# Patient Record
Sex: FEMALE | Race: WHITE | NOT HISPANIC OR LATINO | Employment: OTHER | URBAN - NONMETROPOLITAN AREA
[De-identification: names, ages, dates, MRNs, and addresses within clinical notes are randomized per-mention and may not be internally consistent; named-entity substitution may affect disease eponyms.]

---

## 2023-03-10 LAB — SARS-COV-2 RESULT: NOT DETECTED

## 2023-03-24 DIAGNOSIS — I10 HYPERTENSION, UNSPECIFIED TYPE: ICD-10-CM

## 2023-03-24 DIAGNOSIS — E78.5 HYPERLIPIDEMIA, UNSPECIFIED HYPERLIPIDEMIA TYPE: ICD-10-CM

## 2023-03-24 DIAGNOSIS — E03.9 HYPOTHYROIDISM, UNSPECIFIED TYPE: ICD-10-CM

## 2023-03-24 RX ORDER — LOSARTAN POTASSIUM 100 MG/1
100 TABLET ORAL DAILY
Qty: 90 TABLET | Refills: 0 | Status: SHIPPED | OUTPATIENT
Start: 2023-03-24 | End: 2023-06-13

## 2023-03-24 RX ORDER — ROSUVASTATIN CALCIUM 40 MG/1
40 TABLET, COATED ORAL DAILY
Qty: 90 TABLET | Refills: 0 | Status: SHIPPED | OUTPATIENT
Start: 2023-03-24 | End: 2023-06-13

## 2023-03-24 RX ORDER — CHOLECALCIFEROL (VITAMIN D3) 25 MCG
25 TABLET ORAL DAILY
COMMUNITY

## 2023-03-24 RX ORDER — ROSUVASTATIN CALCIUM 40 MG/1
40 TABLET, COATED ORAL DAILY
COMMUNITY
Start: 2017-08-07 | End: 2023-03-24 | Stop reason: SDUPTHER

## 2023-03-24 RX ORDER — CALCIUM CARBONATE 600 MG
1 TABLET ORAL DAILY
COMMUNITY
End: 2023-10-12 | Stop reason: ALTCHOICE

## 2023-03-24 RX ORDER — ACETAMINOPHEN 500 MG
1 TABLET ORAL NIGHTLY
COMMUNITY

## 2023-03-24 RX ORDER — DIPHENOXYLATE HYDROCHLORIDE AND ATROPINE SULFATE 2.5; .025 MG/1; MG/1
TABLET ORAL
COMMUNITY
Start: 2022-12-27 | End: 2023-09-14 | Stop reason: ALTCHOICE

## 2023-03-24 RX ORDER — LEVOTHYROXINE SODIUM 88 UG/1
88 TABLET ORAL
COMMUNITY
End: 2023-03-24 | Stop reason: SDUPTHER

## 2023-03-24 RX ORDER — ASPIRIN 81 MG/1
1 TABLET ORAL DAILY
COMMUNITY
End: 2023-09-14 | Stop reason: ALTCHOICE

## 2023-03-24 RX ORDER — FENOFIBRATE 160 MG/1
160 TABLET ORAL DAILY
Qty: 90 TABLET | Refills: 0 | Status: SHIPPED | OUTPATIENT
Start: 2023-03-24 | End: 2023-06-13

## 2023-03-24 RX ORDER — FENOFIBRATE 160 MG/1
1 TABLET ORAL DAILY
COMMUNITY
End: 2023-03-24 | Stop reason: SDUPTHER

## 2023-03-24 RX ORDER — BLOOD SUGAR DIAGNOSTIC
STRIP MISCELLANEOUS 4 TIMES DAILY
COMMUNITY
Start: 2016-03-07 | End: 2023-10-06 | Stop reason: ALTCHOICE

## 2023-03-24 RX ORDER — DOCUSATE SODIUM 100 MG/1
CAPSULE, LIQUID FILLED ORAL
COMMUNITY
Start: 2022-12-28 | End: 2023-09-14 | Stop reason: ALTCHOICE

## 2023-03-24 RX ORDER — LOSARTAN POTASSIUM 100 MG/1
100 TABLET ORAL DAILY
COMMUNITY
End: 2023-03-24 | Stop reason: SDUPTHER

## 2023-03-24 RX ORDER — INSULIN LISPRO 100 [IU]/ML
10-20 INJECTION, SOLUTION INTRAVENOUS; SUBCUTANEOUS
COMMUNITY
Start: 2017-11-13 | End: 2023-11-09 | Stop reason: SDUPTHER

## 2023-03-24 RX ORDER — GABAPENTIN 100 MG/1
100 CAPSULE ORAL 3 TIMES DAILY
COMMUNITY
Start: 2023-02-08 | End: 2023-09-14 | Stop reason: ALTCHOICE

## 2023-03-24 RX ORDER — LISINOPRIL 10 MG/1
10 TABLET ORAL DAILY
COMMUNITY
Start: 2017-09-19 | End: 2024-01-17

## 2023-03-24 RX ORDER — INSULIN DEGLUDEC 100 U/ML
40 INJECTION, SOLUTION SUBCUTANEOUS NIGHTLY
COMMUNITY
End: 2023-09-14 | Stop reason: ALTCHOICE

## 2023-03-24 RX ORDER — LEVOTHYROXINE SODIUM 88 UG/1
88 TABLET ORAL
Qty: 90 TABLET | Refills: 0 | Status: SHIPPED | OUTPATIENT
Start: 2023-03-24 | End: 2023-06-13

## 2023-03-29 LAB
ALANINE AMINOTRANSFERASE (SGPT) (U/L) IN SER/PLAS: 9 U/L (ref 7–45)
ALBUMIN (G/DL) IN SER/PLAS: 3.9 G/DL (ref 3.4–5)
ALKALINE PHOSPHATASE (U/L) IN SER/PLAS: 48 U/L (ref 33–136)
ANION GAP IN SER/PLAS: 15 MMOL/L (ref 10–20)
ASPARTATE AMINOTRANSFERASE (SGOT) (U/L) IN SER/PLAS: 12 U/L (ref 9–39)
BASOPHILS (10*3/UL) IN BLOOD BY AUTOMATED COUNT: 0.1 X10E9/L (ref 0–0.1)
BASOPHILS/100 LEUKOCYTES IN BLOOD BY AUTOMATED COUNT: 1.2 % (ref 0–2)
BILIRUBIN TOTAL (MG/DL) IN SER/PLAS: 0.4 MG/DL (ref 0–1.2)
CALCIUM (MG/DL) IN SER/PLAS: 9.3 MG/DL (ref 8.6–10.3)
CARBON DIOXIDE, TOTAL (MMOL/L) IN SER/PLAS: 21 MMOL/L (ref 21–32)
CHLORIDE (MMOL/L) IN SER/PLAS: 105 MMOL/L (ref 98–107)
CREATININE (MG/DL) IN SER/PLAS: 0.93 MG/DL (ref 0.5–1.05)
EOSINOPHILS (10*3/UL) IN BLOOD BY AUTOMATED COUNT: 0.79 X10E9/L (ref 0–0.4)
EOSINOPHILS/100 LEUKOCYTES IN BLOOD BY AUTOMATED COUNT: 9.8 % (ref 0–6)
ERYTHROCYTE DISTRIBUTION WIDTH (RATIO) BY AUTOMATED COUNT: 17.2 % (ref 11.5–14.5)
ERYTHROCYTE MEAN CORPUSCULAR HEMOGLOBIN CONCENTRATION (G/DL) BY AUTOMATED: 30.7 G/DL (ref 32–36)
ERYTHROCYTE MEAN CORPUSCULAR VOLUME (FL) BY AUTOMATED COUNT: 92 FL (ref 80–100)
ERYTHROCYTES (10*6/UL) IN BLOOD BY AUTOMATED COUNT: 3.06 X10E12/L (ref 4–5.2)
GFR FEMALE: 65 ML/MIN/1.73M2
GLUCOSE (MG/DL) IN SER/PLAS: 180 MG/DL (ref 74–99)
HEMATOCRIT (%) IN BLOOD BY AUTOMATED COUNT: 28.3 % (ref 36–46)
HEMOGLOBIN (G/DL) IN BLOOD: 8.7 G/DL (ref 12–16)
IMMATURE GRANULOCYTES/100 LEUKOCYTES IN BLOOD BY AUTOMATED COUNT: 0.5 % (ref 0–0.9)
LEUKOCYTES (10*3/UL) IN BLOOD BY AUTOMATED COUNT: 8.1 X10E9/L (ref 4.4–11.3)
LYMPHOCYTES (10*3/UL) IN BLOOD BY AUTOMATED COUNT: 3.22 X10E9/L (ref 0.8–3)
LYMPHOCYTES/100 LEUKOCYTES IN BLOOD BY AUTOMATED COUNT: 40 % (ref 13–44)
MONOCYTES (10*3/UL) IN BLOOD BY AUTOMATED COUNT: 0.79 X10E9/L (ref 0.05–0.8)
MONOCYTES/100 LEUKOCYTES IN BLOOD BY AUTOMATED COUNT: 9.8 % (ref 2–10)
NEUTROPHILS (10*3/UL) IN BLOOD BY AUTOMATED COUNT: 3.12 X10E9/L (ref 1.6–5.5)
NEUTROPHILS/100 LEUKOCYTES IN BLOOD BY AUTOMATED COUNT: 38.7 % (ref 40–80)
PLATELETS (10*3/UL) IN BLOOD AUTOMATED COUNT: 562 X10E9/L (ref 150–450)
POTASSIUM (MMOL/L) IN SER/PLAS: 4.5 MMOL/L (ref 3.5–5.3)
PROTEIN TOTAL: 7 G/DL (ref 6.4–8.2)
SODIUM (MMOL/L) IN SER/PLAS: 136 MMOL/L (ref 136–145)
UREA NITROGEN (MG/DL) IN SER/PLAS: 25 MG/DL (ref 6–23)

## 2023-03-30 LAB — CANCER AG 125 (U/ML) IN SERUM: 89.6 U/ML (ref 0–30.2)

## 2023-04-05 ENCOUNTER — OFFICE VISIT (OUTPATIENT)
Dept: PRIMARY CARE | Facility: CLINIC | Age: 73
End: 2023-04-05
Payer: MEDICARE

## 2023-04-05 VITALS
OXYGEN SATURATION: 96 % | HEART RATE: 86 BPM | BODY MASS INDEX: 26.67 KG/M2 | WEIGHT: 155.4 LBS | SYSTOLIC BLOOD PRESSURE: 104 MMHG | DIASTOLIC BLOOD PRESSURE: 60 MMHG

## 2023-04-05 DIAGNOSIS — I10 HYPERTENSION, UNSPECIFIED TYPE: ICD-10-CM

## 2023-04-05 DIAGNOSIS — E11.69 TYPE 2 DIABETES MELLITUS WITH OTHER SPECIFIED COMPLICATION, WITH LONG-TERM CURRENT USE OF INSULIN (MULTI): ICD-10-CM

## 2023-04-05 DIAGNOSIS — C56.9 PRIMARY MALIGNANT NEOPLASM OF OVARY WITH WIDESPREAD METASTATIC DISEASE, UNSPECIFIED LATERALITY (MULTI): Primary | ICD-10-CM

## 2023-04-05 DIAGNOSIS — E03.9 HYPOTHYROIDISM, UNSPECIFIED TYPE: ICD-10-CM

## 2023-04-05 DIAGNOSIS — C80.0 PRIMARY MALIGNANT NEOPLASM OF OVARY WITH WIDESPREAD METASTATIC DISEASE, UNSPECIFIED LATERALITY (MULTI): Primary | ICD-10-CM

## 2023-04-05 DIAGNOSIS — I25.10 CAD, MULTIPLE VESSEL: ICD-10-CM

## 2023-04-05 DIAGNOSIS — Z79.4 TYPE 2 DIABETES MELLITUS WITH OTHER SPECIFIED COMPLICATION, WITH LONG-TERM CURRENT USE OF INSULIN (MULTI): ICD-10-CM

## 2023-04-05 PROBLEM — R93.1 ABNORMAL SCREENING CARDIAC CT: Status: ACTIVE | Noted: 2023-04-05

## 2023-04-05 PROBLEM — R39.9 URINARY SYMPTOM OR SIGN: Status: ACTIVE | Noted: 2023-04-05

## 2023-04-05 PROBLEM — R23.4 FISSURE IN SKIN OF FOOT: Status: ACTIVE | Noted: 2023-04-05

## 2023-04-05 PROBLEM — E11.9 TYPE 2 DIABETES MELLITUS (MULTI): Status: ACTIVE | Noted: 2023-04-05

## 2023-04-05 PROBLEM — M19.90 OSTEOARTHRITIS: Status: ACTIVE | Noted: 2023-04-05

## 2023-04-05 PROBLEM — E66.3 OVERWEIGHT WITH BODY MASS INDEX (BMI) OF 28 TO 28.9 IN ADULT: Status: ACTIVE | Noted: 2023-04-05

## 2023-04-05 PROBLEM — M25.539 PAIN, WRIST JOINT: Status: ACTIVE | Noted: 2023-04-05

## 2023-04-05 PROBLEM — B35.1 PAIN DUE TO ONYCHOMYCOSIS OF TOENAILS OF BOTH FEET: Status: ACTIVE | Noted: 2023-04-05

## 2023-04-05 PROBLEM — E55.9 VITAMIN D DEFICIENCY: Status: ACTIVE | Noted: 2023-04-05

## 2023-04-05 PROBLEM — M79.675 PAIN DUE TO ONYCHOMYCOSIS OF TOENAILS OF BOTH FEET: Status: ACTIVE | Noted: 2023-04-05

## 2023-04-05 PROBLEM — B35.3 TINEA PEDIS OF BOTH FEET: Status: ACTIVE | Noted: 2023-04-05

## 2023-04-05 PROBLEM — T75.3XXA MOTION SICKNESS: Status: ACTIVE | Noted: 2023-04-05

## 2023-04-05 PROBLEM — E78.5 HYPERLIPIDEMIA: Status: ACTIVE | Noted: 2023-04-05

## 2023-04-05 PROBLEM — M79.674 PAIN DUE TO ONYCHOMYCOSIS OF TOENAILS OF BOTH FEET: Status: ACTIVE | Noted: 2023-04-05

## 2023-04-05 PROCEDURE — 3008F BODY MASS INDEX DOCD: CPT | Performed by: INTERNAL MEDICINE

## 2023-04-05 PROCEDURE — 3078F DIAST BP <80 MM HG: CPT | Performed by: INTERNAL MEDICINE

## 2023-04-05 PROCEDURE — 3066F NEPHROPATHY DOC TX: CPT | Performed by: INTERNAL MEDICINE

## 2023-04-05 PROCEDURE — 1036F TOBACCO NON-USER: CPT | Performed by: INTERNAL MEDICINE

## 2023-04-05 PROCEDURE — 3074F SYST BP LT 130 MM HG: CPT | Performed by: INTERNAL MEDICINE

## 2023-04-05 PROCEDURE — 4010F ACE/ARB THERAPY RXD/TAKEN: CPT | Performed by: INTERNAL MEDICINE

## 2023-04-05 PROCEDURE — 99215 OFFICE O/P EST HI 40 MIN: CPT | Performed by: INTERNAL MEDICINE

## 2023-04-05 PROCEDURE — 1159F MED LIST DOCD IN RCRD: CPT | Performed by: INTERNAL MEDICINE

## 2023-04-05 PROCEDURE — 1160F RVW MEDS BY RX/DR IN RCRD: CPT | Performed by: INTERNAL MEDICINE

## 2023-04-05 RX ORDER — TRAMADOL HYDROCHLORIDE 50 MG/1
TABLET ORAL
COMMUNITY
Start: 2023-03-24 | End: 2023-10-06 | Stop reason: ALTCHOICE

## 2023-04-05 RX ORDER — PEN NEEDLE, DIABETIC 30 GX3/16"
NEEDLE, DISPOSABLE MISCELLANEOUS
COMMUNITY

## 2023-04-05 RX ORDER — SYRINGE,SAFETY WITH NEEDLE,1ML 25GX1"
SYRINGE (EA) MISCELLANEOUS DAILY
COMMUNITY
Start: 2017-10-16 | End: 2023-09-14 | Stop reason: ALTCHOICE

## 2023-04-05 RX ORDER — GLUCAGON INJECTION, SOLUTION 1 MG/.2ML
INJECTION, SOLUTION SUBCUTANEOUS
COMMUNITY

## 2023-04-05 RX ORDER — METFORMIN HYDROCHLORIDE 500 MG/1
500 TABLET, EXTENDED RELEASE ORAL 2 TIMES DAILY
COMMUNITY
End: 2024-04-09 | Stop reason: SDUPTHER

## 2023-04-05 RX ORDER — MECLIZINE HYDROCHLORIDE 25 MG/1
TABLET ORAL
COMMUNITY
Start: 2023-04-04 | End: 2023-09-14 | Stop reason: ALTCHOICE

## 2023-04-05 ASSESSMENT — ENCOUNTER SYMPTOMS
OCCASIONAL FEELINGS OF UNSTEADINESS: 0
LOSS OF SENSATION IN FEET: 0
DEPRESSION: 1

## 2023-04-05 NOTE — PROGRESS NOTES
Subjective   Patient ID: Catalina Mendoza is a 72 y.o. female who presents for Follow-up (3 mth medical management ).  HPI    Patient presented for post hospital follow-up.    Patient had surgery for stage III ovarian cancer on March 13, 2023.  Currently in chemo.  Oncology following  Ultrasound for RUQ pain tomorrow     DM  this morning on insulin no side effects.  Endocrine following.    Hypercholesterolemia on therapy no side effects    Hypothyroid on therapy no side effects    Hypertension stable on therapy no side effects    CAD Hypercholesterolemia on therapy no side effects    Diet and exercise reviewed      Review of Systems   All other systems reviewed and are negative.      Objective   Physical Exam  Vitals reviewed.   Constitutional:       Appearance: Normal appearance. She is obese.   HENT:      Head: Normocephalic and atraumatic.      Mouth/Throat:      Pharynx: No posterior oropharyngeal erythema.   Eyes:      General: No scleral icterus.     Conjunctiva/sclera: Conjunctivae normal.      Pupils: Pupils are equal, round, and reactive to light.   Cardiovascular:      Rate and Rhythm: Normal rate and regular rhythm.      Heart sounds: Normal heart sounds.   Pulmonary:      Effort: No respiratory distress.      Breath sounds: No wheezing.   Abdominal:      General: Abdomen is flat. Bowel sounds are normal. There is no distension.      Palpations: Abdomen is soft. There is no mass.      Tenderness: There is no abdominal tenderness. There is no rebound.      Comments: Incision clean and dry   Musculoskeletal:         General: Normal range of motion.      Cervical back: Normal range of motion and neck supple.   Skin:     General: Skin is warm and dry.   Neurological:      General: No focal deficit present.      Mental Status: She is alert and oriented to person, place, and time. Mental status is at baseline.   Psychiatric:         Mood and Affect: Mood normal.         Behavior: Behavior normal.          Thought Content: Thought content normal.         Judgment: Judgment normal.         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  Currently in chemo.  Oncology following  Ultrasound for RUQ pain tomorrow     DM  this morning on insulin no side effects.  Endocrine following.    Hypercholesterolemia on therapy no side effects    Hypothyroid on therapy no side effects    Hypertension stable on therapy no side effects    CAD Hypercholesterolemia on therapy no side effects    Diet and exercise reviewed    Follow up 3 months

## 2023-05-23 LAB
ESTIMATED AVERAGE GLUCOSE FOR HBA1C: 166 MG/DL
HEMOGLOBIN A1C/HEMOGLOBIN TOTAL IN BLOOD: 7.4 %
THYROTROPIN (MIU/L) IN SER/PLAS BY DETECTION LIMIT <= 0.05 MIU/L: 1.63 MIU/L (ref 0.44–3.98)

## 2023-06-02 PROBLEM — E11.311 DIABETIC MACULAR EDEMA (MULTI): Status: ACTIVE | Noted: 2019-05-10

## 2023-06-02 PROBLEM — E11.9 DIABETES MELLITUS (MULTI): Status: ACTIVE | Noted: 2023-06-02

## 2023-06-02 PROBLEM — E78.2 MIXED HYPERLIPIDEMIA: Status: ACTIVE | Noted: 2019-05-10

## 2023-06-02 PROBLEM — E55.9 VITAMIN D DEFICIENCY: Status: ACTIVE | Noted: 2019-05-10

## 2023-06-02 PROBLEM — E03.9 ACQUIRED HYPOTHYROIDISM: Status: ACTIVE | Noted: 2019-05-10

## 2023-06-02 PROBLEM — E10.21 DIABETIC NEPHROPATHY ASSOCIATED WITH TYPE 1 DIABETES MELLITUS (MULTI): Status: ACTIVE | Noted: 2020-07-10

## 2023-06-02 RX ORDER — DEXTROSE 4 G
TABLET,CHEWABLE ORAL
COMMUNITY
End: 2023-10-06 | Stop reason: ALTCHOICE

## 2023-06-06 LAB
GENETICS TEST NAME-DATA CONVERSION: NORMAL
LAB MOLECULAR CA TECHNICAL NOTES: NORMAL

## 2023-06-08 ENCOUNTER — OFFICE VISIT (OUTPATIENT)
Dept: PRIMARY CARE | Facility: CLINIC | Age: 73
End: 2023-06-08
Payer: MEDICARE

## 2023-06-08 VITALS
BODY MASS INDEX: 27.29 KG/M2 | SYSTOLIC BLOOD PRESSURE: 120 MMHG | HEART RATE: 70 BPM | WEIGHT: 159 LBS | DIASTOLIC BLOOD PRESSURE: 70 MMHG | OXYGEN SATURATION: 99 %

## 2023-06-08 DIAGNOSIS — I25.10 CAD, MULTIPLE VESSEL: ICD-10-CM

## 2023-06-08 DIAGNOSIS — C56.9 PRIMARY MALIGNANT NEOPLASM OF OVARY WITH WIDESPREAD METASTATIC DISEASE, UNSPECIFIED LATERALITY (MULTI): Primary | ICD-10-CM

## 2023-06-08 DIAGNOSIS — E78.2 MIXED HYPERLIPIDEMIA: ICD-10-CM

## 2023-06-08 DIAGNOSIS — I10 HYPERTENSION, UNSPECIFIED TYPE: ICD-10-CM

## 2023-06-08 DIAGNOSIS — E03.9 HYPOTHYROIDISM, UNSPECIFIED TYPE: ICD-10-CM

## 2023-06-08 DIAGNOSIS — Z79.4 TYPE 2 DIABETES MELLITUS WITH OTHER SPECIFIED COMPLICATION, WITH LONG-TERM CURRENT USE OF INSULIN (MULTI): ICD-10-CM

## 2023-06-08 DIAGNOSIS — C80.0 PRIMARY MALIGNANT NEOPLASM OF OVARY WITH WIDESPREAD METASTATIC DISEASE, UNSPECIFIED LATERALITY (MULTI): Primary | ICD-10-CM

## 2023-06-08 DIAGNOSIS — E11.69 TYPE 2 DIABETES MELLITUS WITH OTHER SPECIFIED COMPLICATION, WITH LONG-TERM CURRENT USE OF INSULIN (MULTI): ICD-10-CM

## 2023-06-08 PROCEDURE — 3078F DIAST BP <80 MM HG: CPT | Performed by: INTERNAL MEDICINE

## 2023-06-08 PROCEDURE — 3074F SYST BP LT 130 MM HG: CPT | Performed by: INTERNAL MEDICINE

## 2023-06-08 PROCEDURE — 3051F HG A1C>EQUAL 7.0%<8.0%: CPT | Performed by: INTERNAL MEDICINE

## 2023-06-08 PROCEDURE — 1036F TOBACCO NON-USER: CPT | Performed by: INTERNAL MEDICINE

## 2023-06-08 PROCEDURE — 3066F NEPHROPATHY DOC TX: CPT | Performed by: INTERNAL MEDICINE

## 2023-06-08 PROCEDURE — 1160F RVW MEDS BY RX/DR IN RCRD: CPT | Performed by: INTERNAL MEDICINE

## 2023-06-08 PROCEDURE — 3008F BODY MASS INDEX DOCD: CPT | Performed by: INTERNAL MEDICINE

## 2023-06-08 PROCEDURE — 4010F ACE/ARB THERAPY RXD/TAKEN: CPT | Performed by: INTERNAL MEDICINE

## 2023-06-08 PROCEDURE — 1159F MED LIST DOCD IN RCRD: CPT | Performed by: INTERNAL MEDICINE

## 2023-06-08 PROCEDURE — 99214 OFFICE O/P EST MOD 30 MIN: CPT | Performed by: INTERNAL MEDICINE

## 2023-06-08 ASSESSMENT — ENCOUNTER SYMPTOMS: HYPERTENSION: 1

## 2023-06-08 NOTE — PROGRESS NOTES
Subjective   Patient ID: Catalina Mendoza is a 72 y.o. female who presents for Hypertension, Hyperthyroidism, and Diabetes Mellitus.  Hypertension  Follow up    patient had surgery for stage III ovarian cancer on March 13, 2023.  Currently in chemo.  Oncology following     DM  this morning on insulin no side effects.  Endocrine following. up due to chemo    Hypercholesterolemia on therapy no side effects     Hypothyroid on therapy no side effects     Hypertension stable on therapy no side effects     CAD on therapy no side effects     Diet and exercise reviewed    Review of Systems   All other systems reviewed and are negative.      Objective   /70   Pulse 70   Wt 72.1 kg (159 lb)   SpO2 99%   BMI 27.29 kg/m²   Lab Results   Component Value Date    WBC 5.4 05/23/2023    HGB 9.7 (L) 05/23/2023    HCT 30.6 (L) 05/23/2023     05/23/2023    CHOL 161 07/27/2022    TRIG 453 (H) 07/27/2022    HDL 34.0 (A) 07/27/2022    ALT 17 05/23/2023    AST 20 05/23/2023     (L) 05/23/2023    K 4.2 05/23/2023     05/23/2023    CREATININE 0.81 05/23/2023    BUN 19 05/23/2023    CO2 24 05/23/2023    TSH 1.63 05/23/2023    HGBA1C 7.4 (A) 05/23/2023           Physical Exam  Vitals reviewed.   Constitutional:       Appearance: Normal appearance. She is normal weight.   HENT:      Head: Normocephalic and atraumatic.      Mouth/Throat:      Pharynx: No posterior oropharyngeal erythema.   Eyes:      General: No scleral icterus.     Conjunctiva/sclera: Conjunctivae normal.      Pupils: Pupils are equal, round, and reactive to light.   Cardiovascular:      Rate and Rhythm: Normal rate and regular rhythm.      Heart sounds: Normal heart sounds.   Pulmonary:      Effort: No respiratory distress.      Breath sounds: No wheezing.   Abdominal:      General: Abdomen is flat. Bowel sounds are normal. There is no distension.      Palpations: Abdomen is soft. There is no mass.      Tenderness: There is no abdominal  tenderness. There is no rebound.   Musculoskeletal:         General: Normal range of motion.      Cervical back: Normal range of motion and neck supple.   Skin:     General: Skin is warm and dry.   Neurological:      General: No focal deficit present.      Mental Status: She is alert and oriented to person, place, and time. Mental status is at baseline.   Psychiatric:         Mood and Affect: Mood normal.         Behavior: Behavior normal.         Thought Content: Thought content normal.         Judgment: Judgment normal.       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  Currently in chemo.  Oncology following     DM  this morning on insulin no side effects.  Endocrine following. up due to chemo    Hypercholesterolemia on therapy no side effects     Hypothyroid on therapy no side effects     Hypertension stable on therapy no side effects     CAD on therapy no side effects     Diet and exercise reviewed    Follow up 3 months

## 2023-06-13 DIAGNOSIS — E03.9 HYPOTHYROIDISM, UNSPECIFIED TYPE: ICD-10-CM

## 2023-06-13 DIAGNOSIS — I10 HYPERTENSION, UNSPECIFIED TYPE: ICD-10-CM

## 2023-06-13 DIAGNOSIS — E78.5 HYPERLIPIDEMIA, UNSPECIFIED HYPERLIPIDEMIA TYPE: ICD-10-CM

## 2023-06-13 RX ORDER — LEVOTHYROXINE SODIUM 88 UG/1
TABLET ORAL
Qty: 90 TABLET | Refills: 0 | Status: SHIPPED | OUTPATIENT
Start: 2023-06-13 | End: 2023-06-15 | Stop reason: SDUPTHER

## 2023-06-13 RX ORDER — LOSARTAN POTASSIUM 100 MG/1
TABLET ORAL
Qty: 90 TABLET | Refills: 0 | Status: SHIPPED | OUTPATIENT
Start: 2023-06-13 | End: 2023-09-12

## 2023-06-13 RX ORDER — ROSUVASTATIN CALCIUM 40 MG/1
TABLET, COATED ORAL
Qty: 90 TABLET | Refills: 0 | Status: SHIPPED | OUTPATIENT
Start: 2023-06-13 | End: 2023-10-25

## 2023-06-13 RX ORDER — FENOFIBRATE 160 MG/1
TABLET ORAL
Qty: 90 TABLET | Refills: 0 | Status: SHIPPED | OUTPATIENT
Start: 2023-06-13 | End: 2023-09-12

## 2023-06-15 ENCOUNTER — TELEPHONE (OUTPATIENT)
Dept: PRIMARY CARE | Facility: CLINIC | Age: 73
End: 2023-06-15
Payer: MEDICARE

## 2023-06-15 DIAGNOSIS — E03.9 HYPOTHYROIDISM, UNSPECIFIED TYPE: ICD-10-CM

## 2023-06-15 RX ORDER — LEVOTHYROXINE SODIUM 88 UG/1
88 TABLET ORAL
Qty: 90 TABLET | Refills: 0 | Status: SHIPPED | OUTPATIENT
Start: 2023-06-15 | End: 2023-06-19 | Stop reason: ALTCHOICE

## 2023-06-19 DIAGNOSIS — E03.9 HYPOTHYROIDISM, UNSPECIFIED TYPE: ICD-10-CM

## 2023-06-19 RX ORDER — LEVOTHYROXINE SODIUM 112 UG/1
CAPSULE ORAL
COMMUNITY
End: 2023-11-09 | Stop reason: ENTERED-IN-ERROR

## 2023-09-02 PROBLEM — E11.65 POORLY CONTROLLED DIABETES MELLITUS (MULTI): Status: ACTIVE | Noted: 2023-09-02

## 2023-09-02 RX ORDER — INSULIN GLARGINE 100 [IU]/ML
40 INJECTION, SOLUTION SUBCUTANEOUS NIGHTLY
COMMUNITY
Start: 2023-04-12 | End: 2023-12-08

## 2023-09-07 ENCOUNTER — APPOINTMENT (OUTPATIENT)
Dept: PRIMARY CARE | Facility: CLINIC | Age: 73
End: 2023-09-07
Payer: MEDICARE

## 2023-09-11 DIAGNOSIS — I10 HYPERTENSION, UNSPECIFIED TYPE: ICD-10-CM

## 2023-09-11 DIAGNOSIS — E78.5 HYPERLIPIDEMIA, UNSPECIFIED HYPERLIPIDEMIA TYPE: ICD-10-CM

## 2023-09-12 ENCOUNTER — APPOINTMENT (OUTPATIENT)
Dept: PRIMARY CARE | Facility: CLINIC | Age: 73
End: 2023-09-12
Payer: MEDICARE

## 2023-09-12 RX ORDER — FENOFIBRATE 160 MG/1
TABLET ORAL
Qty: 90 TABLET | Refills: 0 | Status: SHIPPED | OUTPATIENT
Start: 2023-09-12 | End: 2023-12-13

## 2023-09-12 RX ORDER — LOSARTAN POTASSIUM 100 MG/1
TABLET ORAL
Qty: 90 TABLET | Refills: 0 | Status: SHIPPED | OUTPATIENT
Start: 2023-09-12 | End: 2023-10-12 | Stop reason: ALTCHOICE

## 2023-09-14 ENCOUNTER — OFFICE VISIT (OUTPATIENT)
Dept: PRIMARY CARE | Facility: CLINIC | Age: 73
End: 2023-09-14
Payer: MEDICARE

## 2023-09-14 VITALS
WEIGHT: 153 LBS | DIASTOLIC BLOOD PRESSURE: 68 MMHG | HEART RATE: 86 BPM | SYSTOLIC BLOOD PRESSURE: 122 MMHG | OXYGEN SATURATION: 98 % | BODY MASS INDEX: 26.26 KG/M2

## 2023-09-14 DIAGNOSIS — R10.9 ABDOMINAL PAIN, UNSPECIFIED ABDOMINAL LOCATION: Primary | ICD-10-CM

## 2023-09-14 DIAGNOSIS — E78.00 HYPERCHOLESTEREMIA: ICD-10-CM

## 2023-09-14 DIAGNOSIS — E66.9 DIABETES MELLITUS TYPE 2 IN OBESE: ICD-10-CM

## 2023-09-14 DIAGNOSIS — I10 HYPERTENSION, UNSPECIFIED TYPE: ICD-10-CM

## 2023-09-14 DIAGNOSIS — C56.9 MALIGNANT NEOPLASM OF OVARY, UNSPECIFIED LATERALITY (MULTI): ICD-10-CM

## 2023-09-14 DIAGNOSIS — E03.9 HYPOTHYROIDISM, UNSPECIFIED TYPE: ICD-10-CM

## 2023-09-14 DIAGNOSIS — E11.69 DIABETES MELLITUS TYPE 2 IN OBESE: ICD-10-CM

## 2023-09-14 DIAGNOSIS — I25.10 CAD, MULTIPLE VESSEL: ICD-10-CM

## 2023-09-14 PROCEDURE — 3074F SYST BP LT 130 MM HG: CPT | Performed by: INTERNAL MEDICINE

## 2023-09-14 PROCEDURE — 1036F TOBACCO NON-USER: CPT | Performed by: INTERNAL MEDICINE

## 2023-09-14 PROCEDURE — 99214 OFFICE O/P EST MOD 30 MIN: CPT | Performed by: INTERNAL MEDICINE

## 2023-09-14 PROCEDURE — 1160F RVW MEDS BY RX/DR IN RCRD: CPT | Performed by: INTERNAL MEDICINE

## 2023-09-14 PROCEDURE — 3078F DIAST BP <80 MM HG: CPT | Performed by: INTERNAL MEDICINE

## 2023-09-14 PROCEDURE — 3008F BODY MASS INDEX DOCD: CPT | Performed by: INTERNAL MEDICINE

## 2023-09-14 PROCEDURE — 1159F MED LIST DOCD IN RCRD: CPT | Performed by: INTERNAL MEDICINE

## 2023-09-14 PROCEDURE — 3051F HG A1C>EQUAL 7.0%<8.0%: CPT | Performed by: INTERNAL MEDICINE

## 2023-09-14 PROCEDURE — 4010F ACE/ARB THERAPY RXD/TAKEN: CPT | Performed by: INTERNAL MEDICINE

## 2023-09-14 RX ORDER — DULOXETIN HYDROCHLORIDE 60 MG/1
60 CAPSULE, DELAYED RELEASE ORAL NIGHTLY
COMMUNITY
Start: 2023-08-15 | End: 2024-02-27 | Stop reason: SDUPTHER

## 2023-09-14 ASSESSMENT — ENCOUNTER SYMPTOMS: HYPERTENSION: 1

## 2023-09-14 NOTE — PROGRESS NOTES
Subjective   Patient ID: Catalina Mendoza is a 72 y.o. female who presents for Med Management, Diabetes Mellitus, Hyperlipidemia, Hypertension, and Primary malignant neoplasm of ovary with widespread metasta.  Hyperlipidemia    Hypertension    Sick appt    LUQ pain x 2-21  Now goes around upper abdomen  Before cancer  CT June rev'd responded well to chemo    patient had surgery for stage III ovarian cancer on March 13, 2023.  Currently in chemo.  Oncology following  Cardio involved possible chemo issues affecting heart     DM  this morning on insulin no side effects. Endocrine following.      Hypercholesterolemia on therapy no side effects     Hypothyroid on therapy no side effects     Hypertension stable on therapy no side effects     CAD on therapy no side effects     Diet and exercise reviewed    Review of Systems   All other systems reviewed and are negative.      Objective   /68   Pulse 86   Wt 69.4 kg (153 lb)   SpO2 98%   BMI 26.26 kg/m²   Lab Results   Component Value Date    WBC 4.5 06/09/2023    HGB 9.6 (L) 06/09/2023    HCT 29.8 (L) 06/09/2023     06/09/2023    CHOL 161 07/27/2022    TRIG 453 (H) 07/27/2022    HDL 34.0 (A) 07/27/2022    ALT 17 06/09/2023    AST 19 06/09/2023     (L) 06/09/2023    K 4.2 06/09/2023     06/09/2023    CREATININE 0.80 06/09/2023    BUN 13 06/09/2023    CO2 23 06/09/2023    TSH 1.63 05/23/2023    HGBA1C 7.4 (A) 05/23/2023           Physical Exam  Vitals reviewed.   Constitutional:       Appearance: Normal appearance. She is normal weight.   HENT:      Head: Normocephalic and atraumatic.      Mouth/Throat:      Pharynx: No posterior oropharyngeal erythema.   Eyes:      General: No scleral icterus.     Conjunctiva/sclera: Conjunctivae normal.      Pupils: Pupils are equal, round, and reactive to light.   Cardiovascular:      Rate and Rhythm: Normal rate and regular rhythm.      Heart sounds: Normal heart sounds.   Pulmonary:      Effort: No  respiratory distress.      Breath sounds: No wheezing.   Abdominal:      General: Abdomen is flat. Bowel sounds are normal. There is no distension.      Palpations: Abdomen is soft. There is no mass.      Tenderness: There is no abdominal tenderness. There is no rebound.   Musculoskeletal:         General: Normal range of motion.      Cervical back: Normal range of motion and neck supple.   Skin:     General: Skin is warm and dry.   Neurological:      General: No focal deficit present.      Mental Status: She is alert and oriented to person, place, and time. Mental status is at baseline.   Psychiatric:         Mood and Affect: Mood normal.         Behavior: Behavior normal.         Thought Content: Thought content normal.         Judgment: Judgment normal.         Problem List Items Addressed This Visit          Cardiac and Vasculature    CAD, multiple vessel       Endocrine/Metabolic    Hypothyroidism     Other Visit Diagnoses       Abdominal pain, unspecified abdominal location    -  Primary    Malignant neoplasm of ovary, unspecified laterality (CMS/HCC)        Diabetes mellitus type 2 in obese (CMS/HCC)        Hypertension, unspecified type        Hypercholesteremia        BMI 26.0-26.9,adult              Assessment/Plan   Sick visit    LUQ pain x 2-21  Now goes around upper abdomen  Before cancer  CT June rev'd responded well to chemo  Started cymbalta this week    patient had surgery for stage III ovarian cancer on March 13, 2023.  Currently in chemo.  Oncology following  Cardio involved possible chemo issues affecting heart     DM  this morning on insulin no side effects. Endocrine following.      Hypercholesterolemia on therapy no side effects     Hypothyroid on therapy no side effects     Hypertension stable on therapy no side effects     CAD on therapy no side effects     Diet and exercise reviewed    Follow up 1 month / medicare physical

## 2023-10-10 ENCOUNTER — OFFICE VISIT (OUTPATIENT)
Dept: HEMATOLOGY/ONCOLOGY | Facility: CLINIC | Age: 73
End: 2023-10-10
Payer: MEDICARE

## 2023-10-10 VITALS
TEMPERATURE: 97.7 F | BODY MASS INDEX: 26.99 KG/M2 | DIASTOLIC BLOOD PRESSURE: 83 MMHG | RESPIRATION RATE: 17 BRPM | HEART RATE: 88 BPM | WEIGHT: 152.34 LBS | OXYGEN SATURATION: 97 % | SYSTOLIC BLOOD PRESSURE: 133 MMHG | HEIGHT: 63 IN

## 2023-10-10 DIAGNOSIS — C56.9 PRIMARY MALIGNANT NEOPLASM OF OVARY WITH WIDESPREAD METASTATIC DISEASE, UNSPECIFIED LATERALITY (MULTI): Primary | ICD-10-CM

## 2023-10-10 DIAGNOSIS — C80.0 PRIMARY MALIGNANT NEOPLASM OF OVARY WITH WIDESPREAD METASTATIC DISEASE, UNSPECIFIED LATERALITY (MULTI): Primary | ICD-10-CM

## 2023-10-10 PROCEDURE — 99214 OFFICE O/P EST MOD 30 MIN: CPT | Performed by: INTERNAL MEDICINE

## 2023-10-10 PROCEDURE — 1125F AMNT PAIN NOTED PAIN PRSNT: CPT | Performed by: INTERNAL MEDICINE

## 2023-10-10 PROCEDURE — 4010F ACE/ARB THERAPY RXD/TAKEN: CPT | Performed by: INTERNAL MEDICINE

## 2023-10-10 PROCEDURE — 1159F MED LIST DOCD IN RCRD: CPT | Performed by: INTERNAL MEDICINE

## 2023-10-10 PROCEDURE — 3008F BODY MASS INDEX DOCD: CPT | Performed by: INTERNAL MEDICINE

## 2023-10-10 PROCEDURE — 1160F RVW MEDS BY RX/DR IN RCRD: CPT | Performed by: INTERNAL MEDICINE

## 2023-10-10 PROCEDURE — 1036F TOBACCO NON-USER: CPT | Performed by: INTERNAL MEDICINE

## 2023-10-10 PROCEDURE — 3075F SYST BP GE 130 - 139MM HG: CPT | Performed by: INTERNAL MEDICINE

## 2023-10-10 PROCEDURE — 3051F HG A1C>EQUAL 7.0%<8.0%: CPT | Performed by: INTERNAL MEDICINE

## 2023-10-10 PROCEDURE — 3079F DIAST BP 80-89 MM HG: CPT | Performed by: INTERNAL MEDICINE

## 2023-10-10 RX ORDER — HEPARIN 100 UNIT/ML
500 SYRINGE INTRAVENOUS AS NEEDED
Status: CANCELLED | OUTPATIENT
Start: 2023-10-10

## 2023-10-10 RX ORDER — HEPARIN SODIUM,PORCINE/PF 10 UNIT/ML
50 SYRINGE (ML) INTRAVENOUS AS NEEDED
Status: CANCELLED | OUTPATIENT
Start: 2023-10-10

## 2023-10-10 ASSESSMENT — PATIENT HEALTH QUESTIONNAIRE - PHQ9
SUM OF ALL RESPONSES TO PHQ9 QUESTIONS 1 AND 2: 0
1. LITTLE INTEREST OR PLEASURE IN DOING THINGS: NOT AT ALL
2. FEELING DOWN, DEPRESSED OR HOPELESS: NOT AT ALL

## 2023-10-10 ASSESSMENT — PAIN SCALES - GENERAL: PAINLEVEL: 3

## 2023-10-10 ASSESSMENT — ENCOUNTER SYMPTOMS
OCCASIONAL FEELINGS OF UNSTEADINESS: 0
LOSS OF SENSATION IN FEET: 0
DEPRESSION: 0

## 2023-10-10 ASSESSMENT — COLUMBIA-SUICIDE SEVERITY RATING SCALE - C-SSRS
6. HAVE YOU EVER DONE ANYTHING, STARTED TO DO ANYTHING, OR PREPARED TO DO ANYTHING TO END YOUR LIFE?: NO
1. IN THE PAST MONTH, HAVE YOU WISHED YOU WERE DEAD OR WISHED YOU COULD GO TO SLEEP AND NOT WAKE UP?: NO
2. HAVE YOU ACTUALLY HAD ANY THOUGHTS OF KILLING YOURSELF?: NO

## 2023-10-10 ASSESSMENT — LIFESTYLE VARIABLES: HOW OFTEN DO YOU HAVE A DRINK CONTAINING ALCOHOL: NEVER

## 2023-10-10 NOTE — PATIENT INSTRUCTIONS
Today with you met Dr. Ca.  You will have a STAT CT then MD follow up to discuss next treatment plan.  You were given lexicomp sheets for avastin to review.

## 2023-10-11 ENCOUNTER — HOSPITAL ENCOUNTER (OUTPATIENT)
Dept: RADIOLOGY | Facility: HOSPITAL | Age: 73
Discharge: HOME | End: 2023-10-11
Payer: MEDICARE

## 2023-10-11 ENCOUNTER — INFUSION (OUTPATIENT)
Dept: HEMATOLOGY/ONCOLOGY | Facility: HOSPITAL | Age: 73
End: 2023-10-11
Payer: MEDICARE

## 2023-10-11 VITALS
RESPIRATION RATE: 18 BRPM | TEMPERATURE: 97 F | DIASTOLIC BLOOD PRESSURE: 70 MMHG | HEART RATE: 94 BPM | BODY MASS INDEX: 27.03 KG/M2 | OXYGEN SATURATION: 98 % | SYSTOLIC BLOOD PRESSURE: 114 MMHG | WEIGHT: 152.34 LBS

## 2023-10-11 DIAGNOSIS — C80.0 PRIMARY MALIGNANT NEOPLASM OF OVARY WITH WIDESPREAD METASTATIC DISEASE, UNSPECIFIED LATERALITY (MULTI): ICD-10-CM

## 2023-10-11 DIAGNOSIS — C56.9 MALIGNANT NEOPLASM OF UNSPECIFIED OVARY (MULTI): ICD-10-CM

## 2023-10-11 DIAGNOSIS — C56.9 PRIMARY MALIGNANT NEOPLASM OF OVARY WITH WIDESPREAD METASTATIC DISEASE, UNSPECIFIED LATERALITY (MULTI): ICD-10-CM

## 2023-10-11 PROCEDURE — 71260 CT THORAX DX C+: CPT | Mod: FOREIGN READ | Performed by: RADIOLOGY

## 2023-10-11 PROCEDURE — G1004 CDSM NDSC: HCPCS

## 2023-10-11 PROCEDURE — 74177 CT ABD & PELVIS W/CONTRAST: CPT | Mod: FOREIGN READ | Performed by: RADIOLOGY

## 2023-10-11 PROCEDURE — 96523 IRRIG DRUG DELIVERY DEVICE: CPT

## 2023-10-11 PROCEDURE — 2550000001 HC RX 255 CONTRASTS: Performed by: INTERNAL MEDICINE

## 2023-10-11 PROCEDURE — 2500000004 HC RX 250 GENERAL PHARMACY W/ HCPCS (ALT 636 FOR OP/ED): Performed by: INTERNAL MEDICINE

## 2023-10-11 RX ORDER — HEPARIN 100 UNIT/ML
500 SYRINGE INTRAVENOUS AS NEEDED
Status: CANCELLED | OUTPATIENT
Start: 2023-10-11

## 2023-10-11 RX ORDER — HEPARIN 100 UNIT/ML
500 SYRINGE INTRAVENOUS AS NEEDED
Status: DISCONTINUED | OUTPATIENT
Start: 2023-10-11 | End: 2023-10-11 | Stop reason: HOSPADM

## 2023-10-11 RX ORDER — HEPARIN SODIUM,PORCINE/PF 10 UNIT/ML
50 SYRINGE (ML) INTRAVENOUS AS NEEDED
Status: CANCELLED | OUTPATIENT
Start: 2023-10-11

## 2023-10-11 RX ADMIN — IOHEXOL 75 ML: 350 INJECTION, SOLUTION INTRAVENOUS at 15:24

## 2023-10-11 RX ADMIN — Medication 500 UNITS: at 15:18

## 2023-10-11 ASSESSMENT — PAIN SCALES - GENERAL: PAINLEVEL: 0-NO PAIN

## 2023-10-12 ENCOUNTER — OFFICE VISIT (OUTPATIENT)
Dept: PRIMARY CARE | Facility: CLINIC | Age: 73
End: 2023-10-12
Payer: MEDICARE

## 2023-10-12 VITALS
OXYGEN SATURATION: 99 % | WEIGHT: 152.8 LBS | BODY MASS INDEX: 27.07 KG/M2 | SYSTOLIC BLOOD PRESSURE: 116 MMHG | HEART RATE: 88 BPM | DIASTOLIC BLOOD PRESSURE: 72 MMHG | HEIGHT: 63 IN

## 2023-10-12 DIAGNOSIS — Z79.4 TYPE 2 DIABETES MELLITUS WITHOUT COMPLICATION, WITH LONG-TERM CURRENT USE OF INSULIN (MULTI): ICD-10-CM

## 2023-10-12 DIAGNOSIS — I10 HYPERTENSION, UNSPECIFIED TYPE: ICD-10-CM

## 2023-10-12 DIAGNOSIS — C56.9 PRIMARY MALIGNANT NEOPLASM OF OVARY WITH WIDESPREAD METASTATIC DISEASE, UNSPECIFIED LATERALITY (MULTI): ICD-10-CM

## 2023-10-12 DIAGNOSIS — Z00.00 ROUTINE GENERAL MEDICAL EXAMINATION AT HEALTH CARE FACILITY: Primary | ICD-10-CM

## 2023-10-12 DIAGNOSIS — E55.9 VITAMIN D DEFICIENCY: ICD-10-CM

## 2023-10-12 DIAGNOSIS — C80.0 PRIMARY MALIGNANT NEOPLASM OF OVARY WITH WIDESPREAD METASTATIC DISEASE, UNSPECIFIED LATERALITY (MULTI): ICD-10-CM

## 2023-10-12 DIAGNOSIS — E78.00 HYPERCHOLESTEREMIA: ICD-10-CM

## 2023-10-12 DIAGNOSIS — E03.9 HYPOTHYROIDISM, UNSPECIFIED TYPE: ICD-10-CM

## 2023-10-12 DIAGNOSIS — I25.10 CAD, MULTIPLE VESSEL: ICD-10-CM

## 2023-10-12 DIAGNOSIS — Z00.00 ENCOUNTER FOR ANNUAL WELLNESS VISIT (AWV) IN MEDICARE PATIENT: ICD-10-CM

## 2023-10-12 DIAGNOSIS — E11.9 TYPE 2 DIABETES MELLITUS WITHOUT COMPLICATION, WITH LONG-TERM CURRENT USE OF INSULIN (MULTI): ICD-10-CM

## 2023-10-12 PROCEDURE — 3051F HG A1C>EQUAL 7.0%<8.0%: CPT | Performed by: INTERNAL MEDICINE

## 2023-10-12 PROCEDURE — 3074F SYST BP LT 130 MM HG: CPT | Performed by: INTERNAL MEDICINE

## 2023-10-12 PROCEDURE — 1036F TOBACCO NON-USER: CPT | Performed by: INTERNAL MEDICINE

## 2023-10-12 PROCEDURE — 1170F FXNL STATUS ASSESSED: CPT | Performed by: INTERNAL MEDICINE

## 2023-10-12 PROCEDURE — 3008F BODY MASS INDEX DOCD: CPT | Performed by: INTERNAL MEDICINE

## 2023-10-12 PROCEDURE — 3078F DIAST BP <80 MM HG: CPT | Performed by: INTERNAL MEDICINE

## 2023-10-12 PROCEDURE — 99397 PER PM REEVAL EST PAT 65+ YR: CPT | Performed by: INTERNAL MEDICINE

## 2023-10-12 PROCEDURE — 1126F AMNT PAIN NOTED NONE PRSNT: CPT | Performed by: INTERNAL MEDICINE

## 2023-10-12 PROCEDURE — 4010F ACE/ARB THERAPY RXD/TAKEN: CPT | Performed by: INTERNAL MEDICINE

## 2023-10-12 PROCEDURE — 1160F RVW MEDS BY RX/DR IN RCRD: CPT | Performed by: INTERNAL MEDICINE

## 2023-10-12 PROCEDURE — G0439 PPPS, SUBSEQ VISIT: HCPCS | Performed by: INTERNAL MEDICINE

## 2023-10-12 PROCEDURE — 99214 OFFICE O/P EST MOD 30 MIN: CPT | Performed by: INTERNAL MEDICINE

## 2023-10-12 PROCEDURE — 1159F MED LIST DOCD IN RCRD: CPT | Performed by: INTERNAL MEDICINE

## 2023-10-12 RX ORDER — ISOPROPYL ALCOHOL 70 ML/100ML
SWAB TOPICAL
COMMUNITY

## 2023-10-12 RX ORDER — TIZANIDINE 4 MG/1
1 TABLET ORAL 2 TIMES DAILY PRN
COMMUNITY
End: 2023-11-05 | Stop reason: WASHOUT

## 2023-10-12 ASSESSMENT — ACTIVITIES OF DAILY LIVING (ADL)
GROCERY_SHOPPING: INDEPENDENT
MANAGING_FINANCES: INDEPENDENT
DRESSING: INDEPENDENT
DOING_HOUSEWORK: INDEPENDENT
BATHING: INDEPENDENT
TAKING_MEDICATION: INDEPENDENT

## 2023-10-12 ASSESSMENT — PATIENT HEALTH QUESTIONNAIRE - PHQ9
2. FEELING DOWN, DEPRESSED OR HOPELESS: NOT AT ALL
1. LITTLE INTEREST OR PLEASURE IN DOING THINGS: NOT AT ALL
SUM OF ALL RESPONSES TO PHQ9 QUESTIONS 1 AND 2: 0

## 2023-10-12 ASSESSMENT — ENCOUNTER SYMPTOMS
OCCASIONAL FEELINGS OF UNSTEADINESS: 0
DEPRESSION: 0
LOSS OF SENSATION IN FEET: 0

## 2023-10-13 NOTE — PROGRESS NOTES
"Subjective   Reason for Visit: Catalina Mendoza is an 72 y.o. female here for a Medicare Wellness visit / yearly physical follow up    Past Medical, Surgical, and Family History reviewed and updated in chart.       Reviewed all medications by prescribing practitioner or clinical pharmacist (such as prescriptions, OTCs, herbal therapies and supplements) and documented in the medical record.       HPI    Medicare wellness    Yearly physical    Mammogram 2-23  Dexa n/a  Colonoscopy 2017  due 2027  CT chest lung cancer screening n/a  GYN n/a  immunizations rev'd RSV, COVID, Pneumo  (visual loss with flu yrs ago)  BMI 27    Follow up    LUQ pain x 2-21  Now goes around upper abdomen  Before cancer  CT June rev'd responded well to chemo  On cymbalta   Saw cardio work up neg  Consider pain mgmt on follow up     patient had surgery for stage III ovarian cancer on March 13, 2023.  Currently in chemo.  Oncology following  Now metastatic     DM -2  on insulin no side effects. Endocrine following.      Hypercholesterolemia on therapy no side effects     Hypothyroid on therapy no side effects     Hypertension stable on therapy no side effects     CAD stable on therapy no side effects     Diet and exercise reviewed    Patient Care Team:  Juju Kenney MD as PCP - General  Henry-Tesfaye Ca MD as PCP - [employer] PCP     Review of Systems   All other systems reviewed and are negative.      Objective   Vitals:  /72   Pulse 88   Ht 1.6 m (5' 3\")   Wt 69.3 kg (152 lb 12.8 oz)   LMP  (LMP Unknown)   SpO2 99%   BMI 27.07 kg/m²       Physical Exam  Vitals reviewed.   Constitutional:       Appearance: Normal appearance. She is normal weight.   HENT:      Head: Normocephalic and atraumatic.      Mouth/Throat:      Pharynx: No posterior oropharyngeal erythema.   Eyes:      General: No scleral icterus.     Conjunctiva/sclera: Conjunctivae normal.      Pupils: Pupils are equal, round, and reactive to light. "   Cardiovascular:      Rate and Rhythm: Normal rate and regular rhythm.      Heart sounds: Normal heart sounds.   Pulmonary:      Effort: No respiratory distress.      Breath sounds: No wheezing.   Abdominal:      General: Abdomen is flat. Bowel sounds are normal. There is no distension.      Palpations: Abdomen is soft. There is no mass.      Tenderness: There is no abdominal tenderness. There is no rebound.   Musculoskeletal:         General: Normal range of motion.      Cervical back: Normal range of motion and neck supple.   Skin:     General: Skin is warm and dry.   Neurological:      General: No focal deficit present.      Mental Status: She is alert and oriented to person, place, and time. Mental status is at baseline.   Psychiatric:         Mood and Affect: Mood normal.         Behavior: Behavior normal.         Thought Content: Thought content normal.         Judgment: Judgment normal.         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 (CMS/HCC)    Relevant Orders    CBC and Auto Differential     Other Visit Diagnoses       Routine general medical examination at health care facility    -  Primary    Encounter for annual wellness visit (AWV) in Medicare patient        Hypertension, unspecified type        Hypercholesteremia        Relevant Orders    Lipid Panel    TSH with reflex to Free T4 if abnormal    Comprehensive Metabolic Panel    BMI 27.0-27.9,adult            Medicare wellness    Yearly physical    Mammogram 2-23  Dexa n/a  Colonoscopy 2017 due 2027  CT chest lung cancer screening n/a  GYN  n/a  immunizations rev'd RSV, COVID, Pneumo  (visual loss with flu yrs ago)  BMI 27    Follow up    LUQ pain x 2-21  Now goes around upper abdomen  Before cancer  CT June rev'd responded well to chemo  On cymbalta   Saw cardio work up neg  Consider pain mgmt on follow up     patient had surgery for stage III ovarian cancer on March 13, 2023.  Currently in chemo.  Oncology following  Now metastatic     DM -2  on insulin no side effects. Endocrine following.      Hypercholesterolemia on therapy no side effects     Hypothyroid on therapy no side effects     Hypertension stable on therapy no side effects     CAD stable on therapy no side effects     Diet and exercise reviewed    Check labs before appt    Follow up 3 months

## 2023-10-19 ENCOUNTER — OFFICE VISIT (OUTPATIENT)
Dept: HEMATOLOGY/ONCOLOGY | Facility: CLINIC | Age: 73
End: 2023-10-19
Payer: MEDICARE

## 2023-10-19 VITALS
OXYGEN SATURATION: 98 % | DIASTOLIC BLOOD PRESSURE: 78 MMHG | BODY MASS INDEX: 26.93 KG/M2 | WEIGHT: 152.01 LBS | TEMPERATURE: 96.4 F | HEART RATE: 82 BPM | HEIGHT: 63 IN | SYSTOLIC BLOOD PRESSURE: 118 MMHG | RESPIRATION RATE: 18 BRPM

## 2023-10-19 DIAGNOSIS — C80.0 PRIMARY MALIGNANT NEOPLASM OF OVARY WITH WIDESPREAD METASTATIC DISEASE, UNSPECIFIED LATERALITY (MULTI): ICD-10-CM

## 2023-10-19 DIAGNOSIS — C56.9 PRIMARY MALIGNANT NEOPLASM OF OVARY WITH WIDESPREAD METASTATIC DISEASE, UNSPECIFIED LATERALITY (MULTI): ICD-10-CM

## 2023-10-19 DIAGNOSIS — C44.02 SQUAMOUS CELL CARCINOMA OF SKIN OF LIP: Primary | ICD-10-CM

## 2023-10-19 PROCEDURE — 1160F RVW MEDS BY RX/DR IN RCRD: CPT | Performed by: INTERNAL MEDICINE

## 2023-10-19 PROCEDURE — 3051F HG A1C>EQUAL 7.0%<8.0%: CPT | Performed by: INTERNAL MEDICINE

## 2023-10-19 PROCEDURE — 99215 OFFICE O/P EST HI 40 MIN: CPT | Performed by: INTERNAL MEDICINE

## 2023-10-19 PROCEDURE — 3074F SYST BP LT 130 MM HG: CPT | Performed by: INTERNAL MEDICINE

## 2023-10-19 PROCEDURE — 3008F BODY MASS INDEX DOCD: CPT | Performed by: INTERNAL MEDICINE

## 2023-10-19 PROCEDURE — 1036F TOBACCO NON-USER: CPT | Performed by: INTERNAL MEDICINE

## 2023-10-19 PROCEDURE — 1126F AMNT PAIN NOTED NONE PRSNT: CPT | Performed by: INTERNAL MEDICINE

## 2023-10-19 PROCEDURE — 4010F ACE/ARB THERAPY RXD/TAKEN: CPT | Performed by: INTERNAL MEDICINE

## 2023-10-19 PROCEDURE — 3078F DIAST BP <80 MM HG: CPT | Performed by: INTERNAL MEDICINE

## 2023-10-19 PROCEDURE — 1159F MED LIST DOCD IN RCRD: CPT | Performed by: INTERNAL MEDICINE

## 2023-10-19 RX ORDER — PROCHLORPERAZINE MALEATE 5 MG
10 TABLET ORAL EVERY 6 HOURS PRN
Status: CANCELLED | OUTPATIENT
Start: 2024-02-06

## 2023-10-19 RX ORDER — FAMOTIDINE 10 MG/ML
20 INJECTION INTRAVENOUS ONCE AS NEEDED
Status: CANCELLED | OUTPATIENT
Start: 2023-12-26

## 2023-10-19 RX ORDER — FAMOTIDINE 10 MG/ML
20 INJECTION INTRAVENOUS ONCE AS NEEDED
Status: CANCELLED | OUTPATIENT
Start: 2023-10-31

## 2023-10-19 RX ORDER — ONDANSETRON HYDROCHLORIDE 2 MG/ML
8 INJECTION, SOLUTION INTRAVENOUS ONCE
Status: CANCELLED | OUTPATIENT
Start: 2024-02-06

## 2023-10-19 RX ORDER — FAMOTIDINE 10 MG/ML
20 INJECTION INTRAVENOUS ONCE AS NEEDED
Status: CANCELLED | OUTPATIENT
Start: 2024-01-23

## 2023-10-19 RX ORDER — ONDANSETRON HYDROCHLORIDE 2 MG/ML
8 INJECTION, SOLUTION INTRAVENOUS ONCE
Status: CANCELLED | OUTPATIENT
Start: 2023-11-14

## 2023-10-19 RX ORDER — EPINEPHRINE 0.3 MG/.3ML
0.3 INJECTION SUBCUTANEOUS EVERY 5 MIN PRN
Status: CANCELLED | OUTPATIENT
Start: 2023-11-14

## 2023-10-19 RX ORDER — EPINEPHRINE 0.3 MG/.3ML
0.3 INJECTION SUBCUTANEOUS EVERY 5 MIN PRN
Status: CANCELLED | OUTPATIENT
Start: 2024-01-23

## 2023-10-19 RX ORDER — DIPHENHYDRAMINE HYDROCHLORIDE 50 MG/ML
50 INJECTION INTRAMUSCULAR; INTRAVENOUS AS NEEDED
Status: CANCELLED | OUTPATIENT
Start: 2024-01-09

## 2023-10-19 RX ORDER — EPINEPHRINE 0.3 MG/.3ML
0.3 INJECTION SUBCUTANEOUS EVERY 5 MIN PRN
Status: CANCELLED | OUTPATIENT
Start: 2023-12-05

## 2023-10-19 RX ORDER — ONDANSETRON HYDROCHLORIDE 2 MG/ML
8 INJECTION, SOLUTION INTRAVENOUS ONCE
Status: CANCELLED | OUTPATIENT
Start: 2023-12-05

## 2023-10-19 RX ORDER — FAMOTIDINE 10 MG/ML
20 INJECTION INTRAVENOUS ONCE AS NEEDED
Status: CANCELLED | OUTPATIENT
Start: 2023-11-28

## 2023-10-19 RX ORDER — ONDANSETRON HYDROCHLORIDE 2 MG/ML
8 INJECTION, SOLUTION INTRAVENOUS ONCE
Status: CANCELLED | OUTPATIENT
Start: 2024-01-23

## 2023-10-19 RX ORDER — DIPHENHYDRAMINE HYDROCHLORIDE 50 MG/ML
50 INJECTION INTRAMUSCULAR; INTRAVENOUS AS NEEDED
Status: CANCELLED | OUTPATIENT
Start: 2023-12-05

## 2023-10-19 RX ORDER — PROCHLORPERAZINE EDISYLATE 5 MG/ML
10 INJECTION INTRAMUSCULAR; INTRAVENOUS EVERY 6 HOURS PRN
Status: CANCELLED | OUTPATIENT
Start: 2023-12-05

## 2023-10-19 RX ORDER — EPINEPHRINE 0.3 MG/.3ML
0.3 INJECTION SUBCUTANEOUS EVERY 5 MIN PRN
Status: CANCELLED | OUTPATIENT
Start: 2024-01-02

## 2023-10-19 RX ORDER — ONDANSETRON HYDROCHLORIDE 2 MG/ML
8 INJECTION, SOLUTION INTRAVENOUS ONCE
Status: CANCELLED | OUTPATIENT
Start: 2024-01-30

## 2023-10-19 RX ORDER — FAMOTIDINE 10 MG/ML
20 INJECTION INTRAVENOUS ONCE AS NEEDED
Status: CANCELLED | OUTPATIENT
Start: 2023-11-07

## 2023-10-19 RX ORDER — PROCHLORPERAZINE MALEATE 5 MG
10 TABLET ORAL EVERY 6 HOURS PRN
Status: CANCELLED | OUTPATIENT
Start: 2023-11-14

## 2023-10-19 RX ORDER — ONDANSETRON HYDROCHLORIDE 2 MG/ML
8 INJECTION, SOLUTION INTRAVENOUS ONCE
Status: CANCELLED | OUTPATIENT
Start: 2023-12-26

## 2023-10-19 RX ORDER — PROCHLORPERAZINE EDISYLATE 5 MG/ML
10 INJECTION INTRAMUSCULAR; INTRAVENOUS EVERY 6 HOURS PRN
Status: CANCELLED | OUTPATIENT
Start: 2024-01-30

## 2023-10-19 RX ORDER — DIPHENHYDRAMINE HYDROCHLORIDE 50 MG/ML
50 INJECTION INTRAMUSCULAR; INTRAVENOUS AS NEEDED
Status: CANCELLED | OUTPATIENT
Start: 2023-12-12

## 2023-10-19 RX ORDER — ONDANSETRON HYDROCHLORIDE 2 MG/ML
8 INJECTION, SOLUTION INTRAVENOUS ONCE
Status: CANCELLED | OUTPATIENT
Start: 2023-11-28

## 2023-10-19 RX ORDER — ALBUTEROL SULFATE 0.83 MG/ML
3 SOLUTION RESPIRATORY (INHALATION) AS NEEDED
Status: CANCELLED | OUTPATIENT
Start: 2024-01-09

## 2023-10-19 RX ORDER — ONDANSETRON HYDROCHLORIDE 2 MG/ML
8 INJECTION, SOLUTION INTRAVENOUS ONCE
Status: CANCELLED | OUTPATIENT
Start: 2023-11-07

## 2023-10-19 RX ORDER — PROCHLORPERAZINE MALEATE 5 MG
10 TABLET ORAL EVERY 6 HOURS PRN
Status: CANCELLED | OUTPATIENT
Start: 2023-10-31

## 2023-10-19 RX ORDER — DIPHENHYDRAMINE HYDROCHLORIDE 50 MG/ML
50 INJECTION INTRAMUSCULAR; INTRAVENOUS AS NEEDED
Status: CANCELLED | OUTPATIENT
Start: 2024-01-30

## 2023-10-19 RX ORDER — PROCHLORPERAZINE EDISYLATE 5 MG/ML
10 INJECTION INTRAMUSCULAR; INTRAVENOUS EVERY 6 HOURS PRN
Status: CANCELLED | OUTPATIENT
Start: 2024-01-09

## 2023-10-19 RX ORDER — PROCHLORPERAZINE EDISYLATE 5 MG/ML
10 INJECTION INTRAMUSCULAR; INTRAVENOUS EVERY 6 HOURS PRN
Status: CANCELLED | OUTPATIENT
Start: 2023-10-31

## 2023-10-19 RX ORDER — DIPHENHYDRAMINE HYDROCHLORIDE 50 MG/ML
50 INJECTION INTRAMUSCULAR; INTRAVENOUS AS NEEDED
Status: CANCELLED | OUTPATIENT
Start: 2023-11-14

## 2023-10-19 RX ORDER — PROCHLORPERAZINE MALEATE 5 MG
10 TABLET ORAL EVERY 6 HOURS PRN
Status: CANCELLED | OUTPATIENT
Start: 2024-01-02

## 2023-10-19 RX ORDER — FAMOTIDINE 10 MG/ML
20 INJECTION INTRAVENOUS ONCE AS NEEDED
Status: CANCELLED | OUTPATIENT
Start: 2024-01-02

## 2023-10-19 RX ORDER — FAMOTIDINE 10 MG/ML
20 INJECTION INTRAVENOUS ONCE AS NEEDED
Status: CANCELLED | OUTPATIENT
Start: 2024-01-09

## 2023-10-19 RX ORDER — ONDANSETRON HYDROCHLORIDE 2 MG/ML
8 INJECTION, SOLUTION INTRAVENOUS ONCE
Status: CANCELLED | OUTPATIENT
Start: 2023-12-12

## 2023-10-19 RX ORDER — PROCHLORPERAZINE MALEATE 5 MG
10 TABLET ORAL EVERY 6 HOURS PRN
Status: CANCELLED | OUTPATIENT
Start: 2023-11-28

## 2023-10-19 RX ORDER — ONDANSETRON HYDROCHLORIDE 2 MG/ML
8 INJECTION, SOLUTION INTRAVENOUS ONCE
Status: CANCELLED | OUTPATIENT
Start: 2023-10-31

## 2023-10-19 RX ORDER — ALBUTEROL SULFATE 0.83 MG/ML
3 SOLUTION RESPIRATORY (INHALATION) AS NEEDED
Status: CANCELLED | OUTPATIENT
Start: 2023-12-12

## 2023-10-19 RX ORDER — PROCHLORPERAZINE MALEATE 5 MG
10 TABLET ORAL EVERY 6 HOURS PRN
Status: CANCELLED | OUTPATIENT
Start: 2023-12-26

## 2023-10-19 RX ORDER — EPINEPHRINE 0.3 MG/.3ML
0.3 INJECTION SUBCUTANEOUS EVERY 5 MIN PRN
Status: CANCELLED | OUTPATIENT
Start: 2024-01-30

## 2023-10-19 RX ORDER — DIPHENHYDRAMINE HYDROCHLORIDE 50 MG/ML
50 INJECTION INTRAMUSCULAR; INTRAVENOUS AS NEEDED
Status: CANCELLED | OUTPATIENT
Start: 2023-11-28

## 2023-10-19 RX ORDER — DIPHENHYDRAMINE HYDROCHLORIDE 50 MG/ML
50 INJECTION INTRAMUSCULAR; INTRAVENOUS AS NEEDED
Status: CANCELLED | OUTPATIENT
Start: 2024-01-23

## 2023-10-19 RX ORDER — DIPHENHYDRAMINE HYDROCHLORIDE 50 MG/ML
50 INJECTION INTRAMUSCULAR; INTRAVENOUS AS NEEDED
Status: CANCELLED | OUTPATIENT
Start: 2024-01-02

## 2023-10-19 RX ORDER — ALBUTEROL SULFATE 0.83 MG/ML
3 SOLUTION RESPIRATORY (INHALATION) AS NEEDED
Status: CANCELLED | OUTPATIENT
Start: 2024-02-06

## 2023-10-19 RX ORDER — ALBUTEROL SULFATE 0.83 MG/ML
3 SOLUTION RESPIRATORY (INHALATION) AS NEEDED
Status: CANCELLED | OUTPATIENT
Start: 2023-12-26

## 2023-10-19 RX ORDER — PROCHLORPERAZINE MALEATE 5 MG
10 TABLET ORAL EVERY 6 HOURS PRN
Status: CANCELLED | OUTPATIENT
Start: 2024-01-23

## 2023-10-19 RX ORDER — ONDANSETRON HYDROCHLORIDE 2 MG/ML
8 INJECTION, SOLUTION INTRAVENOUS ONCE
Status: CANCELLED | OUTPATIENT
Start: 2024-01-09

## 2023-10-19 RX ORDER — PROCHLORPERAZINE MALEATE 5 MG
10 TABLET ORAL EVERY 6 HOURS PRN
Status: CANCELLED | OUTPATIENT
Start: 2023-12-05

## 2023-10-19 RX ORDER — EPINEPHRINE 0.3 MG/.3ML
0.3 INJECTION SUBCUTANEOUS EVERY 5 MIN PRN
Status: CANCELLED | OUTPATIENT
Start: 2024-02-06

## 2023-10-19 RX ORDER — EPINEPHRINE 0.3 MG/.3ML
0.3 INJECTION SUBCUTANEOUS EVERY 5 MIN PRN
Status: CANCELLED | OUTPATIENT
Start: 2024-01-09

## 2023-10-19 RX ORDER — PROCHLORPERAZINE MALEATE 5 MG
10 TABLET ORAL EVERY 6 HOURS PRN
Status: CANCELLED | OUTPATIENT
Start: 2024-01-09

## 2023-10-19 RX ORDER — EPINEPHRINE 0.3 MG/.3ML
0.3 INJECTION SUBCUTANEOUS EVERY 5 MIN PRN
Status: CANCELLED | OUTPATIENT
Start: 2023-12-26

## 2023-10-19 RX ORDER — PROCHLORPERAZINE EDISYLATE 5 MG/ML
10 INJECTION INTRAMUSCULAR; INTRAVENOUS EVERY 6 HOURS PRN
Status: CANCELLED | OUTPATIENT
Start: 2023-11-28

## 2023-10-19 RX ORDER — PROCHLORPERAZINE MALEATE 5 MG
10 TABLET ORAL EVERY 6 HOURS PRN
Status: CANCELLED | OUTPATIENT
Start: 2023-11-07

## 2023-10-19 RX ORDER — FAMOTIDINE 10 MG/ML
20 INJECTION INTRAVENOUS ONCE AS NEEDED
Status: CANCELLED | OUTPATIENT
Start: 2024-02-06

## 2023-10-19 RX ORDER — DIPHENHYDRAMINE HYDROCHLORIDE 50 MG/ML
50 INJECTION INTRAMUSCULAR; INTRAVENOUS AS NEEDED
Status: CANCELLED | OUTPATIENT
Start: 2023-10-31

## 2023-10-19 RX ORDER — PROCHLORPERAZINE EDISYLATE 5 MG/ML
10 INJECTION INTRAMUSCULAR; INTRAVENOUS EVERY 6 HOURS PRN
Status: CANCELLED | OUTPATIENT
Start: 2023-11-07

## 2023-10-19 RX ORDER — EPINEPHRINE 0.3 MG/.3ML
0.3 INJECTION SUBCUTANEOUS EVERY 5 MIN PRN
Status: CANCELLED | OUTPATIENT
Start: 2023-12-12

## 2023-10-19 RX ORDER — ALBUTEROL SULFATE 0.83 MG/ML
3 SOLUTION RESPIRATORY (INHALATION) AS NEEDED
Status: CANCELLED | OUTPATIENT
Start: 2024-01-30

## 2023-10-19 RX ORDER — PROCHLORPERAZINE EDISYLATE 5 MG/ML
10 INJECTION INTRAMUSCULAR; INTRAVENOUS EVERY 6 HOURS PRN
Status: CANCELLED | OUTPATIENT
Start: 2023-11-14

## 2023-10-19 RX ORDER — FAMOTIDINE 10 MG/ML
20 INJECTION INTRAVENOUS ONCE AS NEEDED
Status: CANCELLED | OUTPATIENT
Start: 2023-12-05

## 2023-10-19 RX ORDER — ALBUTEROL SULFATE 0.83 MG/ML
3 SOLUTION RESPIRATORY (INHALATION) AS NEEDED
Status: CANCELLED | OUTPATIENT
Start: 2024-01-23

## 2023-10-19 RX ORDER — EPINEPHRINE 0.3 MG/.3ML
0.3 INJECTION SUBCUTANEOUS EVERY 5 MIN PRN
Status: CANCELLED | OUTPATIENT
Start: 2023-11-07

## 2023-10-19 RX ORDER — FAMOTIDINE 10 MG/ML
20 INJECTION INTRAVENOUS ONCE AS NEEDED
Status: CANCELLED | OUTPATIENT
Start: 2023-12-12

## 2023-10-19 RX ORDER — PROCHLORPERAZINE EDISYLATE 5 MG/ML
10 INJECTION INTRAMUSCULAR; INTRAVENOUS EVERY 6 HOURS PRN
Status: CANCELLED | OUTPATIENT
Start: 2024-01-23

## 2023-10-19 RX ORDER — DIPHENHYDRAMINE HYDROCHLORIDE 50 MG/ML
50 INJECTION INTRAMUSCULAR; INTRAVENOUS AS NEEDED
Status: CANCELLED | OUTPATIENT
Start: 2023-12-26

## 2023-10-19 RX ORDER — FAMOTIDINE 10 MG/ML
20 INJECTION INTRAVENOUS ONCE AS NEEDED
Status: CANCELLED | OUTPATIENT
Start: 2024-01-30

## 2023-10-19 RX ORDER — DIPHENHYDRAMINE HYDROCHLORIDE 50 MG/ML
50 INJECTION INTRAMUSCULAR; INTRAVENOUS AS NEEDED
Status: CANCELLED | OUTPATIENT
Start: 2024-02-06

## 2023-10-19 RX ORDER — EPINEPHRINE 0.3 MG/.3ML
0.3 INJECTION SUBCUTANEOUS EVERY 5 MIN PRN
Status: CANCELLED | OUTPATIENT
Start: 2023-11-28

## 2023-10-19 RX ORDER — PROCHLORPERAZINE EDISYLATE 5 MG/ML
10 INJECTION INTRAMUSCULAR; INTRAVENOUS EVERY 6 HOURS PRN
Status: CANCELLED | OUTPATIENT
Start: 2024-01-02

## 2023-10-19 RX ORDER — PROCHLORPERAZINE EDISYLATE 5 MG/ML
10 INJECTION INTRAMUSCULAR; INTRAVENOUS EVERY 6 HOURS PRN
Status: CANCELLED | OUTPATIENT
Start: 2023-12-26

## 2023-10-19 RX ORDER — FAMOTIDINE 10 MG/ML
20 INJECTION INTRAVENOUS ONCE AS NEEDED
Status: CANCELLED | OUTPATIENT
Start: 2023-11-14

## 2023-10-19 RX ORDER — DIPHENHYDRAMINE HYDROCHLORIDE 50 MG/ML
50 INJECTION INTRAMUSCULAR; INTRAVENOUS AS NEEDED
Status: CANCELLED | OUTPATIENT
Start: 2023-11-07

## 2023-10-19 RX ORDER — ALBUTEROL SULFATE 0.83 MG/ML
3 SOLUTION RESPIRATORY (INHALATION) AS NEEDED
Status: CANCELLED | OUTPATIENT
Start: 2024-01-02

## 2023-10-19 RX ORDER — ALBUTEROL SULFATE 0.83 MG/ML
3 SOLUTION RESPIRATORY (INHALATION) AS NEEDED
Status: CANCELLED | OUTPATIENT
Start: 2023-11-14

## 2023-10-19 RX ORDER — ALBUTEROL SULFATE 0.83 MG/ML
3 SOLUTION RESPIRATORY (INHALATION) AS NEEDED
Status: CANCELLED | OUTPATIENT
Start: 2023-12-05

## 2023-10-19 RX ORDER — EPINEPHRINE 0.3 MG/.3ML
0.3 INJECTION SUBCUTANEOUS EVERY 5 MIN PRN
Status: CANCELLED | OUTPATIENT
Start: 2023-10-31

## 2023-10-19 RX ORDER — ALBUTEROL SULFATE 0.83 MG/ML
3 SOLUTION RESPIRATORY (INHALATION) AS NEEDED
Status: CANCELLED | OUTPATIENT
Start: 2023-10-31

## 2023-10-19 RX ORDER — ALBUTEROL SULFATE 0.83 MG/ML
3 SOLUTION RESPIRATORY (INHALATION) AS NEEDED
Status: CANCELLED | OUTPATIENT
Start: 2023-11-28

## 2023-10-19 RX ORDER — PROCHLORPERAZINE EDISYLATE 5 MG/ML
10 INJECTION INTRAMUSCULAR; INTRAVENOUS EVERY 6 HOURS PRN
Status: CANCELLED | OUTPATIENT
Start: 2023-12-12

## 2023-10-19 RX ORDER — PROCHLORPERAZINE EDISYLATE 5 MG/ML
10 INJECTION INTRAMUSCULAR; INTRAVENOUS EVERY 6 HOURS PRN
Status: CANCELLED | OUTPATIENT
Start: 2024-02-06

## 2023-10-19 RX ORDER — PROCHLORPERAZINE MALEATE 5 MG
10 TABLET ORAL EVERY 6 HOURS PRN
Status: CANCELLED | OUTPATIENT
Start: 2024-01-30

## 2023-10-19 RX ORDER — ONDANSETRON HYDROCHLORIDE 2 MG/ML
8 INJECTION, SOLUTION INTRAVENOUS ONCE
Status: CANCELLED | OUTPATIENT
Start: 2024-01-02

## 2023-10-19 RX ORDER — ALBUTEROL SULFATE 0.83 MG/ML
3 SOLUTION RESPIRATORY (INHALATION) AS NEEDED
Status: CANCELLED | OUTPATIENT
Start: 2023-11-07

## 2023-10-19 RX ORDER — PROCHLORPERAZINE MALEATE 5 MG
10 TABLET ORAL EVERY 6 HOURS PRN
Status: CANCELLED | OUTPATIENT
Start: 2023-12-12

## 2023-10-19 ASSESSMENT — PATIENT HEALTH QUESTIONNAIRE - PHQ9
SUM OF ALL RESPONSES TO PHQ9 QUESTIONS 1 AND 2: 0
2. FEELING DOWN, DEPRESSED OR HOPELESS: NOT AT ALL
1. LITTLE INTEREST OR PLEASURE IN DOING THINGS: NOT AT ALL

## 2023-10-19 ASSESSMENT — COLUMBIA-SUICIDE SEVERITY RATING SCALE - C-SSRS
1. IN THE PAST MONTH, HAVE YOU WISHED YOU WERE DEAD OR WISHED YOU COULD GO TO SLEEP AND NOT WAKE UP?: NO
2. HAVE YOU ACTUALLY HAD ANY THOUGHTS OF KILLING YOURSELF?: NO
6. HAVE YOU EVER DONE ANYTHING, STARTED TO DO ANYTHING, OR PREPARED TO DO ANYTHING TO END YOUR LIFE?: NO

## 2023-10-19 ASSESSMENT — ENCOUNTER SYMPTOMS
DEPRESSION: 0
OCCASIONAL FEELINGS OF UNSTEADINESS: 0
LOSS OF SENSATION IN FEET: 0

## 2023-10-19 ASSESSMENT — PAIN SCALES - GENERAL: PAINLEVEL: 0-NO PAIN

## 2023-10-19 NOTE — PATIENT INSTRUCTIONS
Today you met with Dr. Ca and reviewed your recent CT.  You consented to start Avastin and Topotecan.  You were given lexicomp information.  Your orers are in and scheduling will occur.  Call for any issues

## 2023-10-19 NOTE — PROGRESS NOTES
Patient ID: Catalina Mendoza is a 72 y.o. female.  Referring Physician: Aleyda Ca MD  [id number] [place] [id number]  Primary Care Provider: Jjuu Kenney MD      Subjective    HPI  Ovarian cancer with malignant ascites.  CT scan gave 12 19 2022.  compared with 12/23 2022.  Mediastinal and abdominal lymphadenopathy which demonstrated abnormal  FDG [id number].  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 Discussed with patient       woman [presented at 72 years old] who presents today with abdominal distention and CT scan showing omental nodularity and extensive abdominal adenopathy.  Her  is 2320 with  elevation in CA 19-9 as well.  INR guided paracentesis as well as cytology of malignant ascitic fluid is pending at this time.  Clinical diagnosis and impression is that of ovarian adenocarcinoma stage III\4.  Full staging CT scans ordered and pathology  reports pending.      04/04/2023: Below is notes copied from OB/GYN.  # [facility]   - We reviewed the typical pathophysiology and management of ovarian cancer, we discussed that ovarian cancer is usually managed with a combination of chemotherapy and surgery  - We reviewed her imaging    - Chest lymph nodes specifically were significantly improved prior to 3rd chemo   - Schedule visit for genetic counseling to r/o hereditary cancer syndromes and/or targeted therapy markers  - Continue monitoring tumor markers  - She is not a candidate for HIPEC  - We discussed the plan for adjuvant chemo following surgery. Adjuvant chemotherapy cycles after surgery can be managed by gyn onc or med onc   12/27/2022-2/7/2023: 3 Cycles Carbo Taxol: Had surgery and resumed Chemotherapy 4/13/2023-5/23/2023.  was  still in double digits of 85.  Patient not a candidate for olaparib.     06/13/2023: Most recent  is 18 well within normal.  We will discussed with patient with the option to observe and to  "intervene if we should see a  rise or morphologic evidence of disease recurrence.  Patient is platinum sensitive and has had  good response at this time.  Maintenance or other intervention will be dependent on either a rising  or morphologic evidence of disease persistence.  Bevacizumab will be considered plus minus of the therapeutic options.  Review of Systems - Oncology     Objective   BSA: 1.75 meters squared  /78 (BP Location: Left arm, Patient Position: Sitting, BP Cuff Size: Adult)   Pulse 82   Temp 35.8 °C (96.4 °F) (Temporal)   Resp 18   Ht (S) 1.597 m (5' 2.87\")   Wt 68.9 kg (152 lb 0.1 oz)   LMP  (LMP Unknown)   SpO2 98%   BMI 27.03 kg/m²     Family History   Problem Relation Name Age of Onset    Arthritis Mother      Diabetes Mother      Hyperlipidemia Mother      Other (Cardiac disorder) Father      Diabetes Father      Heart disease Father      Diabetes Sister      Hepatitis Sister          C, chronic    Other (Chronic liver failure without hepatic coma) Sister      Diabetes Brother      Heart disease Brother      Hyperlipidemia Brother      Diabetes Other Sibling     Other (Heart surgery) Other Sibling      Oncology History    No history exists.   FINDINGS:  Chest:  The lungs remain clear without significant nodule or mass. The heart  is normal size with minimal scattered coronary artery calcifications.  There is also calcification at the aortic valve. Thoracic aorta is  normal caliber. There is no significant mediastinal lymphadenopathy or  mass. There is no pericardial or pleural effusion.   Abdomen:  Liver demonstrates steatosis. There are no focal liver lesions. Spleen  is normal size with benign calcification noted. No significant  abnormalities involve the pancreas, adrenal glands, or biliary system.  A large dominant cyst in the lower right kidney is stable at 6.2 cm.  There is a small non-obstructing calculus the right kidney. There is  no hydronephrosis.  A few small " nodules from previously seen carcinomatosis are again  demonstrated. These appear slightly more prominent than the prior  study, including a 9 mm nodule anterior to the distal stomach (series  201, slice 122/213), previously 6 mm. In addition, small  retroperitoneal lymph nodes are also slightly more prominent,  including a left periaortic lymph node inferior to the renal hilum at  1.0 cm (series 201, slice 128/213), previously 0.9 cm. There is no  associated ascites.  Stomach is normally distended. There is no appreciable small bowel  thickening or obstruction. There is moderate stool in the colon.  Pelvis:  No bulky mass or lymphadenopathy is seen. Uterus and ovaries are  surgically absent. Bladder is nondistended. There are scattered  phleboliths. The deep pelvic veins are patent.  Skeletal:  No blastic or lytic lesions are seen. Minimal arthritis is most  pronounced in the mid thoracic spine, and at L3-4.  IMPRESSION:  1. Suggestion of subtle interval progression with minimal enlargement  of a few small nodules from carcinomatosis and minimal retroperitoneal  lymphadenopathy. Advise continued close surveillance.  2. No bulky disease in the chest, abdomen, or pelvis.  3. Remainder as above.  Signed by Cole Biswas MD 10/11/23    Catalinaglenn eMndoza  reports that she has never smoked. She has never been exposed to tobacco smoke. She has never used smokeless tobacco.  She  reports no history of alcohol use.  She  reports no history of drug use.    Physical Exam    Performance Status:  Symptomatic; fully ambulatory    Assessment/Plan    06/13/2023: Most recent  is 18 well within normal.  We will discussed with patient with the option to observe and to intervene if we should see a  rise or morphologic evidence of disease recurrence.  Patient is platinum sensitive and has had  good response at this time.  Maintenance or other intervention will be dependent on either a rising  or morphologic evidence of  disease persistence.  Bevacizumab will be considered plus minus of the therapeutic options.   every 2 monthly.  Return  to clinic every 4 monthly.        10/19/2023: Patient seen today CT scan chest abdomen and pelvis reviewed with showing subtle progression in the abdomen with carcinomatosis and minimal retroperitoneal lymphadenopathy.   has also markedly increased and patient will be treated with topotecan and bevacizumab and will follow with bevacizumab maintenance.                  Aleyda Ca MD

## 2023-10-24 DIAGNOSIS — E78.5 HYPERLIPIDEMIA, UNSPECIFIED HYPERLIPIDEMIA TYPE: ICD-10-CM

## 2023-10-25 RX ORDER — ROSUVASTATIN CALCIUM 40 MG/1
TABLET, COATED ORAL
Qty: 90 TABLET | Refills: 0 | Status: SHIPPED | OUTPATIENT
Start: 2023-10-25 | End: 2024-04-10 | Stop reason: SDUPTHER

## 2023-10-27 ENCOUNTER — LAB (OUTPATIENT)
Dept: LAB | Facility: LAB | Age: 73
End: 2023-10-27
Payer: MEDICARE

## 2023-10-27 DIAGNOSIS — C44.02 SQUAMOUS CELL CARCINOMA OF SKIN OF LIP: ICD-10-CM

## 2023-10-27 DIAGNOSIS — C56.9 PRIMARY MALIGNANT NEOPLASM OF OVARY WITH WIDESPREAD METASTATIC DISEASE, UNSPECIFIED LATERALITY (MULTI): ICD-10-CM

## 2023-10-27 DIAGNOSIS — C80.0 PRIMARY MALIGNANT NEOPLASM OF OVARY WITH WIDESPREAD METASTATIC DISEASE, UNSPECIFIED LATERALITY (MULTI): ICD-10-CM

## 2023-10-27 LAB
ALBUMIN SERPL BCP-MCNC: 4.1 G/DL (ref 3.4–5)
ALP SERPL-CCNC: 46 U/L (ref 33–136)
ALT SERPL W P-5'-P-CCNC: 17 U/L (ref 7–45)
ANION GAP SERPL CALC-SCNC: 12 MMOL/L (ref 10–20)
APPEARANCE UR: ABNORMAL
AST SERPL W P-5'-P-CCNC: 21 U/L (ref 9–39)
BACTERIA #/AREA URNS AUTO: ABNORMAL /HPF
BASOPHILS # BLD AUTO: 0.08 X10*3/UL (ref 0–0.1)
BASOPHILS NFR BLD AUTO: 1.3 %
BILIRUB SERPL-MCNC: 0.4 MG/DL (ref 0–1.2)
BILIRUB UR STRIP.AUTO-MCNC: NEGATIVE MG/DL
BUN SERPL-MCNC: 14 MG/DL (ref 6–23)
CALCIUM SERPL-MCNC: 9.6 MG/DL (ref 8.6–10.3)
CAOX CRY #/AREA UR COMP ASSIST: ABNORMAL /HPF
CHLORIDE SERPL-SCNC: 106 MMOL/L (ref 98–107)
CO2 SERPL-SCNC: 25 MMOL/L (ref 21–32)
COLOR UR: YELLOW
CREAT SERPL-MCNC: 0.91 MG/DL (ref 0.5–1.05)
EOSINOPHIL # BLD AUTO: 0.54 X10*3/UL (ref 0–0.4)
EOSINOPHIL NFR BLD AUTO: 8.6 %
ERYTHROCYTE [DISTWIDTH] IN BLOOD BY AUTOMATED COUNT: 12.8 % (ref 11.5–14.5)
GFR SERPL CREATININE-BSD FRML MDRD: 67 ML/MIN/1.73M*2
GLUCOSE SERPL-MCNC: 183 MG/DL (ref 74–99)
GLUCOSE UR STRIP.AUTO-MCNC: NEGATIVE MG/DL
HBV CORE AB SER QL: NONREACTIVE
HBV SURFACE AB SER-ACNC: <3.1 MIU/ML
HBV SURFACE AG SERPL QL IA: NONREACTIVE
HCT VFR BLD AUTO: 35.1 % (ref 36–46)
HGB BLD-MCNC: 10.8 G/DL (ref 12–16)
IMM GRANULOCYTES # BLD AUTO: 0.01 X10*3/UL (ref 0–0.5)
IMM GRANULOCYTES NFR BLD AUTO: 0.2 % (ref 0–0.9)
KETONES UR STRIP.AUTO-MCNC: NEGATIVE MG/DL
LEUKOCYTE ESTERASE UR QL STRIP.AUTO: ABNORMAL
LYMPHOCYTES # BLD AUTO: 3.15 X10*3/UL (ref 0.8–3)
LYMPHOCYTES NFR BLD AUTO: 50.2 %
MCH RBC QN AUTO: 28.9 PG (ref 26–34)
MCHC RBC AUTO-ENTMCNC: 30.8 G/DL (ref 32–36)
MCV RBC AUTO: 94 FL (ref 80–100)
MONOCYTES # BLD AUTO: 0.6 X10*3/UL (ref 0.05–0.8)
MONOCYTES NFR BLD AUTO: 9.6 %
NEUTROPHILS # BLD AUTO: 1.89 X10*3/UL (ref 1.6–5.5)
NEUTROPHILS NFR BLD AUTO: 30.1 %
NITRITE UR QL STRIP.AUTO: NEGATIVE
NRBC BLD-RTO: 0 /100 WBCS (ref 0–0)
PH UR STRIP.AUTO: 5 [PH]
PLATELET # BLD AUTO: 370 X10*3/UL (ref 150–450)
PMV BLD AUTO: 9.8 FL (ref 7.5–11.5)
POTASSIUM SERPL-SCNC: 4.8 MMOL/L (ref 3.5–5.3)
PROT SERPL-MCNC: 6.8 G/DL (ref 6.4–8.2)
PROT UR STRIP.AUTO-MCNC: NEGATIVE MG/DL
RBC # BLD AUTO: 3.74 X10*6/UL (ref 4–5.2)
RBC # UR STRIP.AUTO: NEGATIVE /UL
RBC #/AREA URNS AUTO: ABNORMAL /HPF
SODIUM SERPL-SCNC: 138 MMOL/L (ref 136–145)
SP GR UR STRIP.AUTO: 1.02
SQUAMOUS #/AREA URNS AUTO: ABNORMAL /HPF
TRANS CELLS #/AREA UR COMP ASSIST: ABNORMAL /HPF
UROBILINOGEN UR STRIP.AUTO-MCNC: <2 MG/DL
WBC # BLD AUTO: 6.3 X10*3/UL (ref 4.4–11.3)
WBC #/AREA URNS AUTO: >50 /HPF
WBC CLUMPS #/AREA URNS AUTO: ABNORMAL /HPF

## 2023-10-27 PROCEDURE — 86706 HEP B SURFACE ANTIBODY: CPT | Performed by: INTERNAL MEDICINE

## 2023-10-27 PROCEDURE — 85025 COMPLETE CBC W/AUTO DIFF WBC: CPT | Performed by: INTERNAL MEDICINE

## 2023-10-27 PROCEDURE — 80053 COMPREHEN METABOLIC PANEL: CPT | Performed by: INTERNAL MEDICINE

## 2023-10-27 PROCEDURE — 36415 COLL VENOUS BLD VENIPUNCTURE: CPT

## 2023-10-27 PROCEDURE — 87340 HEPATITIS B SURFACE AG IA: CPT | Performed by: INTERNAL MEDICINE

## 2023-10-27 PROCEDURE — 81001 URINALYSIS AUTO W/SCOPE: CPT | Performed by: INTERNAL MEDICINE

## 2023-10-27 PROCEDURE — 86704 HEP B CORE ANTIBODY TOTAL: CPT | Performed by: INTERNAL MEDICINE

## 2023-10-31 ENCOUNTER — INFUSION (OUTPATIENT)
Dept: HEMATOLOGY/ONCOLOGY | Facility: CLINIC | Age: 73
End: 2023-10-31
Payer: MEDICARE

## 2023-10-31 ENCOUNTER — OFFICE VISIT (OUTPATIENT)
Dept: HEMATOLOGY/ONCOLOGY | Facility: CLINIC | Age: 73
End: 2023-10-31
Payer: MEDICARE

## 2023-10-31 VITALS
BODY MASS INDEX: 27.17 KG/M2 | HEART RATE: 87 BPM | DIASTOLIC BLOOD PRESSURE: 81 MMHG | SYSTOLIC BLOOD PRESSURE: 124 MMHG | TEMPERATURE: 97.5 F | WEIGHT: 152.78 LBS | RESPIRATION RATE: 16 BRPM | OXYGEN SATURATION: 98 %

## 2023-10-31 DIAGNOSIS — C56.9 PRIMARY MALIGNANT NEOPLASM OF OVARY WITH WIDESPREAD METASTATIC DISEASE, UNSPECIFIED LATERALITY (MULTI): ICD-10-CM

## 2023-10-31 DIAGNOSIS — C80.0 PRIMARY MALIGNANT NEOPLASM OF OVARY WITH WIDESPREAD METASTATIC DISEASE, UNSPECIFIED LATERALITY (MULTI): ICD-10-CM

## 2023-10-31 DIAGNOSIS — C44.02 SQUAMOUS CELL CARCINOMA OF SKIN OF LIP: ICD-10-CM

## 2023-10-31 DIAGNOSIS — C56.9 PRIMARY MALIGNANT NEOPLASM OF OVARY WITH WIDESPREAD METASTATIC DISEASE, UNSPECIFIED LATERALITY (MULTI): Primary | ICD-10-CM

## 2023-10-31 DIAGNOSIS — C80.0 PRIMARY MALIGNANT NEOPLASM OF OVARY WITH WIDESPREAD METASTATIC DISEASE, UNSPECIFIED LATERALITY (MULTI): Primary | ICD-10-CM

## 2023-10-31 PROCEDURE — 2500000004 HC RX 250 GENERAL PHARMACY W/ HCPCS (ALT 636 FOR OP/ED): Performed by: INTERNAL MEDICINE

## 2023-10-31 PROCEDURE — 96417 CHEMO IV INFUS EACH ADDL SEQ: CPT

## 2023-10-31 PROCEDURE — 96375 TX/PRO/DX INJ NEW DRUG ADDON: CPT | Mod: INF

## 2023-10-31 PROCEDURE — 99215 OFFICE O/P EST HI 40 MIN: CPT | Performed by: INTERNAL MEDICINE

## 2023-10-31 PROCEDURE — 3051F HG A1C>EQUAL 7.0%<8.0%: CPT | Performed by: INTERNAL MEDICINE

## 2023-10-31 PROCEDURE — 4010F ACE/ARB THERAPY RXD/TAKEN: CPT | Performed by: INTERNAL MEDICINE

## 2023-10-31 PROCEDURE — 3074F SYST BP LT 130 MM HG: CPT | Performed by: INTERNAL MEDICINE

## 2023-10-31 PROCEDURE — 96413 CHEMO IV INFUSION 1 HR: CPT

## 2023-10-31 PROCEDURE — 1126F AMNT PAIN NOTED NONE PRSNT: CPT | Performed by: INTERNAL MEDICINE

## 2023-10-31 PROCEDURE — 1160F RVW MEDS BY RX/DR IN RCRD: CPT | Performed by: INTERNAL MEDICINE

## 2023-10-31 PROCEDURE — 99215 OFFICE O/P EST HI 40 MIN: CPT | Mod: 25 | Performed by: INTERNAL MEDICINE

## 2023-10-31 PROCEDURE — 3008F BODY MASS INDEX DOCD: CPT | Performed by: INTERNAL MEDICINE

## 2023-10-31 PROCEDURE — 1036F TOBACCO NON-USER: CPT | Performed by: INTERNAL MEDICINE

## 2023-10-31 PROCEDURE — 3079F DIAST BP 80-89 MM HG: CPT | Performed by: INTERNAL MEDICINE

## 2023-10-31 PROCEDURE — 1159F MED LIST DOCD IN RCRD: CPT | Performed by: INTERNAL MEDICINE

## 2023-10-31 PROCEDURE — 2500000004 HC RX 250 GENERAL PHARMACY W/ HCPCS (ALT 636 FOR OP/ED)

## 2023-10-31 RX ORDER — FAMOTIDINE 10 MG/ML
20 INJECTION INTRAVENOUS ONCE AS NEEDED
Status: DISCONTINUED | OUTPATIENT
Start: 2023-10-31 | End: 2023-10-31 | Stop reason: HOSPADM

## 2023-10-31 RX ORDER — DIPHENHYDRAMINE HYDROCHLORIDE 50 MG/ML
50 INJECTION INTRAMUSCULAR; INTRAVENOUS AS NEEDED
Status: DISCONTINUED | OUTPATIENT
Start: 2023-10-31 | End: 2023-10-31 | Stop reason: HOSPADM

## 2023-10-31 RX ORDER — HEPARIN 100 UNIT/ML
500 SYRINGE INTRAVENOUS AS NEEDED
Status: CANCELLED | OUTPATIENT
Start: 2023-10-31

## 2023-10-31 RX ORDER — ONDANSETRON HYDROCHLORIDE 2 MG/ML
8 INJECTION, SOLUTION INTRAVENOUS ONCE
Status: COMPLETED | OUTPATIENT
Start: 2023-10-31 | End: 2023-10-31

## 2023-10-31 RX ORDER — ALBUTEROL SULFATE 0.83 MG/ML
3 SOLUTION RESPIRATORY (INHALATION) AS NEEDED
Status: DISCONTINUED | OUTPATIENT
Start: 2023-10-31 | End: 2023-10-31 | Stop reason: HOSPADM

## 2023-10-31 RX ORDER — EPINEPHRINE 0.3 MG/.3ML
0.3 INJECTION SUBCUTANEOUS EVERY 5 MIN PRN
Status: DISCONTINUED | OUTPATIENT
Start: 2023-10-31 | End: 2023-10-31 | Stop reason: HOSPADM

## 2023-10-31 RX ORDER — PROCHLORPERAZINE MALEATE 10 MG
10 TABLET ORAL EVERY 6 HOURS PRN
Status: DISCONTINUED | OUTPATIENT
Start: 2023-10-31 | End: 2023-10-31 | Stop reason: HOSPADM

## 2023-10-31 RX ORDER — PROCHLORPERAZINE EDISYLATE 5 MG/ML
10 INJECTION INTRAMUSCULAR; INTRAVENOUS EVERY 6 HOURS PRN
Status: DISCONTINUED | OUTPATIENT
Start: 2023-10-31 | End: 2023-10-31 | Stop reason: HOSPADM

## 2023-10-31 RX ORDER — HEPARIN SODIUM,PORCINE/PF 10 UNIT/ML
50 SYRINGE (ML) INTRAVENOUS AS NEEDED
Status: CANCELLED | OUTPATIENT
Start: 2023-10-31

## 2023-10-31 RX ORDER — HEPARIN 100 UNIT/ML
500 SYRINGE INTRAVENOUS AS NEEDED
Status: DISCONTINUED | OUTPATIENT
Start: 2023-10-31 | End: 2023-10-31 | Stop reason: HOSPADM

## 2023-10-31 RX ADMIN — SODIUM CHLORIDE, PRESERVATIVE FREE 500 UNITS: 5 INJECTION INTRAVENOUS at 12:04

## 2023-10-31 RX ADMIN — BEVACIZUMAB-AWWB 700 MG: 100 INJECTION, SOLUTION INTRAVENOUS at 11:34

## 2023-10-31 RX ADMIN — ONDANSETRON 8 MG: 2 INJECTION, SOLUTION INTRAMUSCULAR; INTRAVENOUS at 10:16

## 2023-10-31 ASSESSMENT — ENCOUNTER SYMPTOMS
OCCASIONAL FEELINGS OF UNSTEADINESS: 0
DEPRESSION: 0
LOSS OF SENSATION IN FEET: 0

## 2023-10-31 ASSESSMENT — PAIN SCALES - GENERAL: PAINLEVEL: 0-NO PAIN

## 2023-10-31 NOTE — SIGNIFICANT EVENT
10/31/23 0914   Prechemo Checklist   Has the patient been in the hospital, ED, or urgent care since last date of service No   Chemo/Immuno Consent Signed Yes   Protocol/Indications Verified Yes   Confirmed to previous date/time of medication N/A   Compared to previous dose N/A   All medications are dated accurately Yes   Pregnancy Test Negative Not applicable   Parameters Met Yes   BSA/Weight-Height Verified Yes   Dose Calculations Verified Yes

## 2023-10-31 NOTE — PATIENT INSTRUCTIONS
Today you review the treatment plan with Dr. Ca.  A biopsy will be ordered of the mass on your left chest.  Remember to have the colace on hand if you should notice your bowels slowing down.  We will see you again with your day 1 of Cycle 2.  Call for any concerns.

## 2023-11-02 ENCOUNTER — APPOINTMENT (OUTPATIENT)
Dept: HEMATOLOGY/ONCOLOGY | Facility: CLINIC | Age: 73
End: 2023-11-02
Payer: MEDICARE

## 2023-11-03 ENCOUNTER — APPOINTMENT (OUTPATIENT)
Dept: HEMATOLOGY/ONCOLOGY | Facility: HOSPITAL | Age: 73
End: 2023-11-03
Payer: MEDICARE

## 2023-11-03 NOTE — PROGRESS NOTES
Patient ID: Catalina Mendoza is a 72 y.o. female.  Referring Physician: Aleyda Ca MD  [id number] [place] [id number]  Primary Care Provider: Juju Kenney MD      Subjective    HPI  Ovarian cancer with malignant ascites.  CT scan gave 12 19 2022.  compared with 12/23 2022.  Mediastinal and abdominal lymphadenopathy which demonstrated abnormal  FDG [id number].  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 Discussed with patient       woman [presented at 72 years old] who presents today with abdominal distention and CT scan showing omental nodularity and extensive abdominal adenopathy.  Her  is 2320 with  elevation in CA 19-9 as well.  INR guided paracentesis as well as cytology of malignant ascitic fluid is pending at this time.  Clinical diagnosis and impression is that of ovarian adenocarcinoma stage III\4.  Full staging CT scans ordered and pathology  reports pending.      04/04/2023: Below is notes copied from OB/GYN.  # HGSOC   - We reviewed the typical pathophysiology and management of ovarian cancer, we discussed that ovarian cancer is usually managed with a combination of chemotherapy and surgery  - We reviewed her imaging    - Chest lymph nodes specifically were significantly improved prior to 3rd chemo   - Schedule visit for genetic counseling to r/o hereditary cancer syndromes and/or targeted therapy markers  - Continue monitoring tumor markers  - She is not a candidate for HIPEC  - We discussed the plan for adjuvant chemo following surgery. Adjuvant chemotherapy cycles after surgery can be managed by gyn onc or med onc   12/27/2022-2/7/2023: 3 Cycles Carbo Taxol: Had surgery and resumed Chemotherapy 4/13/2023-5/23/2023.  was  still in double digits of 85.  Patient not a candidate for olaparib.     06/13/2023: Most recent  is 18 well within normal.  We will discussed with patient with the option to observe and to  intervene if we should see a  rise or morphologic evidence of disease recurrence.  Patient is platinum sensitive and has had  good response at this time.  Maintenance or other intervention will be dependent on either a rising  or morphologic evidence of disease persistence.  Bevacizumab will be considered plus minus of the therapeutic options    Now with recurrent disease:  Review of Systems - Oncology     Objective   BSA: 1.75 meters squared  /81 (BP Location: Right arm)   Pulse 87   Temp 36.4 °C (97.5 °F)   Resp 16   Wt 69.3 kg (152 lb 12.5 oz)   LMP  (LMP Unknown)   SpO2 98%   BMI 27.17 kg/m²     Family History   Problem Relation Name Age of Onset    Arthritis Mother      Diabetes Mother      Hyperlipidemia Mother      Other (Cardiac disorder) Father      Diabetes Father      Heart disease Father      Diabetes Sister      Hepatitis Sister          C, chronic    Other (Chronic liver failure without hepatic coma) Sister      Diabetes Brother      Heart disease Brother      Hyperlipidemia Brother      Diabetes Other Sibling     Other (Heart surgery) Other Sibling      Oncology History   Primary malignant neoplasm of ovary with widespread metastatic disease (CMS/HCC)   4/5/2023 Initial Diagnosis    Primary malignant neoplasm of ovary with widespread metastatic disease (CMS/HCC)     10/31/2023 -  Chemotherapy    Topotecan + Bevacizumab, 28 Day Cycles     Squamous cell carcinoma of skin of lip   4/14/2015 Initial Diagnosis    Squamous cell carcinoma of skin of lip     10/31/2023 -  Chemotherapy    Topotecan + Bevacizumab, 28 Day Cycles         Catalina LASSITER Wilton  reports that she has never smoked. She has never been exposed to tobacco smoke. She has never used smokeless tobacco.  She  reports no history of alcohol use.  She  reports no history of drug use.    Physical Exam    Performance Status:  Symptomatic; fully ambulatory    Assessment/Plan    Patient presents today high risk high-grade  ovarian cancer presenting for chemotherapy with topotecan plus bevacizumab.  Cycle 1 day 1.  Side effects adverse effects risk-benefit ratio has been discussed.  This patient is shared between [facility] and oncology.  She is cleared for chemotherapy at this point today.    Patient is deemed to have recurred less than 6 months and therefore platinum resistance is conferred.      Diagnoses and all orders for this visit:  Squamous cell carcinoma of skin of lip  -     Clinic Appointment Request  -     Referral to General Surgery; Future  Primary malignant neoplasm of ovary with widespread metastatic disease, unspecified laterality (CMS/HCC)  -     Clinic Appointment Request  -     Referral to General Surgery; Future             Henry-Tesfaye Ca MD

## 2023-11-06 ENCOUNTER — OFFICE VISIT (OUTPATIENT)
Dept: SURGERY | Facility: CLINIC | Age: 73
End: 2023-11-06
Payer: MEDICARE

## 2023-11-06 VITALS
SYSTOLIC BLOOD PRESSURE: 114 MMHG | DIASTOLIC BLOOD PRESSURE: 68 MMHG | OXYGEN SATURATION: 98 % | BODY MASS INDEX: 26.93 KG/M2 | HEART RATE: 86 BPM | WEIGHT: 152 LBS | TEMPERATURE: 97.5 F | HEIGHT: 63 IN

## 2023-11-06 DIAGNOSIS — C44.02 SQUAMOUS CELL CARCINOMA OF SKIN OF LIP: ICD-10-CM

## 2023-11-06 DIAGNOSIS — C56.9 PRIMARY MALIGNANT NEOPLASM OF OVARY WITH WIDESPREAD METASTATIC DISEASE, UNSPECIFIED LATERALITY (MULTI): ICD-10-CM

## 2023-11-06 DIAGNOSIS — C80.0 PRIMARY MALIGNANT NEOPLASM OF OVARY WITH WIDESPREAD METASTATIC DISEASE, UNSPECIFIED LATERALITY (MULTI): ICD-10-CM

## 2023-11-06 DIAGNOSIS — R07.81 RIB PAIN ON LEFT SIDE: Primary | ICD-10-CM

## 2023-11-06 PROCEDURE — 1159F MED LIST DOCD IN RCRD: CPT | Performed by: SURGERY

## 2023-11-06 PROCEDURE — 1036F TOBACCO NON-USER: CPT | Performed by: SURGERY

## 2023-11-06 PROCEDURE — 99203 OFFICE O/P NEW LOW 30 MIN: CPT | Performed by: SURGERY

## 2023-11-06 PROCEDURE — 4010F ACE/ARB THERAPY RXD/TAKEN: CPT | Performed by: SURGERY

## 2023-11-06 PROCEDURE — 1125F AMNT PAIN NOTED PAIN PRSNT: CPT | Performed by: SURGERY

## 2023-11-06 PROCEDURE — 3078F DIAST BP <80 MM HG: CPT | Performed by: SURGERY

## 2023-11-06 PROCEDURE — 3008F BODY MASS INDEX DOCD: CPT | Performed by: SURGERY

## 2023-11-06 PROCEDURE — 3074F SYST BP LT 130 MM HG: CPT | Performed by: SURGERY

## 2023-11-06 PROCEDURE — 3051F HG A1C>EQUAL 7.0%<8.0%: CPT | Performed by: SURGERY

## 2023-11-06 PROCEDURE — 1160F RVW MEDS BY RX/DR IN RCRD: CPT | Performed by: SURGERY

## 2023-11-06 ASSESSMENT — PAIN SCALES - GENERAL: PAINLEVEL: 3

## 2023-11-06 NOTE — PROGRESS NOTES
Patient ID: Catalina Mendoza is a 72 y.o. female.  Referring Physician: No referring provider defined for this encounter.  Primary Care Provider: Juju Kenney MD    Subjective    Interval History:  Overall doing Ok she still has kind of a band like feeling across her upper abdomen, she actually did see Dr. De La Cruz yesterday and I think she was referred to PT. Of note on her abdominal exam there is a slight indentation underneath the left rib cage where she is pointing to this band like structure. We did discuss potentially something musculoskeletal, we also reviewed her current treatment plan and plan going forward. She is eating and drinking well, moving her bowels OK, feel reasonably well in her current regimen which is Topotecan and Avastin.          PMH: type 1 diabetes, managed with endo; lip cancer x2, multiple skin cancers     Past Surgical History: laparoscopic hysterectomy, tubal ligation, laparoscopic appendectomy, several cyst removals     Family History: Cancer of unknown type in maternal aunt. Otherwise denies a history of gyn related cancers including ovarian, endometrial, breast,  pancreas, and GI cancers. Father and siblings have significant history of heart disease, father and 3 brothers have had major heart surgery.     Social History: Denies a history of smoking, alcohol use or recreational drug use. The patient is retired and currently lives with , son  Hussain and daughter in law Sanna; patient and  relocated here from [place] as they initially sought care there for concerning symptoms, and significant experienced delays in communication of results and lack of followup.      OBGYN History: The patient is a , experienced multiple miscarriages. She used OCP in distant past prior to hysterectomy for variable amounts  of time. She has not used HRT.       Screening:  -Pap smear: s/p hysterectomy  -Mammogram: 23, benign but right field not entirely visualized,  recommended repeat screen   -Colonoscopy: 2018    Objective    BSA: 1.75 meters squared  /77 (BP Location: Left arm, Patient Position: Sitting, BP Cuff Size: Adult)   Pulse 88   Temp 36.1 °C (97 °F) (Temporal)   Resp 17   Wt 69 kg (152 lb 1.9 oz)   LMP  (LMP Unknown)   SpO2 97%   BMI 26.95 kg/m²      Physical Exam  Constitutional:       General: She is not in acute distress.     Appearance: Normal appearance. She is not toxic-appearing.   HENT:      Head: Normocephalic.      Mouth/Throat:      Mouth: Mucous membranes are moist.      Pharynx: Oropharynx is clear.   Eyes:      Extraocular Movements: Extraocular movements intact.      Conjunctiva/sclera: Conjunctivae normal.      Pupils: Pupils are equal, round, and reactive to light.   Cardiovascular:      Rate and Rhythm: Normal rate and regular rhythm.      Heart sounds: Normal heart sounds. No murmur heard.     No friction rub. No gallop.   Pulmonary:      Effort: Pulmonary effort is normal.      Breath sounds: Normal breath sounds. No wheezing or rhonchi.   Abdominal:      General: Bowel sounds are normal. There is no distension.      Palpations: Abdomen is soft.      Tenderness: There is no abdominal tenderness.   Musculoskeletal:         General: Normal range of motion.      Cervical back: Normal range of motion.   Skin:     General: Skin is warm.   Neurological:      General: No focal deficit present.      Mental Status: She is alert and oriented to person, place, and time.   Psychiatric:         Mood and Affect: Mood normal.         Behavior: Behavior normal.         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, started on NACT with carbo/taxol  - 2/7/23: S/p cycle 3 with good interval response  - 3/13/23: Diagnostic laparoscopy, BSO, extensive MIKAELA, bilateral ureterolysis, infracolic omentectomy, abdominal wall and  mesenteric biopsies, complete gross resection to R0  - 5/23/23: Chemo completed     Primary malignant neoplasm of ovary with widespread metastatic disease (CMS/HCC)   4/5/2023 Initial Diagnosis    Primary malignant neoplasm of ovary with widespread metastatic disease (CMS/HCC)     10/31/2023 -  Chemotherapy    Topotecan + Bevacizumab, 28 Day Cycles     Squamous cell carcinoma of skin of lip   4/14/2015 Initial Diagnosis    Squamous cell carcinoma of skin of lip     10/31/2023 -  Chemotherapy    Topotecan + Bevacizumab, 28 Day Cycles       72 y.o. woman here today for post op visit following surgery for stage IV likely HGSOC, s/p 3 cylces of neoadjuvant carbo/taxol. S/P Diagnostic  laparoscopy, BSO, extensive MIKAELA, bilateral ureterolysis, infracolic omentectomy, abdominal wall and mesenteric biopsies, complete gross resection to R0 on 3/13/23.  Co-morbid conditions: T1DM, HGSOC, skin cancer  PS: 0     # HGSOC   - We reviewed the typical pathophysiology and management of ovarian cancer, we discussed that ovarian cancer is usually managed with a combination of chemotherapy and surgery  - We reviewed her imaging    - Continue monitoring tumor markers  - Genetics with two VUS, BRCA neg   - Recurrence being managed by Dr. Ca, currently on altaf/ishan  - Plan for a follow up visit with Dr. Dorys Crystal in 6 months we will call the patient with that.    # Chemotherapy induced neuropathy  - Taking vitamin B6  - Self discontinued Gabapentin  - Doing well on 60mg Duloxetine daily, will continue        Scribe Attestation  By signing my name below, I, Lisa Spencer   attest that this documentation has been prepared under the direction and in the presence of Dorys Crystal MD.    Provider Attestation - Scribe documentation    All medical record entries made by the Scribe were at my direction and personally dictated by me. I have reviewed the chart and agree that the record accurately reflects my personal  performance of the history, physical exam, discussion and plan.    Dorys Crystal MD

## 2023-11-07 ENCOUNTER — OFFICE VISIT (OUTPATIENT)
Dept: GYNECOLOGIC ONCOLOGY | Facility: CLINIC | Age: 73
End: 2023-11-07
Payer: MEDICARE

## 2023-11-07 ENCOUNTER — NUTRITION (OUTPATIENT)
Dept: HEMATOLOGY/ONCOLOGY | Facility: CLINIC | Age: 73
End: 2023-11-07
Payer: MEDICARE

## 2023-11-07 ENCOUNTER — INFUSION (OUTPATIENT)
Dept: HEMATOLOGY/ONCOLOGY | Facility: CLINIC | Age: 73
End: 2023-11-07
Payer: MEDICARE

## 2023-11-07 ENCOUNTER — APPOINTMENT (OUTPATIENT)
Dept: HEMATOLOGY/ONCOLOGY | Facility: CLINIC | Age: 73
End: 2023-11-07
Payer: MEDICARE

## 2023-11-07 ENCOUNTER — APPOINTMENT (OUTPATIENT)
Dept: GYNECOLOGIC ONCOLOGY | Facility: CLINIC | Age: 73
End: 2023-11-07
Payer: MEDICARE

## 2023-11-07 VITALS
BODY MASS INDEX: 26.95 KG/M2 | TEMPERATURE: 97 F | SYSTOLIC BLOOD PRESSURE: 122 MMHG | OXYGEN SATURATION: 97 % | DIASTOLIC BLOOD PRESSURE: 77 MMHG | WEIGHT: 152.12 LBS | HEART RATE: 88 BPM | RESPIRATION RATE: 17 BRPM

## 2023-11-07 VITALS — HEIGHT: 63 IN | WEIGHT: 152.12 LBS | BODY MASS INDEX: 26.95 KG/M2

## 2023-11-07 DIAGNOSIS — C56.9 PRIMARY MALIGNANT NEOPLASM OF OVARY WITH WIDESPREAD METASTATIC DISEASE, UNSPECIFIED LATERALITY (MULTI): Primary | ICD-10-CM

## 2023-11-07 DIAGNOSIS — C80.0 PRIMARY MALIGNANT NEOPLASM OF OVARY WITH WIDESPREAD METASTATIC DISEASE, UNSPECIFIED LATERALITY (MULTI): Primary | ICD-10-CM

## 2023-11-07 DIAGNOSIS — C56.9 PRIMARY MALIGNANT NEOPLASM OF OVARY WITH WIDESPREAD METASTATIC DISEASE, UNSPECIFIED LATERALITY (MULTI): ICD-10-CM

## 2023-11-07 DIAGNOSIS — C80.0 PRIMARY MALIGNANT NEOPLASM OF OVARY WITH WIDESPREAD METASTATIC DISEASE, UNSPECIFIED LATERALITY (MULTI): ICD-10-CM

## 2023-11-07 DIAGNOSIS — C44.02 SQUAMOUS CELL CARCINOMA OF SKIN OF LIP: ICD-10-CM

## 2023-11-07 LAB
BASOPHILS # BLD AUTO: 0.02 X10*3/UL (ref 0–0.1)
BASOPHILS NFR BLD AUTO: 0.5 %
EOSINOPHIL # BLD AUTO: 0.23 X10*3/UL (ref 0–0.4)
EOSINOPHIL NFR BLD AUTO: 5.3 %
ERYTHROCYTE [DISTWIDTH] IN BLOOD BY AUTOMATED COUNT: 12.2 % (ref 11.5–14.5)
HCT VFR BLD AUTO: 31.7 % (ref 36–46)
HGB BLD-MCNC: 10.2 G/DL (ref 12–16)
IMM GRANULOCYTES # BLD AUTO: 0 X10*3/UL (ref 0–0.5)
IMM GRANULOCYTES NFR BLD AUTO: 0 % (ref 0–0.9)
LYMPHOCYTES # BLD AUTO: 2.65 X10*3/UL (ref 0.8–3)
LYMPHOCYTES NFR BLD AUTO: 61.5 %
MCH RBC QN AUTO: 29.2 PG (ref 26–34)
MCHC RBC AUTO-ENTMCNC: 32.2 G/DL (ref 32–36)
MCV RBC AUTO: 91 FL (ref 80–100)
MONOCYTES # BLD AUTO: 0.44 X10*3/UL (ref 0.05–0.8)
MONOCYTES NFR BLD AUTO: 10.2 %
NEUTROPHILS # BLD AUTO: 0.97 X10*3/UL (ref 1.6–5.5)
NEUTROPHILS NFR BLD AUTO: 22.5 %
NRBC BLD-RTO: ABNORMAL /100{WBCS}
OVALOCYTES BLD QL SMEAR: NORMAL
PLATELET # BLD AUTO: 206 X10*3/UL (ref 150–450)
RBC # BLD AUTO: 3.49 X10*6/UL (ref 4–5.2)
RBC MORPH BLD: NORMAL
WBC # BLD AUTO: 4.3 X10*3/UL (ref 4.4–11.3)

## 2023-11-07 PROCEDURE — 1160F RVW MEDS BY RX/DR IN RCRD: CPT | Performed by: STUDENT IN AN ORGANIZED HEALTH CARE EDUCATION/TRAINING PROGRAM

## 2023-11-07 PROCEDURE — 85025 COMPLETE CBC W/AUTO DIFF WBC: CPT

## 2023-11-07 PROCEDURE — 96375 TX/PRO/DX INJ NEW DRUG ADDON: CPT | Mod: INF

## 2023-11-07 PROCEDURE — 99214 OFFICE O/P EST MOD 30 MIN: CPT | Performed by: STUDENT IN AN ORGANIZED HEALTH CARE EDUCATION/TRAINING PROGRAM

## 2023-11-07 PROCEDURE — 1125F AMNT PAIN NOTED PAIN PRSNT: CPT | Performed by: STUDENT IN AN ORGANIZED HEALTH CARE EDUCATION/TRAINING PROGRAM

## 2023-11-07 PROCEDURE — 3008F BODY MASS INDEX DOCD: CPT | Performed by: STUDENT IN AN ORGANIZED HEALTH CARE EDUCATION/TRAINING PROGRAM

## 2023-11-07 PROCEDURE — 2500000004 HC RX 250 GENERAL PHARMACY W/ HCPCS (ALT 636 FOR OP/ED): Performed by: INTERNAL MEDICINE

## 2023-11-07 PROCEDURE — 2500000004 HC RX 250 GENERAL PHARMACY W/ HCPCS (ALT 636 FOR OP/ED)

## 2023-11-07 PROCEDURE — 99214 OFFICE O/P EST MOD 30 MIN: CPT | Mod: 25 | Performed by: STUDENT IN AN ORGANIZED HEALTH CARE EDUCATION/TRAINING PROGRAM

## 2023-11-07 PROCEDURE — 4010F ACE/ARB THERAPY RXD/TAKEN: CPT | Performed by: STUDENT IN AN ORGANIZED HEALTH CARE EDUCATION/TRAINING PROGRAM

## 2023-11-07 PROCEDURE — 1036F TOBACCO NON-USER: CPT | Performed by: STUDENT IN AN ORGANIZED HEALTH CARE EDUCATION/TRAINING PROGRAM

## 2023-11-07 PROCEDURE — 3051F HG A1C>EQUAL 7.0%<8.0%: CPT | Performed by: STUDENT IN AN ORGANIZED HEALTH CARE EDUCATION/TRAINING PROGRAM

## 2023-11-07 PROCEDURE — 3078F DIAST BP <80 MM HG: CPT | Performed by: STUDENT IN AN ORGANIZED HEALTH CARE EDUCATION/TRAINING PROGRAM

## 2023-11-07 PROCEDURE — 96413 CHEMO IV INFUSION 1 HR: CPT

## 2023-11-07 PROCEDURE — 1159F MED LIST DOCD IN RCRD: CPT | Performed by: STUDENT IN AN ORGANIZED HEALTH CARE EDUCATION/TRAINING PROGRAM

## 2023-11-07 PROCEDURE — 3074F SYST BP LT 130 MM HG: CPT | Performed by: STUDENT IN AN ORGANIZED HEALTH CARE EDUCATION/TRAINING PROGRAM

## 2023-11-07 RX ORDER — ALBUTEROL SULFATE 0.83 MG/ML
3 SOLUTION RESPIRATORY (INHALATION) AS NEEDED
Status: DISCONTINUED | OUTPATIENT
Start: 2023-11-07 | End: 2023-11-07 | Stop reason: HOSPADM

## 2023-11-07 RX ORDER — EPINEPHRINE 0.3 MG/.3ML
0.3 INJECTION SUBCUTANEOUS EVERY 5 MIN PRN
Status: DISCONTINUED | OUTPATIENT
Start: 2023-11-07 | End: 2023-11-07 | Stop reason: HOSPADM

## 2023-11-07 RX ORDER — HEPARIN SODIUM,PORCINE/PF 10 UNIT/ML
50 SYRINGE (ML) INTRAVENOUS AS NEEDED
Status: CANCELLED | OUTPATIENT
Start: 2023-11-07

## 2023-11-07 RX ORDER — FAMOTIDINE 10 MG/ML
20 INJECTION INTRAVENOUS ONCE AS NEEDED
Status: DISCONTINUED | OUTPATIENT
Start: 2023-11-07 | End: 2023-11-07 | Stop reason: HOSPADM

## 2023-11-07 RX ORDER — PROCHLORPERAZINE MALEATE 10 MG
10 TABLET ORAL EVERY 6 HOURS PRN
Status: DISCONTINUED | OUTPATIENT
Start: 2023-11-07 | End: 2023-11-07 | Stop reason: HOSPADM

## 2023-11-07 RX ORDER — PROCHLORPERAZINE EDISYLATE 5 MG/ML
10 INJECTION INTRAMUSCULAR; INTRAVENOUS EVERY 6 HOURS PRN
Status: DISCONTINUED | OUTPATIENT
Start: 2023-11-07 | End: 2023-11-07 | Stop reason: HOSPADM

## 2023-11-07 RX ORDER — DIPHENHYDRAMINE HYDROCHLORIDE 50 MG/ML
50 INJECTION INTRAMUSCULAR; INTRAVENOUS AS NEEDED
Status: DISCONTINUED | OUTPATIENT
Start: 2023-11-07 | End: 2023-11-07 | Stop reason: HOSPADM

## 2023-11-07 RX ORDER — HEPARIN 100 UNIT/ML
500 SYRINGE INTRAVENOUS AS NEEDED
Status: DISCONTINUED | OUTPATIENT
Start: 2023-11-07 | End: 2023-11-07 | Stop reason: HOSPADM

## 2023-11-07 RX ORDER — ONDANSETRON HYDROCHLORIDE 2 MG/ML
8 INJECTION, SOLUTION INTRAVENOUS ONCE
Status: COMPLETED | OUTPATIENT
Start: 2023-11-07 | End: 2023-11-07

## 2023-11-07 RX ORDER — HEPARIN SODIUM,PORCINE/PF 10 UNIT/ML
50 SYRINGE (ML) INTRAVENOUS AS NEEDED
Status: DISCONTINUED | OUTPATIENT
Start: 2023-11-07 | End: 2023-11-07 | Stop reason: HOSPADM

## 2023-11-07 RX ORDER — HEPARIN 100 UNIT/ML
500 SYRINGE INTRAVENOUS AS NEEDED
Status: CANCELLED | OUTPATIENT
Start: 2023-11-07

## 2023-11-07 RX ADMIN — ONDANSETRON 8 MG: 2 INJECTION, SOLUTION INTRAMUSCULAR; INTRAVENOUS at 13:05

## 2023-11-07 RX ADMIN — HEPARIN 500 UNITS: 100 SYRINGE at 14:30

## 2023-11-07 ASSESSMENT — ENCOUNTER SYMPTOMS
DEPRESSION: 0
OCCASIONAL FEELINGS OF UNSTEADINESS: 0
LOSS OF SENSATION IN FEET: 0

## 2023-11-07 ASSESSMENT — PAIN SCALES - GENERAL: PAINLEVEL: 3

## 2023-11-07 NOTE — PROGRESS NOTES
"NUTRITION Assessment NOTE  Reason for Visit:  Catalina Mendoza is a 72 y.o. female who presents for hx ovarian cancer with malignant ascites; 10/2023 found to have subtle progression and rising , plan to restart treatment.     Visited with pt for assessment today during infusion. Pt noted to have weight loss.     Lab Results   Component Value Date/Time    GLUCOSE 183 (H) 10/27/2023 0820     10/27/2023 0820    K 4.8 10/27/2023 0820     10/27/2023 0820    CO2 25 10/27/2023 0820    ANIONGAP 12 10/27/2023 0820    BUN 14 10/27/2023 0820    CREATININE 0.91 10/27/2023 0820    EGFR 67 10/27/2023 0820    CALCIUM 9.6 10/27/2023 0820    ALBUMIN 4.1 10/27/2023 0820    ALKPHOS 46 10/27/2023 0820    PROT 6.8 10/27/2023 0820    AST 21 10/27/2023 0820    BILITOT 0.4 10/27/2023 0820    ALT 17 10/27/2023 0820     Lab Results   Component Value Date/Time    VITD25 55 07/27/2022 0831       Anthropometrics:  Anthropometrics  Height: 160 cm (5' 2.99\")  Weight: 69 kg (152 lb 1.9 oz)  BMI (Calculated): 26.95  IBW/kg (Dietitian Calculated): 52.3 kg  Percent of IBW: 132 %  Adjusted Body Weight (kg): 56 kg  Weight Change  Weight History / % Weight Change: 5.6% weight loss in the last 6 months; weight appears to be relatively stable over the 4-6 weeks.        Wt Readings from Last 10 Encounters:   11/07/23 69 kg (152 lb 1.9 oz)   11/07/23 69 kg (152 lb 1.9 oz)   11/06/23 68.9 kg (152 lb)   10/31/23 69.3 kg (152 lb 12.5 oz)   10/19/23 68.9 kg (152 lb 0.1 oz)   10/12/23 69.3 kg (152 lb 12.8 oz)   10/11/23 69.1 kg (152 lb 5.4 oz)   10/10/23 69.1 kg (152 lb 5.4 oz)   09/14/23 69.4 kg (153 lb)   06/08/23 72.1 kg (159 lb)        Food And Nutrient Intake:  Food and Nutrient History  Food and Nutrient History: pt reports weight gain from fluid retention in abdomen. Likes to walk and wasn't able to before- now working up to 8-9,000- Appetite good; type 1 DM - Dr. Ruvalcaba for endo - short and long action. sometimes will go low at night- " " dexcom 7; calibrated because was 40 but it was reading low readings now improved s/p calibration. Consumes fairlife milk at home- tries to incorporate lower sugar options due to BGs being elevated with medications/ treatment at times.  Energy Intake: Good > 75 %  Food Allergy: oranges, peas, melons, from being tested, not sure on ; but eats oranges now without issues.  GI Symptoms: none  Oral Problems: denies     Food Intake  Amount of Food: smaller breafkast (1/4 c coffee, 1/2 english muffin with PB) , fruit/ yogurt for lunch, live with son/ dtr in law who do cooking (every plate); Dinner is 630p usually bed by 930 doesn't eat before bed- morning 730am, and 830-9am.    Food Preparation  Cooking: Family  Other: prepares breakfast and lunch for self                                       Food Supplement Intake  Oral Nutrition Supplements:  (has tried but doesn't like)           Nutrition Focused Physical Exam:  Subcutaneous Fat Loss  Orbital Fat Pads: Well nourshed (slightly bulging fat pads)  Buccal Fat Pads: Well nourished (full, rounded cheeks)  Triceps: Defer  Ribs: Defer  Muscle Wasting  Temporalis: Well nourished (well-defined muscle)  Pectoralis (Clavicular Region): Defer  Deltoid/Trapezius: Defer  Interosseous: Well nourished (muscle bulges)  Trapezius/Infraspinatus/Supraspinatus (Scapular Region): Defer  Quadriceps: Defer  Gastrocnemius: Defer        Energy Needs  Calculated Energy Needs Using Equations  Height: 160 cm (5' 2.99\")  Weight Used for Equation Calculations: 56 kg (123 lb 7.3 oz) (adjusted weight)  [place] Equation (Overweight or Obese Patients): 1034  Estimated Energy Needs  Total Energy Estimated Needs (kCal): 1680 kCal  Total Estimated Energy Need per Day (kCal/kg): 30 kCal/kg  Estimated Fluid Needs  Total Fluid Estimated Needs (mL): 1680 mL  Total Fluid Estimated Needs (mL/kg): 30 mL/kg  Estimated Protein Needs  Total Protein Estimated Needs (g): 67.2 g  Total Protein Estimated Needs " (g/kg): 1.2 g/kg        Nutrition Diagnosis   Malnutrition Diagnosis  Patient has Malnutrition Diagnosis: No  Nutrition Diagnosis  Patient has Nutrition Diagnosis: Yes  Diagnosis Status (1): New  Nutrition Diagnosis 1: Unintended weight loss  Related to (1): pathophysiology of treatment/ fluid shifts  As Evidenced by (1): pt reporting fluid weight gain with previous treatment with loss (5.6% loss in 6 months) due to fluid shifts and now with weight at [facility].    Nutrition Interventions/Recommendations   Food and Nutrition Delivery  Meals & Snacks: Carbohydrate-modified diet, Protein-modified diet, Modify Composition of Meals/Snacks  Goals: Encouraged intake of complex carbohydrates in place of simple sugars and to consume with protein rich foods. Encouraged protein with meals and snacks as able. Encouraged consistency with meals/ snacks to help with BG as well- discouraged skipping meals/ snacks. Trial of snack before bed to help with BGs in the morning.  Medical Food Supplement: Other, Specify: (carnation instant breakfast)  Goals: discussed use of lower sugar option to help meet protein needs, incorporate as snack as able.  Coordination of Nutrition Care by a Nutrition Professional  Collaboration and referral of nutrition care: Collaboration by nutrition professional with other providers  Goals: Pt asked [facility] about foods to help with blood counts; discussed with pt, encouraged food safety and incorporating a balanced diet with fruits/ vegetables as able.        Patient Instructions   Today we talked about incorporating protein with meals and snacks to help with blood sugars. We discussed potential use of carnation instant breakfast (low sugar option). Also suggested consuming a snack before bed of at least 1 servings of carbohydrates (15g) and 1 ounce of protein to potentially help with blood sugars in the morning.   Examples are:  Apple/ banana with peanut butter or a small handful of nuts  Several whole grain  crackers with 1-2 ounces of cheese or some peanut butter  2 ranjith cracker squares with 8oz of milk  6oz container of low fat yogurt (can add in some cheerios or fruit if chooses)  1 slice of whole grain toast with peanut butter     We did also discuss incorporating fruits/ vegetables, could also try incorporating flaxseed meal, starting with 1 tsp mixed into yogurt and monitor for GI tolerance.     Handouts provided:  BG mgmt  Wrap up with summary/ suggestions    Nutrition Monitoring and Evaluation   Food/Nutrient Related History Monitoring  Monitoring and Evaluation Plan: Meal/snack pattern, Protein intake, Carbohydrate intake  Meal/Snack Pattern: Estimated meal and snack pattern  Criteria: Encouraged consistency with meals/ snacks at least 5 days a week  Estimated protein intake: Estimated protein intake  Criteria: Encouraged protein intake with carbohydrates to help maintain blood sugars.  Estimated carbohydrate intake: Estimated carbohydrate intake  Criteria: Recommend whole grain carbohydrates to help sustain BGs. Limit simple sugars and pair carbohydrates with protein source.  Additional Plan: Use of CIB as needed to help meet needs and prevent skipping meals.        Time Spent  Prep time on day of patient encounter: 10 minutes  Time spent directly with patient, family or caregiver: 20 minutes  Additional Time Spent on Patient Care Activities: 0 minutes  Documentation Time: 25 minutes  Other Time Spent: 0 minutes  Total: 55 minutes        Readiness to Change : Good  Level of Understanding : Good  Anticipated Compliant : Good

## 2023-11-07 NOTE — SIGNIFICANT EVENT
11/07/23 1234   Prechemo Checklist   Has the patient been in the hospital, ED, or urgent care since last date of service No   Chemo/Immuno Consent Signed Yes   Protocol/Indications Verified Yes   Confirmed to previous date/time of medication Yes   Compared to previous dose Yes   All medications are dated accurately Yes   Pregnancy Test Negative Not applicable   Parameters Met (!) No   Provider Notified Yes   Provider Name Dr. hutson   Is Patient Proceeding With Treatment? Yes   BSA/Weight-Height Verified Yes   Dose Calculations Verified Yes

## 2023-11-07 NOTE — PATIENT INSTRUCTIONS
Today we talked about incorporating protein with meals and snacks to help with blood sugars. We discussed potential use of carnation instant breakfast (low sugar option). Also suggested consuming a snack before bed of at least 1 servings of carbohydrates (15g) and 1 ounce of protein to potentially help with blood sugars in the morning.   Examples are:  Apple/ banana with peanut butter or a small handful of nuts  Several whole grain crackers with 1-2 ounces of cheese or some peanut butter  2 ranjith cracker squares with 8oz of milk  6oz container of low fat yogurt (can add in some cheerios or fruit if chooses)  1 slice of whole grain toast with peanut butter     We did also discuss incorporating fruits/ vegetables, could also try incorporating flaxseed meal, starting with 1 tsp mixed into yogurt and monitor for GI tolerance.     Handouts provided:  BG mgmt  Wrap up with summary/ suggestions

## 2023-11-09 ENCOUNTER — OFFICE VISIT (OUTPATIENT)
Dept: ENDOCRINOLOGY | Facility: CLINIC | Age: 73
End: 2023-11-09
Payer: MEDICARE

## 2023-11-09 VITALS
BODY MASS INDEX: 26.75 KG/M2 | HEART RATE: 76 BPM | SYSTOLIC BLOOD PRESSURE: 130 MMHG | RESPIRATION RATE: 16 BRPM | WEIGHT: 151 LBS | DIASTOLIC BLOOD PRESSURE: 70 MMHG | HEIGHT: 63 IN

## 2023-11-09 DIAGNOSIS — I10 BENIGN ESSENTIAL HYPERTENSION: ICD-10-CM

## 2023-11-09 DIAGNOSIS — E10.9 TYPE 1 DIABETES MELLITUS WITHOUT COMPLICATION (MULTI): Primary | ICD-10-CM

## 2023-11-09 DIAGNOSIS — E78.5 HYPERLIPIDEMIA, UNSPECIFIED HYPERLIPIDEMIA TYPE: ICD-10-CM

## 2023-11-09 PROCEDURE — 1036F TOBACCO NON-USER: CPT | Performed by: INTERNAL MEDICINE

## 2023-11-09 PROCEDURE — 1159F MED LIST DOCD IN RCRD: CPT | Performed by: INTERNAL MEDICINE

## 2023-11-09 PROCEDURE — 3008F BODY MASS INDEX DOCD: CPT | Performed by: INTERNAL MEDICINE

## 2023-11-09 PROCEDURE — 99214 OFFICE O/P EST MOD 30 MIN: CPT | Performed by: INTERNAL MEDICINE

## 2023-11-09 PROCEDURE — 3078F DIAST BP <80 MM HG: CPT | Performed by: INTERNAL MEDICINE

## 2023-11-09 PROCEDURE — 1125F AMNT PAIN NOTED PAIN PRSNT: CPT | Performed by: INTERNAL MEDICINE

## 2023-11-09 PROCEDURE — 3075F SYST BP GE 130 - 139MM HG: CPT | Performed by: INTERNAL MEDICINE

## 2023-11-09 PROCEDURE — 4010F ACE/ARB THERAPY RXD/TAKEN: CPT | Performed by: INTERNAL MEDICINE

## 2023-11-09 PROCEDURE — 1160F RVW MEDS BY RX/DR IN RCRD: CPT | Performed by: INTERNAL MEDICINE

## 2023-11-09 PROCEDURE — 3051F HG A1C>EQUAL 7.0%<8.0%: CPT | Performed by: INTERNAL MEDICINE

## 2023-11-09 RX ORDER — INSULIN LISPRO 100 [IU]/ML
INJECTION, SOLUTION INTRAVENOUS; SUBCUTANEOUS
Qty: 60 ML | Refills: 3 | Status: SHIPPED | OUTPATIENT
Start: 2023-11-09 | End: 2024-04-01 | Stop reason: SDUPTHER

## 2023-11-09 RX ORDER — LEVOTHYROXINE SODIUM 88 UG/1
88 TABLET ORAL
COMMUNITY
End: 2024-04-09 | Stop reason: SDUPTHER

## 2023-11-09 ASSESSMENT — ENCOUNTER SYMPTOMS
COUGH: 0
FEVER: 0
PALPITATIONS: 0
HEADACHES: 0
SHORTNESS OF BREATH: 0
CHILLS: 0
NAUSEA: 0
FATIGUE: 0
VOMITING: 0
DIARRHEA: 0

## 2023-11-09 NOTE — PATIENT INSTRUCTIONS
Adjust am carb ratio to 1/5 during treatment weeks  Message in a few weeks for further review  Follow up in 3 months

## 2023-11-09 NOTE — PROGRESS NOTES
"Subjective   Patient ID: Catalina Mendoza is a 72 y.o. female who presents for Diabetes (Type 1 ), Hypothyroidism, Hyperlipidemia, and Hypertension.  HPI  Since last visit now back on chemo for ovarian ca.  Just started and sugars spiking with rx, especially after breakfast.  No lows.  GMI per clarity 8.0.    Feeling pretty well so far and still walking for fitness despite treatment actually advised by onc    Checks sugars 4x a day  Injects insulin 4x a day  Currently utilizing cgm to check sugars      Review of Systems   Constitutional:  Negative for chills, fatigue and fever.   Respiratory:  Negative for cough and shortness of breath.    Cardiovascular:  Negative for chest pain and palpitations.   Gastrointestinal:  Negative for diarrhea, nausea and vomiting.   Neurological:  Negative for headaches.       Objective     11/9/2023 1:52 PM    /70   Pulse 76   Resp 16   Weight 68.5 kg (151 lb)   Height 1.6 m (5' 3\")         Physical Exam  Constitutional:       Appearance: Normal appearance. She is overweight.   HENT:      Head: Normocephalic and atraumatic.   Neck:      Thyroid: No thyroid mass, thyromegaly or thyroid tenderness.   Cardiovascular:      Rate and Rhythm: Normal rate and regular rhythm.      Heart sounds: No murmur heard.     No gallop.   Pulmonary:      Effort: Pulmonary effort is normal.      Breath sounds: Normal breath sounds.   Abdominal:      Palpations: Abdomen is soft.      Comments: benign   Neurological:      General: No focal deficit present.      Mental Status: She is alert and oriented to person, place, and time.      Deep Tendon Reflexes: Reflexes are normal and symmetric.   Psychiatric:         Behavior: Behavior is cooperative.         Assessment/Plan   Problem List Items Addressed This Visit             ICD-10-CM    Benign essential hypertension I10    Hyperlipidemia E78.5    Diabetes mellitus (CMS/[facility]) - Primary E11.9   Discussed course, sugars are decent.   Adjusted am carb " ratio today and advised using new ratio during treatment weeks only (3 wks on, 1 week off).    Advised messaging to review numbers  Follow up in 3 months  Bp controlled and on rosuva

## 2023-11-14 ENCOUNTER — NUTRITION (OUTPATIENT)
Dept: HEMATOLOGY/ONCOLOGY | Facility: CLINIC | Age: 73
End: 2023-11-14

## 2023-11-14 ENCOUNTER — INFUSION (OUTPATIENT)
Dept: HEMATOLOGY/ONCOLOGY | Facility: CLINIC | Age: 73
End: 2023-11-14
Payer: MEDICARE

## 2023-11-14 VITALS
OXYGEN SATURATION: 96 % | DIASTOLIC BLOOD PRESSURE: 73 MMHG | BODY MASS INDEX: 26.87 KG/M2 | HEART RATE: 90 BPM | SYSTOLIC BLOOD PRESSURE: 132 MMHG | RESPIRATION RATE: 17 BRPM | WEIGHT: 151.68 LBS | TEMPERATURE: 97.3 F

## 2023-11-14 VITALS — HEIGHT: 63 IN | BODY MASS INDEX: 26.88 KG/M2 | WEIGHT: 151.68 LBS

## 2023-11-14 DIAGNOSIS — C56.9 PRIMARY MALIGNANT NEOPLASM OF OVARY WITH WIDESPREAD METASTATIC DISEASE, UNSPECIFIED LATERALITY (MULTI): Primary | ICD-10-CM

## 2023-11-14 DIAGNOSIS — C80.0 PRIMARY MALIGNANT NEOPLASM OF OVARY WITH WIDESPREAD METASTATIC DISEASE, UNSPECIFIED LATERALITY (MULTI): Primary | ICD-10-CM

## 2023-11-14 DIAGNOSIS — C44.02 SQUAMOUS CELL CARCINOMA OF SKIN OF LIP: ICD-10-CM

## 2023-11-14 LAB
BASOPHILS # BLD AUTO: 0.01 X10*3/UL (ref 0–0.1)
BASOPHILS NFR BLD AUTO: 0.3 %
EOSINOPHIL # BLD AUTO: 0.14 X10*3/UL (ref 0–0.4)
EOSINOPHIL NFR BLD AUTO: 4.2 %
ERYTHROCYTE [DISTWIDTH] IN BLOOD BY AUTOMATED COUNT: 12.3 % (ref 11.5–14.5)
HCT VFR BLD AUTO: 28.8 % (ref 36–46)
HGB BLD-MCNC: 9.2 G/DL (ref 12–16)
IMM GRANULOCYTES # BLD AUTO: 0 X10*3/UL (ref 0–0.5)
IMM GRANULOCYTES NFR BLD AUTO: 0 % (ref 0–0.9)
LYMPHOCYTES # BLD AUTO: 2.3 X10*3/UL (ref 0.8–3)
LYMPHOCYTES NFR BLD AUTO: 68.2 %
MCH RBC QN AUTO: 28.8 PG (ref 26–34)
MCHC RBC AUTO-ENTMCNC: 31.9 G/DL (ref 32–36)
MCV RBC AUTO: 90 FL (ref 80–100)
MONOCYTES # BLD AUTO: 0.15 X10*3/UL (ref 0.05–0.8)
MONOCYTES NFR BLD AUTO: 4.5 %
NEUTROPHILS # BLD AUTO: 0.77 X10*3/UL (ref 1.6–5.5)
NEUTROPHILS NFR BLD AUTO: 22.8 %
NRBC BLD-RTO: ABNORMAL /100{WBCS}
OVALOCYTES BLD QL SMEAR: NORMAL
PLATELET # BLD AUTO: 119 X10*3/UL (ref 150–450)
RBC # BLD AUTO: 3.19 X10*6/UL (ref 4–5.2)
RBC MORPH BLD: NORMAL
WBC # BLD AUTO: 3.4 X10*3/UL (ref 4.4–11.3)

## 2023-11-14 PROCEDURE — 36591 DRAW BLOOD OFF VENOUS DEVICE: CPT

## 2023-11-14 PROCEDURE — 2500000004 HC RX 250 GENERAL PHARMACY W/ HCPCS (ALT 636 FOR OP/ED): Mod: JZ | Performed by: INTERNAL MEDICINE

## 2023-11-14 PROCEDURE — 2500000004 HC RX 250 GENERAL PHARMACY W/ HCPCS (ALT 636 FOR OP/ED): Performed by: INTERNAL MEDICINE

## 2023-11-14 PROCEDURE — 96413 CHEMO IV INFUSION 1 HR: CPT

## 2023-11-14 PROCEDURE — 85025 COMPLETE CBC W/AUTO DIFF WBC: CPT

## 2023-11-14 RX ORDER — HEPARIN 100 UNIT/ML
500 SYRINGE INTRAVENOUS AS NEEDED
Status: DISCONTINUED | OUTPATIENT
Start: 2023-11-14 | End: 2023-11-14 | Stop reason: HOSPADM

## 2023-11-14 RX ORDER — HEPARIN 100 UNIT/ML
500 SYRINGE INTRAVENOUS AS NEEDED
Status: CANCELLED | OUTPATIENT
Start: 2023-11-14

## 2023-11-14 RX ORDER — ALBUTEROL SULFATE 0.83 MG/ML
3 SOLUTION RESPIRATORY (INHALATION) AS NEEDED
Status: DISCONTINUED | OUTPATIENT
Start: 2023-11-14 | End: 2023-11-14 | Stop reason: HOSPADM

## 2023-11-14 RX ORDER — PROCHLORPERAZINE MALEATE 10 MG
10 TABLET ORAL EVERY 6 HOURS PRN
Status: DISCONTINUED | OUTPATIENT
Start: 2023-11-14 | End: 2023-11-14 | Stop reason: HOSPADM

## 2023-11-14 RX ORDER — PROCHLORPERAZINE EDISYLATE 5 MG/ML
10 INJECTION INTRAMUSCULAR; INTRAVENOUS EVERY 6 HOURS PRN
Status: DISCONTINUED | OUTPATIENT
Start: 2023-11-14 | End: 2023-11-14 | Stop reason: HOSPADM

## 2023-11-14 RX ORDER — HEPARIN SODIUM,PORCINE/PF 10 UNIT/ML
50 SYRINGE (ML) INTRAVENOUS AS NEEDED
Status: CANCELLED | OUTPATIENT
Start: 2023-11-14

## 2023-11-14 RX ORDER — DIPHENHYDRAMINE HYDROCHLORIDE 50 MG/ML
50 INJECTION INTRAMUSCULAR; INTRAVENOUS AS NEEDED
Status: DISCONTINUED | OUTPATIENT
Start: 2023-11-14 | End: 2023-11-14 | Stop reason: HOSPADM

## 2023-11-14 RX ORDER — EPINEPHRINE 0.3 MG/.3ML
0.3 INJECTION SUBCUTANEOUS EVERY 5 MIN PRN
Status: DISCONTINUED | OUTPATIENT
Start: 2023-11-14 | End: 2023-11-14 | Stop reason: HOSPADM

## 2023-11-14 RX ORDER — FAMOTIDINE 10 MG/ML
20 INJECTION INTRAVENOUS ONCE AS NEEDED
Status: DISCONTINUED | OUTPATIENT
Start: 2023-11-14 | End: 2023-11-14 | Stop reason: HOSPADM

## 2023-11-14 RX ADMIN — BEVACIZUMAB-AWWB 700 MG: 400 INJECTION, SOLUTION INTRAVENOUS at 11:38

## 2023-11-14 RX ADMIN — SODIUM CHLORIDE, PRESERVATIVE FREE 500 UNITS: 5 INJECTION INTRAVENOUS at 12:10

## 2023-11-14 ASSESSMENT — ENCOUNTER SYMPTOMS
OCCASIONAL FEELINGS OF UNSTEADINESS: 0
DEPRESSION: 0
LOSS OF SENSATION IN FEET: 0

## 2023-11-14 ASSESSMENT — PAIN SCALES - GENERAL: PAINLEVEL: 0-NO PAIN

## 2023-11-14 NOTE — PROGRESS NOTES
"NUTRITION Follow-up NOTE  Reason for Visit:  Catalina Mendoza is a 73 y.o. female who presents for hx ovarian cancer with malignant ascites; 10/2023 found to have subtle progression and rising , plan to restart treatment.     Current tx: ishan/ topotecan    Started: 10/31/23      Visited with pt and spouse today for follow-up.       Lab Results   Component Value Date/Time    GLUCOSE 183 (H) 10/27/2023 0820     10/27/2023 0820    K 4.8 10/27/2023 0820     10/27/2023 0820    CO2 25 10/27/2023 0820    ANIONGAP 12 10/27/2023 0820    BUN 14 10/27/2023 0820    CREATININE 0.91 10/27/2023 0820    EGFR 67 10/27/2023 0820    CALCIUM 9.6 10/27/2023 0820    ALBUMIN 4.1 10/27/2023 0820    ALKPHOS 46 10/27/2023 0820    PROT 6.8 10/27/2023 0820    AST 21 10/27/2023 0820    BILITOT 0.4 10/27/2023 0820    ALT 17 10/27/2023 0820     Lab Results   Component Value Date/Time    VITD25 55 07/27/2022 0831       Anthropometrics:  Anthropometrics  Height: 160 cm (5' 2.99\")  Weight: 68.8 kg (151 lb 10.8 oz)  BMI (Calculated): 26.88  IBW/kg (Dietitian Calculated): 52.3 kg  Percent of IBW: 132 %  Adjusted Body Weight (kg): 56 kg  Weight Change  Weight History / % Weight Change: 5.6% weight loss in the last 6 months; weight appears to be relatively stable over the 4-6 weeks; slight decrease but relatively stable.  Significant Weight Loss: No        Wt Readings from Last 10 Encounters:   11/14/23 68.8 kg (151 lb 10.8 oz)   11/14/23 68.8 kg (151 lb 10.8 oz)   11/09/23 68.5 kg (151 lb)   11/07/23 69 kg (152 lb 1.9 oz)   11/07/23 69 kg (152 lb 1.9 oz)   11/06/23 68.9 kg (152 lb)   10/31/23 69.3 kg (152 lb 12.5 oz)   10/19/23 68.9 kg (152 lb 0.1 oz)   10/12/23 69.3 kg (152 lb 12.8 oz)   10/11/23 69.1 kg (152 lb 5.4 oz)        Food And Nutrient Intake:  Food and Nutrient History  Food and Nutrient History: Pt reports appetite continue to be good; very slight intermittent nausea- hasn't needed to take any medications. Met with endo- " "and BGs have been good- endo does not feel snack at night will help BGs in the morning. Pt will have snack in evening if < 100 prior to administering long acting. Endo did adjust carb ratio during week of chemotherapy to help, 1 unit for every 7. Has been tyring to incorporate more protein with carbohydrates consistently  Energy Intake: Good > 75 %  Food Allergy: oranges, peas, melons, from being tested, not sure on ; but eats oranges now without issues.  GI Symptoms: nausea, constipation  GI Symptoms greater than 2 weeks: intermittent (nausea: very mild per pt report; constipation: no BM x 2 days, took stool softener within 1 hour had looser stools, took pepto the next day with resolve in looser stools, unclear if related to treatment or bowel regiment)  Oral Problems: denies     Food Intake  Amount of Food: Has not been skipping meals                                                 Medication and Complementary/Alternative Medicine Use  OTC Medication Use: Bepto for looser stools, 1 dose, also took 1 dose of stool softeners for \"constipation\"      Nutrition Focused Physical Exam: COMPLETED 11/7/23           Energy Needs  Calculated Energy Needs Using Equations  Height: 160 cm (5' 2.99\")  Weight Used for Equation Calculations: 56 kg (123 lb 7.3 oz) (adjusted wt)  [place] Equation (Overweight or Obese Patients): 1029  Estimated Energy Needs  Total Energy Estimated Needs (kCal): 1680 kCal  Total Estimated Energy Need per Day (kCal/kg): 30 kCal/kg  Estimated Fluid Needs  Total Fluid Estimated Needs (mL): 1680 mL  Total Fluid Estimated Needs (mL/kg): 30 mL/kg  Estimated Protein Needs  Total Protein Estimated Needs (g): 67.2 g  Total Protein Estimated Needs (g/kg): 1.2 g/kg        Nutrition Diagnosis   Malnutrition Diagnosis  Patient has Malnutrition Diagnosis: No  Nutrition Diagnosis  Patient has Nutrition Diagnosis: No  Diagnosis Status (1): Resolved  Additional Assessment Information (1): Pt's weight has " stabilized and appetite appears stable; previous loss due to fluid- will monitor for NIS and need for intervention    Nutrition Interventions/Recommendations   Food and Nutrition Delivery  Meals & Snacks: Carbohydrate-modified diet, Protein-modified diet, Modify Composition of Meals/Snacks, Fluid-modified diet  Goals: Continue DM friendly diet, carbohydrates with protein to help with BG mgmt; discussed adequate hydration with bowel mgmt and if should expierence worse nausea/ vomiting.  Medical Food Supplement: Other, Specify:  Goals: ONS as needed if not able to consume adequate intake to meet needs        There are no Patient Instructions on file for this visit.    Nutrition Monitoring and Evaluation   Food/Nutrient Related History Monitoring  Monitoring and Evaluation Plan: Carbohydrate intake, Protein intake  Estimated protein intake: Estimated protein intake  Criteria: Incorporate protein source with carbohydrates  Estimated carbohydrate intake: Estimated carbohydrate intake  Criteria: Continue DM friendly diet  Additional Plan: Monitor need for bowel regiment depending on NIS from treatment.        Time Spent  Prep time on day of patient encounter: 10 minutes  Time spent directly with patient, family or caregiver: 15 minutes  Additional Time Spent on Patient Care Activities: 0 minutes  Documentation Time: 10 minutes  Other Time Spent: 0 minutes  Total: 35 minutes

## 2023-11-16 ENCOUNTER — APPOINTMENT (OUTPATIENT)
Dept: SURGERY | Facility: CLINIC | Age: 73
End: 2023-11-16
Payer: MEDICARE

## 2023-11-28 ENCOUNTER — INFUSION (OUTPATIENT)
Dept: HEMATOLOGY/ONCOLOGY | Facility: CLINIC | Age: 73
End: 2023-11-28
Payer: MEDICARE

## 2023-11-28 ENCOUNTER — OFFICE VISIT (OUTPATIENT)
Dept: HEMATOLOGY/ONCOLOGY | Facility: CLINIC | Age: 73
End: 2023-11-28
Payer: MEDICARE

## 2023-11-28 VITALS
OXYGEN SATURATION: 96 % | DIASTOLIC BLOOD PRESSURE: 75 MMHG | SYSTOLIC BLOOD PRESSURE: 124 MMHG | BODY MASS INDEX: 27.03 KG/M2 | RESPIRATION RATE: 17 BRPM | TEMPERATURE: 97.9 F | HEART RATE: 92 BPM | WEIGHT: 152.56 LBS

## 2023-11-28 DIAGNOSIS — C80.0 PRIMARY MALIGNANT NEOPLASM OF OVARY WITH WIDESPREAD METASTATIC DISEASE, UNSPECIFIED LATERALITY (MULTI): ICD-10-CM

## 2023-11-28 DIAGNOSIS — C56.9 PRIMARY MALIGNANT NEOPLASM OF OVARY WITH WIDESPREAD METASTATIC DISEASE, UNSPECIFIED LATERALITY (MULTI): ICD-10-CM

## 2023-11-28 DIAGNOSIS — C44.02 SQUAMOUS CELL CARCINOMA OF SKIN OF LIP: ICD-10-CM

## 2023-11-28 LAB
ALBUMIN SERPL BCP-MCNC: 4.3 G/DL (ref 3.4–5)
ALP SERPL-CCNC: 49 U/L (ref 33–136)
ALT SERPL W P-5'-P-CCNC: 36 U/L (ref 7–45)
ANION GAP SERPL CALC-SCNC: 11 MMOL/L (ref 10–20)
APPEARANCE UR: ABNORMAL
AST SERPL W P-5'-P-CCNC: 37 U/L (ref 9–39)
BACTERIA #/AREA URNS AUTO: ABNORMAL /HPF
BASOPHILS # BLD AUTO: 0.05 X10*3/UL (ref 0–0.1)
BASOPHILS NFR BLD AUTO: 0.9 %
BILIRUB SERPL-MCNC: 0.3 MG/DL (ref 0–1.2)
BILIRUB UR STRIP.AUTO-MCNC: NEGATIVE MG/DL
BUN SERPL-MCNC: 18 MG/DL (ref 6–23)
CALCIUM SERPL-MCNC: 9.8 MG/DL (ref 8.6–10.3)
CANCER AG125 SERPL-ACNC: 55.3 U/ML (ref 0–30.2)
CAOX CRY #/AREA UR COMP ASSIST: ABNORMAL /HPF
CHLORIDE SERPL-SCNC: 106 MMOL/L (ref 98–107)
CO2 SERPL-SCNC: 24 MMOL/L (ref 21–32)
COLOR UR: YELLOW
CREAT SERPL-MCNC: 0.97 MG/DL (ref 0.5–1.05)
EOSINOPHIL # BLD AUTO: 0.12 X10*3/UL (ref 0–0.4)
EOSINOPHIL NFR BLD AUTO: 2.1 %
ERYTHROCYTE [DISTWIDTH] IN BLOOD BY AUTOMATED COUNT: 14.2 % (ref 11.5–14.5)
GFR SERPL CREATININE-BSD FRML MDRD: 61 ML/MIN/1.73M*2
GLUCOSE SERPL-MCNC: 230 MG/DL (ref 74–99)
GLUCOSE UR STRIP.AUTO-MCNC: ABNORMAL MG/DL
HCT VFR BLD AUTO: 32.6 % (ref 36–46)
HGB BLD-MCNC: 10.2 G/DL (ref 12–16)
HYALINE CASTS #/AREA URNS AUTO: ABNORMAL /LPF
IMM GRANULOCYTES # BLD AUTO: 0.06 X10*3/UL (ref 0–0.5)
IMM GRANULOCYTES NFR BLD AUTO: 1 % (ref 0–0.9)
KETONES UR STRIP.AUTO-MCNC: ABNORMAL MG/DL
LEUKOCYTE ESTERASE UR QL STRIP.AUTO: ABNORMAL
LYMPHOCYTES # BLD AUTO: 3.18 X10*3/UL (ref 0.8–3)
LYMPHOCYTES NFR BLD AUTO: 54.5 %
MCH RBC QN AUTO: 28.9 PG (ref 26–34)
MCHC RBC AUTO-ENTMCNC: 31.3 G/DL (ref 32–36)
MCV RBC AUTO: 92 FL (ref 80–100)
MONOCYTES # BLD AUTO: 0.82 X10*3/UL (ref 0.05–0.8)
MONOCYTES NFR BLD AUTO: 14.1 %
NEUTROPHILS # BLD AUTO: 1.6 X10*3/UL (ref 1.6–5.5)
NEUTROPHILS NFR BLD AUTO: 27.4 %
NITRITE UR QL STRIP.AUTO: NEGATIVE
PH UR STRIP.AUTO: 5 [PH]
PLATELET # BLD AUTO: 607 X10*3/UL (ref 150–450)
POTASSIUM SERPL-SCNC: 4.4 MMOL/L (ref 3.5–5.3)
PROT SERPL-MCNC: 6.7 G/DL (ref 6.4–8.2)
PROT UR STRIP.AUTO-MCNC: NEGATIVE MG/DL
RBC # BLD AUTO: 3.53 X10*6/UL (ref 4–5.2)
RBC # UR STRIP.AUTO: NEGATIVE /UL
RBC #/AREA URNS AUTO: ABNORMAL /HPF
SODIUM SERPL-SCNC: 137 MMOL/L (ref 136–145)
SP GR UR STRIP.AUTO: 1.02
SQUAMOUS #/AREA URNS AUTO: ABNORMAL /HPF
UROBILINOGEN UR STRIP.AUTO-MCNC: <2 MG/DL
WBC # BLD AUTO: 5.8 X10*3/UL (ref 4.4–11.3)
WBC #/AREA URNS AUTO: ABNORMAL /HPF

## 2023-11-28 PROCEDURE — 4010F ACE/ARB THERAPY RXD/TAKEN: CPT | Performed by: INTERNAL MEDICINE

## 2023-11-28 PROCEDURE — 80053 COMPREHEN METABOLIC PANEL: CPT

## 2023-11-28 PROCEDURE — 2500000004 HC RX 250 GENERAL PHARMACY W/ HCPCS (ALT 636 FOR OP/ED): Performed by: INTERNAL MEDICINE

## 2023-11-28 PROCEDURE — 81001 URINALYSIS AUTO W/SCOPE: CPT

## 2023-11-28 PROCEDURE — 1036F TOBACCO NON-USER: CPT | Performed by: INTERNAL MEDICINE

## 2023-11-28 PROCEDURE — 96413 CHEMO IV INFUSION 1 HR: CPT

## 2023-11-28 PROCEDURE — 86304 IMMUNOASSAY TUMOR CA 125: CPT

## 2023-11-28 PROCEDURE — 85025 COMPLETE CBC W/AUTO DIFF WBC: CPT

## 2023-11-28 PROCEDURE — 3008F BODY MASS INDEX DOCD: CPT | Performed by: INTERNAL MEDICINE

## 2023-11-28 PROCEDURE — 96377 APPLICATON ON-BODY INJECTOR: CPT

## 2023-11-28 PROCEDURE — 3074F SYST BP LT 130 MM HG: CPT | Performed by: INTERNAL MEDICINE

## 2023-11-28 PROCEDURE — 96417 CHEMO IV INFUS EACH ADDL SEQ: CPT

## 2023-11-28 PROCEDURE — 99214 OFFICE O/P EST MOD 30 MIN: CPT | Performed by: INTERNAL MEDICINE

## 2023-11-28 PROCEDURE — 1126F AMNT PAIN NOTED NONE PRSNT: CPT | Performed by: INTERNAL MEDICINE

## 2023-11-28 PROCEDURE — 96375 TX/PRO/DX INJ NEW DRUG ADDON: CPT | Mod: INF

## 2023-11-28 PROCEDURE — 3051F HG A1C>EQUAL 7.0%<8.0%: CPT | Performed by: INTERNAL MEDICINE

## 2023-11-28 PROCEDURE — 1159F MED LIST DOCD IN RCRD: CPT | Performed by: INTERNAL MEDICINE

## 2023-11-28 PROCEDURE — 1160F RVW MEDS BY RX/DR IN RCRD: CPT | Performed by: INTERNAL MEDICINE

## 2023-11-28 PROCEDURE — 99214 OFFICE O/P EST MOD 30 MIN: CPT | Mod: 25 | Performed by: INTERNAL MEDICINE

## 2023-11-28 PROCEDURE — 3078F DIAST BP <80 MM HG: CPT | Performed by: INTERNAL MEDICINE

## 2023-11-28 RX ORDER — PROCHLORPERAZINE MALEATE 10 MG
10 TABLET ORAL EVERY 6 HOURS PRN
Status: DISCONTINUED | OUTPATIENT
Start: 2023-11-28 | End: 2023-11-28 | Stop reason: HOSPADM

## 2023-11-28 RX ORDER — HEPARIN 100 UNIT/ML
500 SYRINGE INTRAVENOUS AS NEEDED
Status: CANCELLED | OUTPATIENT
Start: 2023-11-28

## 2023-11-28 RX ORDER — PROCHLORPERAZINE EDISYLATE 5 MG/ML
10 INJECTION INTRAMUSCULAR; INTRAVENOUS EVERY 6 HOURS PRN
Status: DISCONTINUED | OUTPATIENT
Start: 2023-11-28 | End: 2023-11-28 | Stop reason: HOSPADM

## 2023-11-28 RX ORDER — ALBUTEROL SULFATE 0.83 MG/ML
3 SOLUTION RESPIRATORY (INHALATION) AS NEEDED
Status: DISCONTINUED | OUTPATIENT
Start: 2023-11-28 | End: 2023-11-28 | Stop reason: HOSPADM

## 2023-11-28 RX ORDER — HEPARIN 100 UNIT/ML
500 SYRINGE INTRAVENOUS AS NEEDED
Status: DISCONTINUED | OUTPATIENT
Start: 2023-11-28 | End: 2023-11-28 | Stop reason: HOSPADM

## 2023-11-28 RX ORDER — EPINEPHRINE 0.3 MG/.3ML
0.3 INJECTION SUBCUTANEOUS EVERY 5 MIN PRN
Status: DISCONTINUED | OUTPATIENT
Start: 2023-11-28 | End: 2023-11-28 | Stop reason: HOSPADM

## 2023-11-28 RX ORDER — ONDANSETRON HYDROCHLORIDE 2 MG/ML
8 INJECTION, SOLUTION INTRAVENOUS ONCE
Status: COMPLETED | OUTPATIENT
Start: 2023-11-28 | End: 2023-11-28

## 2023-11-28 RX ORDER — FAMOTIDINE 10 MG/ML
20 INJECTION INTRAVENOUS ONCE AS NEEDED
Status: DISCONTINUED | OUTPATIENT
Start: 2023-11-28 | End: 2023-11-28 | Stop reason: HOSPADM

## 2023-11-28 RX ORDER — HEPARIN SODIUM,PORCINE/PF 10 UNIT/ML
50 SYRINGE (ML) INTRAVENOUS AS NEEDED
Status: CANCELLED | OUTPATIENT
Start: 2023-11-28

## 2023-11-28 RX ORDER — DIPHENHYDRAMINE HYDROCHLORIDE 50 MG/ML
50 INJECTION INTRAMUSCULAR; INTRAVENOUS AS NEEDED
Status: DISCONTINUED | OUTPATIENT
Start: 2023-11-28 | End: 2023-11-28 | Stop reason: HOSPADM

## 2023-11-28 RX ADMIN — BEVACIZUMAB-AWWB 700 MG: 400 INJECTION, SOLUTION INTRAVENOUS at 12:43

## 2023-11-28 RX ADMIN — ONDANSETRON 8 MG: 2 INJECTION, SOLUTION INTRAMUSCULAR; INTRAVENOUS at 11:26

## 2023-11-28 RX ADMIN — HEPARIN 500 UNITS: 100 SYRINGE at 13:14

## 2023-11-28 RX ADMIN — TOPOTECAN 7 MG: 1 INJECTION, SOLUTION, CONCENTRATE INTRAVENOUS at 12:04

## 2023-11-28 RX ADMIN — PEGFILGRASTIM 6 MG: KIT SUBCUTANEOUS at 13:07

## 2023-11-28 ASSESSMENT — ENCOUNTER SYMPTOMS
LOSS OF SENSATION IN FEET: 0
OCCASIONAL FEELINGS OF UNSTEADINESS: 0
DEPRESSION: 0

## 2023-11-28 ASSESSMENT — PAIN SCALES - GENERAL: PAINLEVEL: 0-NO PAIN

## 2023-11-28 ASSESSMENT — NCCN CANCER DISTRESS MANAGEMENT: NCCN PHYSICAL CONCERNS: 3

## 2023-11-28 NOTE — PROGRESS NOTES
Patient ID: Catalina Mendoza is a 73 y.o. female.  Referring Physician: Aleyda Ca MD  [id number] [place] [id number]  Primary Care Provider: Juju Kenney MD      Subjective    HPI  Ovarian cancer with malignant ascites.  CT scan gave 12 19 2022.  compared with 12/23 2022.  Mediastinal and abdominal lymphadenopathy which demonstrated abnormal  FDG [id number].  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 Discussed with patient       woman [presented at 72 years old] who presents today with abdominal distention and CT scan showing omental nodularity and extensive abdominal adenopathy.  Her  is 2320 with  elevation in CA 19-9 as well.  INR guided paracentesis as well as cytology of malignant ascitic fluid is pending at this time.  Clinical diagnosis and impression is that of ovarian adenocarcinoma stage III\4.  Full staging CT scans ordered and pathology  reports pending.      04/04/2023: Below is notes copied from OB/GYN.  # HGSOC   - We reviewed the typical pathophysiology and management of ovarian cancer, we discussed that ovarian cancer is usually managed with a combination of chemotherapy and surgery  - We reviewed her imaging    - Chest lymph nodes specifically were significantly improved prior to 3rd chemo   - Schedule visit for genetic counseling to r/o hereditary cancer syndromes and/or targeted therapy markers  - Continue monitoring tumor markers  - She is not a candidate for HIPEC  - We discussed the plan for adjuvant chemo following surgery. Adjuvant chemotherapy cycles after surgery can be managed by gyn onc or med onc   12/27/2022-2/7/2023: 3 Cycles Carbo Taxol: Had surgery and resumed Chemotherapy 4/13/2023-5/23/2023.  was  still in double digits of 85.  Patient not a candidate for olaparib.     06/13/2023: Most recent  is 18 well within normal.  We will discussed with patient with the option to observe and to  intervene if we should see a  rise or morphologic evidence of disease recurrence.  Patient is platinum sensitive and has had  good response at this time.  Maintenance or other intervention will be dependent on either a rising  or morphologic evidence of disease persistence.  Bevacizumab will be considered plus minus of the therapeutic options     Now with recurrent disease:  Review of Systems   Cardiovascular: Negative.    Gastrointestinal: Negative.         Objective   BSA: 1.75 meters squared  /75 (BP Location: Left arm)   Pulse 92   Temp 36.6 °C (97.9 °F)   Resp 17   Wt 69.2 kg (152 lb 8.9 oz)   LMP  (LMP Unknown)   SpO2 96%   BMI 27.03 kg/m²     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, started on NACT with carbo/taxol  - 2/7/23: S/p cycle 3 with good interval response  - 3/13/23: Diagnostic laparoscopy, BSO, extensive MIKAELA, bilateral ureterolysis, infracolic omentectomy, abdominal wall and mesenteric biopsies, complete gross resection to R0  - 5/23/23: Chemo completed     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 She has never been exposed to tobacco smoke. She has never used smokeless tobacco.  She  reports no history of alcohol use.  She  reports no history of drug use.    Physical Exam  HENT:      Head: Normocephalic and atraumatic.   Eyes:      Extraocular Movements: Extraocular movements intact.      Pupils: Pupils are equal, round, and reactive to light.   Cardiovascular:      Rate and Rhythm: Regular rhythm.   Pulmonary:      Effort: Pulmonary effort is normal.   Abdominal:      Palpations: Abdomen is soft.   Musculoskeletal:         General: Normal range of motion.      Cervical back: Normal range of motion and neck supple.   Neurological:      Mental Status: She is alert.         Performance Status:  Symptomatic; fully ambulatory    Assessment/Plan    And has been seen today.  Patient on topotecan and bevacizumab.  Cycle 2 patient had a bout of neutropenia and hence Neulasta support prescribed.  Cleared for therapy responding to therapy as evidenced by  declined.  Cleared for therapy today.      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

## 2023-11-28 NOTE — PATIENT INSTRUCTIONS
Today you met with Dr. Ca.  Your Ca-125 will be checked every 3 months.  Continue treatment as is.  No changes.  Call for any concerns.

## 2023-12-01 ASSESSMENT — ENCOUNTER SYMPTOMS
CARDIOVASCULAR NEGATIVE: 1
GASTROINTESTINAL NEGATIVE: 1

## 2023-12-05 ENCOUNTER — INFUSION (OUTPATIENT)
Dept: HEMATOLOGY/ONCOLOGY | Facility: CLINIC | Age: 73
End: 2023-12-05
Payer: MEDICARE

## 2023-12-05 VITALS
HEART RATE: 88 BPM | TEMPERATURE: 97.7 F | WEIGHT: 150.02 LBS | RESPIRATION RATE: 16 BRPM | SYSTOLIC BLOOD PRESSURE: 135 MMHG | BODY MASS INDEX: 26.58 KG/M2 | DIASTOLIC BLOOD PRESSURE: 83 MMHG | OXYGEN SATURATION: 95 %

## 2023-12-05 DIAGNOSIS — C56.9 PRIMARY MALIGNANT NEOPLASM OF OVARY WITH WIDESPREAD METASTATIC DISEASE, UNSPECIFIED LATERALITY (MULTI): ICD-10-CM

## 2023-12-05 DIAGNOSIS — C80.0 PRIMARY MALIGNANT NEOPLASM OF OVARY WITH WIDESPREAD METASTATIC DISEASE, UNSPECIFIED LATERALITY (MULTI): ICD-10-CM

## 2023-12-05 DIAGNOSIS — C44.02 SQUAMOUS CELL CARCINOMA OF SKIN OF LIP: ICD-10-CM

## 2023-12-05 LAB
BASOPHILS # BLD AUTO: 0.07 X10*3/UL (ref 0–0.1)
BASOPHILS NFR BLD AUTO: 0.2 %
EOSINOPHIL # BLD AUTO: 0.15 X10*3/UL (ref 0–0.4)
EOSINOPHIL NFR BLD AUTO: 0.4 %
ERYTHROCYTE [DISTWIDTH] IN BLOOD BY AUTOMATED COUNT: 14.4 % (ref 11.5–14.5)
HCT VFR BLD AUTO: 32.3 % (ref 36–46)
HGB BLD-MCNC: 10.4 G/DL (ref 12–16)
IMM GRANULOCYTES # BLD AUTO: 0.66 X10*3/UL (ref 0–0.5)
IMM GRANULOCYTES NFR BLD AUTO: 1.9 % (ref 0–0.9)
LYMPHOCYTES # BLD AUTO: 5.94 X10*3/UL (ref 0.8–3)
LYMPHOCYTES NFR BLD AUTO: 17.3 %
MCH RBC QN AUTO: 29 PG (ref 26–34)
MCHC RBC AUTO-ENTMCNC: 32.2 G/DL (ref 32–36)
MCV RBC AUTO: 90 FL (ref 80–100)
MONOCYTES # BLD AUTO: 2.75 X10*3/UL (ref 0.05–0.8)
MONOCYTES NFR BLD AUTO: 8 %
NEUTROPHILS # BLD AUTO: 24.8 X10*3/UL (ref 1.6–5.5)
NEUTROPHILS NFR BLD AUTO: 72.2 %
NRBC BLD-RTO: ABNORMAL /100{WBCS}
PLATELET # BLD AUTO: 278 X10*3/UL (ref 150–450)
RBC # BLD AUTO: 3.59 X10*6/UL (ref 4–5.2)
WBC # BLD AUTO: 34.4 X10*3/UL (ref 4.4–11.3)

## 2023-12-05 PROCEDURE — 2500000004 HC RX 250 GENERAL PHARMACY W/ HCPCS (ALT 636 FOR OP/ED): Performed by: INTERNAL MEDICINE

## 2023-12-05 PROCEDURE — 85025 COMPLETE CBC W/AUTO DIFF WBC: CPT

## 2023-12-05 PROCEDURE — 96375 TX/PRO/DX INJ NEW DRUG ADDON: CPT | Mod: INF

## 2023-12-05 PROCEDURE — 96413 CHEMO IV INFUSION 1 HR: CPT

## 2023-12-05 RX ORDER — EPINEPHRINE 0.3 MG/.3ML
0.3 INJECTION SUBCUTANEOUS EVERY 5 MIN PRN
Status: DISCONTINUED | OUTPATIENT
Start: 2023-12-05 | End: 2023-12-05 | Stop reason: HOSPADM

## 2023-12-05 RX ORDER — PROCHLORPERAZINE EDISYLATE 5 MG/ML
10 INJECTION INTRAMUSCULAR; INTRAVENOUS EVERY 6 HOURS PRN
Status: DISCONTINUED | OUTPATIENT
Start: 2023-12-05 | End: 2023-12-05 | Stop reason: HOSPADM

## 2023-12-05 RX ORDER — HEPARIN SODIUM,PORCINE/PF 10 UNIT/ML
50 SYRINGE (ML) INTRAVENOUS AS NEEDED
Status: DISCONTINUED | OUTPATIENT
Start: 2023-12-05 | End: 2023-12-05 | Stop reason: HOSPADM

## 2023-12-05 RX ORDER — DIPHENHYDRAMINE HYDROCHLORIDE 50 MG/ML
50 INJECTION INTRAMUSCULAR; INTRAVENOUS AS NEEDED
Status: DISCONTINUED | OUTPATIENT
Start: 2023-12-05 | End: 2023-12-05 | Stop reason: HOSPADM

## 2023-12-05 RX ORDER — PROCHLORPERAZINE MALEATE 10 MG
10 TABLET ORAL EVERY 6 HOURS PRN
Status: DISCONTINUED | OUTPATIENT
Start: 2023-12-05 | End: 2023-12-05 | Stop reason: HOSPADM

## 2023-12-05 RX ORDER — ALBUTEROL SULFATE 0.83 MG/ML
3 SOLUTION RESPIRATORY (INHALATION) AS NEEDED
Status: DISCONTINUED | OUTPATIENT
Start: 2023-12-05 | End: 2023-12-05 | Stop reason: HOSPADM

## 2023-12-05 RX ORDER — HEPARIN 100 UNIT/ML
500 SYRINGE INTRAVENOUS AS NEEDED
Status: CANCELLED | OUTPATIENT
Start: 2023-12-05

## 2023-12-05 RX ORDER — ONDANSETRON HYDROCHLORIDE 2 MG/ML
8 INJECTION, SOLUTION INTRAVENOUS ONCE
Status: COMPLETED | OUTPATIENT
Start: 2023-12-05 | End: 2023-12-05

## 2023-12-05 RX ORDER — HEPARIN SODIUM,PORCINE/PF 10 UNIT/ML
50 SYRINGE (ML) INTRAVENOUS AS NEEDED
Status: CANCELLED | OUTPATIENT
Start: 2023-12-05

## 2023-12-05 RX ORDER — HEPARIN 100 UNIT/ML
500 SYRINGE INTRAVENOUS AS NEEDED
Status: DISCONTINUED | OUTPATIENT
Start: 2023-12-05 | End: 2023-12-05 | Stop reason: HOSPADM

## 2023-12-05 RX ORDER — FAMOTIDINE 10 MG/ML
20 INJECTION INTRAVENOUS ONCE AS NEEDED
Status: DISCONTINUED | OUTPATIENT
Start: 2023-12-05 | End: 2023-12-05 | Stop reason: HOSPADM

## 2023-12-05 RX ADMIN — TOPOTECAN 7 MG: 1 INJECTION, SOLUTION, CONCENTRATE INTRAVENOUS at 15:22

## 2023-12-05 RX ADMIN — ONDANSETRON 8 MG: 2 INJECTION INTRAMUSCULAR; INTRAVENOUS at 14:55

## 2023-12-05 RX ADMIN — HEPARIN 500 UNITS: 100 SYRINGE at 16:08

## 2023-12-05 ASSESSMENT — PAIN SCALES - GENERAL: PAINLEVEL: 0-NO PAIN

## 2023-12-05 NOTE — PROGRESS NOTES
Patient tolerated treatment well. Discharged to home with follow up appointments in place. Patient agreed to plan and verbalized understanding using teach back method.

## 2023-12-08 DIAGNOSIS — C56.9 PRIMARY MALIGNANT NEOPLASM OF OVARY WITH WIDESPREAD METASTATIC DISEASE, UNSPECIFIED LATERALITY (MULTI): Primary | ICD-10-CM

## 2023-12-08 DIAGNOSIS — E78.5 HYPERLIPIDEMIA, UNSPECIFIED HYPERLIPIDEMIA TYPE: ICD-10-CM

## 2023-12-08 DIAGNOSIS — C44.02 SQUAMOUS CELL CARCINOMA OF SKIN OF LIP: ICD-10-CM

## 2023-12-08 DIAGNOSIS — E10.9 TYPE 1 DIABETES MELLITUS WITHOUT COMPLICATION (MULTI): Primary | ICD-10-CM

## 2023-12-08 DIAGNOSIS — C80.0 PRIMARY MALIGNANT NEOPLASM OF OVARY WITH WIDESPREAD METASTATIC DISEASE, UNSPECIFIED LATERALITY (MULTI): Primary | ICD-10-CM

## 2023-12-08 RX ORDER — PROCHLORPERAZINE MALEATE 5 MG
10 TABLET ORAL EVERY 6 HOURS PRN
Status: CANCELLED | OUTPATIENT
Start: 2024-03-05

## 2023-12-08 RX ORDER — PROCHLORPERAZINE EDISYLATE 5 MG/ML
10 INJECTION INTRAMUSCULAR; INTRAVENOUS EVERY 6 HOURS PRN
Status: CANCELLED | OUTPATIENT
Start: 2024-02-27

## 2023-12-08 RX ORDER — PROCHLORPERAZINE EDISYLATE 5 MG/ML
10 INJECTION INTRAMUSCULAR; INTRAVENOUS EVERY 6 HOURS PRN
Status: CANCELLED | OUTPATIENT
Start: 2024-02-20

## 2023-12-08 RX ORDER — DIPHENHYDRAMINE HYDROCHLORIDE 50 MG/ML
50 INJECTION INTRAMUSCULAR; INTRAVENOUS AS NEEDED
Status: CANCELLED | OUTPATIENT
Start: 2024-02-27

## 2023-12-08 RX ORDER — FAMOTIDINE 10 MG/ML
20 INJECTION INTRAVENOUS ONCE AS NEEDED
Status: CANCELLED | OUTPATIENT
Start: 2024-02-27

## 2023-12-08 RX ORDER — PROCHLORPERAZINE MALEATE 5 MG
10 TABLET ORAL EVERY 6 HOURS PRN
Status: CANCELLED | OUTPATIENT
Start: 2024-02-27

## 2023-12-08 RX ORDER — EPINEPHRINE 0.3 MG/.3ML
0.3 INJECTION SUBCUTANEOUS EVERY 5 MIN PRN
Status: CANCELLED | OUTPATIENT
Start: 2024-02-20

## 2023-12-08 RX ORDER — ONDANSETRON HYDROCHLORIDE 2 MG/ML
8 INJECTION, SOLUTION INTRAVENOUS ONCE
Status: CANCELLED | OUTPATIENT
Start: 2024-02-20

## 2023-12-08 RX ORDER — DIPHENHYDRAMINE HYDROCHLORIDE 50 MG/ML
50 INJECTION INTRAMUSCULAR; INTRAVENOUS AS NEEDED
Status: CANCELLED | OUTPATIENT
Start: 2024-02-20

## 2023-12-08 RX ORDER — EPINEPHRINE 0.3 MG/.3ML
0.3 INJECTION SUBCUTANEOUS EVERY 5 MIN PRN
Status: CANCELLED | OUTPATIENT
Start: 2024-03-05

## 2023-12-08 RX ORDER — ALBUTEROL SULFATE 0.83 MG/ML
3 SOLUTION RESPIRATORY (INHALATION) AS NEEDED
Status: CANCELLED | OUTPATIENT
Start: 2024-02-27

## 2023-12-08 RX ORDER — DIPHENHYDRAMINE HYDROCHLORIDE 50 MG/ML
50 INJECTION INTRAMUSCULAR; INTRAVENOUS AS NEEDED
Status: CANCELLED | OUTPATIENT
Start: 2024-03-05

## 2023-12-08 RX ORDER — ONDANSETRON HYDROCHLORIDE 2 MG/ML
8 INJECTION, SOLUTION INTRAVENOUS ONCE
Status: CANCELLED | OUTPATIENT
Start: 2024-02-27

## 2023-12-08 RX ORDER — FAMOTIDINE 10 MG/ML
20 INJECTION INTRAVENOUS ONCE AS NEEDED
Status: CANCELLED | OUTPATIENT
Start: 2024-03-05

## 2023-12-08 RX ORDER — EPINEPHRINE 0.3 MG/.3ML
0.3 INJECTION SUBCUTANEOUS EVERY 5 MIN PRN
Status: CANCELLED | OUTPATIENT
Start: 2024-02-27

## 2023-12-08 RX ORDER — ALBUTEROL SULFATE 0.83 MG/ML
3 SOLUTION RESPIRATORY (INHALATION) AS NEEDED
Status: CANCELLED | OUTPATIENT
Start: 2024-03-05

## 2023-12-08 RX ORDER — ONDANSETRON HYDROCHLORIDE 2 MG/ML
8 INJECTION, SOLUTION INTRAVENOUS ONCE
Status: CANCELLED | OUTPATIENT
Start: 2024-03-05

## 2023-12-08 RX ORDER — FAMOTIDINE 10 MG/ML
20 INJECTION INTRAVENOUS ONCE AS NEEDED
Status: CANCELLED | OUTPATIENT
Start: 2024-02-20

## 2023-12-08 RX ORDER — INSULIN GLARGINE 100 [IU]/ML
40 INJECTION, SOLUTION SUBCUTANEOUS
Qty: 45 ML | Refills: 2 | Status: ON HOLD | OUTPATIENT
Start: 2023-12-08 | End: 2024-02-13 | Stop reason: SDUPTHER

## 2023-12-08 RX ORDER — PROCHLORPERAZINE EDISYLATE 5 MG/ML
10 INJECTION INTRAMUSCULAR; INTRAVENOUS EVERY 6 HOURS PRN
Status: CANCELLED | OUTPATIENT
Start: 2024-03-05

## 2023-12-08 RX ORDER — PROCHLORPERAZINE MALEATE 5 MG
10 TABLET ORAL EVERY 6 HOURS PRN
Status: CANCELLED | OUTPATIENT
Start: 2024-02-20

## 2023-12-08 RX ORDER — ALBUTEROL SULFATE 0.83 MG/ML
3 SOLUTION RESPIRATORY (INHALATION) AS NEEDED
Status: CANCELLED | OUTPATIENT
Start: 2024-02-20

## 2023-12-12 ENCOUNTER — INFUSION (OUTPATIENT)
Dept: HEMATOLOGY/ONCOLOGY | Facility: CLINIC | Age: 73
End: 2023-12-12
Payer: MEDICARE

## 2023-12-12 VITALS
BODY MASS INDEX: 26.31 KG/M2 | HEART RATE: 100 BPM | DIASTOLIC BLOOD PRESSURE: 75 MMHG | OXYGEN SATURATION: 98 % | WEIGHT: 148.48 LBS | TEMPERATURE: 97 F | RESPIRATION RATE: 17 BRPM | SYSTOLIC BLOOD PRESSURE: 116 MMHG

## 2023-12-12 DIAGNOSIS — C80.0 PRIMARY MALIGNANT NEOPLASM OF OVARY WITH WIDESPREAD METASTATIC DISEASE, UNSPECIFIED LATERALITY (MULTI): ICD-10-CM

## 2023-12-12 DIAGNOSIS — C56.9 PRIMARY MALIGNANT NEOPLASM OF OVARY WITH WIDESPREAD METASTATIC DISEASE, UNSPECIFIED LATERALITY (MULTI): ICD-10-CM

## 2023-12-12 DIAGNOSIS — C44.02 SQUAMOUS CELL CARCINOMA OF SKIN OF LIP: ICD-10-CM

## 2023-12-12 LAB
BASOPHILS # BLD AUTO: 0.01 X10*3/UL (ref 0–0.1)
BASOPHILS NFR BLD AUTO: 0.3 %
BITE CELLS BLD QL SMEAR: PRESENT
DACRYOCYTES BLD QL SMEAR: NORMAL
EOSINOPHIL # BLD AUTO: 0.2 X10*3/UL (ref 0–0.4)
EOSINOPHIL NFR BLD AUTO: 6.4 %
ERYTHROCYTE [DISTWIDTH] IN BLOOD BY AUTOMATED COUNT: 13.4 % (ref 11.5–14.5)
HCT VFR BLD AUTO: 28.4 % (ref 36–46)
HGB BLD-MCNC: 9.2 G/DL (ref 12–16)
IMM GRANULOCYTES # BLD AUTO: 0 X10*3/UL (ref 0–0.5)
IMM GRANULOCYTES NFR BLD AUTO: 0 % (ref 0–0.9)
LYMPHOCYTES # BLD AUTO: 2.42 X10*3/UL (ref 0.8–3)
LYMPHOCYTES NFR BLD AUTO: 77.8 %
MCH RBC QN AUTO: 29.1 PG (ref 26–34)
MCHC RBC AUTO-ENTMCNC: 32.4 G/DL (ref 32–36)
MCV RBC AUTO: 90 FL (ref 80–100)
MONOCYTES # BLD AUTO: 0.12 X10*3/UL (ref 0.05–0.8)
MONOCYTES NFR BLD AUTO: 3.9 %
NEUTROPHILS # BLD AUTO: 0.36 X10*3/UL (ref 1.6–5.5)
NEUTROPHILS NFR BLD AUTO: 11.6 %
NRBC BLD-RTO: ABNORMAL /100{WBCS}
OVALOCYTES BLD QL SMEAR: NORMAL
PLATELET # BLD AUTO: 59 X10*3/UL (ref 150–450)
RBC # BLD AUTO: 3.16 X10*6/UL (ref 4–5.2)
RBC MORPH BLD: NORMAL
SCHISTOCYTES BLD QL SMEAR: NORMAL
SPHEROCYTES BLD QL SMEAR: NORMAL
WBC # BLD AUTO: 3.1 X10*3/UL (ref 4.4–11.3)

## 2023-12-12 PROCEDURE — 2500000004 HC RX 250 GENERAL PHARMACY W/ HCPCS (ALT 636 FOR OP/ED)

## 2023-12-12 PROCEDURE — 85025 COMPLETE CBC W/AUTO DIFF WBC: CPT

## 2023-12-12 PROCEDURE — 36591 DRAW BLOOD OFF VENOUS DEVICE: CPT

## 2023-12-12 RX ORDER — HEPARIN SODIUM,PORCINE/PF 10 UNIT/ML
50 SYRINGE (ML) INTRAVENOUS AS NEEDED
Status: CANCELLED | OUTPATIENT
Start: 2023-12-12

## 2023-12-12 RX ORDER — HEPARIN 100 UNIT/ML
500 SYRINGE INTRAVENOUS AS NEEDED
Status: CANCELLED | OUTPATIENT
Start: 2023-12-12

## 2023-12-12 RX ORDER — HEPARIN 100 UNIT/ML
SYRINGE INTRAVENOUS
Status: COMPLETED
Start: 2023-12-12 | End: 2023-12-12

## 2023-12-12 RX ORDER — HEPARIN SODIUM,PORCINE/PF 10 UNIT/ML
50 SYRINGE (ML) INTRAVENOUS AS NEEDED
Status: DISCONTINUED | OUTPATIENT
Start: 2023-12-12 | End: 2023-12-12 | Stop reason: HOSPADM

## 2023-12-12 RX ORDER — HEPARIN 100 UNIT/ML
500 SYRINGE INTRAVENOUS AS NEEDED
Status: DISCONTINUED | OUTPATIENT
Start: 2023-12-12 | End: 2023-12-12 | Stop reason: HOSPADM

## 2023-12-12 RX ADMIN — HEPARIN 500 UNITS: 100 SYRINGE at 10:11

## 2023-12-12 ASSESSMENT — PAIN SCALES - GENERAL: PAINLEVEL: 0-NO PAIN

## 2023-12-12 NOTE — PROGRESS NOTES
Patient did not meet parameters for treatment today. Dr. Ca notified and treatment was held. Patient has a follow up appointment on  12/26/23, discussed the plan with patient and her . Patient agreed to plan and verbalized understanding using teach back method.

## 2023-12-13 RX ORDER — FENOFIBRATE 160 MG/1
TABLET ORAL
Qty: 90 TABLET | Refills: 0 | Status: SHIPPED | OUTPATIENT
Start: 2023-12-13 | End: 2024-04-18 | Stop reason: SDUPTHER

## 2023-12-18 ENCOUNTER — APPOINTMENT (OUTPATIENT)
Dept: PAIN MEDICINE | Facility: HOSPITAL | Age: 73
End: 2023-12-18
Payer: MEDICARE

## 2023-12-26 ENCOUNTER — OFFICE VISIT (OUTPATIENT)
Dept: HEMATOLOGY/ONCOLOGY | Facility: CLINIC | Age: 73
End: 2023-12-26
Payer: MEDICARE

## 2023-12-26 ENCOUNTER — APPOINTMENT (OUTPATIENT)
Dept: HEMATOLOGY/ONCOLOGY | Facility: CLINIC | Age: 73
End: 2023-12-26
Payer: MEDICARE

## 2023-12-26 ENCOUNTER — INFUSION (OUTPATIENT)
Dept: HEMATOLOGY/ONCOLOGY | Facility: CLINIC | Age: 73
End: 2023-12-26
Payer: MEDICARE

## 2023-12-26 VITALS
BODY MASS INDEX: 26.6 KG/M2 | WEIGHT: 150.13 LBS | HEART RATE: 97 BPM | SYSTOLIC BLOOD PRESSURE: 116 MMHG | DIASTOLIC BLOOD PRESSURE: 69 MMHG | OXYGEN SATURATION: 96 % | RESPIRATION RATE: 16 BRPM | TEMPERATURE: 98.1 F

## 2023-12-26 DIAGNOSIS — C80.0 PRIMARY MALIGNANT NEOPLASM OF OVARY WITH WIDESPREAD METASTATIC DISEASE, UNSPECIFIED LATERALITY (MULTI): ICD-10-CM

## 2023-12-26 DIAGNOSIS — C56.9 PRIMARY MALIGNANT NEOPLASM OF OVARY WITH WIDESPREAD METASTATIC DISEASE, UNSPECIFIED LATERALITY (MULTI): ICD-10-CM

## 2023-12-26 DIAGNOSIS — C44.02 SQUAMOUS CELL CARCINOMA OF SKIN OF LIP: ICD-10-CM

## 2023-12-26 LAB
ALBUMIN SERPL BCP-MCNC: 4.3 G/DL (ref 3.4–5)
ALP SERPL-CCNC: 45 U/L (ref 33–136)
ALT SERPL W P-5'-P-CCNC: 15 U/L (ref 7–45)
ANION GAP SERPL CALC-SCNC: 13 MMOL/L (ref 10–20)
APPEARANCE UR: ABNORMAL
AST SERPL W P-5'-P-CCNC: 22 U/L (ref 9–39)
BACTERIA #/AREA URNS AUTO: ABNORMAL /HPF
BASOPHILS # BLD AUTO: 0.1 X10*3/UL (ref 0–0.1)
BASOPHILS NFR BLD AUTO: 1.1 %
BILIRUB SERPL-MCNC: 0.3 MG/DL (ref 0–1.2)
BILIRUB UR STRIP.AUTO-MCNC: NEGATIVE MG/DL
BUN SERPL-MCNC: 22 MG/DL (ref 6–23)
CALCIUM SERPL-MCNC: 9.5 MG/DL (ref 8.6–10.3)
CHLORIDE SERPL-SCNC: 106 MMOL/L (ref 98–107)
CO2 SERPL-SCNC: 23 MMOL/L (ref 21–32)
COLOR UR: ABNORMAL
CREAT SERPL-MCNC: 1 MG/DL (ref 0.5–1.05)
DACRYOCYTES BLD QL SMEAR: NORMAL
EOSINOPHIL # BLD AUTO: 0.28 X10*3/UL (ref 0–0.4)
EOSINOPHIL NFR BLD AUTO: 3 %
ERYTHROCYTE [DISTWIDTH] IN BLOOD BY AUTOMATED COUNT: 16.6 % (ref 11.5–14.5)
FERRITIN SERPL-MCNC: 89 NG/ML (ref 8–150)
GFR SERPL CREATININE-BSD FRML MDRD: 59 ML/MIN/1.73M*2
GLUCOSE SERPL-MCNC: 203 MG/DL (ref 74–99)
GLUCOSE UR STRIP.AUTO-MCNC: ABNORMAL MG/DL
HCT VFR BLD AUTO: 29.5 % (ref 36–46)
HGB BLD-MCNC: 9.3 G/DL (ref 12–16)
HYALINE CASTS #/AREA URNS AUTO: ABNORMAL /LPF
IMM GRANULOCYTES # BLD AUTO: 0.09 X10*3/UL (ref 0–0.5)
IMM GRANULOCYTES NFR BLD AUTO: 1 % (ref 0–0.9)
IRON SATN MFR SERPL: 15 % (ref 25–45)
IRON SERPL-MCNC: 71 UG/DL (ref 35–150)
KETONES UR STRIP.AUTO-MCNC: ABNORMAL MG/DL
LEUKOCYTE ESTERASE UR QL STRIP.AUTO: ABNORMAL
LYMPHOCYTES # BLD AUTO: 3.2 X10*3/UL (ref 0.8–3)
LYMPHOCYTES NFR BLD AUTO: 34.8 %
MCH RBC QN AUTO: 29.5 PG (ref 26–34)
MCHC RBC AUTO-ENTMCNC: 31.5 G/DL (ref 32–36)
MCV RBC AUTO: 94 FL (ref 80–100)
MONOCYTES # BLD AUTO: 0.87 X10*3/UL (ref 0.05–0.8)
MONOCYTES NFR BLD AUTO: 9.5 %
MUCOUS THREADS #/AREA URNS AUTO: ABNORMAL /LPF
NEUTROPHILS # BLD AUTO: 4.65 X10*3/UL (ref 1.6–5.5)
NEUTROPHILS NFR BLD AUTO: 50.6 %
NITRITE UR QL STRIP.AUTO: NEGATIVE
NRBC BLD-RTO: ABNORMAL /100{WBCS}
OVALOCYTES BLD QL SMEAR: NORMAL
PH UR STRIP.AUTO: 5 [PH]
PLATELET # BLD AUTO: 695 X10*3/UL (ref 150–450)
POLYCHROMASIA BLD QL SMEAR: NORMAL
POTASSIUM SERPL-SCNC: 4.3 MMOL/L (ref 3.5–5.3)
PROT SERPL-MCNC: 6.6 G/DL (ref 6.4–8.2)
PROT UR STRIP.AUTO-MCNC: NEGATIVE MG/DL
RBC # BLD AUTO: 3.15 X10*6/UL (ref 4–5.2)
RBC # UR STRIP.AUTO: NEGATIVE /UL
RBC #/AREA URNS AUTO: ABNORMAL /HPF
RBC MORPH BLD: NORMAL
SCHISTOCYTES BLD QL SMEAR: NORMAL
SODIUM SERPL-SCNC: 138 MMOL/L (ref 136–145)
SP GR UR STRIP.AUTO: 1.02
SQUAMOUS #/AREA URNS AUTO: ABNORMAL /HPF
TIBC SERPL-MCNC: 472 UG/DL (ref 240–445)
UIBC SERPL-MCNC: 401 UG/DL (ref 110–370)
UROBILINOGEN UR STRIP.AUTO-MCNC: <2 MG/DL
WBC # BLD AUTO: 9.2 X10*3/UL (ref 4.4–11.3)
WBC #/AREA URNS AUTO: ABNORMAL /HPF

## 2023-12-26 PROCEDURE — 82728 ASSAY OF FERRITIN: CPT

## 2023-12-26 PROCEDURE — 99214 OFFICE O/P EST MOD 30 MIN: CPT | Mod: 25 | Performed by: PHYSICIAN ASSISTANT

## 2023-12-26 PROCEDURE — 2500000004 HC RX 250 GENERAL PHARMACY W/ HCPCS (ALT 636 FOR OP/ED): Performed by: INTERNAL MEDICINE

## 2023-12-26 PROCEDURE — 1126F AMNT PAIN NOTED NONE PRSNT: CPT | Performed by: PHYSICIAN ASSISTANT

## 2023-12-26 PROCEDURE — 80053 COMPREHEN METABOLIC PANEL: CPT

## 2023-12-26 PROCEDURE — 1160F RVW MEDS BY RX/DR IN RCRD: CPT | Performed by: PHYSICIAN ASSISTANT

## 2023-12-26 PROCEDURE — 83550 IRON BINDING TEST: CPT

## 2023-12-26 PROCEDURE — 1036F TOBACCO NON-USER: CPT | Performed by: PHYSICIAN ASSISTANT

## 2023-12-26 PROCEDURE — 1159F MED LIST DOCD IN RCRD: CPT | Performed by: PHYSICIAN ASSISTANT

## 2023-12-26 PROCEDURE — 3051F HG A1C>EQUAL 7.0%<8.0%: CPT | Performed by: PHYSICIAN ASSISTANT

## 2023-12-26 PROCEDURE — 3074F SYST BP LT 130 MM HG: CPT | Performed by: PHYSICIAN ASSISTANT

## 2023-12-26 PROCEDURE — 3008F BODY MASS INDEX DOCD: CPT | Performed by: PHYSICIAN ASSISTANT

## 2023-12-26 PROCEDURE — 4010F ACE/ARB THERAPY RXD/TAKEN: CPT | Performed by: PHYSICIAN ASSISTANT

## 2023-12-26 PROCEDURE — 85025 COMPLETE CBC W/AUTO DIFF WBC: CPT

## 2023-12-26 PROCEDURE — 96417 CHEMO IV INFUS EACH ADDL SEQ: CPT

## 2023-12-26 PROCEDURE — 82607 VITAMIN B-12: CPT

## 2023-12-26 PROCEDURE — 81001 URINALYSIS AUTO W/SCOPE: CPT

## 2023-12-26 PROCEDURE — 96372 THER/PROPH/DIAG INJ SC/IM: CPT

## 2023-12-26 PROCEDURE — 96375 TX/PRO/DX INJ NEW DRUG ADDON: CPT | Mod: INF

## 2023-12-26 PROCEDURE — 3078F DIAST BP <80 MM HG: CPT | Performed by: PHYSICIAN ASSISTANT

## 2023-12-26 PROCEDURE — 99214 OFFICE O/P EST MOD 30 MIN: CPT | Performed by: PHYSICIAN ASSISTANT

## 2023-12-26 PROCEDURE — 96413 CHEMO IV INFUSION 1 HR: CPT

## 2023-12-26 RX ORDER — FAMOTIDINE 10 MG/ML
20 INJECTION INTRAVENOUS ONCE AS NEEDED
Status: DISCONTINUED | OUTPATIENT
Start: 2023-12-26 | End: 2023-12-26 | Stop reason: HOSPADM

## 2023-12-26 RX ORDER — HEPARIN 100 UNIT/ML
500 SYRINGE INTRAVENOUS AS NEEDED
Status: DISCONTINUED | OUTPATIENT
Start: 2023-12-26 | End: 2023-12-26 | Stop reason: HOSPADM

## 2023-12-26 RX ORDER — EPINEPHRINE 0.3 MG/.3ML
0.3 INJECTION SUBCUTANEOUS EVERY 5 MIN PRN
Status: DISCONTINUED | OUTPATIENT
Start: 2023-12-26 | End: 2023-12-26 | Stop reason: HOSPADM

## 2023-12-26 RX ORDER — PROCHLORPERAZINE EDISYLATE 5 MG/ML
10 INJECTION INTRAMUSCULAR; INTRAVENOUS EVERY 6 HOURS PRN
Status: DISCONTINUED | OUTPATIENT
Start: 2023-12-26 | End: 2023-12-26 | Stop reason: HOSPADM

## 2023-12-26 RX ORDER — HEPARIN SODIUM,PORCINE/PF 10 UNIT/ML
50 SYRINGE (ML) INTRAVENOUS AS NEEDED
Status: CANCELLED | OUTPATIENT
Start: 2023-12-26

## 2023-12-26 RX ORDER — ONDANSETRON HYDROCHLORIDE 2 MG/ML
8 INJECTION, SOLUTION INTRAVENOUS ONCE
Status: COMPLETED | OUTPATIENT
Start: 2023-12-26 | End: 2023-12-26

## 2023-12-26 RX ORDER — ALBUTEROL SULFATE 0.83 MG/ML
3 SOLUTION RESPIRATORY (INHALATION) AS NEEDED
Status: DISCONTINUED | OUTPATIENT
Start: 2023-12-26 | End: 2023-12-26 | Stop reason: HOSPADM

## 2023-12-26 RX ORDER — PROCHLORPERAZINE MALEATE 10 MG
10 TABLET ORAL EVERY 6 HOURS PRN
Status: DISCONTINUED | OUTPATIENT
Start: 2023-12-26 | End: 2023-12-26 | Stop reason: HOSPADM

## 2023-12-26 RX ORDER — HEPARIN 100 UNIT/ML
500 SYRINGE INTRAVENOUS AS NEEDED
Status: CANCELLED | OUTPATIENT
Start: 2023-12-26

## 2023-12-26 RX ORDER — DIPHENHYDRAMINE HYDROCHLORIDE 50 MG/ML
50 INJECTION INTRAMUSCULAR; INTRAVENOUS AS NEEDED
Status: DISCONTINUED | OUTPATIENT
Start: 2023-12-26 | End: 2023-12-26 | Stop reason: HOSPADM

## 2023-12-26 RX ADMIN — PEGFILGRASTIM 6 MG: KIT SUBCUTANEOUS at 15:13

## 2023-12-26 RX ADMIN — HEPARIN 500 UNITS: 100 SYRINGE at 15:13

## 2023-12-26 RX ADMIN — ONDANSETRON 8 MG: 2 INJECTION INTRAMUSCULAR; INTRAVENOUS at 12:22

## 2023-12-26 RX ADMIN — BEVACIZUMAB-AWWB 700 MG: 400 INJECTION, SOLUTION INTRAVENOUS at 14:41

## 2023-12-26 RX ADMIN — TOPOTECAN 7 MG: 1 INJECTION, SOLUTION, CONCENTRATE INTRAVENOUS at 13:57

## 2023-12-26 ASSESSMENT — PAIN SCALES - GENERAL: PAINLEVEL: 0-NO PAIN

## 2023-12-26 ASSESSMENT — ENCOUNTER SYMPTOMS
CARDIOVASCULAR NEGATIVE: 1
GASTROINTESTINAL NEGATIVE: 1

## 2023-12-26 NOTE — PROGRESS NOTES
Patient ID: Catalina Mendoza is a 73 y.o. female.  Referring Physician: Aleyda Ca MD  [id number] [place] [phone]  Primary Care Provider: Juju Kenney MD      Subjective    HPI  Ovarian cancer with malignant ascites.  CT scan gave 12 19 2022.  compared with 12/23 2022.  Mediastinal and abdominal lymphadenopathy which demonstrated abnormal  FDG [id number]/19/2052.  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 Discussed with patient       woman [presented at 72 years old] who presents today with abdominal distention and CT scan showing omental nodularity and extensive abdominal adenopathy.  Her  is 2320 with  elevation in CA 19-9 as well.  INR guided paracentesis as well as cytology of malignant ascitic fluid is pending at this time.  Clinical diagnosis and impression is that of ovarian adenocarcinoma stage III\4.  Full staging CT scans ordered and pathology  reports pending.      04/04/2023: Below is notes copied from OB/GYN.  # HGSOC   - We reviewed the typical pathophysiology and management of ovarian cancer, we discussed that ovarian cancer is usually managed with a combination of chemotherapy and surgery  - We reviewed her imaging    - Chest lymph nodes specifically were significantly improved prior to 3rd chemo   - Schedule visit for genetic counseling to r/o hereditary cancer syndromes and/or targeted therapy markers  - Continue monitoring tumor markers  - She is not a candidate for HIPEC  - We discussed the plan for adjuvant chemo following surgery. Adjuvant chemotherapy cycles after surgery can be managed by gyn onc or med onc   12/27/2022-2/7/2023: 3 Cycles Carbo Taxol: Had surgery and resumed Chemotherapy 4/13/2023-5/23/2023.  was  still in double digits of 85.  Patient not a candidate for olaparib.     06/13/2023: Most recent  is 18 well within normal.  We will discussed with patient with the option to observe and to  intervene if we should see a  rise or morphologic evidence of disease recurrence.  Patient is platinum sensitive and has had  good response at this time.  Maintenance or other intervention will be dependent on either a rising  or morphologic evidence of disease persistence.  Bevacizumab will be considered plus minus of the therapeutic options    Interval history:  On assessment,reports feeling well. Tolerating chemo well aside from fatigue. Appetite fair. No new complaints.     Now with recurrent disease:  Review of Systems   Cardiovascular: Negative.    Gastrointestinal: Negative.         Objective   BSA: 1.74 meters squared  /69 (BP Location: Left arm)   Pulse 97   Temp 36.7 °C (98.1 °F)   Resp 16   Wt 68.1 kg (150 lb 2.1 oz)   LMP  (LMP Unknown)   SpO2 96%   BMI 26.60 kg/m²     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, started on NACT with carbo/taxol  - 2/7/23: S/p cycle 3 with good interval response  - 3/13/23: Diagnostic laparoscopy, BSO, extensive MIKAELA, bilateral ureterolysis, infracolic omentectomy, abdominal wall and mesenteric biopsies, complete gross resection to R0  - 5/23/23: Chemo completed     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 She has never been exposed to tobacco smoke. She has never used smokeless tobacco.  She  reports no history of alcohol use.  She  reports no history of drug use.    Physical Exam  HENT:      Head: Normocephalic and atraumatic.   Eyes:      Extraocular Movements: Extraocular movements intact.      Pupils: Pupils are equal, round, and reactive to light.   Cardiovascular:      Rate and Rhythm: Regular rhythm.   Pulmonary:      Effort: Pulmonary effort is normal.   Abdominal:      Palpations: Abdomen is soft.   Musculoskeletal:         General: Normal range of motion.      Cervical back: Normal range of motion and neck supple.   Neurological:      Mental Status: She is alert.         Performance Status:  Symptomatic; fully ambulatory    Assessment/Plan    And has been seen today.  Continue cycle 3 of  topotecan and bevacizumab.  Cleared for therapy today    Cycle 2 patient had a bout of neutropenia and hence Neulasta support prescribed.      Cleared for therapy responding to therapy as evidenced by  declined.  .    Anemia work up w/nutritional labs for now    Patient verbalized understanding, and all her questions were answered to her satisfaction    30 min spent with patient greater than 50 % of which was spent in consultation and coordination of care.

## 2023-12-27 LAB — VIT B12 SERPL-MCNC: 734 PG/ML (ref 211–911)

## 2024-01-02 ENCOUNTER — INFUSION (OUTPATIENT)
Dept: HEMATOLOGY/ONCOLOGY | Facility: CLINIC | Age: 74
End: 2024-01-02
Payer: MEDICARE

## 2024-01-02 ENCOUNTER — TELEPHONE (OUTPATIENT)
Dept: HEMATOLOGY/ONCOLOGY | Facility: CLINIC | Age: 74
End: 2024-01-02
Payer: MEDICARE

## 2024-01-02 VITALS
OXYGEN SATURATION: 97 % | WEIGHT: 148.04 LBS | TEMPERATURE: 97.3 F | BODY MASS INDEX: 26.23 KG/M2 | DIASTOLIC BLOOD PRESSURE: 72 MMHG | HEART RATE: 104 BPM | RESPIRATION RATE: 17 BRPM | SYSTOLIC BLOOD PRESSURE: 109 MMHG

## 2024-01-02 DIAGNOSIS — C80.0 PRIMARY MALIGNANT NEOPLASM OF OVARY WITH WIDESPREAD METASTATIC DISEASE, UNSPECIFIED LATERALITY (MULTI): ICD-10-CM

## 2024-01-02 DIAGNOSIS — C44.02 SQUAMOUS CELL CARCINOMA OF SKIN OF LIP: ICD-10-CM

## 2024-01-02 DIAGNOSIS — C56.9 PRIMARY MALIGNANT NEOPLASM OF OVARY WITH WIDESPREAD METASTATIC DISEASE, UNSPECIFIED LATERALITY (MULTI): ICD-10-CM

## 2024-01-02 LAB
BASOPHILS # BLD MANUAL: 0.61 X10*3/UL (ref 0–0.1)
BASOPHILS NFR BLD MANUAL: 1 %
DACRYOCYTES BLD QL SMEAR: ABNORMAL
EOSINOPHIL # BLD MANUAL: 0 X10*3/UL (ref 0–0.4)
EOSINOPHIL NFR BLD MANUAL: 0 %
ERYTHROCYTE [DISTWIDTH] IN BLOOD BY AUTOMATED COUNT: 16.5 % (ref 11.5–14.5)
HCT VFR BLD AUTO: 31.2 % (ref 36–46)
HGB BLD-MCNC: 9.9 G/DL (ref 12–16)
IMM GRANULOCYTES # BLD AUTO: 5.11 X10*3/UL (ref 0–0.5)
IMM GRANULOCYTES NFR BLD AUTO: 8.4 % (ref 0–0.9)
LYMPHOCYTES # BLD MANUAL: 6.11 X10*3/UL (ref 0.8–3)
LYMPHOCYTES NFR BLD MANUAL: 10 %
MCH RBC QN AUTO: 29.8 PG (ref 26–34)
MCHC RBC AUTO-ENTMCNC: 31.7 G/DL (ref 32–36)
MCV RBC AUTO: 94 FL (ref 80–100)
METAMYELOCYTES # BLD MANUAL: 0.61 X10*3/UL
METAMYELOCYTES NFR BLD MANUAL: 1 %
MONOCYTES # BLD MANUAL: 3.67 X10*3/UL (ref 0.05–0.8)
MONOCYTES NFR BLD MANUAL: 6 %
MYELOCYTES # BLD MANUAL: 0.61 X10*3/UL
MYELOCYTES NFR BLD MANUAL: 1 %
NEUTROPHILS # BLD MANUAL: 49.5 X10*3/UL (ref 1.6–5.5)
NEUTS BAND # BLD MANUAL: 3.67 X10*3/UL (ref 0–0.5)
NEUTS BAND NFR BLD MANUAL: 6 %
NEUTS SEG # BLD MANUAL: 45.83 X10*3/UL (ref 1.6–5)
NEUTS SEG NFR BLD MANUAL: 75 %
NRBC BLD-RTO: ABNORMAL /100{WBCS}
OVALOCYTES BLD QL SMEAR: ABNORMAL
PLATELET # BLD AUTO: 396 X10*3/UL (ref 150–450)
PLATELET CLUMP BLD QL SMEAR: PRESENT
POLYCHROMASIA BLD QL SMEAR: ABNORMAL
RBC # BLD AUTO: 3.32 X10*6/UL (ref 4–5.2)
RBC MORPH BLD: ABNORMAL
SCHISTOCYTES BLD QL SMEAR: ABNORMAL
TOTAL CELLS COUNTED BLD: 100
WBC # BLD AUTO: 61.1 X10*3/UL (ref 4.4–11.3)

## 2024-01-02 PROCEDURE — 96375 TX/PRO/DX INJ NEW DRUG ADDON: CPT | Mod: INF

## 2024-01-02 PROCEDURE — 2500000004 HC RX 250 GENERAL PHARMACY W/ HCPCS (ALT 636 FOR OP/ED): Performed by: INTERNAL MEDICINE

## 2024-01-02 PROCEDURE — 85027 COMPLETE CBC AUTOMATED: CPT | Performed by: INTERNAL MEDICINE

## 2024-01-02 PROCEDURE — 96413 CHEMO IV INFUSION 1 HR: CPT

## 2024-01-02 PROCEDURE — 85027 COMPLETE CBC AUTOMATED: CPT

## 2024-01-02 PROCEDURE — 85007 BL SMEAR W/DIFF WBC COUNT: CPT

## 2024-01-02 PROCEDURE — 85007 BL SMEAR W/DIFF WBC COUNT: CPT | Performed by: INTERNAL MEDICINE

## 2024-01-02 RX ORDER — HEPARIN SODIUM,PORCINE/PF 10 UNIT/ML
50 SYRINGE (ML) INTRAVENOUS AS NEEDED
Status: CANCELLED | OUTPATIENT
Start: 2024-01-02

## 2024-01-02 RX ORDER — PROCHLORPERAZINE MALEATE 10 MG
10 TABLET ORAL EVERY 6 HOURS PRN
Status: DISCONTINUED | OUTPATIENT
Start: 2024-01-02 | End: 2024-01-02 | Stop reason: HOSPADM

## 2024-01-02 RX ORDER — HEPARIN 100 UNIT/ML
500 SYRINGE INTRAVENOUS AS NEEDED
Status: CANCELLED | OUTPATIENT
Start: 2024-01-02

## 2024-01-02 RX ORDER — HEPARIN SODIUM,PORCINE/PF 10 UNIT/ML
50 SYRINGE (ML) INTRAVENOUS AS NEEDED
Status: DISCONTINUED | OUTPATIENT
Start: 2024-01-02 | End: 2024-01-02 | Stop reason: HOSPADM

## 2024-01-02 RX ORDER — DIPHENHYDRAMINE HYDROCHLORIDE 50 MG/ML
50 INJECTION INTRAMUSCULAR; INTRAVENOUS AS NEEDED
Status: DISCONTINUED | OUTPATIENT
Start: 2024-01-02 | End: 2024-01-02 | Stop reason: HOSPADM

## 2024-01-02 RX ORDER — ONDANSETRON HYDROCHLORIDE 2 MG/ML
8 INJECTION, SOLUTION INTRAVENOUS ONCE
Status: COMPLETED | OUTPATIENT
Start: 2024-01-02 | End: 2024-01-02

## 2024-01-02 RX ORDER — ALBUTEROL SULFATE 0.83 MG/ML
3 SOLUTION RESPIRATORY (INHALATION) AS NEEDED
Status: DISCONTINUED | OUTPATIENT
Start: 2024-01-02 | End: 2024-01-02 | Stop reason: HOSPADM

## 2024-01-02 RX ORDER — FAMOTIDINE 10 MG/ML
20 INJECTION INTRAVENOUS ONCE AS NEEDED
Status: DISCONTINUED | OUTPATIENT
Start: 2024-01-02 | End: 2024-01-02 | Stop reason: HOSPADM

## 2024-01-02 RX ORDER — EPINEPHRINE 0.3 MG/.3ML
0.3 INJECTION SUBCUTANEOUS EVERY 5 MIN PRN
Status: DISCONTINUED | OUTPATIENT
Start: 2024-01-02 | End: 2024-01-02 | Stop reason: HOSPADM

## 2024-01-02 RX ORDER — PROCHLORPERAZINE EDISYLATE 5 MG/ML
10 INJECTION INTRAMUSCULAR; INTRAVENOUS EVERY 6 HOURS PRN
Status: DISCONTINUED | OUTPATIENT
Start: 2024-01-02 | End: 2024-01-02 | Stop reason: HOSPADM

## 2024-01-02 RX ORDER — HEPARIN 100 UNIT/ML
500 SYRINGE INTRAVENOUS AS NEEDED
Status: DISCONTINUED | OUTPATIENT
Start: 2024-01-02 | End: 2024-01-02 | Stop reason: HOSPADM

## 2024-01-02 RX ADMIN — ONDANSETRON 8 MG: 2 INJECTION INTRAMUSCULAR; INTRAVENOUS at 16:25

## 2024-01-02 RX ADMIN — TOPOTECAN 7 MG: 1 INJECTION, SOLUTION, CONCENTRATE INTRAVENOUS at 16:30

## 2024-01-02 RX ADMIN — SODIUM CHLORIDE, PRESERVATIVE FREE 500 UNITS: 5 INJECTION INTRAVENOUS at 17:08

## 2024-01-02 ASSESSMENT — PAIN SCALES - GENERAL: PAINLEVEL: 3

## 2024-01-02 NOTE — PROGRESS NOTES
Adverse Event Note     Name:Catalina Mendoza  : 1950  MRN: [id number]      Adverse Event:  Medication: ***  Administered Date/Time: ***  Reactions/Symptoms Started Time: ***   Symptoms: (check all that apply)  [] Back Pain   [] Erythema Face     [] Hypotension     [] Rash/Rigors        [] Other  [] Bleeding    [] Erythema Hands  [] Itching               [] Swelling/Edema [] Unknown  [] Chest Pain [] Hives/Urticaria     [] Low platelet Ct  [] Syncope  [] Cytopenia  [] Hypertension       [] Neutropenia         Severity: {MILD, MODERATE, SEVERE:06836}   Provider Notified: {Yes, No:73645}   Medications Given(Add all medications to the chart via , or treatment plan)  [] Acetaminophen-Tylenol       [] Famotidine-Pepcid [] Nitroglycerine-NTG  [] Albuterol                               [] Hydrocortisone      [] Ondansetron-Zofran  [] Diphenhydramine-Benadryl [] Lorazepam-Ativan  [] Naloxone-Narcan  [] Epinephrine-Epi                    [] Methylprednisone   Additional Details/ Comments:

## 2024-01-08 ENCOUNTER — LAB (OUTPATIENT)
Dept: LAB | Facility: LAB | Age: 74
End: 2024-01-08
Payer: MEDICARE

## 2024-01-08 DIAGNOSIS — C44.02 SQUAMOUS CELL CARCINOMA OF SKIN OF LIP: ICD-10-CM

## 2024-01-08 DIAGNOSIS — C80.0 PRIMARY MALIGNANT NEOPLASM OF OVARY WITH WIDESPREAD METASTATIC DISEASE, UNSPECIFIED LATERALITY (MULTI): ICD-10-CM

## 2024-01-08 DIAGNOSIS — C56.9 PRIMARY MALIGNANT NEOPLASM OF OVARY WITH WIDESPREAD METASTATIC DISEASE, UNSPECIFIED LATERALITY (MULTI): ICD-10-CM

## 2024-01-08 LAB
BASOPHILS # BLD AUTO: 0.07 X10*3/UL (ref 0–0.1)
BASOPHILS NFR BLD AUTO: 2 %
EOSINOPHIL # BLD AUTO: 0.12 X10*3/UL (ref 0–0.4)
EOSINOPHIL NFR BLD AUTO: 3.5 %
ERYTHROCYTE [DISTWIDTH] IN BLOOD BY AUTOMATED COUNT: 15.6 % (ref 11.5–14.5)
HCT VFR BLD AUTO: 29.2 % (ref 36–46)
HGB BLD-MCNC: 9.1 G/DL (ref 12–16)
IMM GRANULOCYTES # BLD AUTO: 0.01 X10*3/UL (ref 0–0.5)
IMM GRANULOCYTES NFR BLD AUTO: 0.3 % (ref 0–0.9)
LYMPHOCYTES # BLD AUTO: 2.12 X10*3/UL (ref 0.8–3)
LYMPHOCYTES NFR BLD AUTO: 62 %
MCH RBC QN AUTO: 29.1 PG (ref 26–34)
MCHC RBC AUTO-ENTMCNC: 31.2 G/DL (ref 32–36)
MCV RBC AUTO: 93 FL (ref 80–100)
MONOCYTES # BLD AUTO: 0.16 X10*3/UL (ref 0.05–0.8)
MONOCYTES NFR BLD AUTO: 4.7 %
NEUTROPHILS # BLD AUTO: 0.94 X10*3/UL (ref 1.6–5.5)
NEUTROPHILS NFR BLD AUTO: 27.5 %
NRBC BLD-RTO: 0 /100 WBCS (ref 0–0)
OVALOCYTES BLD QL SMEAR: NORMAL
PLATELET # BLD AUTO: 119 X10*3/UL (ref 150–450)
RBC # BLD AUTO: 3.13 X10*6/UL (ref 4–5.2)
RBC MORPH BLD: NORMAL
SCHISTOCYTES BLD QL SMEAR: NORMAL
WBC # BLD AUTO: 3.4 X10*3/UL (ref 4.4–11.3)

## 2024-01-09 ENCOUNTER — NUTRITION (OUTPATIENT)
Dept: HEMATOLOGY/ONCOLOGY | Facility: CLINIC | Age: 74
End: 2024-01-09
Payer: MEDICARE

## 2024-01-09 ENCOUNTER — INFUSION (OUTPATIENT)
Dept: HEMATOLOGY/ONCOLOGY | Facility: CLINIC | Age: 74
End: 2024-01-09
Payer: MEDICARE

## 2024-01-09 VITALS
HEART RATE: 99 BPM | TEMPERATURE: 97.7 F | WEIGHT: 149.69 LBS | RESPIRATION RATE: 17 BRPM | SYSTOLIC BLOOD PRESSURE: 138 MMHG | OXYGEN SATURATION: 99 % | BODY MASS INDEX: 26.52 KG/M2 | DIASTOLIC BLOOD PRESSURE: 69 MMHG

## 2024-01-09 VITALS — BODY MASS INDEX: 26.52 KG/M2 | WEIGHT: 149.69 LBS | HEIGHT: 63 IN

## 2024-01-09 DIAGNOSIS — C56.9 PRIMARY MALIGNANT NEOPLASM OF OVARY WITH WIDESPREAD METASTATIC DISEASE, UNSPECIFIED LATERALITY (MULTI): ICD-10-CM

## 2024-01-09 DIAGNOSIS — C80.0 PRIMARY MALIGNANT NEOPLASM OF OVARY WITH WIDESPREAD METASTATIC DISEASE, UNSPECIFIED LATERALITY (MULTI): ICD-10-CM

## 2024-01-09 DIAGNOSIS — C44.02 SQUAMOUS CELL CARCINOMA OF SKIN OF LIP: ICD-10-CM

## 2024-01-09 PROCEDURE — 96377 APPLICATON ON-BODY INJECTOR: CPT

## 2024-01-09 PROCEDURE — 96413 CHEMO IV INFUSION 1 HR: CPT

## 2024-01-09 PROCEDURE — 96375 TX/PRO/DX INJ NEW DRUG ADDON: CPT | Mod: INF

## 2024-01-09 PROCEDURE — 96417 CHEMO IV INFUS EACH ADDL SEQ: CPT

## 2024-01-09 PROCEDURE — 2500000004 HC RX 250 GENERAL PHARMACY W/ HCPCS (ALT 636 FOR OP/ED): Performed by: INTERNAL MEDICINE

## 2024-01-09 PROCEDURE — 96372 THER/PROPH/DIAG INJ SC/IM: CPT | Performed by: INTERNAL MEDICINE

## 2024-01-09 RX ORDER — EPINEPHRINE 0.3 MG/.3ML
0.3 INJECTION SUBCUTANEOUS EVERY 5 MIN PRN
Status: DISCONTINUED | OUTPATIENT
Start: 2024-01-09 | End: 2024-01-09 | Stop reason: HOSPADM

## 2024-01-09 RX ORDER — PROCHLORPERAZINE EDISYLATE 5 MG/ML
10 INJECTION INTRAMUSCULAR; INTRAVENOUS EVERY 6 HOURS PRN
Status: DISCONTINUED | OUTPATIENT
Start: 2024-01-09 | End: 2024-01-09 | Stop reason: HOSPADM

## 2024-01-09 RX ORDER — HEPARIN SODIUM,PORCINE/PF 10 UNIT/ML
50 SYRINGE (ML) INTRAVENOUS AS NEEDED
Status: DISCONTINUED | OUTPATIENT
Start: 2024-01-09 | End: 2024-01-09 | Stop reason: HOSPADM

## 2024-01-09 RX ORDER — PROCHLORPERAZINE MALEATE 10 MG
10 TABLET ORAL EVERY 6 HOURS PRN
Status: DISCONTINUED | OUTPATIENT
Start: 2024-01-09 | End: 2024-01-09 | Stop reason: HOSPADM

## 2024-01-09 RX ORDER — HEPARIN SODIUM,PORCINE/PF 10 UNIT/ML
50 SYRINGE (ML) INTRAVENOUS AS NEEDED
Status: CANCELLED | OUTPATIENT
Start: 2024-01-09

## 2024-01-09 RX ORDER — FAMOTIDINE 10 MG/ML
20 INJECTION INTRAVENOUS ONCE AS NEEDED
Status: DISCONTINUED | OUTPATIENT
Start: 2024-01-09 | End: 2024-01-09 | Stop reason: HOSPADM

## 2024-01-09 RX ORDER — ALBUTEROL SULFATE 0.83 MG/ML
3 SOLUTION RESPIRATORY (INHALATION) AS NEEDED
Status: DISCONTINUED | OUTPATIENT
Start: 2024-01-09 | End: 2024-01-09 | Stop reason: HOSPADM

## 2024-01-09 RX ORDER — ONDANSETRON HYDROCHLORIDE 2 MG/ML
8 INJECTION, SOLUTION INTRAVENOUS ONCE
Status: COMPLETED | OUTPATIENT
Start: 2024-01-09 | End: 2024-01-09

## 2024-01-09 RX ORDER — HEPARIN 100 UNIT/ML
500 SYRINGE INTRAVENOUS AS NEEDED
Status: CANCELLED | OUTPATIENT
Start: 2024-01-09

## 2024-01-09 RX ORDER — HEPARIN 100 UNIT/ML
500 SYRINGE INTRAVENOUS AS NEEDED
Status: DISCONTINUED | OUTPATIENT
Start: 2024-01-09 | End: 2024-01-09 | Stop reason: HOSPADM

## 2024-01-09 RX ORDER — DIPHENHYDRAMINE HYDROCHLORIDE 50 MG/ML
50 INJECTION INTRAMUSCULAR; INTRAVENOUS AS NEEDED
Status: DISCONTINUED | OUTPATIENT
Start: 2024-01-09 | End: 2024-01-09 | Stop reason: HOSPADM

## 2024-01-09 RX ADMIN — ONDANSETRON 8 MG: 2 INJECTION, SOLUTION INTRAMUSCULAR; INTRAVENOUS at 08:56

## 2024-01-09 RX ADMIN — PEGFILGRASTIM 6 MG: KIT SUBCUTANEOUS at 10:28

## 2024-01-09 RX ADMIN — BEVACIZUMAB-AWWB 700 MG: 400 INJECTION, SOLUTION INTRAVENOUS at 10:31

## 2024-01-09 RX ADMIN — HEPARIN 500 UNITS: 100 SYRINGE at 10:53

## 2024-01-09 RX ADMIN — TOPOTECAN HYDROCHLORIDE 7 MG: 4 INJECTION, POWDER, LYOPHILIZED, FOR SOLUTION INTRAVENOUS at 09:45

## 2024-01-09 ASSESSMENT — PAIN SCALES - GENERAL: PAINLEVEL: 2

## 2024-01-09 NOTE — PROGRESS NOTES
"NUTRITION Follow-up NOTE    Nutrition Assessment     Reason for Visit:  Catalina Mendoza is a 73 y.o. female who presents for hx ovarian cancer with malignant ascites; 10/2023 found to have subtle progression and rising , plan to restart treatment.     Current tx: ishan/ topotecan    Started: 10/31/23      Visited with pt and spouse today for follow-up.     Lab Results   Component Value Date/Time    GLUCOSE 203 (H) 12/26/2023 1100     12/26/2023 1100    K 4.3 12/26/2023 1100     12/26/2023 1100    CO2 23 12/26/2023 1100    ANIONGAP 13 12/26/2023 1100    BUN 22 12/26/2023 1100    CREATININE 1.00 12/26/2023 1100    EGFR 59 (L) 12/26/2023 1100    CALCIUM 9.5 12/26/2023 1100    ALBUMIN 4.3 12/26/2023 1100    ALKPHOS 45 12/26/2023 1100    PROT 6.6 12/26/2023 1100    AST 22 12/26/2023 1100    BILITOT 0.3 12/26/2023 1100    ALT 15 12/26/2023 1100     Lab Results   Component Value Date/Time    VITD25 55 07/27/2022 0831       Anthropometrics:  Anthropometrics  Height: 160 cm (5' 2.99\")  Weight: 67.9 kg (149 lb 11.1 oz)  BMI (Calculated): 26.52  IBW/kg (Dietitian Calculated): 52.3 kg  Adjusted Body Weight (kg): 56 kg  Weight Change  Weight History / % Weight Change: Previously noted 5.6% weight loss in the last 6 months; weight appears to be relatively stable over the last few months.  Significant Weight Loss: No        Wt Readings from Last 10 Encounters:   01/09/24 67.9 kg (149 lb 11.1 oz)   01/09/24 67.9 kg (149 lb 11.1 oz)   01/02/24 67.1 kg (148 lb 0.6 oz)   12/26/23 68.1 kg (150 lb 2.1 oz)   12/12/23 67.4 kg (148 lb 7.7 oz)   12/05/23 68.1 kg (150 lb 0.4 oz)   11/28/23 69.2 kg (152 lb 8.9 oz)   11/14/23 68.8 kg (151 lb 10.8 oz)   11/14/23 68.8 kg (151 lb 10.8 oz)   11/09/23 68.5 kg (151 lb)        Food And Nutrient Intake:  Food and Nutrient History  Food and Nutrient History: Appetite has been good- BGs still all over the place per pt- sliding scale. Went down to 64- woke up orange juice and cookie " "and BG this morning was 120. Didn't sound again the rest of night- has broken tooth but not bothering her right now.  Energy Intake: Good > 75 %  Fluid Intake: water - 4, 16.9oz bottle; occasional diet soda, coffee  Food Allergy: oranges, peas, melons, from being tested, not sure on ; but eats oranges now without issues.  GI Symptoms: none  Oral Problems: denies                                                              Nutrition Focused Physical Exam Findings:  Completed 11/7/2023                          Energy Needs  Calculated Energy Needs Using Equations  Height: 160 cm (5' 2.99\")  Weight Used for Equation Calculations: 56 kg (123 lb 7.3 oz) (adjusted weight)  [place]- [place] Equation (Overweight or Obese Patients): 1029  Estimated Energy Needs  Total Energy Estimated Needs (kCal): 1680 kCal  Total Estimated Energy Need per Day (kCal/kg): 30 kCal/kg  Estimated Fluid Needs  Total Fluid Estimated Needs (mL): 1680 mL  Total Fluid Estimated Needs (mL/kg): 30 mL/kg  Estimated Protein Needs  Total Protein Estimated Needs (g): 67.2 g  Total Protein Estimated Needs (g/kg): 1.2 g/kg        Nutrition Diagnosis   Malnutrition Diagnosis  Patient has Malnutrition Diagnosis: No    Nutrition Diagnosis  Patient has Nutrition Diagnosis: No  Additional Assessment Information (1): Slight decrease in weight- has been able to maintain; appetite remains good    Nutrition Interventions/Recommendations   Nutrition Prescription  Individualized Nutrition Prescription Provided for : Nutrition during cancer treatment; NIS mgmt; DM/ BG mgmt    Food and Nutrition Delivery  Food and Nutrition Delivery  Meals & Snacks: Carbohydrate-modified diet, Protein-modified diet, Modify Composition of Meals/Snacks, Fluid-modified diet  Goals: Continue DM friendly diet, carbohydrates with protein to help with BG mgmt; continue  adequate hydration with bowel mgmt  Medical Food Supplement: Other, Specify:  Goals: ONS as needed if not able to consume " adequate intake to meet needs  Goals: Will remain available as needed; weight appears fairly stable- encouraged to continue DM friendly diet    Nutrition Education  Nutrition Education  Nutrition Education Content: Content related nutrition education  Goals: DM friendly diet- adequate calories/ protein to maintain weight    Coordination of Care       There are no Patient Instructions on file for this visit.    Nutrition Monitoring and Evaluation   Food/Nutrient Related History Monitoring  Monitoring and Evaluation Plan: Carbohydrate intake, Protein intake  Estimated protein intake: Estimated protein intake  Criteria: Incorporate protein source with carbohydrates  Estimated carbohydrate intake: Estimated carbohydrate intake  Criteria: Continue DM friendly diet  Additional Plan: Monitor need for bowel regiment depending on NIS from treatment.        Time Spent  Prep time on day of patient encounter: 5 minutes  Time spent directly with patient, family or caregiver: 15 minutes  Additional Time Spent on Patient Care Activities: 0 minutes  Documentation Time: 10 minutes  Other Time Spent: 0 minutes  Total: 30 minutes

## 2024-01-17 ENCOUNTER — OFFICE VISIT (OUTPATIENT)
Dept: PRIMARY CARE | Facility: CLINIC | Age: 74
End: 2024-01-17
Payer: MEDICARE

## 2024-01-17 VITALS
SYSTOLIC BLOOD PRESSURE: 102 MMHG | OXYGEN SATURATION: 98 % | DIASTOLIC BLOOD PRESSURE: 54 MMHG | HEART RATE: 89 BPM | WEIGHT: 148.6 LBS | BODY MASS INDEX: 26.33 KG/M2

## 2024-01-17 DIAGNOSIS — C80.0 PRIMARY MALIGNANT NEOPLASM OF OVARY WITH WIDESPREAD METASTATIC DISEASE, UNSPECIFIED LATERALITY (MULTI): ICD-10-CM

## 2024-01-17 DIAGNOSIS — E11.69 DIABETES MELLITUS TYPE 2 IN OBESE: ICD-10-CM

## 2024-01-17 DIAGNOSIS — I25.10 CAD, MULTIPLE VESSEL: ICD-10-CM

## 2024-01-17 DIAGNOSIS — E66.9 DIABETES MELLITUS TYPE 2 IN OBESE: ICD-10-CM

## 2024-01-17 DIAGNOSIS — E11.311 DIABETIC MACULAR EDEMA (MULTI): ICD-10-CM

## 2024-01-17 DIAGNOSIS — E03.9 HYPOTHYROIDISM, UNSPECIFIED TYPE: ICD-10-CM

## 2024-01-17 DIAGNOSIS — C78.6 SECONDARY MALIGNANT NEOPLASM OF RETROPERITONEUM AND PERITONEUM (MULTI): ICD-10-CM

## 2024-01-17 DIAGNOSIS — D84.9 IMMUNODEFICIENCY, UNSPECIFIED (MULTI): ICD-10-CM

## 2024-01-17 DIAGNOSIS — N18.31 STAGE 3A CHRONIC KIDNEY DISEASE (MULTI): ICD-10-CM

## 2024-01-17 DIAGNOSIS — E78.00 HYPERCHOLESTEREMIA: ICD-10-CM

## 2024-01-17 DIAGNOSIS — C56.9 PRIMARY MALIGNANT NEOPLASM OF OVARY WITH WIDESPREAD METASTATIC DISEASE, UNSPECIFIED LATERALITY (MULTI): ICD-10-CM

## 2024-01-17 DIAGNOSIS — I10 HYPERTENSION, UNSPECIFIED TYPE: Primary | ICD-10-CM

## 2024-01-17 DIAGNOSIS — G62.0 DRUG-INDUCED POLYNEUROPATHY (MULTI): ICD-10-CM

## 2024-01-17 PROBLEM — T14.8XXA FRACTURE OF BONE: Status: ACTIVE | Noted: 2024-01-17

## 2024-01-17 PROBLEM — Z85.819 HISTORY OF MALIGNANT NEOPLASM OF LIP: Status: ACTIVE | Noted: 2024-01-17

## 2024-01-17 PROBLEM — D62 ACUTE POSTHEMORRHAGIC ANEMIA: Status: ACTIVE | Noted: 2023-03-16

## 2024-01-17 PROBLEM — R93.89 ABNORMAL COMPUTED TOMOGRAPHY SCAN: Status: ACTIVE | Noted: 2023-04-05

## 2024-01-17 PROBLEM — Z98.890 POSTOPERATIVE STATE: Status: ACTIVE | Noted: 2023-03-13

## 2024-01-17 PROBLEM — R07.81 RIB PAIN: Status: ACTIVE | Noted: 2024-01-17

## 2024-01-17 PROCEDURE — 1160F RVW MEDS BY RX/DR IN RCRD: CPT | Performed by: INTERNAL MEDICINE

## 2024-01-17 PROCEDURE — 1036F TOBACCO NON-USER: CPT | Performed by: INTERNAL MEDICINE

## 2024-01-17 PROCEDURE — 3008F BODY MASS INDEX DOCD: CPT | Performed by: INTERNAL MEDICINE

## 2024-01-17 PROCEDURE — 1159F MED LIST DOCD IN RCRD: CPT | Performed by: INTERNAL MEDICINE

## 2024-01-17 PROCEDURE — 4010F ACE/ARB THERAPY RXD/TAKEN: CPT | Performed by: INTERNAL MEDICINE

## 2024-01-17 PROCEDURE — 3074F SYST BP LT 130 MM HG: CPT | Performed by: INTERNAL MEDICINE

## 2024-01-17 PROCEDURE — 99214 OFFICE O/P EST MOD 30 MIN: CPT | Performed by: INTERNAL MEDICINE

## 2024-01-17 PROCEDURE — 1125F AMNT PAIN NOTED PAIN PRSNT: CPT | Performed by: INTERNAL MEDICINE

## 2024-01-17 PROCEDURE — 3078F DIAST BP <80 MM HG: CPT | Performed by: INTERNAL MEDICINE

## 2024-01-17 RX ORDER — LOSARTAN POTASSIUM 50 MG/1
50 TABLET ORAL DAILY
Qty: 90 TABLET | Refills: 0 | Status: SHIPPED | OUTPATIENT
Start: 2024-01-17 | End: 2024-04-01

## 2024-01-17 NOTE — PROGRESS NOTES
Subjective   Patient ID: Catalina Mendoza is a 73 y.o. female who presents for 3 month medical management.  HPI    Routine follow up    LUQ pain x 2-21  Now goes around upper abdomen  Before cancer  CT June rev'd responded well to chemo  On cymbalta   Saw cardio work up neg  pain mgmt pending     patient had surgery for stage III ovarian cancer on March 13, 2023.  Currently in chemo.  Oncology following  Now metastatic     DM -2  on insulin no side effects. Endocrine following.      Hypercholesterolemia on therapy no side effects     Hypothyroid on therapy no side effects     Hypertension  on therapy gets dizzy when stands up  Lower losartan 50 mg daily   follow     CAD stable on therapy no side effects     Diet and exercise reviewed    Review of Systems   All other systems reviewed and are negative.      Objective   /54   Pulse 89   Wt 67.4 kg (148 lb 9.6 oz)   LMP  (LMP Unknown)   SpO2 98%   BMI 26.33 kg/m²   Lab Results   Component Value Date    WBC 3.4 (L) 01/08/2024    HGB 9.1 (L) 01/08/2024    HCT 29.2 (L) 01/08/2024     (L) 01/08/2024    CHOL 161 07/27/2022    TRIG 453 (H) 07/27/2022    HDL 34.0 (A) 07/27/2022    ALT 15 12/26/2023    AST 22 12/26/2023     12/26/2023    K 4.3 12/26/2023     12/26/2023    CREATININE 1.00 12/26/2023    BUN 22 12/26/2023    CO2 23 12/26/2023    TSH 1.63 05/23/2023    HGBA1C 7.4 (A) 05/23/2023           Physical Exam  Vitals reviewed.   Constitutional:       Appearance: Normal appearance. She is normal weight.   HENT:      Head: Normocephalic and atraumatic.      Mouth/Throat:      Pharynx: No posterior oropharyngeal erythema.   Eyes:      General: No scleral icterus.     Conjunctiva/sclera: Conjunctivae normal.      Pupils: Pupils are equal, round, and reactive to light.   Cardiovascular:      Rate and Rhythm: Normal rate and regular rhythm.      Heart sounds: Normal heart sounds.   Pulmonary:      Effort: No respiratory distress.      Breath  sounds: No wheezing.   Abdominal:      General: Abdomen is flat. Bowel sounds are normal. There is no distension.      Palpations: Abdomen is soft. There is no mass.      Tenderness: There is no abdominal tenderness. There is no rebound.   Musculoskeletal:         General: Normal range of motion.      Cervical back: Normal range of motion and neck supple.   Skin:     General: Skin is warm and dry.   Neurological:      General: No focal deficit present.      Mental Status: She is alert and oriented to person, place, and time. Mental status is at baseline.   Psychiatric:         Mood and Affect: Mood normal.         Behavior: Behavior normal.         Thought Content: Thought content normal.         Judgment: Judgment normal.         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       Multi-system (Lupus, Sarcoid)    Immunodeficiency, unspecified (CMS/HCC) D84.9       Neuro    Drug-induced polyneuropathy (CMS/HCC) G62.0     Other Visit Diagnoses         Codes    Hypertension, unspecified type    -  Primary I10    Relevant Medications    losartan (Cozaar) 50 mg tablet    Diabetes mellitus type 2 in obese (CMS/HCC)     E11.69, E66.9    Hypercholesteremia     E78.00    BMI 26.0-26.9,adult     Z68.26          Assessment/Plan     LUQ pain x 2-21 unchanged  Now goes around upper abdomen  Before cancer  CT June rev'd responded well to chemo  On cymbalta   Saw cardio work up neg  pain mgmt pending     patient had surgery for stage III ovarian cancer on  March 13, 2023.  Currently in chemo.  Oncology following  Now metastatic     DM -2  on insulin no side effects. Endocrine following.      Hypercholesterolemia on therapy no side effects     Hypothyroid on therapy no side effects     Hypertension  on therapy gets dizzy when stands up  Lower losartan 50 mg daily   follow     CAD stable on therapy no side effects     Diet and exercise reviewed  Mammogram 2-23  Dexa n/a  Colonoscopy 2017  due 2027  CT chest lung cancer screening n/a  GYN n/a  immunizations rev'd RSV, COVID, Pneumo  (visual loss with flu yrs ago)  BMI 26.3     Check labs / not done  Mammogram ordered thru oncology    Follow up 6 weeks

## 2024-01-22 ENCOUNTER — LAB (OUTPATIENT)
Dept: LAB | Facility: LAB | Age: 74
End: 2024-01-22
Payer: MEDICARE

## 2024-01-22 DIAGNOSIS — E11.9 TYPE 2 DIABETES MELLITUS WITHOUT COMPLICATION, WITH LONG-TERM CURRENT USE OF INSULIN (MULTI): ICD-10-CM

## 2024-01-22 DIAGNOSIS — E78.00 HYPERCHOLESTEREMIA: ICD-10-CM

## 2024-01-22 DIAGNOSIS — E55.9 VITAMIN D DEFICIENCY: ICD-10-CM

## 2024-01-22 DIAGNOSIS — C80.0 PRIMARY MALIGNANT NEOPLASM OF OVARY WITH WIDESPREAD METASTATIC DISEASE, UNSPECIFIED LATERALITY (MULTI): ICD-10-CM

## 2024-01-22 DIAGNOSIS — C44.02 SQUAMOUS CELL CARCINOMA OF SKIN OF LIP: ICD-10-CM

## 2024-01-22 DIAGNOSIS — C56.9 PRIMARY MALIGNANT NEOPLASM OF OVARY WITH WIDESPREAD METASTATIC DISEASE, UNSPECIFIED LATERALITY (MULTI): ICD-10-CM

## 2024-01-22 DIAGNOSIS — Z79.4 TYPE 2 DIABETES MELLITUS WITHOUT COMPLICATION, WITH LONG-TERM CURRENT USE OF INSULIN (MULTI): ICD-10-CM

## 2024-01-22 LAB
25(OH)D3 SERPL-MCNC: 45 NG/ML (ref 30–100)
ALBUMIN SERPL BCP-MCNC: 4.2 G/DL (ref 3.4–5)
ALBUMIN SERPL BCP-MCNC: 4.4 G/DL (ref 3.4–5)
ALP SERPL-CCNC: 88 U/L (ref 33–136)
ALP SERPL-CCNC: 99 U/L (ref 33–136)
ALT SERPL W P-5'-P-CCNC: 11 U/L (ref 7–45)
ALT SERPL W P-5'-P-CCNC: 15 U/L (ref 7–45)
ANION GAP SERPL CALC-SCNC: 15 MMOL/L (ref 10–20)
ANION GAP SERPL CALC-SCNC: 15 MMOL/L (ref 10–20)
APPEARANCE UR: ABNORMAL
AST SERPL W P-5'-P-CCNC: 20 U/L (ref 9–39)
AST SERPL W P-5'-P-CCNC: 21 U/L (ref 9–39)
BASOPHILS # BLD MANUAL: 0 X10*3/UL (ref 0–0.1)
BASOPHILS NFR BLD MANUAL: 0 %
BILIRUB SERPL-MCNC: 0.3 MG/DL (ref 0–1.2)
BILIRUB SERPL-MCNC: 0.3 MG/DL (ref 0–1.2)
BILIRUB UR STRIP.AUTO-MCNC: NEGATIVE MG/DL
BUN SERPL-MCNC: 14 MG/DL (ref 6–23)
BUN SERPL-MCNC: 14 MG/DL (ref 6–23)
CALCIUM SERPL-MCNC: 10 MG/DL (ref 8.6–10.3)
CALCIUM SERPL-MCNC: 9.7 MG/DL (ref 8.6–10.3)
CHLORIDE SERPL-SCNC: 104 MMOL/L (ref 98–107)
CHLORIDE SERPL-SCNC: 104 MMOL/L (ref 98–107)
CHOLEST SERPL-MCNC: 126 MG/DL (ref 0–199)
CHOLESTEROL/HDL RATIO: 4.3
CO2 SERPL-SCNC: 23 MMOL/L (ref 21–32)
CO2 SERPL-SCNC: 23 MMOL/L (ref 21–32)
COLOR UR: YELLOW
CREAT SERPL-MCNC: 0.95 MG/DL (ref 0.5–1.05)
CREAT SERPL-MCNC: 0.99 MG/DL (ref 0.5–1.05)
CREAT UR-MCNC: 150 MG/DL (ref 20–320)
EGFRCR SERPLBLD CKD-EPI 2021: 60 ML/MIN/1.73M*2
EGFRCR SERPLBLD CKD-EPI 2021: 63 ML/MIN/1.73M*2
EOSINOPHIL # BLD MANUAL: 0.43 X10*3/UL (ref 0–0.4)
EOSINOPHIL NFR BLD MANUAL: 2 %
ERYTHROCYTE [DISTWIDTH] IN BLOOD BY AUTOMATED COUNT: 19.3 % (ref 11.5–14.5)
EST. AVERAGE GLUCOSE BLD GHB EST-MCNC: 189 MG/DL
GLUCOSE SERPL-MCNC: 183 MG/DL (ref 74–99)
GLUCOSE SERPL-MCNC: 200 MG/DL (ref 74–99)
GLUCOSE UR STRIP.AUTO-MCNC: NEGATIVE MG/DL
HBA1C MFR BLD: 8.2 %
HCT VFR BLD AUTO: 27.7 % (ref 36–46)
HDLC SERPL-MCNC: 29.3 MG/DL
HGB BLD-MCNC: 8.4 G/DL (ref 12–16)
HYALINE CASTS #/AREA URNS AUTO: ABNORMAL /LPF
HYPOCHROMIA BLD QL SMEAR: ABNORMAL
IMM GRANULOCYTES # BLD AUTO: 1.98 X10*3/UL (ref 0–0.5)
IMM GRANULOCYTES NFR BLD AUTO: 9.2 % (ref 0–0.9)
KETONES UR STRIP.AUTO-MCNC: NEGATIVE MG/DL
LDLC SERPL CALC-MCNC: 50 MG/DL
LEUKOCYTE ESTERASE UR QL STRIP.AUTO: ABNORMAL
LYMPHOCYTES # BLD MANUAL: 1.94 X10*3/UL (ref 0.8–3)
LYMPHOCYTES NFR BLD MANUAL: 9 %
MCH RBC QN AUTO: 29.8 PG (ref 26–34)
MCHC RBC AUTO-ENTMCNC: 30.3 G/DL (ref 32–36)
MCV RBC AUTO: 98 FL (ref 80–100)
METAMYELOCYTES # BLD MANUAL: 0.65 X10*3/UL
METAMYELOCYTES NFR BLD MANUAL: 3 %
MICROALBUMIN UR-MCNC: 51.6 MG/L
MICROALBUMIN/CREAT UR: 34.4 UG/MG CREAT
MONOCYTES # BLD MANUAL: 1.29 X10*3/UL (ref 0.05–0.8)
MONOCYTES NFR BLD MANUAL: 6 %
MUCOUS THREADS #/AREA URNS AUTO: ABNORMAL /LPF
MYELOCYTES # BLD MANUAL: 0.65 X10*3/UL
MYELOCYTES NFR BLD MANUAL: 3 %
NEUTROPHILS # BLD MANUAL: 16.56 X10*3/UL (ref 1.6–5.5)
NEUTS BAND # BLD MANUAL: 1.94 X10*3/UL (ref 0–0.5)
NEUTS BAND NFR BLD MANUAL: 9 %
NEUTS SEG # BLD MANUAL: 14.62 X10*3/UL (ref 1.6–5)
NEUTS SEG NFR BLD MANUAL: 68 %
NITRITE UR QL STRIP.AUTO: NEGATIVE
NON HDL CHOLESTEROL: 97 MG/DL (ref 0–149)
NRBC BLD-RTO: 0.4 /100 WBCS (ref 0–0)
PH UR STRIP.AUTO: 5 [PH]
PLATELET # BLD AUTO: 390 X10*3/UL (ref 150–450)
POLYCHROMASIA BLD QL SMEAR: ABNORMAL
POTASSIUM SERPL-SCNC: 4.6 MMOL/L (ref 3.5–5.3)
POTASSIUM SERPL-SCNC: 4.8 MMOL/L (ref 3.5–5.3)
PROT SERPL-MCNC: 6.8 G/DL (ref 6.4–8.2)
PROT SERPL-MCNC: 6.9 G/DL (ref 6.4–8.2)
PROT UR STRIP.AUTO-MCNC: NEGATIVE MG/DL
RBC # BLD AUTO: 2.82 X10*6/UL (ref 4–5.2)
RBC # UR STRIP.AUTO: NEGATIVE /UL
RBC #/AREA URNS AUTO: ABNORMAL /HPF
RBC MORPH BLD: ABNORMAL
SODIUM SERPL-SCNC: 137 MMOL/L (ref 136–145)
SODIUM SERPL-SCNC: 137 MMOL/L (ref 136–145)
SP GR UR STRIP.AUTO: 1.02
SQUAMOUS #/AREA URNS AUTO: ABNORMAL /HPF
TOTAL CELLS COUNTED BLD: 100
TOXIC GRANULES BLD QL SMEAR: PRESENT
TRANS CELLS #/AREA UR COMP ASSIST: ABNORMAL /HPF
TRIGL SERPL-MCNC: 236 MG/DL (ref 0–149)
TSH SERPL-ACNC: 3.55 MIU/L (ref 0.44–3.98)
UROBILINOGEN UR STRIP.AUTO-MCNC: <2 MG/DL
VLDL: 47 MG/DL (ref 0–40)
WBC # BLD AUTO: 21.5 X10*3/UL (ref 4.4–11.3)
WBC #/AREA URNS AUTO: >50 /HPF

## 2024-01-22 PROCEDURE — 83036 HEMOGLOBIN GLYCOSYLATED A1C: CPT

## 2024-01-22 PROCEDURE — 82570 ASSAY OF URINE CREATININE: CPT

## 2024-01-22 PROCEDURE — 82043 UR ALBUMIN QUANTITATIVE: CPT

## 2024-01-22 PROCEDURE — 82306 VITAMIN D 25 HYDROXY: CPT

## 2024-01-23 ENCOUNTER — OFFICE VISIT (OUTPATIENT)
Dept: HEMATOLOGY/ONCOLOGY | Facility: CLINIC | Age: 74
End: 2024-01-23
Payer: MEDICARE

## 2024-01-23 ENCOUNTER — INFUSION (OUTPATIENT)
Dept: HEMATOLOGY/ONCOLOGY | Facility: CLINIC | Age: 74
End: 2024-01-23
Payer: MEDICARE

## 2024-01-23 VITALS
HEART RATE: 95 BPM | WEIGHT: 149.91 LBS | DIASTOLIC BLOOD PRESSURE: 69 MMHG | BODY MASS INDEX: 26.56 KG/M2 | OXYGEN SATURATION: 96 % | RESPIRATION RATE: 16 BRPM | TEMPERATURE: 97.9 F | SYSTOLIC BLOOD PRESSURE: 114 MMHG

## 2024-01-23 DIAGNOSIS — C80.0 PRIMARY MALIGNANT NEOPLASM OF OVARY WITH WIDESPREAD METASTATIC DISEASE, UNSPECIFIED LATERALITY (MULTI): ICD-10-CM

## 2024-01-23 DIAGNOSIS — C44.02 SQUAMOUS CELL CARCINOMA OF SKIN OF LIP: ICD-10-CM

## 2024-01-23 DIAGNOSIS — C56.9 PRIMARY MALIGNANT NEOPLASM OF OVARY WITH WIDESPREAD METASTATIC DISEASE, UNSPECIFIED LATERALITY (MULTI): ICD-10-CM

## 2024-01-23 DIAGNOSIS — C56.9 PRIMARY MALIGNANT NEOPLASM OF OVARY WITH WIDESPREAD METASTATIC DISEASE, UNSPECIFIED LATERALITY (MULTI): Primary | ICD-10-CM

## 2024-01-23 DIAGNOSIS — C80.0 PRIMARY MALIGNANT NEOPLASM OF OVARY WITH WIDESPREAD METASTATIC DISEASE, UNSPECIFIED LATERALITY (MULTI): Primary | ICD-10-CM

## 2024-01-23 PROCEDURE — 96375 TX/PRO/DX INJ NEW DRUG ADDON: CPT | Mod: INF

## 2024-01-23 PROCEDURE — 3060F POS MICROALBUMINURIA REV: CPT | Performed by: INTERNAL MEDICINE

## 2024-01-23 PROCEDURE — 99214 OFFICE O/P EST MOD 30 MIN: CPT | Performed by: INTERNAL MEDICINE

## 2024-01-23 PROCEDURE — 4010F ACE/ARB THERAPY RXD/TAKEN: CPT | Performed by: INTERNAL MEDICINE

## 2024-01-23 PROCEDURE — 1036F TOBACCO NON-USER: CPT | Performed by: INTERNAL MEDICINE

## 2024-01-23 PROCEDURE — 1159F MED LIST DOCD IN RCRD: CPT | Performed by: INTERNAL MEDICINE

## 2024-01-23 PROCEDURE — 1157F ADVNC CARE PLAN IN RCRD: CPT | Performed by: INTERNAL MEDICINE

## 2024-01-23 PROCEDURE — 2500000004 HC RX 250 GENERAL PHARMACY W/ HCPCS (ALT 636 FOR OP/ED): Performed by: INTERNAL MEDICINE

## 2024-01-23 PROCEDURE — 3048F LDL-C <100 MG/DL: CPT | Performed by: INTERNAL MEDICINE

## 2024-01-23 PROCEDURE — 3008F BODY MASS INDEX DOCD: CPT | Performed by: INTERNAL MEDICINE

## 2024-01-23 PROCEDURE — 3074F SYST BP LT 130 MM HG: CPT | Performed by: INTERNAL MEDICINE

## 2024-01-23 PROCEDURE — 96417 CHEMO IV INFUS EACH ADDL SEQ: CPT

## 2024-01-23 PROCEDURE — 3052F HG A1C>EQUAL 8.0%<EQUAL 9.0%: CPT | Performed by: INTERNAL MEDICINE

## 2024-01-23 PROCEDURE — 1160F RVW MEDS BY RX/DR IN RCRD: CPT | Performed by: INTERNAL MEDICINE

## 2024-01-23 PROCEDURE — 96372 THER/PROPH/DIAG INJ SC/IM: CPT | Mod: JZ | Performed by: INTERNAL MEDICINE

## 2024-01-23 PROCEDURE — 1126F AMNT PAIN NOTED NONE PRSNT: CPT | Performed by: INTERNAL MEDICINE

## 2024-01-23 PROCEDURE — 3078F DIAST BP <80 MM HG: CPT | Performed by: INTERNAL MEDICINE

## 2024-01-23 PROCEDURE — 96413 CHEMO IV INFUSION 1 HR: CPT

## 2024-01-23 RX ORDER — FAMOTIDINE 10 MG/ML
20 INJECTION INTRAVENOUS ONCE AS NEEDED
Status: CANCELLED | OUTPATIENT
Start: 2024-04-02

## 2024-01-23 RX ORDER — ALBUTEROL SULFATE 0.83 MG/ML
3 SOLUTION RESPIRATORY (INHALATION) AS NEEDED
Status: DISCONTINUED | OUTPATIENT
Start: 2024-01-23 | End: 2024-01-23 | Stop reason: HOSPADM

## 2024-01-23 RX ORDER — PROCHLORPERAZINE EDISYLATE 5 MG/ML
10 INJECTION INTRAMUSCULAR; INTRAVENOUS EVERY 6 HOURS PRN
Status: CANCELLED | OUTPATIENT
Start: 2024-04-02

## 2024-01-23 RX ORDER — ALBUTEROL SULFATE 0.83 MG/ML
3 SOLUTION RESPIRATORY (INHALATION) AS NEEDED
Status: CANCELLED | OUTPATIENT
Start: 2024-03-19

## 2024-01-23 RX ORDER — PROCHLORPERAZINE MALEATE 5 MG
10 TABLET ORAL EVERY 6 HOURS PRN
Status: CANCELLED | OUTPATIENT
Start: 2024-03-19

## 2024-01-23 RX ORDER — DIPHENHYDRAMINE HYDROCHLORIDE 50 MG/ML
50 INJECTION INTRAMUSCULAR; INTRAVENOUS AS NEEDED
Status: CANCELLED | OUTPATIENT
Start: 2024-03-26

## 2024-01-23 RX ORDER — EPINEPHRINE 0.3 MG/.3ML
0.3 INJECTION SUBCUTANEOUS EVERY 5 MIN PRN
Status: DISCONTINUED | OUTPATIENT
Start: 2024-01-23 | End: 2024-01-23 | Stop reason: HOSPADM

## 2024-01-23 RX ORDER — PROCHLORPERAZINE MALEATE 5 MG
10 TABLET ORAL EVERY 6 HOURS PRN
Status: CANCELLED | OUTPATIENT
Start: 2024-04-02

## 2024-01-23 RX ORDER — PROCHLORPERAZINE EDISYLATE 5 MG/ML
10 INJECTION INTRAMUSCULAR; INTRAVENOUS EVERY 6 HOURS PRN
Status: CANCELLED | OUTPATIENT
Start: 2024-03-19

## 2024-01-23 RX ORDER — DIPHENHYDRAMINE HYDROCHLORIDE 50 MG/ML
50 INJECTION INTRAMUSCULAR; INTRAVENOUS AS NEEDED
Status: CANCELLED | OUTPATIENT
Start: 2024-04-02

## 2024-01-23 RX ORDER — FAMOTIDINE 10 MG/ML
20 INJECTION INTRAVENOUS ONCE AS NEEDED
Status: CANCELLED | OUTPATIENT
Start: 2024-03-26

## 2024-01-23 RX ORDER — DIPHENHYDRAMINE HYDROCHLORIDE 50 MG/ML
50 INJECTION INTRAMUSCULAR; INTRAVENOUS AS NEEDED
Status: DISCONTINUED | OUTPATIENT
Start: 2024-01-23 | End: 2024-01-23 | Stop reason: HOSPADM

## 2024-01-23 RX ORDER — ONDANSETRON HYDROCHLORIDE 2 MG/ML
8 INJECTION, SOLUTION INTRAVENOUS ONCE
Status: CANCELLED | OUTPATIENT
Start: 2024-04-02

## 2024-01-23 RX ORDER — PROCHLORPERAZINE EDISYLATE 5 MG/ML
10 INJECTION INTRAMUSCULAR; INTRAVENOUS EVERY 6 HOURS PRN
Status: DISCONTINUED | OUTPATIENT
Start: 2024-01-23 | End: 2024-01-23 | Stop reason: HOSPADM

## 2024-01-23 RX ORDER — HEPARIN SODIUM,PORCINE/PF 10 UNIT/ML
50 SYRINGE (ML) INTRAVENOUS AS NEEDED
Status: CANCELLED | OUTPATIENT
Start: 2024-01-23

## 2024-01-23 RX ORDER — EPINEPHRINE 0.3 MG/.3ML
0.3 INJECTION SUBCUTANEOUS EVERY 5 MIN PRN
Status: CANCELLED | OUTPATIENT
Start: 2024-04-02

## 2024-01-23 RX ORDER — ALBUTEROL SULFATE 0.83 MG/ML
3 SOLUTION RESPIRATORY (INHALATION) AS NEEDED
Status: CANCELLED | OUTPATIENT
Start: 2024-04-02

## 2024-01-23 RX ORDER — PROCHLORPERAZINE EDISYLATE 5 MG/ML
10 INJECTION INTRAMUSCULAR; INTRAVENOUS EVERY 6 HOURS PRN
Status: CANCELLED | OUTPATIENT
Start: 2024-03-26

## 2024-01-23 RX ORDER — HEPARIN 100 UNIT/ML
500 SYRINGE INTRAVENOUS AS NEEDED
Status: DISCONTINUED | OUTPATIENT
Start: 2024-01-23 | End: 2024-01-23 | Stop reason: HOSPADM

## 2024-01-23 RX ORDER — PROCHLORPERAZINE MALEATE 10 MG
10 TABLET ORAL EVERY 6 HOURS PRN
Status: DISCONTINUED | OUTPATIENT
Start: 2024-01-23 | End: 2024-01-23 | Stop reason: HOSPADM

## 2024-01-23 RX ORDER — ALBUTEROL SULFATE 0.83 MG/ML
3 SOLUTION RESPIRATORY (INHALATION) AS NEEDED
Status: CANCELLED | OUTPATIENT
Start: 2024-03-26

## 2024-01-23 RX ORDER — ONDANSETRON HYDROCHLORIDE 2 MG/ML
8 INJECTION, SOLUTION INTRAVENOUS ONCE
Status: CANCELLED | OUTPATIENT
Start: 2024-03-19

## 2024-01-23 RX ORDER — ONDANSETRON HYDROCHLORIDE 2 MG/ML
8 INJECTION, SOLUTION INTRAVENOUS ONCE
Status: COMPLETED | OUTPATIENT
Start: 2024-01-23 | End: 2024-01-23

## 2024-01-23 RX ORDER — ONDANSETRON HYDROCHLORIDE 2 MG/ML
8 INJECTION, SOLUTION INTRAVENOUS ONCE
Status: CANCELLED | OUTPATIENT
Start: 2024-03-26

## 2024-01-23 RX ORDER — DIPHENHYDRAMINE HYDROCHLORIDE 50 MG/ML
50 INJECTION INTRAMUSCULAR; INTRAVENOUS AS NEEDED
Status: CANCELLED | OUTPATIENT
Start: 2024-03-19

## 2024-01-23 RX ORDER — PROCHLORPERAZINE MALEATE 5 MG
10 TABLET ORAL EVERY 6 HOURS PRN
Status: CANCELLED | OUTPATIENT
Start: 2024-03-26

## 2024-01-23 RX ORDER — HEPARIN 100 UNIT/ML
500 SYRINGE INTRAVENOUS AS NEEDED
Status: CANCELLED | OUTPATIENT
Start: 2024-01-23

## 2024-01-23 RX ORDER — EPINEPHRINE 0.3 MG/.3ML
0.3 INJECTION SUBCUTANEOUS EVERY 5 MIN PRN
Status: CANCELLED | OUTPATIENT
Start: 2024-03-19

## 2024-01-23 RX ORDER — EPINEPHRINE 0.3 MG/.3ML
0.3 INJECTION SUBCUTANEOUS EVERY 5 MIN PRN
Status: CANCELLED | OUTPATIENT
Start: 2024-03-26

## 2024-01-23 RX ORDER — FAMOTIDINE 10 MG/ML
20 INJECTION INTRAVENOUS ONCE AS NEEDED
Status: DISCONTINUED | OUTPATIENT
Start: 2024-01-23 | End: 2024-01-23 | Stop reason: HOSPADM

## 2024-01-23 RX ORDER — FAMOTIDINE 10 MG/ML
20 INJECTION INTRAVENOUS ONCE AS NEEDED
Status: CANCELLED | OUTPATIENT
Start: 2024-03-19

## 2024-01-23 RX ADMIN — TOPOTECAN 7 MG: 1 INJECTION, SOLUTION, CONCENTRATE INTRAVENOUS at 12:35

## 2024-01-23 RX ADMIN — BEVACIZUMAB-AWWB 700 MG: 400 INJECTION, SOLUTION INTRAVENOUS at 13:31

## 2024-01-23 RX ADMIN — ONDANSETRON 8 MG: 2 INJECTION INTRAMUSCULAR; INTRAVENOUS at 11:23

## 2024-01-23 RX ADMIN — HEPARIN 500 UNITS: 100 SYRINGE at 14:11

## 2024-01-23 ASSESSMENT — PAIN SCALES - GENERAL: PAINLEVEL: 0-NO PAIN

## 2024-01-23 NOTE — PATIENT INSTRUCTIONS
Today you met with Dr. Ca.  No change to your treatment.  A CT will be added.  I will print some information regarding blood transfusions- as we are watching your hemoglobin trend.  Call for any concerns.

## 2024-01-23 NOTE — PROGRESS NOTES
Patient ID: Catalina Mendoza is a 73 y.o. female.  Referring Physician: Aleyda Ca MD  [id number] [place] [address]  Primary Care Provider: Juju Kenney MD  Visit Type:  Follow Up         Subjective    HPI  Ovarian cancer with malignant ascites.  CT scan gave 12 19 2022.  compared with 12/23 2022.  Mediastinal and abdominal lymphadenopathy which demonstrated abnormal  FDG [id number].  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 Discussed with patient       woman [presented at 72 years old] who presents today with abdominal distention and CT scan showing omental nodularity and extensive abdominal adenopathy.  Her  is 2320 with  elevation in CA 19-9 as well.  INR guided paracentesis as well as cytology of malignant ascitic fluid is pending at this time.  Clinical diagnosis and impression is that of ovarian adenocarcinoma stage III\4.  Full staging CT scans ordered and pathology  reports pending.      04/04/2023: Below is notes copied from OB/GYN.  # [facility]OC   - We reviewed the typical pathophysiology and management of ovarian cancer, we discussed that ovarian cancer is usually managed with a combination of chemotherapy and surgery  - We reviewed her imaging    - Chest lymph nodes specifically were significantly improved prior to 3rd chemo   - Schedule visit for genetic counseling to r/o hereditary cancer syndromes and/or targeted therapy markers  - Continue monitoring tumor markers  - She is not a candidate for HIPEC  - We discussed the plan for adjuvant chemo following surgery. Adjuvant chemotherapy cycles after surgery can be managed by gyn onc or med onc   12/27/2022-2/7/2023: 3 Cycles Carbo Taxol: Had surgery and resumed Chemotherapy 4/13/2023-5/23/2023.  was  still in double digits of 85.  Patient not a candidate for olaparib.     06/13/2023: Most recent  is 18 well within normal.  We will discussed with patient with the option  to observe and to intervene if we should see a  rise or morphologic evidence of disease recurrence.  Patient is platinum sensitive and has had  good response at this time.  Maintenance or other intervention will be dependent on either a rising  or morphologic evidence of disease persistence.  Bevacizumab will be considered plus minus of the therapeutic options  Review of Systems   Constitutional: Negative.    HENT:  Negative.     Eyes: Negative.    Respiratory: Negative.          Objective   BSA: 1.74 meters squared  /69 (BP Location: Left arm)   Pulse 95   Temp 36.6 °C (97.9 °F)   Resp 16   Wt 68 kg (149 lb 14.6 oz)   LMP  (LMP Unknown)   SpO2 96%   BMI 26.56 kg/m²      has a past medical history of Encounter for immunization (10/03/2016), Other injury of unspecified body region, initial encounter, Personal history of malignant neoplasm of unspecified site of lip, oral cavity, and pharynx, Personal history of other complications of pregnancy, childbirth and the puerperium, and Personal history of other malignant neoplasm of skin.   has a past surgical history that includes Hysterectomy (09/28/2015); Appendectomy (09/28/2015); Other surgical history (09/28/2015); Eye surgery (06/20/2016); Cholecystectomy (06/20/2016); and US guided abdominal paracentesis (12/15/2022).  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, started on NACT with carbo/taxol  -  2/7/23: S/p cycle 3 with good interval response  - 3/13/23: Diagnostic laparoscopy, BSO, extensive MIKAELA, bilateral ureterolysis, infracolic omentectomy, abdominal wall and mesenteric biopsies, complete gross resection to R0  - 5/23/23: Chemo completed     Primary malignant neoplasm of ovary with widespread metastatic disease (CMS/HCC)   4/5/2023 Initial Diagnosis    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 She has never been exposed to tobacco smoke. She has never used smokeless tobacco.  She  reports no history of alcohol use.  She  reports no history of drug use.    Physical Exam  Constitutional:       Appearance: Normal appearance.   HENT:      Head: Normocephalic and atraumatic.   Eyes:      Extraocular Movements: Extraocular movements intact.      Pupils: Pupils are equal, round, and reactive to light.   Musculoskeletal:      Cervical back: Normal range of motion and neck supple.   Neurological:      Mental Status: She is alert.         WBC   Date/Time Value Ref Range Status   01/22/2024 09:13 AM 21.5 (H) 4.4 - 11.3 x10*3/uL Final   01/08/2024 10:17 AM 3.4 (L) 4.4 - 11.3 x10*3/uL Final   01/02/2024 02:29 PM 61.1 (HH) 4.4 - 11.3 x10*3/uL Final     nRBC   Date Value Ref Range Status   01/22/2024 0.4 (H) 0.0 - 0.0 /100 WBCs Final   01/08/2024 0.0 0.0 - 0.0 /100 WBCs Final   01/02/2024   Final     Comment:     Not Measured     RBC   Date Value Ref Range Status   01/22/2024 2.82 (L) 4.00 - 5.20 x10*6/uL Final   01/08/2024 3.13 (L) 4.00 - 5.20 x10*6/uL Final   01/02/2024 3.32 (L) 4.00 - 5.20 x10*6/uL Final     Hemoglobin   Date Value Ref Range Status    01/22/2024 8.4 (L) 12.0 - 16.0 g/dL Final   01/08/2024 9.1 (L) 12.0 - 16.0 g/dL Final   01/02/2024 9.9 (L) 12.0 - 16.0 g/dL Final     Hematocrit   Date Value Ref Range Status   01/22/2024 27.7 (L) 36.0 - 46.0 % Final   01/08/2024 29.2 (L) 36.0 - 46.0 % Final   01/02/2024 31.2 (L) 36.0 - 46.0 % Final     MCV   Date/Time Value Ref Range Status   01/22/2024 09:13 AM 98 80 - 100 fL Final   01/08/2024 10:17 AM 93 80 - 100 fL Final   01/02/2024 02:29 PM 94 80 - 100 fL Final     MCH   Date/Time Value Ref Range Status   01/22/2024 09:13 AM 29.8 26.0 - 34.0 pg Final   01/08/2024 10:17 AM 29.1 26.0 - 34.0 pg Final   01/02/2024 02:29 PM 29.8 26.0 - 34.0 pg Final     MCHC   Date/Time Value Ref Range Status   01/22/2024 09:13 AM 30.3 (L) 32.0 - 36.0 g/dL Final   01/08/2024 10:17 AM 31.2 (L) 32.0 - 36.0 g/dL Final   01/02/2024 02:29 PM 31.7 (L) 32.0 - 36.0 g/dL Final     RDW   Date/Time Value Ref Range Status   01/22/2024 09:13 AM 19.3 (H) 11.5 - 14.5 % Final   01/08/2024 10:17 AM 15.6 (H) 11.5 - 14.5 % Final   01/02/2024 02:29 PM 16.5 (H) 11.5 - 14.5 % Final     Platelets   Date/Time Value Ref Range Status   01/22/2024 09:13  150 - 450 x10*3/uL Final   01/08/2024 10:17  (L) 150 - 450 x10*3/uL Final     Comment:     Platelet count verified by smear review.   01/02/2024 02:29  150 - 450 x10*3/uL Final     MPV   Date/Time Value Ref Range Status   10/27/2023 08:21 AM 9.8 7.5 - 11.5 fL Final     Neutrophils %   Date/Time Value Ref Range Status   01/08/2024 10:17 AM 27.5 40.0 - 80.0 % Final   12/26/2023 11:00 AM 50.6 40.0 - 80.0 % Final   12/12/2023 08:33 AM 11.6 40.0 - 80.0 % Final     Immature Granulocytes %, Automated   Date/Time Value Ref Range Status   01/22/2024 09:13 AM 9.2 (H) 0.0 - 0.9 % Final     Comment:     Immature Granulocyte Count (IG) includes promyelocytes, myelocytes and metamyelocytes but does not include bands. Percent differential counts (%) should be interpreted in the context of the absolute  cell counts (cells/UL).   01/08/2024 10:17 AM 0.3 0.0 - 0.9 % Final     Comment:     Immature Granulocyte Count (IG) includes promyelocytes, myelocytes and metamyelocytes but does not include bands. Percent differential counts (%) should be interpreted in the context of the absolute cell counts (cells/UL).   01/02/2024 02:29 PM 8.4 (H) 0.0 - 0.9 % Final     Comment:     Immature Granulocyte Count (IG) includes promyelocytes, myelocytes and metamyelocytes but does not include bands. Percent differential counts (%) should be interpreted in the context of the absolute cell counts (cells/UL).     Lymphocytes %, Manual   Date/Time Value Ref Range Status   01/22/2024 09:13 AM 9.0 13.0 - 44.0 % Final   01/02/2024 02:29 PM 10.0 13.0 - 44.0 % Final     Lymphocytes %   Date/Time Value Ref Range Status   01/08/2024 10:17 AM 62.0 13.0 - 44.0 % Final   12/26/2023 11:00 AM 34.8 13.0 - 44.0 % Final   12/12/2023 08:33 AM 77.8 13.0 - 44.0 % Final     Monocytes %, Manual   Date/Time Value Ref Range Status   01/22/2024 09:13 AM 6.0 2.0 - 10.0 % Final   01/02/2024 02:29 PM 6.0 2.0 - 10.0 % Final     Monocytes %   Date/Time Value Ref Range Status   01/08/2024 10:17 AM 4.7 2.0 - 10.0 % Final   12/26/2023 11:00 AM 9.5 2.0 - 10.0 % Final   12/12/2023 08:33 AM 3.9 2.0 - 10.0 % Final     Eosinophils %, Manual   Date/Time Value Ref Range Status   01/22/2024 09:13 AM 2.0 0.0 - 6.0 % Final   01/02/2024 02:29 PM 0.0 0.0 - 6.0 % Final     Eosinophils %   Date/Time Value Ref Range Status   01/08/2024 10:17 AM 3.5 0.0 - 6.0 % Final   12/26/2023 11:00 AM 3.0 0.0 - 6.0 % Final   12/12/2023 08:33 AM 6.4 0.0 - 6.0 % Final     Basophils %, Manual   Date/Time Value Ref Range Status   01/22/2024 09:13 AM 0.0 0.0 - 2.0 % Final   01/02/2024 02:29 PM 1.0 0.0 - 2.0 % Final     Basophils %   Date/Time Value Ref Range Status   01/08/2024 10:17 AM 2.0 0.0 - 2.0 % Final   12/26/2023 11:00 AM 1.1 0.0 - 2.0 % Final   12/12/2023 08:33 AM 0.3 0.0 - 2.0 % Final      Neutrophils Absolute   Date/Time Value Ref Range Status   01/08/2024 10:17 AM 0.94 (L) 1.60 - 5.50 x10*3/uL Final     Comment:     Percent differential counts (%) should be interpreted in the context of the absolute cell counts (cells/uL).   12/26/2023 11:00 AM 4.65 1.60 - 5.50 x10*3/uL Final     Comment:     Percent differential counts (%) should be interpreted in the context of the absolute cell counts (cells/uL).   12/12/2023 08:33 AM 0.36 (L) 1.60 - 5.50 x10*3/uL Final     Comment:     Percent differential counts (%) should be interpreted in the context of the absolute cell counts (cells/uL).     Immature Granulocytes Absolute, Automated   Date/Time Value Ref Range Status   01/22/2024 09:13 AM 1.98 (H) 0.00 - 0.50 x10*3/uL Final   01/08/2024 10:17 AM 0.01 0.00 - 0.50 x10*3/uL Final   01/02/2024 02:29 PM 5.11 (H) 0.00 - 0.50 x10*3/uL Final     Lymphocytes Absolute   Date/Time Value Ref Range Status   01/08/2024 10:17 AM 2.12 0.80 - 3.00 x10*3/uL Final   12/26/2023 11:00 AM 3.20 (H) 0.80 - 3.00 x10*3/uL Final   12/12/2023 08:33 AM 2.42 0.80 - 3.00 x10*3/uL Final     Monocytes Absolute   Date/Time Value Ref Range Status   01/08/2024 10:17 AM 0.16 0.05 - 0.80 x10*3/uL Final   12/26/2023 11:00 AM 0.87 (H) 0.05 - 0.80 x10*3/uL Final   12/12/2023 08:33 AM 0.12 0.05 - 0.80 x10*3/uL Final     Eosinophils Absolute   Date/Time Value Ref Range Status   01/08/2024 10:17 AM 0.12 0.00 - 0.40 x10*3/uL Final   12/26/2023 11:00 AM 0.28 0.00 - 0.40 x10*3/uL Final   12/12/2023 08:33 AM 0.20 0.00 - 0.40 x10*3/uL Final     Eosinophils Absolute, Manual   Date/Time Value Ref Range Status   01/22/2024 09:13 AM 0.43 (H) 0.00 - 0.40 x10*3/uL Final   01/02/2024 02:29 PM 0.00 0.00 - 0.40 x10*3/uL Final     Basophils Absolute   Date/Time Value Ref Range Status   01/08/2024 10:17 AM 0.07 0.00 - 0.10 x10*3/uL Final   12/26/2023 11:00 AM 0.10 0.00 - 0.10 x10*3/uL Final   12/12/2023 08:33 AM 0.01 0.00 - 0.10 x10*3/uL Final     Basophils  Absolute, Manual   Date/Time Value Ref Range Status   01/22/2024 09:13 AM 0.00 0.00 - 0.10 x10*3/uL Final   01/02/2024 02:29 PM 0.61 (H) 0.00 - 0.10 x10*3/uL Final     Assessment/Plan      Patient with high-grade histopathologic hide risk recurrent ovarian cancer.  Initially treated with platinum based therapy with recurrence currently receiving topotecan plus Avastin.   on the downward trend ordered today.  CT chest abdomen and pelvis ordered for evaluation.  Cleared for chemotherapy.  Previous chemotherapy resulted in cytopenias and currently on cytokine support.  Will continue to monitor blood counts closely.     Diagnoses and all orders for this visit:  Primary malignant neoplasm of ovary with widespread metastatic disease, unspecified laterality (CMS/HCC)  -     Clinic Appointment Request  -     CT chest abdomen pelvis w IV contrast; Future  -     Cancer Antigen 125; Future  -     Clinic Appointment Request; Future  -     Infusion Appointment Request; Future  -     CBC and Auto Differential; Future  -     Comprehensive metabolic panel; Future  -     Urinalysis with Reflex Microscopic; Future  -     Infusion Appointment Request; Future  -     CBC and Auto Differential; Future  -     Infusion Appointment Request; Future  -     CBC and Auto Differential; Future  -     Infusion Appointment Request [facility] [id number] INFUSION; Future  Squamous cell carcinoma of skin of lip  -     Clinic Appointment Request  -     CT chest abdomen pelvis w IV contrast; Future  -     Cancer Antigen 125; Future  -     Clinic Appointment Request; Future  -     Infusion Appointment Request; Future  -     CBC and Auto Differential; Future  -     Comprehensive metabolic panel; Future  -     Urinalysis with Reflex Microscopic; Future  -     Infusion Appointment Request; Future  -     CBC and Auto Differential; Future  -     Infusion Appointment Request; Future  -     CBC and Auto Differential; Future  -     Infusion Appointment Request [facility]  [id number] INFUSION; Future  Other orders  -     ondansetron (Zofran) injection 8 mg  -     prochlorperazine (Compazine) tablet 10 mg  -     prochlorperazine (Compazine) injection 10 mg  -     topotecan (Hycamtin) 7 mg in dextrose 5 % in water (D5W) 107 mL IV  -     bevacizumab (Avastin) 700 mg in sodium chloride 0.9% 128 mL IV  -     pegfilgrastim (Neulasta Onpro) injection 6 mg  -     sodium chloride 0.9 % bolus 500 mL  -     dextrose 5 % in water (D5W) bolus  -     diphenhydrAMINE (BENADryl) injection 50 mg  -     methylPREDNISolone sod succinate (PF) (SOLU-Medrol) 40 mg/mL injection 40 mg  -     famotidine PF (Pepcid) injection 20 mg  -     EPINEPHrine (Epipen) injection syringe 0.3 mg  -     albuterol 2.5 mg /3 mL (0.083 %) nebulizer solution 3 mL  -     ondansetron (Zofran) injection 8 mg  -     prochlorperazine (Compazine) tablet 10 mg  -     prochlorperazine (Compazine) injection 10 mg  -     topotecan (Hycamtin) 7 mg in dextrose 5 % in water (D5W) 107 mL IV  -     sodium chloride 0.9 % bolus 500 mL  -     dextrose 5 % in water (D5W) bolus  -     diphenhydrAMINE (BENADryl) injection 50 mg  -     methylPREDNISolone sod succinate (PF) (SOLU-Medrol) 40 mg/mL injection 40 mg  -     famotidine PF (Pepcid) injection 20 mg  -     EPINEPHrine (Epipen) injection syringe 0.3 mg  -     albuterol 2.5 mg /3 mL (0.083 %) nebulizer solution 3 mL  -     ondansetron (Zofran) injection 8 mg  -     prochlorperazine (Compazine) tablet 10 mg  -     prochlorperazine (Compazine) injection 10 mg  -     topotecan (Hycamtin) 7 mg in dextrose 5 % in water (D5W) 107 mL IV  -     bevacizumab (Avastin) 700 mg in sodium chloride 0.9% 128 mL IV  -     pegfilgrastim (Neulasta Onpro) injection 6 mg  -     sodium chloride 0.9 % bolus 500 mL  -     dextrose 5 % in water (D5W) bolus  -     diphenhydrAMINE (BENADryl) injection 50 mg  -     methylPREDNISolone sod succinate (PF) (SOLU-Medrol) 40 mg/mL injection 40 mg  -     famotidine PF (Pepcid)  injection 20 mg  -     EPINEPHrine (Epipen) injection syringe 0.3 mg  -     albuterol 2.5 mg /3 mL (0.083 %) nebulizer solution 3 mL           Aleyda Ca MD

## 2024-01-23 NOTE — SIGNIFICANT EVENT
01/23/24 1051   Prechemo Checklist   Has the patient been in the hospital, ED, or urgent care since last date of service No   Chemo/Immuno Consent Signed Yes   Protocol/Indications Verified Yes   Confirmed to previous date/time of medication Yes   Compared to previous dose Yes   All medications are dated accurately Yes   Pregnancy Test Negative Not applicable   Parameters Met Yes   Provider Notified Yes   Is Patient Proceeding With Treatment? Yes   BSA/Weight-Height Verified Yes   Dose Calculations Verified Yes

## 2024-01-24 ASSESSMENT — ENCOUNTER SYMPTOMS
CONSTITUTIONAL NEGATIVE: 1
EYES NEGATIVE: 1
RESPIRATORY NEGATIVE: 1

## 2024-01-29 ENCOUNTER — LAB (OUTPATIENT)
Dept: LAB | Facility: LAB | Age: 74
End: 2024-01-29
Payer: MEDICARE

## 2024-01-29 DIAGNOSIS — C44.02 SQUAMOUS CELL CARCINOMA OF SKIN OF LIP: ICD-10-CM

## 2024-01-29 DIAGNOSIS — C56.9 PRIMARY MALIGNANT NEOPLASM OF OVARY WITH WIDESPREAD METASTATIC DISEASE, UNSPECIFIED LATERALITY (MULTI): ICD-10-CM

## 2024-01-29 DIAGNOSIS — C80.0 PRIMARY MALIGNANT NEOPLASM OF OVARY WITH WIDESPREAD METASTATIC DISEASE, UNSPECIFIED LATERALITY (MULTI): ICD-10-CM

## 2024-01-29 LAB
BASOPHILS # BLD AUTO: 0.08 X10*3/UL (ref 0–0.1)
BASOPHILS NFR BLD AUTO: 2 %
EOSINOPHIL # BLD AUTO: 0.11 X10*3/UL (ref 0–0.4)
EOSINOPHIL NFR BLD AUTO: 2.8 %
ERYTHROCYTE [DISTWIDTH] IN BLOOD BY AUTOMATED COUNT: 18.3 % (ref 11.5–14.5)
HCT VFR BLD AUTO: 26.4 % (ref 36–46)
HGB BLD-MCNC: 8.2 G/DL (ref 12–16)
IMM GRANULOCYTES # BLD AUTO: 0.01 X10*3/UL (ref 0–0.5)
IMM GRANULOCYTES NFR BLD AUTO: 0.3 % (ref 0–0.9)
LYMPHOCYTES # BLD AUTO: 2.14 X10*3/UL (ref 0.8–3)
LYMPHOCYTES NFR BLD AUTO: 54.2 %
MCH RBC QN AUTO: 29.6 PG (ref 26–34)
MCHC RBC AUTO-ENTMCNC: 31.1 G/DL (ref 32–36)
MCV RBC AUTO: 95 FL (ref 80–100)
MONOCYTES # BLD AUTO: 0.27 X10*3/UL (ref 0.05–0.8)
MONOCYTES NFR BLD AUTO: 6.8 %
NEUTROPHILS # BLD AUTO: 1.34 X10*3/UL (ref 1.6–5.5)
NEUTROPHILS NFR BLD AUTO: 33.9 %
NRBC BLD-RTO: 0 /100 WBCS (ref 0–0)
PLATELET # BLD AUTO: 435 X10*3/UL (ref 150–450)
RBC # BLD AUTO: 2.77 X10*6/UL (ref 4–5.2)
WBC # BLD AUTO: 4 X10*3/UL (ref 4.4–11.3)

## 2024-01-30 ENCOUNTER — INFUSION (OUTPATIENT)
Dept: HEMATOLOGY/ONCOLOGY | Facility: CLINIC | Age: 74
End: 2024-01-30
Payer: MEDICARE

## 2024-01-30 VITALS
HEART RATE: 88 BPM | DIASTOLIC BLOOD PRESSURE: 66 MMHG | TEMPERATURE: 97.7 F | WEIGHT: 149.25 LBS | OXYGEN SATURATION: 100 % | SYSTOLIC BLOOD PRESSURE: 112 MMHG | BODY MASS INDEX: 26.45 KG/M2 | RESPIRATION RATE: 16 BRPM

## 2024-01-30 DIAGNOSIS — C80.0 PRIMARY MALIGNANT NEOPLASM OF OVARY WITH WIDESPREAD METASTATIC DISEASE, UNSPECIFIED LATERALITY (MULTI): ICD-10-CM

## 2024-01-30 DIAGNOSIS — C56.9 PRIMARY MALIGNANT NEOPLASM OF OVARY WITH WIDESPREAD METASTATIC DISEASE, UNSPECIFIED LATERALITY (MULTI): ICD-10-CM

## 2024-01-30 DIAGNOSIS — C44.02 SQUAMOUS CELL CARCINOMA OF SKIN OF LIP: ICD-10-CM

## 2024-01-30 PROCEDURE — 96375 TX/PRO/DX INJ NEW DRUG ADDON: CPT | Mod: INF

## 2024-01-30 PROCEDURE — 2500000004 HC RX 250 GENERAL PHARMACY W/ HCPCS (ALT 636 FOR OP/ED): Performed by: INTERNAL MEDICINE

## 2024-01-30 PROCEDURE — 96413 CHEMO IV INFUSION 1 HR: CPT

## 2024-01-30 RX ORDER — HEPARIN SODIUM,PORCINE/PF 10 UNIT/ML
50 SYRINGE (ML) INTRAVENOUS AS NEEDED
Status: DISCONTINUED | OUTPATIENT
Start: 2024-01-30 | End: 2024-01-30 | Stop reason: HOSPADM

## 2024-01-30 RX ORDER — HEPARIN SODIUM,PORCINE/PF 10 UNIT/ML
50 SYRINGE (ML) INTRAVENOUS AS NEEDED
Status: CANCELLED | OUTPATIENT
Start: 2024-01-30

## 2024-01-30 RX ORDER — EPINEPHRINE 0.3 MG/.3ML
0.3 INJECTION SUBCUTANEOUS EVERY 5 MIN PRN
Status: DISCONTINUED | OUTPATIENT
Start: 2024-01-30 | End: 2024-01-30 | Stop reason: HOSPADM

## 2024-01-30 RX ORDER — PROCHLORPERAZINE MALEATE 10 MG
10 TABLET ORAL EVERY 6 HOURS PRN
Status: DISCONTINUED | OUTPATIENT
Start: 2024-01-30 | End: 2024-01-30 | Stop reason: HOSPADM

## 2024-01-30 RX ORDER — PROCHLORPERAZINE EDISYLATE 5 MG/ML
10 INJECTION INTRAMUSCULAR; INTRAVENOUS EVERY 6 HOURS PRN
Status: DISCONTINUED | OUTPATIENT
Start: 2024-01-30 | End: 2024-01-30 | Stop reason: HOSPADM

## 2024-01-30 RX ORDER — ONDANSETRON HYDROCHLORIDE 2 MG/ML
8 INJECTION, SOLUTION INTRAVENOUS ONCE
Status: COMPLETED | OUTPATIENT
Start: 2024-01-30 | End: 2024-01-30

## 2024-01-30 RX ORDER — DIPHENHYDRAMINE HYDROCHLORIDE 50 MG/ML
50 INJECTION INTRAMUSCULAR; INTRAVENOUS AS NEEDED
Status: DISCONTINUED | OUTPATIENT
Start: 2024-01-30 | End: 2024-01-30 | Stop reason: HOSPADM

## 2024-01-30 RX ORDER — HEPARIN 100 UNIT/ML
500 SYRINGE INTRAVENOUS AS NEEDED
Status: DISCONTINUED | OUTPATIENT
Start: 2024-01-30 | End: 2024-01-30 | Stop reason: HOSPADM

## 2024-01-30 RX ORDER — FAMOTIDINE 10 MG/ML
20 INJECTION INTRAVENOUS ONCE AS NEEDED
Status: DISCONTINUED | OUTPATIENT
Start: 2024-01-30 | End: 2024-01-30 | Stop reason: HOSPADM

## 2024-01-30 RX ORDER — ALBUTEROL SULFATE 0.83 MG/ML
3 SOLUTION RESPIRATORY (INHALATION) AS NEEDED
Status: DISCONTINUED | OUTPATIENT
Start: 2024-01-30 | End: 2024-01-30 | Stop reason: HOSPADM

## 2024-01-30 RX ORDER — HEPARIN 100 UNIT/ML
500 SYRINGE INTRAVENOUS AS NEEDED
Status: CANCELLED | OUTPATIENT
Start: 2024-01-30

## 2024-01-30 RX ADMIN — TOPOTECAN 7 MG: 1 INJECTION, SOLUTION, CONCENTRATE INTRAVENOUS at 15:21

## 2024-01-30 RX ADMIN — HEPARIN 500 UNITS: 100 SYRINGE at 16:08

## 2024-01-30 RX ADMIN — ONDANSETRON 8 MG: 2 INJECTION INTRAMUSCULAR; INTRAVENOUS at 15:00

## 2024-01-30 ASSESSMENT — PAIN SCALES - GENERAL: PAINLEVEL: 3

## 2024-01-30 NOTE — PROGRESS NOTES
Patient tolerated treatment well, discharged to home with follow up appointments in place. Patient agreed to plan and verbalized understanding using teach back method.

## 2024-02-01 ENCOUNTER — INFUSION (OUTPATIENT)
Dept: HEMATOLOGY/ONCOLOGY | Facility: HOSPITAL | Age: 74
End: 2024-02-01
Payer: MEDICARE

## 2024-02-01 ENCOUNTER — HOSPITAL ENCOUNTER (OUTPATIENT)
Dept: RADIOLOGY | Facility: HOSPITAL | Age: 74
Discharge: HOME | End: 2024-02-01
Payer: MEDICARE

## 2024-02-01 VITALS
RESPIRATION RATE: 16 BRPM | HEART RATE: 87 BPM | BODY MASS INDEX: 26.09 KG/M2 | SYSTOLIC BLOOD PRESSURE: 122 MMHG | WEIGHT: 147.27 LBS | DIASTOLIC BLOOD PRESSURE: 78 MMHG | TEMPERATURE: 95.2 F | OXYGEN SATURATION: 99 %

## 2024-02-01 DIAGNOSIS — C56.9 PRIMARY MALIGNANT NEOPLASM OF OVARY WITH WIDESPREAD METASTATIC DISEASE, UNSPECIFIED LATERALITY (MULTI): ICD-10-CM

## 2024-02-01 DIAGNOSIS — C80.0 PRIMARY MALIGNANT NEOPLASM OF OVARY WITH WIDESPREAD METASTATIC DISEASE, UNSPECIFIED LATERALITY (MULTI): ICD-10-CM

## 2024-02-01 DIAGNOSIS — C44.02 SQUAMOUS CELL CARCINOMA OF SKIN OF LIP: ICD-10-CM

## 2024-02-01 PROCEDURE — 2500000004 HC RX 250 GENERAL PHARMACY W/ HCPCS (ALT 636 FOR OP/ED): Performed by: INTERNAL MEDICINE

## 2024-02-01 PROCEDURE — 96523 IRRIG DRUG DELIVERY DEVICE: CPT

## 2024-02-01 PROCEDURE — 2550000001 HC RX 255 CONTRASTS: Performed by: INTERNAL MEDICINE

## 2024-02-01 PROCEDURE — 74177 CT ABD & PELVIS W/CONTRAST: CPT | Performed by: RADIOLOGY

## 2024-02-01 PROCEDURE — 71260 CT THORAX DX C+: CPT | Performed by: RADIOLOGY

## 2024-02-01 PROCEDURE — 74177 CT ABD & PELVIS W/CONTRAST: CPT

## 2024-02-01 RX ORDER — HEPARIN 100 UNIT/ML
500 SYRINGE INTRAVENOUS AS NEEDED
Status: DISCONTINUED | OUTPATIENT
Start: 2024-02-01 | End: 2024-02-01 | Stop reason: HOSPADM

## 2024-02-01 RX ORDER — HEPARIN SODIUM,PORCINE/PF 10 UNIT/ML
50 SYRINGE (ML) INTRAVENOUS AS NEEDED
Status: CANCELLED | OUTPATIENT
Start: 2024-02-01

## 2024-02-01 RX ORDER — HEPARIN 100 UNIT/ML
500 SYRINGE INTRAVENOUS AS NEEDED
Status: CANCELLED | OUTPATIENT
Start: 2024-02-01

## 2024-02-01 RX ADMIN — Medication 500 UNITS: at 10:37

## 2024-02-01 RX ADMIN — IOHEXOL 75 ML: 350 INJECTION, SOLUTION INTRAVENOUS at 10:33

## 2024-02-01 ASSESSMENT — PATIENT HEALTH QUESTIONNAIRE - PHQ9
SUM OF ALL RESPONSES TO PHQ9 QUESTIONS 1 & 2: 0
1. LITTLE INTEREST OR PLEASURE IN DOING THINGS: NOT AT ALL
2. FEELING DOWN, DEPRESSED OR HOPELESS: NOT AT ALL

## 2024-02-01 ASSESSMENT — PAIN SCALES - GENERAL: PAINLEVEL: 0-NO PAIN

## 2024-02-05 ENCOUNTER — LAB (OUTPATIENT)
Dept: LAB | Facility: LAB | Age: 74
End: 2024-02-05
Payer: MEDICARE

## 2024-02-05 DIAGNOSIS — C44.02 SQUAMOUS CELL CARCINOMA OF SKIN OF LIP: ICD-10-CM

## 2024-02-05 DIAGNOSIS — C56.9 PRIMARY MALIGNANT NEOPLASM OF OVARY WITH WIDESPREAD METASTATIC DISEASE, UNSPECIFIED LATERALITY (MULTI): ICD-10-CM

## 2024-02-05 DIAGNOSIS — C80.0 PRIMARY MALIGNANT NEOPLASM OF OVARY WITH WIDESPREAD METASTATIC DISEASE, UNSPECIFIED LATERALITY (MULTI): ICD-10-CM

## 2024-02-05 LAB
BASOPHILS # BLD MANUAL: 0.08 X10*3/UL (ref 0–0.1)
BASOPHILS NFR BLD MANUAL: 2 %
DACRYOCYTES BLD QL SMEAR: ABNORMAL
EOSINOPHIL # BLD MANUAL: 0.28 X10*3/UL (ref 0–0.4)
EOSINOPHIL NFR BLD MANUAL: 7 %
ERYTHROCYTE [DISTWIDTH] IN BLOOD BY AUTOMATED COUNT: 17.4 % (ref 11.5–14.5)
GIANT PLATELETS BLD QL SMEAR: ABNORMAL
HCT VFR BLD AUTO: 24.7 % (ref 36–46)
HGB BLD-MCNC: 7.7 G/DL (ref 12–16)
HYPOCHROMIA BLD QL SMEAR: ABNORMAL
IMM GRANULOCYTES # BLD AUTO: 0.01 X10*3/UL (ref 0–0.5)
IMM GRANULOCYTES NFR BLD AUTO: 0.3 % (ref 0–0.9)
LYMPHOCYTES # BLD MANUAL: 2.32 X10*3/UL (ref 0.8–3)
LYMPHOCYTES NFR BLD MANUAL: 58 %
MCH RBC QN AUTO: 30 PG (ref 26–34)
MCHC RBC AUTO-ENTMCNC: 31.2 G/DL (ref 32–36)
MCV RBC AUTO: 96 FL (ref 80–100)
MONOCYTES # BLD MANUAL: 0.04 X10*3/UL (ref 0.05–0.8)
MONOCYTES NFR BLD MANUAL: 1 %
NEUTROPHILS # BLD MANUAL: 1.16 X10*3/UL (ref 1.6–5.5)
NEUTS BAND # BLD MANUAL: 0.04 X10*3/UL (ref 0–0.5)
NEUTS BAND NFR BLD MANUAL: 1 %
NEUTS SEG # BLD MANUAL: 1.12 X10*3/UL (ref 1.6–5)
NEUTS SEG NFR BLD MANUAL: 28 %
NRBC BLD-RTO: 0 /100 WBCS (ref 0–0)
PLATELET # BLD AUTO: 189 X10*3/UL (ref 150–450)
RBC # BLD AUTO: 2.57 X10*6/UL (ref 4–5.2)
RBC MORPH BLD: ABNORMAL
TOTAL CELLS COUNTED BLD: 100
VARIANT LYMPHS # BLD MANUAL: 0.12 X10*3/UL (ref 0–0.3)
VARIANT LYMPHS NFR BLD: 3 %
WBC # BLD AUTO: 4 X10*3/UL (ref 4.4–11.3)

## 2024-02-06 ENCOUNTER — NUTRITION (OUTPATIENT)
Dept: HEMATOLOGY/ONCOLOGY | Facility: CLINIC | Age: 74
End: 2024-02-06

## 2024-02-06 ENCOUNTER — INFUSION (OUTPATIENT)
Dept: HEMATOLOGY/ONCOLOGY | Facility: CLINIC | Age: 74
End: 2024-02-06
Payer: MEDICARE

## 2024-02-06 VITALS
DIASTOLIC BLOOD PRESSURE: 68 MMHG | SYSTOLIC BLOOD PRESSURE: 115 MMHG | WEIGHT: 149.25 LBS | TEMPERATURE: 96.4 F | OXYGEN SATURATION: 100 % | BODY MASS INDEX: 26.45 KG/M2 | RESPIRATION RATE: 17 BRPM | HEART RATE: 99 BPM

## 2024-02-06 VITALS — WEIGHT: 149.25 LBS | BODY MASS INDEX: 26.45 KG/M2 | HEIGHT: 63 IN

## 2024-02-06 DIAGNOSIS — C56.9 PRIMARY MALIGNANT NEOPLASM OF OVARY WITH WIDESPREAD METASTATIC DISEASE, UNSPECIFIED LATERALITY (MULTI): Primary | ICD-10-CM

## 2024-02-06 DIAGNOSIS — C80.0 PRIMARY MALIGNANT NEOPLASM OF OVARY WITH WIDESPREAD METASTATIC DISEASE, UNSPECIFIED LATERALITY (MULTI): ICD-10-CM

## 2024-02-06 DIAGNOSIS — C56.9 PRIMARY MALIGNANT NEOPLASM OF OVARY WITH WIDESPREAD METASTATIC DISEASE, UNSPECIFIED LATERALITY (MULTI): ICD-10-CM

## 2024-02-06 DIAGNOSIS — C80.0 PRIMARY MALIGNANT NEOPLASM OF OVARY WITH WIDESPREAD METASTATIC DISEASE, UNSPECIFIED LATERALITY (MULTI): Primary | ICD-10-CM

## 2024-02-06 DIAGNOSIS — C44.02 SQUAMOUS CELL CARCINOMA OF SKIN OF LIP: ICD-10-CM

## 2024-02-06 PROCEDURE — 96372 THER/PROPH/DIAG INJ SC/IM: CPT | Performed by: INTERNAL MEDICINE

## 2024-02-06 PROCEDURE — 2500000004 HC RX 250 GENERAL PHARMACY W/ HCPCS (ALT 636 FOR OP/ED): Mod: JZ | Performed by: INTERNAL MEDICINE

## 2024-02-06 PROCEDURE — 96417 CHEMO IV INFUS EACH ADDL SEQ: CPT

## 2024-02-06 PROCEDURE — 96377 APPLICATON ON-BODY INJECTOR: CPT

## 2024-02-06 PROCEDURE — 96375 TX/PRO/DX INJ NEW DRUG ADDON: CPT | Mod: INF

## 2024-02-06 PROCEDURE — 96413 CHEMO IV INFUSION 1 HR: CPT

## 2024-02-06 PROCEDURE — 2500000004 HC RX 250 GENERAL PHARMACY W/ HCPCS (ALT 636 FOR OP/ED): Performed by: INTERNAL MEDICINE

## 2024-02-06 RX ORDER — ALBUTEROL SULFATE 0.83 MG/ML
3 SOLUTION RESPIRATORY (INHALATION) AS NEEDED
Status: DISCONTINUED | OUTPATIENT
Start: 2024-02-06 | End: 2024-02-06 | Stop reason: HOSPADM

## 2024-02-06 RX ORDER — HEPARIN SODIUM,PORCINE/PF 10 UNIT/ML
50 SYRINGE (ML) INTRAVENOUS AS NEEDED
Status: DISCONTINUED | OUTPATIENT
Start: 2024-02-06 | End: 2024-02-06 | Stop reason: HOSPADM

## 2024-02-06 RX ORDER — EPINEPHRINE 0.3 MG/.3ML
0.3 INJECTION SUBCUTANEOUS EVERY 5 MIN PRN
Status: DISCONTINUED | OUTPATIENT
Start: 2024-02-06 | End: 2024-02-06 | Stop reason: HOSPADM

## 2024-02-06 RX ORDER — FAMOTIDINE 10 MG/ML
20 INJECTION INTRAVENOUS ONCE AS NEEDED
Status: DISCONTINUED | OUTPATIENT
Start: 2024-02-06 | End: 2024-02-06 | Stop reason: HOSPADM

## 2024-02-06 RX ORDER — PROCHLORPERAZINE EDISYLATE 5 MG/ML
10 INJECTION INTRAMUSCULAR; INTRAVENOUS EVERY 6 HOURS PRN
Status: DISCONTINUED | OUTPATIENT
Start: 2024-02-06 | End: 2024-02-06 | Stop reason: HOSPADM

## 2024-02-06 RX ORDER — DIPHENHYDRAMINE HYDROCHLORIDE 50 MG/ML
50 INJECTION INTRAMUSCULAR; INTRAVENOUS AS NEEDED
Status: DISCONTINUED | OUTPATIENT
Start: 2024-02-06 | End: 2024-02-06 | Stop reason: HOSPADM

## 2024-02-06 RX ORDER — HEPARIN 100 UNIT/ML
500 SYRINGE INTRAVENOUS AS NEEDED
Status: DISCONTINUED | OUTPATIENT
Start: 2024-02-06 | End: 2024-02-06 | Stop reason: HOSPADM

## 2024-02-06 RX ORDER — ONDANSETRON HYDROCHLORIDE 2 MG/ML
8 INJECTION, SOLUTION INTRAVENOUS ONCE
Status: COMPLETED | OUTPATIENT
Start: 2024-02-06 | End: 2024-02-06

## 2024-02-06 RX ORDER — HEPARIN SODIUM,PORCINE/PF 10 UNIT/ML
50 SYRINGE (ML) INTRAVENOUS AS NEEDED
Status: CANCELLED | OUTPATIENT
Start: 2024-02-06

## 2024-02-06 RX ORDER — HEPARIN 100 UNIT/ML
500 SYRINGE INTRAVENOUS AS NEEDED
Status: CANCELLED | OUTPATIENT
Start: 2024-02-06

## 2024-02-06 RX ORDER — PROCHLORPERAZINE MALEATE 10 MG
10 TABLET ORAL EVERY 6 HOURS PRN
Status: DISCONTINUED | OUTPATIENT
Start: 2024-02-06 | End: 2024-02-06 | Stop reason: HOSPADM

## 2024-02-06 RX ADMIN — BEVACIZUMAB-AWWB 700 MG: 400 INJECTION, SOLUTION INTRAVENOUS at 12:02

## 2024-02-06 RX ADMIN — HEPARIN 500 UNITS: 100 SYRINGE at 12:40

## 2024-02-06 RX ADMIN — PEGFILGRASTIM 6 MG: KIT SUBCUTANEOUS at 12:42

## 2024-02-06 RX ADMIN — ONDANSETRON 8 MG: 2 INJECTION INTRAMUSCULAR; INTRAVENOUS at 10:52

## 2024-02-06 RX ADMIN — TOPOTECAN 7 MG: 1 INJECTION, SOLUTION, CONCENTRATE INTRAVENOUS at 11:17

## 2024-02-06 ASSESSMENT — PAIN SCALES - GENERAL: PAINLEVEL: 0-NO PAIN

## 2024-02-06 NOTE — PROGRESS NOTES
"NUTRITION Follow-up NOTE    Nutrition Assessment     Reason for Visit:  Catalina Mendoza is a 73 y.o. female who presents for who presents for hx ovarian cancer with malignant ascites; 10/2023 found to have subtle progression and rising , plan to restart treatment.     Current tx: ishan/ topotecan    Started: 10/31/23      Visited with pt briefly today per RN request for list of iron rich foods.     Lab Results   Component Value Date/Time    GLUCOSE 200 (H) 01/22/2024 0913    GLUCOSE 183 (H) 01/22/2024 0913     01/22/2024 0913     01/22/2024 0913    K 4.8 01/22/2024 0913    K 4.6 01/22/2024 0913     01/22/2024 0913     01/22/2024 0913    CO2 23 01/22/2024 0913    CO2 23 01/22/2024 0913    ANIONGAP 15 01/22/2024 0913    ANIONGAP 15 01/22/2024 0913    BUN 14 01/22/2024 0913    BUN 14 01/22/2024 0913    CREATININE 0.95 01/22/2024 0913    CREATININE 0.99 01/22/2024 0913    EGFR 63 01/22/2024 0913    EGFR 60 (L) 01/22/2024 0913    CALCIUM 9.7 01/22/2024 0913    CALCIUM 10.0 01/22/2024 0913    ALBUMIN 4.2 01/22/2024 0913    ALBUMIN 4.4 01/22/2024 0913    ALKPHOS 88 01/22/2024 0913    ALKPHOS 99 01/22/2024 0913    PROT 6.8 01/22/2024 0913    PROT 6.9 01/22/2024 0913    AST 21 01/22/2024 0913    AST 20 01/22/2024 0913    BILITOT 0.3 01/22/2024 0913    BILITOT 0.3 01/22/2024 0913    ALT 15 01/22/2024 0913    ALT 11 01/22/2024 0913     Lab Results   Component Value Date/Time    VITD25 45 01/22/2024 0913       Anthropometrics:  Anthropometrics  Height: 160 cm (5' 2.99\")  Weight: 67.7 kg (149 lb 4 oz)  BMI (Calculated): 26.45  IBW/kg (Dietitian Calculated): 52.3 kg  Percent of IBW: 132 %  Adjusted Body Weight (kg): 56 kg  Weight Change  Weight History / % Weight Change: Previously noted 5.6% weight loss in the last 6 months; weight appears to be relatively stable over the last few months. weight between 67-68kg.  Significant Weight Loss: No        Wt Readings from Last 10 Encounters:   02/06/24 " "67.7 kg (149 lb 4 oz)   02/06/24 67.7 kg (149 lb 4 oz)   02/01/24 66.8 kg (147 lb 4.3 oz)   01/30/24 67.7 kg (149 lb 4 oz)   01/23/24 68 kg (149 lb 14.6 oz)   01/17/24 67.4 kg (148 lb 9.6 oz)   01/09/24 67.9 kg (149 lb 11.1 oz)   01/09/24 67.9 kg (149 lb 11.1 oz)   01/02/24 67.1 kg (148 lb 0.6 oz)   12/26/23 68.1 kg (150 lb 2.1 oz)        Food And Nutrient Intake:  Food and Nutrient History  Food and Nutrient History: Pt reports that her appetite is good and weight has been stable no concerns                                                              Nutrition Focused Physical Exam Findings:                          Energy Needs  Calculated Energy Needs Using Equations  Height: 160 cm (5' 2.99\")        Nutrition Diagnosis             Nutrition Interventions/Recommendations   Nutrition Prescription  Individualized Nutrition Prescription Provided for : Nutrient list    Food and Nutrition Delivery  Food and Nutrition Delivery  Mineral Supplement Therapy: Iron  Goals: Provided pt with list of iron content of foods at RN request for pt to try and increase oral intake of iron rich sources.    Nutrition Education       Coordination of Care  Coordination of Nutrition Care by a Nutrition Professional  Collaboration and referral of nutrition care: Collaboration by nutrition professional with other providers  Goals: Discussed diet sources of iron (pt's hgb 7.7)    Patient Instructions   Handout provided/ reviewed:  Iron content of foods      Nutrition Monitoring and Evaluation   Food/Nutrient Related History Monitoring  Monitoring and Evaluation Plan: Mineral/element intake  Criteria: Increase oral intake of iron rich foods to possibly help with Hgb level          Time Spent  Prep time on day of patient encounter: 5 minutes  Time spent directly with patient, family or caregiver: 5 minutes  Additional Time Spent on Patient Care Activities: 0 minutes  Documentation Time: 10 minutes  Other Time Spent: 0 minutes  Total: 20 " minutes

## 2024-02-06 NOTE — PROGRESS NOTES
Patient tolerated treatment well, discharged to home with follow up appointments in place.    Hgb 7.7 today, Dr. Ca aware, patient to have repeat CBC done next Monday at [place] and possible blood transfusion at [facility]. Patient aware of plan and agrees with the plan using teach back method. all questions were answered.

## 2024-02-11 ENCOUNTER — APPOINTMENT (OUTPATIENT)
Dept: RADIOLOGY | Facility: HOSPITAL | Age: 74
End: 2024-02-11
Payer: MEDICARE

## 2024-02-11 ENCOUNTER — HOSPITAL ENCOUNTER (OUTPATIENT)
Facility: HOSPITAL | Age: 74
Setting detail: OBSERVATION
Discharge: HOME | End: 2024-02-13
Attending: INTERNAL MEDICINE | Admitting: NURSE PRACTITIONER
Payer: MEDICARE

## 2024-02-11 ENCOUNTER — APPOINTMENT (OUTPATIENT)
Dept: CARDIOLOGY | Facility: HOSPITAL | Age: 74
End: 2024-02-11
Payer: MEDICARE

## 2024-02-11 DIAGNOSIS — E10.9 TYPE 1 DIABETES MELLITUS WITHOUT COMPLICATION (MULTI): ICD-10-CM

## 2024-02-11 DIAGNOSIS — E87.1 HYPONATREMIA: ICD-10-CM

## 2024-02-11 DIAGNOSIS — D50.0 ANEMIA DUE TO BLOOD LOSS: Primary | ICD-10-CM

## 2024-02-11 DIAGNOSIS — D64.9 ANEMIA, UNSPECIFIED TYPE: ICD-10-CM

## 2024-02-11 DIAGNOSIS — R55 SYNCOPE, NON CARDIAC: ICD-10-CM

## 2024-02-11 LAB
ABO GROUP (TYPE) IN BLOOD: NORMAL
ALBUMIN SERPL BCP-MCNC: 3.9 G/DL (ref 3.4–5)
ALP SERPL-CCNC: 56 U/L (ref 33–136)
ALT SERPL W P-5'-P-CCNC: 10 U/L (ref 7–45)
ANION GAP SERPL CALC-SCNC: 12 MMOL/L (ref 10–20)
ANTIBODY SCREEN: NORMAL
APTT PPP: 24 SECONDS (ref 27–38)
AST SERPL W P-5'-P-CCNC: 16 U/L (ref 9–39)
BASOPHILS # BLD MANUAL: 0 X10*3/UL (ref 0–0.1)
BASOPHILS NFR BLD MANUAL: 0 %
BILIRUB SERPL-MCNC: 0.6 MG/DL (ref 0–1.2)
BUN SERPL-MCNC: 22 MG/DL (ref 6–23)
CALCIUM SERPL-MCNC: 8.9 MG/DL (ref 8.6–10.3)
CHLORIDE SERPL-SCNC: 103 MMOL/L (ref 98–107)
CO2 SERPL-SCNC: 21 MMOL/L (ref 21–32)
CREAT SERPL-MCNC: 0.9 MG/DL (ref 0.5–1.05)
DACRYOCYTES BLD QL SMEAR: NORMAL
DOHLE BOD BLD QL SMEAR: PRESENT
EGFRCR SERPLBLD CKD-EPI 2021: 67 ML/MIN/1.73M*2
EOSINOPHIL # BLD MANUAL: 0.14 X10*3/UL (ref 0–0.4)
EOSINOPHIL NFR BLD MANUAL: 4 %
ERYTHROCYTE [DISTWIDTH] IN BLOOD BY AUTOMATED COUNT: 17.3 % (ref 11.5–14.5)
FLUAV RNA RESP QL NAA+PROBE: NOT DETECTED
FLUBV RNA RESP QL NAA+PROBE: NOT DETECTED
GLUCOSE BLD MANUAL STRIP-MCNC: 154 MG/DL (ref 74–99)
GLUCOSE SERPL-MCNC: 205 MG/DL (ref 74–99)
HCT VFR BLD AUTO: 18.3 % (ref 36–46)
HEMOCCULT SP1 STL QL: POSITIVE
HGB BLD-MCNC: 6 G/DL (ref 12–16)
HYPOCHROMIA BLD QL SMEAR: NORMAL
IMM GRANULOCYTES # BLD AUTO: 0.03 X10*3/UL (ref 0–0.5)
IMM GRANULOCYTES NFR BLD AUTO: 0.9 % (ref 0–0.9)
INR PPP: 1.2 (ref 0.9–1.1)
LYMPHOCYTES # BLD MANUAL: 1.16 X10*3/UL (ref 0.8–3)
LYMPHOCYTES NFR BLD MANUAL: 33 %
MAGNESIUM SERPL-MCNC: 1.68 MG/DL (ref 1.6–2.4)
MCH RBC QN AUTO: 31.3 PG (ref 26–34)
MCHC RBC AUTO-ENTMCNC: 32.8 G/DL (ref 32–36)
MCV RBC AUTO: 95 FL (ref 80–100)
MONOCYTES # BLD MANUAL: 0.18 X10*3/UL (ref 0.05–0.8)
MONOCYTES NFR BLD MANUAL: 5 %
NEUTROPHILS # BLD MANUAL: 2.04 X10*3/UL (ref 1.6–5.5)
NEUTS BAND # BLD MANUAL: 0.25 X10*3/UL (ref 0–0.5)
NEUTS BAND NFR BLD MANUAL: 7 %
NEUTS SEG # BLD MANUAL: 1.79 X10*3/UL (ref 1.6–5)
NEUTS SEG NFR BLD MANUAL: 51 %
NRBC BLD-RTO: 0 /100 WBCS (ref 0–0)
PLATELET # BLD AUTO: 48 X10*3/UL (ref 150–450)
POTASSIUM SERPL-SCNC: 4.2 MMOL/L (ref 3.5–5.3)
PROT SERPL-MCNC: 6.6 G/DL (ref 6.4–8.2)
PROTHROMBIN TIME: 13 SECONDS (ref 9.8–12.8)
RBC # BLD AUTO: 1.92 X10*6/UL (ref 4–5.2)
RBC MORPH BLD: NORMAL
RH FACTOR (ANTIGEN D): NORMAL
RSV RNA RESP QL NAA+PROBE: NOT DETECTED
SARS-COV-2 RNA RESP QL NAA+PROBE: NOT DETECTED
SCHISTOCYTES BLD QL SMEAR: NORMAL
SODIUM SERPL-SCNC: 132 MMOL/L (ref 136–145)
TOTAL CELLS COUNTED BLD: 100
WBC # BLD AUTO: 3.5 X10*3/UL (ref 4.4–11.3)

## 2024-02-11 PROCEDURE — G0378 HOSPITAL OBSERVATION PER HR: HCPCS

## 2024-02-11 PROCEDURE — 74018 RADEX ABDOMEN 1 VIEW: CPT

## 2024-02-11 PROCEDURE — 70450 CT HEAD/BRAIN W/O DYE: CPT | Performed by: RADIOLOGY

## 2024-02-11 PROCEDURE — 83735 ASSAY OF MAGNESIUM: CPT | Performed by: PHYSICIAN ASSISTANT

## 2024-02-11 PROCEDURE — 71045 X-RAY EXAM CHEST 1 VIEW: CPT | Performed by: RADIOLOGY

## 2024-02-11 PROCEDURE — 96374 THER/PROPH/DIAG INJ IV PUSH: CPT

## 2024-02-11 PROCEDURE — 87637 SARSCOV2&INF A&B&RSV AMP PRB: CPT | Performed by: PHYSICIAN ASSISTANT

## 2024-02-11 PROCEDURE — 2500000004 HC RX 250 GENERAL PHARMACY W/ HCPCS (ALT 636 FOR OP/ED): Performed by: NURSE PRACTITIONER

## 2024-02-11 PROCEDURE — 99285 EMERGENCY DEPT VISIT HI MDM: CPT | Mod: 25

## 2024-02-11 PROCEDURE — 96361 HYDRATE IV INFUSION ADD-ON: CPT

## 2024-02-11 PROCEDURE — 82270 OCCULT BLOOD FECES: CPT | Performed by: PHYSICIAN ASSISTANT

## 2024-02-11 PROCEDURE — 70450 CT HEAD/BRAIN W/O DYE: CPT

## 2024-02-11 PROCEDURE — 71045 X-RAY EXAM CHEST 1 VIEW: CPT

## 2024-02-11 PROCEDURE — P9016 RBC LEUKOCYTES REDUCED: HCPCS

## 2024-02-11 PROCEDURE — 85027 COMPLETE CBC AUTOMATED: CPT | Performed by: PHYSICIAN ASSISTANT

## 2024-02-11 PROCEDURE — 36430 TRANSFUSION BLD/BLD COMPNT: CPT

## 2024-02-11 PROCEDURE — 86901 BLOOD TYPING SEROLOGIC RH(D): CPT | Performed by: PHYSICIAN ASSISTANT

## 2024-02-11 PROCEDURE — 85060 BLOOD SMEAR INTERPRETATION: CPT | Performed by: PHYSICIAN ASSISTANT

## 2024-02-11 PROCEDURE — 86920 COMPATIBILITY TEST SPIN: CPT

## 2024-02-11 PROCEDURE — 2500000002 HC RX 250 W HCPCS SELF ADMINISTERED DRUGS (ALT 637 FOR MEDICARE OP, ALT 636 FOR OP/ED): Performed by: NURSE PRACTITIONER

## 2024-02-11 PROCEDURE — 80053 COMPREHEN METABOLIC PANEL: CPT | Performed by: PHYSICIAN ASSISTANT

## 2024-02-11 PROCEDURE — 96375 TX/PRO/DX INJ NEW DRUG ADDON: CPT

## 2024-02-11 PROCEDURE — 2500000004 HC RX 250 GENERAL PHARMACY W/ HCPCS (ALT 636 FOR OP/ED): Performed by: PHYSICIAN ASSISTANT

## 2024-02-11 PROCEDURE — 74018 RADEX ABDOMEN 1 VIEW: CPT | Performed by: RADIOLOGY

## 2024-02-11 PROCEDURE — 93005 ELECTROCARDIOGRAM TRACING: CPT

## 2024-02-11 PROCEDURE — 82947 ASSAY GLUCOSE BLOOD QUANT: CPT | Mod: 59

## 2024-02-11 PROCEDURE — 85007 BL SMEAR W/DIFF WBC COUNT: CPT | Performed by: PHYSICIAN ASSISTANT

## 2024-02-11 PROCEDURE — 2500000004 HC RX 250 GENERAL PHARMACY W/ HCPCS (ALT 636 FOR OP/ED)

## 2024-02-11 PROCEDURE — 99222 1ST HOSP IP/OBS MODERATE 55: CPT | Performed by: NURSE PRACTITIONER

## 2024-02-11 PROCEDURE — 2500000001 HC RX 250 WO HCPCS SELF ADMINISTERED DRUGS (ALT 637 FOR MEDICARE OP): Performed by: NURSE PRACTITIONER

## 2024-02-11 PROCEDURE — 85730 THROMBOPLASTIN TIME PARTIAL: CPT | Performed by: PHYSICIAN ASSISTANT

## 2024-02-11 PROCEDURE — 85610 PROTHROMBIN TIME: CPT | Performed by: PHYSICIAN ASSISTANT

## 2024-02-11 RX ORDER — LEVOTHYROXINE SODIUM 88 UG/1
88 TABLET ORAL
Status: DISCONTINUED | OUTPATIENT
Start: 2024-02-12 | End: 2024-02-13 | Stop reason: HOSPADM

## 2024-02-11 RX ORDER — ONDANSETRON HYDROCHLORIDE 2 MG/ML
4 INJECTION, SOLUTION INTRAVENOUS EVERY 8 HOURS PRN
Status: DISCONTINUED | OUTPATIENT
Start: 2024-02-11 | End: 2024-02-13 | Stop reason: HOSPADM

## 2024-02-11 RX ORDER — FENOFIBRATE 160 MG/1
160 TABLET ORAL NIGHTLY
Status: DISCONTINUED | OUTPATIENT
Start: 2024-02-11 | End: 2024-02-13 | Stop reason: HOSPADM

## 2024-02-11 RX ORDER — ACETAMINOPHEN 650 MG/1
650 SUPPOSITORY RECTAL EVERY 4 HOURS PRN
Status: DISCONTINUED | OUTPATIENT
Start: 2024-02-11 | End: 2024-02-13 | Stop reason: HOSPADM

## 2024-02-11 RX ORDER — ACETAMINOPHEN 160 MG/5ML
650 SOLUTION ORAL EVERY 4 HOURS PRN
Status: DISCONTINUED | OUTPATIENT
Start: 2024-02-11 | End: 2024-02-11

## 2024-02-11 RX ORDER — ACETAMINOPHEN 325 MG/1
650 TABLET ORAL EVERY 4 HOURS PRN
Status: DISCONTINUED | OUTPATIENT
Start: 2024-02-11 | End: 2024-02-13 | Stop reason: HOSPADM

## 2024-02-11 RX ORDER — DULOXETIN HYDROCHLORIDE 30 MG/1
60 CAPSULE, DELAYED RELEASE ORAL NIGHTLY
Status: DISCONTINUED | OUTPATIENT
Start: 2024-02-11 | End: 2024-02-13 | Stop reason: HOSPADM

## 2024-02-11 RX ORDER — GABAPENTIN 100 MG/1
100 CAPSULE ORAL 3 TIMES DAILY
COMMUNITY
End: 2024-03-18 | Stop reason: ALTCHOICE

## 2024-02-11 RX ORDER — SODIUM CHLORIDE 9 MG/ML
100 INJECTION, SOLUTION INTRAVENOUS CONTINUOUS
Status: DISCONTINUED | OUTPATIENT
Start: 2024-02-11 | End: 2024-02-13 | Stop reason: HOSPADM

## 2024-02-11 RX ORDER — TALC
3 POWDER (GRAM) TOPICAL NIGHTLY PRN
Status: DISCONTINUED | OUTPATIENT
Start: 2024-02-11 | End: 2024-02-13 | Stop reason: HOSPADM

## 2024-02-11 RX ORDER — MORPHINE SULFATE 4 MG/ML
2 INJECTION, SOLUTION INTRAMUSCULAR; INTRAVENOUS ONCE
Status: COMPLETED | OUTPATIENT
Start: 2024-02-11 | End: 2024-02-11

## 2024-02-11 RX ORDER — INSULIN LISPRO 100 [IU]/ML
0-5 INJECTION, SOLUTION INTRAVENOUS; SUBCUTANEOUS
Status: DISCONTINUED | OUTPATIENT
Start: 2024-02-11 | End: 2024-02-13 | Stop reason: HOSPADM

## 2024-02-11 RX ORDER — FAMOTIDINE 10 MG/ML
20 INJECTION INTRAVENOUS 2 TIMES DAILY
Status: DISCONTINUED | OUTPATIENT
Start: 2024-02-11 | End: 2024-02-13 | Stop reason: HOSPADM

## 2024-02-11 RX ORDER — ROSUVASTATIN CALCIUM 10 MG/1
40 TABLET, COATED ORAL DAILY
Status: DISCONTINUED | OUTPATIENT
Start: 2024-02-12 | End: 2024-02-13 | Stop reason: HOSPADM

## 2024-02-11 RX ORDER — SODIUM CHLORIDE 9 MG/ML
INJECTION, SOLUTION INTRAVENOUS
Status: COMPLETED
Start: 2024-02-11 | End: 2024-02-12

## 2024-02-11 RX ORDER — INSULIN LISPRO 100 [IU]/ML
60 INJECTION, SOLUTION INTRAVENOUS; SUBCUTANEOUS
Status: DISCONTINUED | OUTPATIENT
Start: 2024-02-12 | End: 2024-02-12

## 2024-02-11 RX ORDER — ACETAMINOPHEN 325 MG/1
650 TABLET ORAL EVERY 4 HOURS PRN
Status: DISCONTINUED | OUTPATIENT
Start: 2024-02-11 | End: 2024-02-11

## 2024-02-11 RX ORDER — PANTOPRAZOLE SODIUM 40 MG/1
40 TABLET, DELAYED RELEASE ORAL
Status: DISCONTINUED | OUTPATIENT
Start: 2024-02-12 | End: 2024-02-13 | Stop reason: HOSPADM

## 2024-02-11 RX ORDER — INSULIN GLARGINE 100 [IU]/ML
30 INJECTION, SOLUTION SUBCUTANEOUS EVERY 24 HOURS
Status: DISCONTINUED | OUTPATIENT
Start: 2024-02-11 | End: 2024-02-13 | Stop reason: HOSPADM

## 2024-02-11 RX ORDER — FERROUS SULFATE, DRIED 160(50) MG
2 TABLET, EXTENDED RELEASE ORAL NIGHTLY
Status: DISCONTINUED | OUTPATIENT
Start: 2024-02-11 | End: 2024-02-13 | Stop reason: HOSPADM

## 2024-02-11 RX ORDER — GABAPENTIN 100 MG/1
100 CAPSULE ORAL 3 TIMES DAILY
Status: DISCONTINUED | OUTPATIENT
Start: 2024-02-11 | End: 2024-02-13 | Stop reason: HOSPADM

## 2024-02-11 RX ORDER — LOSARTAN POTASSIUM 50 MG/1
50 TABLET ORAL DAILY
Status: DISCONTINUED | OUTPATIENT
Start: 2024-02-12 | End: 2024-02-13 | Stop reason: HOSPADM

## 2024-02-11 RX ORDER — FAMOTIDINE 20 MG/1
20 TABLET, FILM COATED ORAL 2 TIMES DAILY
Status: DISCONTINUED | OUTPATIENT
Start: 2024-02-11 | End: 2024-02-13 | Stop reason: HOSPADM

## 2024-02-11 RX ORDER — PANTOPRAZOLE SODIUM 40 MG/10ML
40 INJECTION, POWDER, LYOPHILIZED, FOR SOLUTION INTRAVENOUS
Status: DISCONTINUED | OUTPATIENT
Start: 2024-02-12 | End: 2024-02-13 | Stop reason: HOSPADM

## 2024-02-11 RX ORDER — FUROSEMIDE 10 MG/ML
20 INJECTION INTRAMUSCULAR; INTRAVENOUS ONCE
Status: COMPLETED | OUTPATIENT
Start: 2024-02-11 | End: 2024-02-11

## 2024-02-11 RX ORDER — CHOLECALCIFEROL (VITAMIN D3) 25 MCG
1000 TABLET ORAL DAILY
Status: DISCONTINUED | OUTPATIENT
Start: 2024-02-12 | End: 2024-02-13 | Stop reason: HOSPADM

## 2024-02-11 RX ORDER — METFORMIN HYDROCHLORIDE 500 MG/1
500 TABLET, EXTENDED RELEASE ORAL 2 TIMES DAILY
Status: DISCONTINUED | OUTPATIENT
Start: 2024-02-11 | End: 2024-02-13 | Stop reason: HOSPADM

## 2024-02-11 RX ORDER — ONDANSETRON 4 MG/1
4 TABLET, FILM COATED ORAL EVERY 8 HOURS PRN
Status: DISCONTINUED | OUTPATIENT
Start: 2024-02-11 | End: 2024-02-13 | Stop reason: HOSPADM

## 2024-02-11 RX ORDER — DEXTROSE MONOHYDRATE 100 MG/ML
0.3 INJECTION, SOLUTION INTRAVENOUS ONCE AS NEEDED
Status: DISCONTINUED | OUTPATIENT
Start: 2024-02-11 | End: 2024-02-13 | Stop reason: HOSPADM

## 2024-02-11 RX ORDER — DEXTROSE 50 % IN WATER (D50W) INTRAVENOUS SYRINGE
25
Status: DISCONTINUED | OUTPATIENT
Start: 2024-02-11 | End: 2024-02-13 | Stop reason: HOSPADM

## 2024-02-11 RX ADMIN — Medication 2 TABLET: at 21:38

## 2024-02-11 RX ADMIN — SODIUM CHLORIDE 100 ML/HR: 9 INJECTION, SOLUTION INTRAVENOUS at 22:37

## 2024-02-11 RX ADMIN — SODIUM CHLORIDE: 0.9 INJECTION, SOLUTION INTRAVENOUS at 18:15

## 2024-02-11 RX ADMIN — MORPHINE SULFATE 2 MG: 4 INJECTION, SOLUTION INTRAMUSCULAR; INTRAVENOUS at 17:05

## 2024-02-11 RX ADMIN — SODIUM CHLORIDE 500 ML: 9 INJECTION, SOLUTION INTRAVENOUS at 13:47

## 2024-02-11 RX ADMIN — FUROSEMIDE 20 MG: 10 INJECTION, SOLUTION INTRAVENOUS at 22:36

## 2024-02-11 RX ADMIN — FAMOTIDINE 20 MG: 20 TABLET, FILM COATED ORAL at 21:39

## 2024-02-11 RX ADMIN — SODIUM CHLORIDE: 9 INJECTION, SOLUTION INTRAVENOUS at 18:15

## 2024-02-11 RX ADMIN — DULOXETINE HYDROCHLORIDE 60 MG: 30 CAPSULE, DELAYED RELEASE ORAL at 21:39

## 2024-02-11 RX ADMIN — FENOFIBRATE 160 MG: 160 TABLET ORAL at 21:39

## 2024-02-11 RX ADMIN — INSULIN GLARGINE 30 UNITS: 100 INJECTION, SOLUTION SUBCUTANEOUS at 21:46

## 2024-02-11 RX ADMIN — INSULIN LISPRO 1 UNITS: 100 INJECTION, SOLUTION INTRAVENOUS; SUBCUTANEOUS at 21:46

## 2024-02-11 SDOH — SOCIAL STABILITY: SOCIAL INSECURITY: ARE THERE ANY APPARENT SIGNS OF INJURIES/BEHAVIORS THAT COULD BE RELATED TO ABUSE/NEGLECT?: NO

## 2024-02-11 SDOH — SOCIAL STABILITY: SOCIAL INSECURITY: DO YOU FEEL ANYONE HAS EXPLOITED OR TAKEN ADVANTAGE OF YOU FINANCIALLY OR OF YOUR PERSONAL PROPERTY?: NO

## 2024-02-11 SDOH — SOCIAL STABILITY: SOCIAL INSECURITY: DO YOU FEEL UNSAFE GOING BACK TO THE PLACE WHERE YOU ARE LIVING?: NO

## 2024-02-11 SDOH — SOCIAL STABILITY: SOCIAL INSECURITY: WERE YOU ABLE TO COMPLETE ALL THE BEHAVIORAL HEALTH SCREENINGS?: YES

## 2024-02-11 SDOH — SOCIAL STABILITY: SOCIAL INSECURITY: DOES ANYONE TRY TO KEEP YOU FROM HAVING/CONTACTING OTHER FRIENDS OR DOING THINGS OUTSIDE YOUR HOME?: NO

## 2024-02-11 SDOH — SOCIAL STABILITY: SOCIAL INSECURITY: ARE YOU OR HAVE YOU BEEN THREATENED OR ABUSED PHYSICALLY, EMOTIONALLY, OR SEXUALLY BY ANYONE?: NO

## 2024-02-11 SDOH — SOCIAL STABILITY: SOCIAL INSECURITY: ABUSE: ADULT

## 2024-02-11 SDOH — SOCIAL STABILITY: SOCIAL INSECURITY: HAS ANYONE EVER THREATENED TO HURT YOUR FAMILY OR YOUR PETS?: NO

## 2024-02-11 SDOH — SOCIAL STABILITY: SOCIAL INSECURITY: HAVE YOU HAD THOUGHTS OF HARMING ANYONE ELSE?: NO

## 2024-02-11 ASSESSMENT — LIFESTYLE VARIABLES
HOW MANY STANDARD DRINKS CONTAINING ALCOHOL DO YOU HAVE ON A TYPICAL DAY: PATIENT DOES NOT DRINK
HOW OFTEN DO YOU HAVE 6 OR MORE DRINKS ON ONE OCCASION: NEVER
SKIP TO QUESTIONS 9-10: 1
AUDIT-C TOTAL SCORE: 0
AUDIT-C TOTAL SCORE: 0
HOW OFTEN DO YOU HAVE A DRINK CONTAINING ALCOHOL: NEVER

## 2024-02-11 ASSESSMENT — COGNITIVE AND FUNCTIONAL STATUS - GENERAL
MOBILITY SCORE: 19
PATIENT BASELINE BEDBOUND: NO
DRESSING REGULAR LOWER BODY CLOTHING: A LITTLE
STANDING UP FROM CHAIR USING ARMS: A LITTLE
DAILY ACTIVITIY SCORE: 19
DRESSING REGULAR UPPER BODY CLOTHING: A LITTLE
HELP NEEDED FOR BATHING: A LITTLE
TOILETING: A LITTLE
WALKING IN HOSPITAL ROOM: A LITTLE
MOVING TO AND FROM BED TO CHAIR: A LITTLE
PERSONAL GROOMING: A LITTLE
CLIMB 3 TO 5 STEPS WITH RAILING: A LOT

## 2024-02-11 ASSESSMENT — COLUMBIA-SUICIDE SEVERITY RATING SCALE - C-SSRS
2. HAVE YOU ACTUALLY HAD ANY THOUGHTS OF KILLING YOURSELF?: NO
1. IN THE PAST MONTH, HAVE YOU WISHED YOU WERE DEAD OR WISHED YOU COULD GO TO SLEEP AND NOT WAKE UP?: NO
6. HAVE YOU EVER DONE ANYTHING, STARTED TO DO ANYTHING, OR PREPARED TO DO ANYTHING TO END YOUR LIFE?: NO

## 2024-02-11 ASSESSMENT — ACTIVITIES OF DAILY LIVING (ADL)
FEEDING YOURSELF: INDEPENDENT
DRESSING YOURSELF: INDEPENDENT
PATIENT'S MEMORY ADEQUATE TO SAFELY COMPLETE DAILY ACTIVITIES?: YES
GROOMING: INDEPENDENT
BATHING: INDEPENDENT
LACK_OF_TRANSPORTATION: NO
HEARING - LEFT EAR: FUNCTIONAL
WALKS IN HOME: INDEPENDENT
ADEQUATE_TO_COMPLETE_ADL: YES
BATHING: INDEPENDENT
ADEQUATE_TO_COMPLETE_ADL: YES
HEARING - LEFT EAR: FUNCTIONAL
HEARING - RIGHT EAR: FUNCTIONAL
DRESSING YOURSELF: INDEPENDENT
JUDGMENT_ADEQUATE_SAFELY_COMPLETE_DAILY_ACTIVITIES: YES
JUDGMENT_ADEQUATE_SAFELY_COMPLETE_DAILY_ACTIVITIES: YES
HEARING - RIGHT EAR: FUNCTIONAL
WALKS IN HOME: INDEPENDENT
GROOMING: INDEPENDENT
PATIENT'S MEMORY ADEQUATE TO SAFELY COMPLETE DAILY ACTIVITIES?: YES
FEEDING YOURSELF: INDEPENDENT
TOILETING: INDEPENDENT

## 2024-02-11 ASSESSMENT — PATIENT HEALTH QUESTIONNAIRE - PHQ9
2. FEELING DOWN, DEPRESSED OR HOPELESS: NOT AT ALL
SUM OF ALL RESPONSES TO PHQ9 QUESTIONS 1 & 2: 0
1. LITTLE INTEREST OR PLEASURE IN DOING THINGS: NOT AT ALL

## 2024-02-11 ASSESSMENT — PAIN SCALES - GENERAL
PAINLEVEL_OUTOF10: 0 - NO PAIN
PAINLEVEL_OUTOF10: 5 - MODERATE PAIN
PAINLEVEL_OUTOF10: 0 - NO PAIN

## 2024-02-11 ASSESSMENT — PAIN - FUNCTIONAL ASSESSMENT
PAIN_FUNCTIONAL_ASSESSMENT: 0-10

## 2024-02-11 ASSESSMENT — PAIN DESCRIPTION - LOCATION: LOCATION: BUTTOCKS

## 2024-02-11 NOTE — ED PROVIDER NOTES
"HPI   No chief complaint on file.      73-year-old female with past medical history positive for diabetes ,HTN, and metastatic ovarian cancer gets chemo 3  out of the month and then has 1 week off sees [facility] has been having lightheaded dizziness and syncopal episodes she did have a hemoglobin drawn last week that was 7.7 , and per family there was a discussion about possibly given her packed red blood cells  Decision was to hold off  per the family . 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, oral cavity, and pharynx     History of malignant neoplasm of lip    Personal history of other complications of pregnancy, childbirth and the puerperium     History of spontaneous     Personal history of other malignant neoplasm of skin     History of malignant neoplasm of skin     Past Surgical History:   Procedure Laterality Date    APPENDECTOMY  2015    Appendectomy    CHOLECYSTECTOMY  2016    Cholecystectomy    EYE SURGERY  2016    Eye Surgery    HYSTERECTOMY  2015    Hysterectomy    OTHER SURGICAL HISTORY  2015    Biopsy Lip    US GUIDED ABDOMINAL PARACENTESIS  12/15/2022    US GUIDED ABDOMINAL PARACENTESIS 12/15/2022 [facility]     Family History   Problem Relation Name Age of Onset    Arthritis Mother      Diabetes Mother      Hyperlipidemia Mother      Other (Cardiac disorder) Father      Diabetes Father      Heart disease Father      Diabetes Sister      Hepatitis Sister          C, chronic    Other (Chronic liver failure without hepatic coma) Sister      " "Diabetes Brother      Heart disease Brother      Hyperlipidemia Brother      Diabetes Other Sibling     Other (Heart surgery) Other Sibling      Social History     Tobacco Use    Smoking status: Never     Passive exposure: Never    Smokeless tobacco: Never   Vaping Use    Vaping Use: Not on file   Substance Use Topics    Alcohol use: Never    Drug use: Never       Physical Exam   ED Triage Vitals   Temp Pulse Resp BP   -- -- -- --      SpO2 Temp src Heart Rate Source Patient Position   -- -- -- --      BP Location FiO2 (%)     -- --       Physical Exam  Vitals and nursing note reviewed.   Constitutional:       General: She is not in acute distress.     Appearance: She is well-developed.   HENT:      Head: Normocephalic and atraumatic.      Right Ear: Tympanic membrane, ear canal and external ear normal.      Left Ear: Tympanic membrane, ear canal and external ear normal.      Nose: Nose normal.      Mouth/Throat:      Mouth: Mucous membranes are moist.   Eyes:      Conjunctiva/sclera: Conjunctivae normal.   Cardiovascular:      Rate and Rhythm: Normal rate and regular rhythm.      Heart sounds: No murmur heard.  Pulmonary:      Effort: Pulmonary effort is normal. No respiratory distress.      Breath sounds: Normal breath sounds.   Abdominal:      Palpations: Abdomen is soft.      Tenderness: There is no abdominal tenderness.   Genitourinary:     Comments: Occult blood brown stool patient with active hemorrhoids  No obvious bleeding noted  But  states she has been \"straining\" having bowel movements      Musculoskeletal:         General: No swelling.      Cervical back: Neck supple.   Skin:     General: Skin is warm and dry.      Capillary Refill: Capillary refill takes less than 2 seconds.   Neurological:      General: No focal deficit present.      Mental Status: She is alert.   Psychiatric:         Mood and Affect: Mood normal.         ED Course & MDM   Diagnoses as of 02/11/24 1701   Anemia, unspecified " type   Hyponatremia   Syncope, non cardiac       Medical Decision Making  Echocardiogram done September 2023 shows LVEF of 73% normal LV SF and no focal  ischemia    CTA of the head showed no intracranial pathology chest x-ray showed no acute cardiopulmonary disease  X-ray of the abdomen and pelvis showed large amount of stool in the rectum otherwise no acute intra-abdominal pathology  Since the x-ray patient had a large stool    Receiving blood transfusion will be admitted to the floor for continued transfusion she was given normal saline bolus    Labs Reviewed  OCCULT BLOOD X1, STOOL - Abnormal     Occult Blood, Stool X1        Positive (*)            CBC WITH AUTO DIFFERENTIAL - Abnormal     WBC                           3.5 (*)                nRBC                          0.0                    RBC                           1.92 (*)               Hemoglobin                    6.0 (*)                Hematocrit                    18.3 (*)               MCV                           95                     MCH                           31.3                   MCHC                          32.8                   RDW                           17.3 (*)               Platelets                     48 (*)                 Immature Granulocytes %, Automated   0.9                    Immature Granulocytes Absolute, Au*   0.03                       Narrative: The previously reported component Neutrophils % is no longer being reported.  The previously reported component Lymphocytes % is no longer being reported.  The previously reported component Monocytes % is no longer being reported.  The previously                   reported component Eosinophils % is no longer being reported.  The previously reported component Basophils % is no longer being reported.  The previously reported component Absolute Neutrophils is no longer being reported.  The previously reported                   component Absolute Lymphocytes is no longer  being reported.  The previously reported component Absolute Monocytes is no longer being reported.  The previously reported component Absolute Eosinophils is no longer being reported.  The previously reported                   component Absolute Basophils is no longer being reported.  COMPREHENSIVE METABOLIC PANEL - Abnormal     Glucose                       205 (*)                Sodium                        132 (*)                Potassium                     4.2                    Chloride                      103                    Bicarbonate                   21                     Anion Gap                     12                     Urea Nitrogen                 22                     Creatinine                    0.90                   eGFR                          67                     Calcium                       8.9                    Albumin                       3.9                    Alkaline Phosphatase          56                     Total Protein                 6.6                    AST                           16                     Bilirubin, Total              0.6                    ALT                           10                  APTT - Abnormal     aPTT                          24 (*)                     Narrative: The APTT is no longer used for monitoring Unfractionated Heparin Therapy. For monitoring Heparin Therapy, use the Heparin Assay.  PROTIME-INR - Abnormal     Protime                       13.0 (*)               INR                           1.2 (*)             MAGNESIUM - Normal     Magnesium                     1.68                SARS-COV-2 AND INFLUENZA A/B PCR - Normal     Flu A Result                                         Flu B Result                                         Coronavirus 2019, PCR                                    Narrative: This assay has received FDA Emergency Use Authorization (EUA) and  is only authorized for the duration of time that circumstances  exist to justify the authorization of the emergency use of in vitro diagnostic tests for the detection of SARS-CoV-2 virus and/or diagnosis of COVID-19 infection under section 564(b)(1) of the Act, 21 U.S.C. 360bbb-3(b)(1). Testing for SARS-CoV-2 is only recommended for patients who meet current clinical and/or epidemiological criteria as defined by federal, state, or local public health directives. This assay is an in vitro diagnostic nucleic acid amplification test for the qualitative detection of SARS-CoV-2, Influenza A, and Influenza B from nasopharyngeal specimens and has been validated for use at [facility]. Negative results do not preclude COVID-19 infections or Influenza A/B infections, and should not be used as the sole basis for diagnosis, treatment, or other management decisions. If Influenza A/B and RSV PCR results are negative, testing for Parainfluenza virus, Adenovirus and Metapneumovirus is routinely performed for [facility] [place] and [place] in[place], and is available on other patients by placing an add-on request.   RSV PCR - Normal     RSV PCR                                                  Narrative: This assay is an FDA-cleared, in vitro diagnostic nucleic acid amplification test for the detection of RSV from nasopharyngeal specimens, and has been validated for use at [facility]. Negative results do not preclude RSV infections, and should not be used as the sole basis for diagnosis, treatment, or other management decisions. If Influenza A/B and RSV PCR results are negative, testing for Parainfluenza virus, Adenovirus and Metapneumovirus is routinely performed for pediatric oncology and intensive care inpatients at [facility], and is available on other patients by placing an add-on request.                                      URINE CULTURE  TYPE AND SCREEN     ABO TYPE                      O                      Rh TYPE                        POS                    ANTIBODY SCREEN               NEG                 URINALYSIS WITH REFLEX MICROSCOPIC  PREPARE RBC     PRODUCT CODE                  [id number]                Unit Number                                          Unit ABO                      O                      Unit RH                       POS                    XM INTEP                      COMP                   Dispense Status               IS                     Blood Expiration Date                                PRODUCT BLOOD TYPE            5100                   UNIT VOLUME                   350                    PRODUCT CODE                  [id number]                Unit Number                                          Unit ABO                      O                      Unit RH                       POS                    XM INTEP                      COMP                   Dispense Status               XM                     Blood Expiration Date                                PRODUCT BLOOD TYPE            5100                   UNIT VOLUME                   350                 MANUAL DIFFERENTIAL     Neutrophils %, Manual         51.0                   Bands %, Manual               7.0                    Lymphocytes %, Manual         33.0                   Monocytes %, Manual           5.0                    Eosinophils %, Manual         4.0                    Basophils %, Manual           0.0                    Seg Neutrophils Absolute, Manual   1.79                   Bands Absolute, Manual        0.25                   Lymphocytes Absolute, Manual   1.16                   Monocytes Absolute, Manual    0.18                   Eosinophils Absolute, Manual   0.14                   Basophils Absolute, Manual    0.00                   Total Cells Counted           100                    Neutrophils Absolute, Manual   2.04                   RBC Morphology                See Below                Hypochromia                   Mild                    RBC Fragments                 Few                    Teardrop Cells                Few                    Dohle Bodies                  Present             PATH REVIEW-CBC DIFFERENTIAL    XR abdomen 1 view   Final Result    As above          MACRO:    None          Signed by: Elieser Guzman 2/11/2024 4:45 PM    Dictation workstation:   [id number]     CT head wo IV contrast   Final Result    No acute intracranial pathology.                Signed by: Brett Mccain 2/11/2024 3:06 PM    Dictation workstation:   [id number]     XR chest 1 view   Final Result    NO ACUTE DISEASE IN THE CHEST          MACRO:    None          Signed by: Elieser Guzman 2/11/2024 2:31 PM    Dictation workstation:   [id number]             Amount and/or Complexity of Data Reviewed  ECG/medicine tests: independent interpretation performed.     Details: EKG done at 120 shows sinus rhythm rate of 72 left axis deviation  and Right bundle branch block, LVH difficult finding an old EKG did note 1 from 2007 did not indicate the right bundle branch block  And nuclear stress test done by advanced cardiology consults on September 2023 that showed sinus rhythm left axis deviation and anterior cardiac changes consistent with anterior MI        Procedure  Procedures     Estefani Carrillo PA-C  02/11/24 1414       Estefani Carrillo, MANNY  02/11/24 1427       Estefani Carrillo, MANNY  02/11/24 1502       Estefani LUBA Carrillo, MANNY  02/11/24 1702       Estefani Carrillo, MANNY  02/11/24 1704

## 2024-02-12 LAB
ANION GAP SERPL CALC-SCNC: <7 MMOL/L (ref 10–20)
APPEARANCE UR: CLEAR
ATRIAL RATE: 72 BPM
BILIRUB UR STRIP.AUTO-MCNC: NEGATIVE MG/DL
BLOOD EXPIRATION DATE: NORMAL
BLOOD EXPIRATION DATE: NORMAL
BUN SERPL-MCNC: 16 MG/DL (ref 6–23)
CALCIUM SERPL-MCNC: 8.9 MG/DL (ref 8.6–10.3)
CHLORIDE SERPL-SCNC: 108 MMOL/L (ref 98–107)
CO2 SERPL-SCNC: 25 MMOL/L (ref 21–32)
COLOR UR: NORMAL
CREAT SERPL-MCNC: 0.89 MG/DL (ref 0.5–1.05)
DISPENSE STATUS: NORMAL
DISPENSE STATUS: NORMAL
EGFRCR SERPLBLD CKD-EPI 2021: 68 ML/MIN/1.73M*2
ERYTHROCYTE [DISTWIDTH] IN BLOOD BY AUTOMATED COUNT: 16.7 % (ref 11.5–14.5)
GLUCOSE BLD MANUAL STRIP-MCNC: 133 MG/DL (ref 74–99)
GLUCOSE BLD MANUAL STRIP-MCNC: 183 MG/DL (ref 74–99)
GLUCOSE BLD MANUAL STRIP-MCNC: 65 MG/DL (ref 74–99)
GLUCOSE BLD MANUAL STRIP-MCNC: 94 MG/DL (ref 74–99)
GLUCOSE SERPL-MCNC: 79 MG/DL (ref 74–99)
GLUCOSE UR STRIP.AUTO-MCNC: NEGATIVE MG/DL
HCT VFR BLD AUTO: 25.2 % (ref 36–46)
HGB BLD-MCNC: 8.5 G/DL (ref 12–16)
KETONES UR STRIP.AUTO-MCNC: NEGATIVE MG/DL
LEUKOCYTE ESTERASE UR QL STRIP.AUTO: NEGATIVE
MCH RBC QN AUTO: 30.7 PG (ref 26–34)
MCHC RBC AUTO-ENTMCNC: 33.7 G/DL (ref 32–36)
MCV RBC AUTO: 91 FL (ref 80–100)
NITRITE UR QL STRIP.AUTO: NEGATIVE
NRBC BLD-RTO: 0 /100 WBCS (ref 0–0)
P AXIS: 52 DEGREES
P OFFSET: 190 MS
P ONSET: 127 MS
PH UR STRIP.AUTO: 6 [PH]
PLATELET # BLD AUTO: 41 X10*3/UL (ref 150–450)
POTASSIUM SERPL-SCNC: 3.4 MMOL/L (ref 3.5–5.3)
PR INTERVAL: 170 MS
PRODUCT BLOOD TYPE: 5100
PRODUCT BLOOD TYPE: 5100
PRODUCT CODE: NORMAL
PRODUCT CODE: NORMAL
PROT UR STRIP.AUTO-MCNC: NEGATIVE MG/DL
Q ONSET: 212 MS
QRS COUNT: 12 BEATS
QRS DURATION: 134 MS
QT INTERVAL: 448 MS
QTC CALCULATION(BAZETT): 490 MS
QTC FREDERICIA: 476 MS
R AXIS: -41 DEGREES
RBC # BLD AUTO: 2.77 X10*6/UL (ref 4–5.2)
RBC # UR STRIP.AUTO: NEGATIVE /UL
SODIUM SERPL-SCNC: 136 MMOL/L (ref 136–145)
SP GR UR STRIP.AUTO: 1
T AXIS: 2 DEGREES
T OFFSET: 436 MS
UNIT ABO: NORMAL
UNIT ABO: NORMAL
UNIT NUMBER: NORMAL
UNIT NUMBER: NORMAL
UNIT RH: NORMAL
UNIT RH: NORMAL
UNIT VOLUME: 350
UNIT VOLUME: 350
UROBILINOGEN UR STRIP.AUTO-MCNC: <2 MG/DL
VENTRICULAR RATE: 72 BPM
WBC # BLD AUTO: 5.3 X10*3/UL (ref 4.4–11.3)
XM INTEP: NORMAL
XM INTEP: NORMAL

## 2024-02-12 PROCEDURE — 81003 URINALYSIS AUTO W/O SCOPE: CPT | Performed by: PHYSICIAN ASSISTANT

## 2024-02-12 PROCEDURE — 2500000001 HC RX 250 WO HCPCS SELF ADMINISTERED DRUGS (ALT 637 FOR MEDICARE OP)

## 2024-02-12 PROCEDURE — 99233 SBSQ HOSP IP/OBS HIGH 50: CPT

## 2024-02-12 PROCEDURE — 2500000001 HC RX 250 WO HCPCS SELF ADMINISTERED DRUGS (ALT 637 FOR MEDICARE OP): Performed by: NURSE PRACTITIONER

## 2024-02-12 PROCEDURE — 80048 BASIC METABOLIC PNL TOTAL CA: CPT | Performed by: NURSE PRACTITIONER

## 2024-02-12 PROCEDURE — 2500000004 HC RX 250 GENERAL PHARMACY W/ HCPCS (ALT 636 FOR OP/ED): Performed by: NURSE PRACTITIONER

## 2024-02-12 PROCEDURE — 85027 COMPLETE CBC AUTOMATED: CPT | Performed by: NURSE PRACTITIONER

## 2024-02-12 PROCEDURE — G0378 HOSPITAL OBSERVATION PER HR: HCPCS

## 2024-02-12 PROCEDURE — 2500000002 HC RX 250 W HCPCS SELF ADMINISTERED DRUGS (ALT 637 FOR MEDICARE OP, ALT 636 FOR OP/ED): Performed by: NURSE PRACTITIONER

## 2024-02-12 PROCEDURE — 99223 1ST HOSP IP/OBS HIGH 75: CPT | Performed by: SURGERY

## 2024-02-12 PROCEDURE — 87086 URINE CULTURE/COLONY COUNT: CPT | Mod: GENLAB | Performed by: PHYSICIAN ASSISTANT

## 2024-02-12 PROCEDURE — 2500000001 HC RX 250 WO HCPCS SELF ADMINISTERED DRUGS (ALT 637 FOR MEDICARE OP): Performed by: SURGERY

## 2024-02-12 PROCEDURE — 82947 ASSAY GLUCOSE BLOOD QUANT: CPT | Mod: 59

## 2024-02-12 PROCEDURE — 37799 UNLISTED PX VASCULAR SURGERY: CPT | Performed by: NURSE PRACTITIONER

## 2024-02-12 RX ORDER — SUCRALFATE 1 G/1
1 TABLET ORAL
Status: DISCONTINUED | OUTPATIENT
Start: 2024-02-12 | End: 2024-02-13 | Stop reason: HOSPADM

## 2024-02-12 RX ORDER — DOCUSATE SODIUM 100 MG/1
100 CAPSULE, LIQUID FILLED ORAL 2 TIMES DAILY
Status: DISCONTINUED | OUTPATIENT
Start: 2024-02-12 | End: 2024-02-13 | Stop reason: HOSPADM

## 2024-02-12 RX ADMIN — Medication 3 MG: at 21:33

## 2024-02-12 RX ADMIN — GABAPENTIN 100 MG: 100 CAPSULE ORAL at 16:08

## 2024-02-12 RX ADMIN — ROSUVASTATIN CALCIUM 40 MG: 10 TABLET, FILM COATED ORAL at 08:33

## 2024-02-12 RX ADMIN — INSULIN LISPRO 1 UNITS: 100 INJECTION, SOLUTION INTRAVENOUS; SUBCUTANEOUS at 12:12

## 2024-02-12 RX ADMIN — SODIUM CHLORIDE 100 ML/HR: 9 INJECTION, SOLUTION INTRAVENOUS at 18:59

## 2024-02-12 RX ADMIN — Medication 1000 UNITS: at 08:32

## 2024-02-12 RX ADMIN — FAMOTIDINE 20 MG: 20 TABLET, FILM COATED ORAL at 21:36

## 2024-02-12 RX ADMIN — SUCRALFATE 1 G: 1 TABLET ORAL at 16:08

## 2024-02-12 RX ADMIN — PANTOPRAZOLE SODIUM 40 MG: 40 TABLET, DELAYED RELEASE ORAL at 06:12

## 2024-02-12 RX ADMIN — FENOFIBRATE 160 MG: 160 TABLET ORAL at 21:34

## 2024-02-12 RX ADMIN — LEVOTHYROXINE SODIUM 88 MCG: 88 TABLET ORAL at 06:12

## 2024-02-12 RX ADMIN — LOSARTAN POTASSIUM 50 MG: 50 TABLET, FILM COATED ORAL at 08:33

## 2024-02-12 RX ADMIN — DOCUSATE SODIUM 100 MG: 100 CAPSULE, LIQUID FILLED ORAL at 21:35

## 2024-02-12 RX ADMIN — ACETAMINOPHEN 650 MG: 325 TABLET ORAL at 16:12

## 2024-02-12 RX ADMIN — Medication 2 TABLET: at 21:34

## 2024-02-12 RX ADMIN — SODIUM CHLORIDE 100 ML/HR: 9 INJECTION, SOLUTION INTRAVENOUS at 08:29

## 2024-02-12 RX ADMIN — DULOXETINE HYDROCHLORIDE 60 MG: 30 CAPSULE, DELAYED RELEASE ORAL at 21:33

## 2024-02-12 RX ADMIN — FAMOTIDINE 20 MG: 20 TABLET, FILM COATED ORAL at 08:32

## 2024-02-12 RX ADMIN — SUCRALFATE 1 G: 1 TABLET ORAL at 21:34

## 2024-02-12 RX ADMIN — GABAPENTIN 100 MG: 100 CAPSULE ORAL at 08:32

## 2024-02-12 ASSESSMENT — PAIN SCALES - GENERAL
PAINLEVEL_OUTOF10: 0 - NO PAIN
PAINLEVEL_OUTOF10: 0 - NO PAIN
PAINLEVEL_OUTOF10: 5 - MODERATE PAIN
PAINLEVEL_OUTOF10: 0 - NO PAIN
PAINLEVEL_OUTOF10: 0 - NO PAIN
PAINLEVEL_OUTOF10: 3

## 2024-02-12 ASSESSMENT — ENCOUNTER SYMPTOMS
FATIGUE: 1
SYNCOPE: 1

## 2024-02-12 ASSESSMENT — COGNITIVE AND FUNCTIONAL STATUS - GENERAL
TOILETING: A LITTLE
MOBILITY SCORE: 22
CLIMB 3 TO 5 STEPS WITH RAILING: A LITTLE
HELP NEEDED FOR BATHING: A LITTLE
PERSONAL GROOMING: A LITTLE
DRESSING REGULAR UPPER BODY CLOTHING: A LITTLE
DRESSING REGULAR LOWER BODY CLOTHING: A LITTLE
DAILY ACTIVITIY SCORE: 19
WALKING IN HOSPITAL ROOM: A LITTLE

## 2024-02-12 ASSESSMENT — PAIN DESCRIPTION - ORIENTATION: ORIENTATION: UPPER

## 2024-02-12 ASSESSMENT — PAIN - FUNCTIONAL ASSESSMENT
PAIN_FUNCTIONAL_ASSESSMENT: 0-10

## 2024-02-12 ASSESSMENT — PAIN DESCRIPTION - LOCATION: LOCATION: ABDOMEN

## 2024-02-12 ASSESSMENT — ACTIVITIES OF DAILY LIVING (ADL): LACK_OF_TRANSPORTATION: NO

## 2024-02-12 NOTE — PROGRESS NOTES
Patient is day 1 OBS for anemia.  ADOD 1 day.     Met with patient at bedside to discuss discharge planning. Patient is A&OX3 from home with her spouse, daughter, and son in law.  They reside in a two story house with 2 steps to enter and 13 steps to the second floor with railings.  Prior to admission patient was independent in all ADL's and IADL's,  uses no DME, and still drives.   Confirmed PCP is Dr. Juju Kenney.  Patient is actively receiving chemo in [place], infusions every Tuesday for 3 weeks and then takes a week off.   Patient feels they have all the help they need at home and are currently denying the need for additional services and/or resources on discharge.     Home no needs  DC plan secure

## 2024-02-12 NOTE — DISCHARGE INSTR - OTHER ORDERS
Thank you for choosing [facility] for your Health Care needs.  As you transition from the hospital back to home, we hope we took your preferences into account on how you manage your health needs so you can manage your health at home.  You may receive a survey in the mail within a couple weeks.  Please take the time to complete it and return it.  Your input is ALWAYS important to us.  Thank you!  Jayelne Kelley Debbie, & Fernando  Your Care Transition team, [phone]

## 2024-02-12 NOTE — NURSING NOTE
Patient resting in bed at this time without complaints.  Patient ambulates to the bathroom with stand by assist.  Patient was instructed to call for help prior to getting out of bed; bed alarm is activated,

## 2024-02-12 NOTE — PROGRESS NOTES
Catalina Mendoza is a 73 y.o. female on day 0 of admission presenting with Anemia due to blood loss.      Subjective   Pt assessed at bedside. Feels much better today. Tolerated blood transfusion with no issues.       Objective     Last Recorded Vitals  /58 (BP Location: Right arm, Patient Position: Lying)   Pulse 75   Temp 36.4 °C (97.5 °F) (Temporal)   Resp 16   Wt 69.3 kg (152 lb 12.5 oz)   SpO2 97%   Intake/Output last 3 Shifts:    Intake/Output Summary (Last 24 hours) at 2/12/2024 1034  Last data filed at 2/11/2024 2215  Gross per 24 hour   Intake 468.75 ml   Output --   Net 468.75 ml       Admission Weight  Weight: 68.9 kg (151 lb 14.4 oz) (02/11/24 1339)    Daily Weight  02/11/24 : 69.3 kg (152 lb 12.5 oz)    Image Results  ECG 12 lead  Normal sinus rhythm  Left axis deviation  Right bundle branch block  Minimal voltage criteria for LVH, may be normal variant  Abnormal ECG  No previous ECGs available      Physical Exam  HENT:      Head: Normocephalic.      Nose: Nose normal.      Mouth/Throat:      Pharynx: Oropharynx is clear.   Eyes:      Conjunctiva/sclera: Conjunctivae normal.   Cardiovascular:      Rate and Rhythm: Normal rate and regular rhythm.   Pulmonary:      Effort: Pulmonary effort is normal.      Breath sounds: Normal breath sounds.   Abdominal:      General: Bowel sounds are normal.      Palpations: Abdomen is soft.   Musculoskeletal:         General: Normal range of motion.      Cervical back: Normal range of motion and neck supple.   Skin:     General: Skin is warm and dry.      Coloration: Skin is pale.   Neurological:      General: No focal deficit present.      Mental Status: She is alert and oriented to person, place, and time.   Psychiatric:         Mood and Affect: Mood normal.         Behavior: Behavior normal.         Relevant Results  Scheduled medications  calcium carbonate-vitamin D3, 2 tablet, oral, Nightly  cholecalciferol, 1,000 Units, oral, Daily  docusate sodium,  100 mg, oral, BID  DULoxetine, 60 mg, oral, Nightly  famotidine, 20 mg, oral, BID   Or  famotidine, 20 mg, intravenous, BID  fenofibrate, 160 mg, oral, Nightly  gabapentin, 100 mg, oral, TID  insulin glargine, 30 Units, subcutaneous, q24h  insulin lispro, 0-5 Units, subcutaneous, Before meals & nightly  levothyroxine, 88 mcg, oral, Daily before breakfast  losartan, 50 mg, oral, Daily  [Held by provider] metFORMIN XR, 500 mg, oral, BID  pantoprazole, 40 mg, oral, Daily before breakfast   Or  pantoprazole, 40 mg, intravenous, Daily before breakfast  rosuvastatin, 40 mg, oral, Daily      Continuous medications  sodium chloride 0.9%, 100 mL/hr, Last Rate: 100 mL/hr (02/12/24 0829)      PRN medications  PRN medications: acetaminophen **OR** [DISCONTINUED] acetaminophen **OR** acetaminophen, [DISCONTINUED] acetaminophen **OR** [DISCONTINUED] acetaminophen **OR** acetaminophen, dextrose 10 % in water (D10W), dextrose, glucagon, melatonin, ondansetron **OR** ondansetron    Results for orders placed or performed during the hospital encounter of 02/11/24 (from the past 24 hour(s))   CBC and Auto Differential   Result Value Ref Range    WBC 3.5 (L) 4.4 - 11.3 x10*3/uL    nRBC 0.0 0.0 - 0.0 /100 WBCs    RBC 1.92 (L) 4.00 - 5.20 x10*6/uL    Hemoglobin 6.0 (LL) 12.0 - 16.0 g/dL    Hematocrit 18.3 (L) 36.0 - 46.0 %    MCV 95 80 - 100 fL    MCH 31.3 26.0 - 34.0 pg    MCHC 32.8 32.0 - 36.0 g/dL    RDW 17.3 (H) 11.5 - 14.5 %    Platelets 48 (L) 150 - 450 x10*3/uL    Immature Granulocytes %, Automated 0.9 0.0 - 0.9 %    Immature Granulocytes Absolute, Automated 0.03 0.00 - 0.50 x10*3/uL   Comprehensive metabolic panel   Result Value Ref Range    Glucose 205 (H) 74 - 99 mg/dL    Sodium 132 (L) 136 - 145 mmol/L    Potassium 4.2 3.5 - 5.3 mmol/L    Chloride 103 98 - 107 mmol/L    Bicarbonate 21 21 - 32 mmol/L    Anion Gap 12 10 - 20 mmol/L    Urea Nitrogen 22 6 - 23 mg/dL    Creatinine 0.90 0.50 - 1.05 mg/dL    eGFR 67 >60 mL/min/1.73m*2     Calcium 8.9 8.6 - 10.3 mg/dL    Albumin 3.9 3.4 - 5.0 g/dL    Alkaline Phosphatase 56 33 - 136 U/L    Total Protein 6.6 6.4 - 8.2 g/dL    AST 16 9 - 39 U/L    Bilirubin, Total 0.6 0.0 - 1.2 mg/dL    ALT 10 7 - 45 U/L   Magnesium   Result Value Ref Range    Magnesium 1.68 1.60 - 2.40 mg/dL   aPTT   Result Value Ref Range    aPTT 24 (L) 27 - 38 seconds   Protime-INR   Result Value Ref Range    Protime 13.0 (H) 9.8 - 12.8 seconds    INR 1.2 (H) 0.9 - 1.1   Type and Screen   Result Value Ref Range    ABO TYPE O     Rh TYPE POS     ANTIBODY SCREEN NEG    Manual Differential   Result Value Ref Range    Neutrophils %, Manual 51.0 40.0 - 80.0 %    Bands %, Manual 7.0 0.0 - 5.0 %    Lymphocytes %, Manual 33.0 13.0 - 44.0 %    Monocytes %, Manual 5.0 2.0 - 10.0 %    Eosinophils %, Manual 4.0 0.0 - 6.0 %    Basophils %, Manual 0.0 0.0 - 2.0 %    Seg Neutrophils Absolute, Manual 1.79 1.60 - 5.00 x10*3/uL    Bands Absolute, Manual 0.25 0.00 - 0.50 x10*3/uL    Lymphocytes Absolute, Manual 1.16 0.80 - 3.00 x10*3/uL    Monocytes Absolute, Manual 0.18 0.05 - 0.80 x10*3/uL    Eosinophils Absolute, Manual 0.14 0.00 - 0.40 x10*3/uL    Basophils Absolute, Manual 0.00 0.00 - 0.10 x10*3/uL    Total Cells Counted 100     Neutrophils Absolute, Manual 2.04 1.60 - 5.50 x10*3/uL    RBC Morphology See Below     Hypochromia Mild     RBC Fragments Few     Teardrop Cells Few     Dohle Bodies Present    Sars-CoV-2 and Influenza A/B PCR   Result Value Ref Range    Flu A Result Not Detected Not Detected    Flu B Result Not Detected Not Detected    Coronavirus 2019, PCR Not Detected Not Detected   RSV PCR   Result Value Ref Range    RSV PCR Not Detected Not Detected   Prepare RBC: 2 Units   Result Value Ref Range    PRODUCT CODE E3725Y34     Unit Number [id number]     Unit ABO O     Unit RH POS     XM INTEP COMP     Dispense Status IS     Blood Expiration Date March 04, 2024 23:59 EST     PRODUCT BLOOD TYPE 5100     UNIT VOLUME 350     PRODUCT  CODE C6838R76     Unit Number [id number]     Unit ABO O     Unit RH POS     XM INTEP COMP     Dispense Status TR     Blood Expiration Date March 08, 2024 23:59 EST     PRODUCT BLOOD TYPE 5100     UNIT VOLUME 350    Occult Blood, Stool    Specimen: Stool   Result Value Ref Range    Occult Blood, Stool X1 Positive (A) Negative   ECG 12 lead   Result Value Ref Range    Ventricular Rate 72 BPM    Atrial Rate 72 BPM    CO Interval 170 ms    QRS Duration 134 ms    QT Interval 448 ms    QTC Calculation(Bazett) 490 ms    P Axis 52 degrees    R Axis -41 degrees    T Axis 2 degrees    QRS Count 12 beats    Q Onset 212 ms    P Onset 127 ms    P Offset 190 ms    T Offset 436 ms    QTC Fredericia 476 ms   POCT GLUCOSE   Result Value Ref Range    POCT Glucose 154 (H) 74 - 99 mg/dL   CBC   Result Value Ref Range    WBC 5.3 4.4 - 11.3 x10*3/uL    nRBC 0.0 0.0 - 0.0 /100 WBCs    RBC 2.77 (L) 4.00 - 5.20 x10*6/uL    Hemoglobin 8.5 (L) 12.0 - 16.0 g/dL    Hematocrit 25.2 (L) 36.0 - 46.0 %    MCV 91 80 - 100 fL    MCH 30.7 26.0 - 34.0 pg    MCHC 33.7 32.0 - 36.0 g/dL    RDW 16.7 (H) 11.5 - 14.5 %    Platelets 41 (L) 150 - 450 x10*3/uL   Basic metabolic panel   Result Value Ref Range    Glucose 79 74 - 99 mg/dL    Sodium 136 136 - 145 mmol/L    Potassium 3.4 (L) 3.5 - 5.3 mmol/L    Chloride 108 (H) 98 - 107 mmol/L    Bicarbonate 25 21 - 32 mmol/L    Anion Gap <7 (L) 10 - 20 mmol/L    Urea Nitrogen 16 6 - 23 mg/dL    Creatinine 0.89 0.50 - 1.05 mg/dL    eGFR 68 >60 mL/min/1.73m*2    Calcium 8.9 8.6 - 10.3 mg/dL   POCT GLUCOSE   Result Value Ref Range    POCT Glucose 65 (L) 74 - 99 mg/dL          Assessment/Plan        Principal Problem:    Anemia due to blood loss    #Anemia due to blood loss  #Metastatic ovarian cancer undergoing chemotherapy  -Last chemo treatment last Tuesday 2/6/24  -H/H on admission 6.0/18.3  -Received 2 units PRBC   -H/H today 8.5/25.2  -Occult stool positive  -Reached out to Oncologist, Dr. Ca; he would  like her to have a colonoscopy  -Dr. Duckworth consulted   -Monitor CBC   -Abd xray: There is not an overwhelming amount of radiodense colonic stool nor  any dilated gas-filled or stool-filled colon. Rectum full of stool. No dilated gas-filled bowel    #Thrombocytopenia  #Pancytopenia   -Plt 41  -RBC 2.77  -WBC 5.3  -Monitor     #DM Type II  -Hgb A1C 8.2  -Continue gabapentin, lantus  -SSI with hypoglycemia protocol  -Monitor BG     #Essential HTN  #HLD  -Continue fenofibrate, losartan, rosuvastatin  -Monitor BP and HR    #Hypothyroidism  -Continue levothyroxine    #Depression  -Continue duloxetine    DVT ppx  -not a candidate due to anemia    F: PRN  E: Replete per protocol  N: Clear liquid   A: Port    Disposition: Pt requires less than 2 inpatient days at this time  Code Status: Full Code    Total accumulated time spent face to face and not face to face preparing to see the patient, obtaining and reviewing separately obtained history; performing a medically appropriate examination and/or evaluation; counseling and educating the patient, family; ordering medications, tests, or procedures; referring and communicating with other health care professionals; documenting clinical information in the patient's medical record; independently interpreting results and communicating the results to the patient, family; and care coordination was 45 minutes.           MARIMAR Weston-CNP

## 2024-02-12 NOTE — CONSULTS
Weakness, Gen  Associated symptoms include fatigue.   Syncope      This a consult requested for patient who was admitted with anemia.  Patient has a history of metastatic ovarian carcinoma which she is receiving chemotherapy.  This is a 73-year-old female whose history dates back to 2022 when she was noted to have evidence of metastatic ovarian carcinoma.  She underwent neoadjuvant treatment with CarboTaxol and then underwent a bilateral salpingo-oophorectomy extensive lysis of adhesions infracolic omentectomy and resection to R0 on 2023.  By her account she done well after this and developed recurrent disease.  She is currently receiving adjuvant chemotherapy.  She was well until recently when her  noted that she been increasingly weak and was brought to the emergency room.  In the emergency room a CT scan confirmed improvement in her overall disease with favorable response to chemotherapy.  She was noted to have a hemoglobin of 6.0 and hematocrit of 18.3 mean cell volume was 94.  She has been taking Motrin recently for back pain.  She denied any blood in the stool she denied any epigastric pain.  She denied any melena.  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, oral cavity, and pharynx     History of malignant neoplasm of lip    Personal history of other complications of pregnancy, childbirth and the puerperium     History of spontaneous     Personal history of other malignant neoplasm of skin     History of malignant neoplasm of skin          Current Facility-Administered Medications:     acetaminophen (Tylenol) tablet 650 mg, 650 mg, oral, q4h PRN **OR** [DISCONTINUED] acetaminophen (Tylenol) oral liquid 650 mg,  650 mg, nasogastric tube, q4h PRN **OR** acetaminophen (Tylenol) suppository 650 mg, 650 mg, rectal, q4h PRN, OSCAR Bryan    [DISCONTINUED] acetaminophen (Tylenol) tablet 650 mg, 650 mg, oral, q4h PRN **OR** [DISCONTINUED] acetaminophen (Tylenol) oral liquid 650 mg, 650 mg, oral, q4h PRN **OR** acetaminophen (Tylenol) suppository 650 mg, 650 mg, rectal, q4h PRN, OSCAR Bryan    calcium carbonate-vitamin D3 500 mg-5 mcg (200 unit) per tablet 2 tablet, 2 tablet, oral, Nightly, OSCAR De Santiago, 2 tablet at 02/11/24 2138    cholecalciferol (Vitamin D-3) tablet 1,000 Units, 1,000 Units, oral, Daily, OSCAR De Santiago, 1,000 Units at 02/12/24 0832    dextrose 10 % in water (D10W) infusion, 0.3 g/kg/hr, intravenous, Once PRN, OSCAR De Santiago    dextrose 50 % injection 25 g, 25 g, intravenous, q15 min PRN, OSCAR De Santiago    docusate sodium (Colace) capsule 100 mg, 100 mg, oral, BID, OSCAR Weston    DULoxetine (Cymbalta) DR capsule 60 mg, 60 mg, oral, Nightly, OSCAR De Santiago, 60 mg at 02/11/24 2139    famotidine (Pepcid) tablet 20 mg, 20 mg, oral, BID, 20 mg at 02/12/24 0832 **OR** famotidine PF (Pepcid) injection 20 mg, 20 mg, intravenous, BID, OSCAR De Santiago    fenofibrate (Triglide) tablet 160 mg, 160 mg, oral, Nightly, OSCAR De Santiago, 160 mg at 02/11/24 2139    gabapentin (Neurontin) capsule 100 mg, 100 mg, oral, TID, OSCAR De Santiago, 100 mg at 02/12/24 0832    glucagon (Glucagen) injection 1 mg, 1 mg, intramuscular, q15 min PRN, OSCAR De Santiago    insulin glargine (Lantus) injection 30 Units, 30 Units, subcutaneous, q24h, OSCAR De Santiago, 30 Units at 02/11/24 2146    insulin lispro (HumaLOG) injection 0-5 Units, 0-5 Units, subcutaneous, Before meals & nightly, OSCAR De Santiago, 1 Units at 02/11/24 2146    levothyroxine (Synthroid, Levoxyl) tablet 88 mcg, 88 mcg, oral, Daily before  breakfast, OSCAR De Santiago, 88 mcg at 02/12/24 0612    losartan (Cozaar) tablet 50 mg, 50 mg, oral, Daily, OSCAR De Santiago, 50 mg at 02/12/24 0833    melatonin tablet 3 mg, 3 mg, oral, Nightly PRN, OSCAR De Santiago    [Held by provider] metFORMIN XR (Glucophage-XR) 24 hr tablet 500 mg, 500 mg, oral, BID, OSCAR De Santiago    ondansetron (Zofran) tablet 4 mg, 4 mg, oral, q8h PRN **OR** ondansetron (Zofran) injection 4 mg, 4 mg, intravenous, q8h PRN, OSCAR Bryan    pantoprazole (ProtoNix) EC tablet 40 mg, 40 mg, oral, Daily before breakfast, 40 mg at 02/12/24 0612 **OR** pantoprazole (ProtoNix) injection 40 mg, 40 mg, intravenous, Daily before breakfast, OSCAR Bryan    rosuvastatin (Crestor) tablet 40 mg, 40 mg, oral, Daily, OSCAR De Santiago, 40 mg at 02/12/24 0833    sodium chloride 0.9% infusion, 100 mL/hr, intravenous, Continuous, OSCAR Bryan, Last Rate: 100 mL/hr at 02/12/24 0829, 100 mL/hr at 02/12/24 0829     Allergies   Allergen Reactions    Amoxicillin Unknown    Flu Vac 2020 [id number](Pf) Other     Dizziness, Double vision    Grass Pollen Unknown    House Dust Unknown    Mold Unknown    Other Dizziness     Dizziness, Double vision    Penicillins Other     unsure    Tree And Shrub Pollen Unknown        Review of Systems  Review of Systems   Constitutional:  Positive for fatigue.   Cardiovascular:  Positive for syncope.       Objective     Vital signs for last 24 hours:  Temp:  [36.3 °C (97.3 °F)-37.1 °C (98.8 °F)] 36.4 °C (97.5 °F)  Heart Rate:  [71-80] 75  Resp:  [16-18] 16  BP: ()/(45-59) 119/58    Intake/Output this shift:  I/O this shift:  In: 1780 [P.O.:480; I.V.:1000; Blood:300]  Out: -     Physical Exam  Physical Exam  Vitals reviewed. Exam conducted with a chaperone present.   Constitutional:       Appearance: She is underweight.   HENT:      Head: Normocephalic.      Nose: Nose normal.      Mouth/Throat:       Pharynx: Oropharynx is clear.   Cardiovascular:      Rate and Rhythm: Normal rate and regular rhythm.      Heart sounds: Normal heart sounds.   Pulmonary:      Effort: Pulmonary effort is normal.      Breath sounds: Normal breath sounds.   Abdominal:      General: Abdomen is flat.      Palpations: Abdomen is soft. There is no mass.      Tenderness: There is no abdominal tenderness. There is no guarding.      Hernia: No hernia is present.      Comments: Midline incision abdomen.  Stool heme positive   Musculoskeletal:         General: Normal range of motion.      Cervical back: Normal range of motion.   Skin:     General: Skin is warm.   Neurological:      General: No focal deficit present.   Psychiatric:         Mood and Affect: Mood normal.         Labs & Radiology      Labs Reviewed   OCCULT BLOOD X1, STOOL - Abnormal       Result Value    Occult Blood, Stool X1 Positive (*)    CBC WITH AUTO DIFFERENTIAL - Abnormal    WBC 3.5 (*)     nRBC 0.0      RBC 1.92 (*)     Hemoglobin 6.0 (*)     Hematocrit 18.3 (*)     MCV 95      MCH 31.3      MCHC 32.8      RDW 17.3 (*)     Platelets 48 (*)     Immature Granulocytes %, Automated 0.9      Immature Granulocytes Absolute, Automated 0.03      Narrative:     The previously reported component Neutrophils % is no longer being reported.  The previously reported component Lymphocytes % is no longer being reported.  The previously reported component Monocytes % is no longer being reported.  The previously                   reported component Eosinophils % is no longer being reported.  The previously reported component Basophils % is no longer being reported.  The previously reported component Absolute Neutrophils is no longer being reported.  The previously reported                   component Absolute Lymphocytes is no longer being reported.  The previously reported component Absolute Monocytes is no longer being reported.  The previously reported component Absolute Eosinophils  is no longer being reported.  The previously reported                   component Absolute Basophils is no longer being reported.   COMPREHENSIVE METABOLIC PANEL - Abnormal    Glucose 205 (*)     Sodium 132 (*)     Potassium 4.2      Chloride 103      Bicarbonate 21      Anion Gap 12      Urea Nitrogen 22      Creatinine 0.90      eGFR 67      Calcium 8.9      Albumin 3.9      Alkaline Phosphatase 56      Total Protein 6.6      AST 16      Bilirubin, Total 0.6      ALT 10     APTT - Abnormal    aPTT 24 (*)     Narrative:     The APTT is no longer used for monitoring Unfractionated Heparin Therapy. For monitoring Heparin Therapy, use the Heparin Assay.   PROTIME-INR - Abnormal    Protime 13.0 (*)     INR 1.2 (*)    CBC - Abnormal    WBC 5.3      nRBC 0.0      RBC 2.77 (*)     Hemoglobin 8.5 (*)     Hematocrit 25.2 (*)     MCV 91      MCH 30.7      MCHC 33.7      RDW 16.7 (*)     Platelets 41 (*)    BASIC METABOLIC PANEL - Abnormal    Glucose 79      Sodium 136      Potassium 3.4 (*)     Chloride 108 (*)     Bicarbonate 25      Anion Gap <7 (*)     Urea Nitrogen 16      Creatinine 0.89      eGFR 68      Calcium 8.9     POCT GLUCOSE - Abnormal    POCT Glucose 154 (*)    POCT GLUCOSE - Abnormal    POCT Glucose 65 (*)    POCT GLUCOSE - Abnormal    POCT Glucose 183 (*)    MAGNESIUM - Normal    Magnesium 1.68     SARS-COV-2 AND INFLUENZA A/B PCR - Normal    Flu A Result Not Detected      Flu B Result Not Detected      Coronavirus 2019, PCR Not Detected      Narrative:     This assay has received FDA Emergency Use Authorization (EUA) and  is only authorized for the duration of time that circumstances exist to justify the authorization of the emergency use of in vitro diagnostic tests for the detection of SARS-CoV-2 virus and/or diagnosis of COVID-19 infection under section 564(b)(1) of the Act, 21 U.S.C. 360bbb-3(b)(1). Testing for SARS-CoV-2 is only recommended for patients who meet current clinical and/or epidemiological  criteria as defined by federal, state, or local public health directives. This assay is an in vitro diagnostic nucleic acid amplification test for the qualitative detection of SARS-CoV-2, Influenza A, and Influenza B from nasopharyngeal specimens and has been validated for use at [facility]. Negative results do not preclude COVID-19 infections or Influenza A/B infections, and should not be used as the sole basis for diagnosis, treatment, or other management decisions. If Influenza A/B and RSV PCR results are negative, testing for Parainfluenza virus, Adenovirus and Metapneumovirus is routinely performed for [facility] pediatric oncology and intensive care inpatients, and is available on other patients by placing an add-on request.    RSV PCR - Normal    RSV PCR Not Detected      Narrative:     This assay is an FDA-cleared, in vitro diagnostic nucleic acid amplification test for the detection of RSV from nasopharyngeal specimens, and has been validated for use at [facility]. Negative results do not preclude RSV infections, and should not be used as the sole basis for diagnosis, treatment, or other management decisions. If Influenza A/B and RSV PCR results are negative, testing for Parainfluenza virus, Adenovirus and Metapneumovirus is routinely performed for pediatric oncology and intensive care inpatients at [facility], and is available on other patients by placing an add-on request.                                       URINE CULTURE   TYPE AND SCREEN    ABO TYPE O      Rh TYPE POS      ANTIBODY SCREEN NEG     URINALYSIS WITH REFLEX MICROSCOPIC   POCT GLUCOSE   POCT GLUCOSE   POCT GLUCOSE   PREPARE RBC    PRODUCT CODE [id number]      Unit Number [id number]      Unit ABO O      Unit RH POS      XM INTEP COMP      Dispense Status IS      Blood Expiration Date March 04, 2024 23:59 EST      PRODUCT BLOOD TYPE 5100      UNIT VOLUME 350      PRODUCT CODE [id number]      Unit Number  [id number]      Unit ABO O      Unit RH POS      XM INTEP COMP      Dispense Status TR      Blood Expiration Date March 08, 2024 23:59 EST      PRODUCT BLOOD TYPE 5100      UNIT VOLUME 350     MANUAL DIFFERENTIAL    Neutrophils %, Manual 51.0      Bands %, Manual 7.0      Lymphocytes %, Manual 33.0      Monocytes %, Manual 5.0      Eosinophils %, Manual 4.0      Basophils %, Manual 0.0      Seg Neutrophils Absolute, Manual 1.79      Bands Absolute, Manual 0.25      Lymphocytes Absolute, Manual 1.16      Monocytes Absolute, Manual 0.18      Eosinophils Absolute, Manual 0.14      Basophils Absolute, Manual 0.00      Total Cells Counted 100      Neutrophils Absolute, Manual 2.04      RBC Morphology See Below      Hypochromia Mild      RBC Fragments Few      Teardrop Cells Few      Dohle Bodies Present     PATH REVIEW-CBC DIFFERENTIAL     CT scan - 2.3.23   IMPRESSION:  CHEST:  1.  No suspicious pulmonary nodule or focal consolidation.  2.  Interval reduction in size of multiple mediastinal lymph nodes,  since 12/23/2022.     ABDOMEN-PELVIS:  1.  Interval decrease 9.8 x 6.6 cm left adnexal mass, previously 11.7  x 9.3 cm on 12/23/2022. Additionally, the previously described right  adnexal lesion has also decreased in size.  2. Interval improvement peritoneal carcinomatosis, abdominopelvic  adenopathy and abdominopelvic ascites compared with the low-dose CT  from 12/19/2022, as described above.  3. Diffuse hepatic steatosis. No focal hepatic lesion.  4. 4 mm right interpolar region nonobstructing calculus.         Impression  Normochromic normocytic anemia.  Possibly related peptic ulcer disease last colonoscopy 2018 in [place].  Heme positive stool.  Plan   Plan-continue IV proton pump inhibitor.  Consider EGD to rule out peptic ulcer disease.  We also discussed colonoscopy.  She did have one in 2018.  Patient elected to think about the recommendations.  I contacted her oncologist.  I will start Carafate in  the interim.

## 2024-02-12 NOTE — CARE PLAN
The clinical goals for the shift include Hgb will be >7 thru DC  Pt. Had mild upper abd pain rated at 5/10--got tylenol for it. Lungs clear, resp restful. Hgb was 8.5. today. States she feels better today and not as weak--up to toilet with just standby--gait is steady. Dr Duckworth saw pt and will do EGD in the morning--pt aware she will be NPO after MN. Pt was started on Carafate today. Tele shows SR. No dizzyness. Cheeks are pink today.

## 2024-02-12 NOTE — H&P
History and Physical         Catalina Mendoza 73 y.o. 1950     History Of Present Illness  Catalina Mendoza is a 73 y.o. female presented to  [facility] ED from home.  Symptomatic anemia pt w/ hx of metastatic ovarian ca , here with syncopal episode hemoglobin of 7.7 last week at [facility] decided to hold off on transfusion progressive lightheaded dizziness hemoglobin 6, ordered for 2 units transfusion.  In ED CT  of Head No acute intracranial pathology Xray  Abdomen here is not an overwhelming amount of radiodense colonic stool nor any dilated gas-filled or stool-filled colon   Rectum full of stool   No dilated gas-filled bowel.  EKG done at 120 shows sinus rhythm rate of 72 left axis deviation  and Right bundle branch block, LVH . On Exam patient  resting in bed.  Appears pale.  Increase  fatigue noted. Patient drifted  to sleep during exam. No acute distress noted       Past Medical History  Past Medical History:   Diagnosis Date    Encounter for immunization 10/03/2016    Flu vaccine need    Other injury of unspecified body region, initial encounter     Broken bones    Personal history of malignant neoplasm of unspecified site of lip, oral cavity, and pharynx     History of malignant neoplasm of lip    Personal history of other complications of pregnancy, childbirth and the puerperium     History of spontaneous     Personal history of other malignant neoplasm of skin     History of malignant neoplasm of skin        Surgical History  She has a past surgical history that includes Hysterectomy (2015); Appendectomy (2015); Other surgical history (2015); Eye surgery (2016); Cholecystectomy (2016); and US guided abdominal paracentesis (12/15/2022).     Social History  Social History     Socioeconomic History    Marital status:      Spouse name: Not on file    Number of children: Not on file    Years of education: Not on file     Highest education level: Not on file   Occupational History    Not on file   Tobacco Use    Smoking status: Never     Passive exposure: Never    Smokeless tobacco: Never   Vaping Use    Vaping Use: Never used   Substance and Sexual Activity    Alcohol use: Never    Drug use: Never    Sexual activity: Not on file   Other Topics Concern    Not on file   Social History Narrative    Not on file     Social Determinants of Health     Financial Resource Strain: Low Risk  (2/11/2024)    Overall Financial Resource Strain (CARDIA)     Difficulty of Paying Living Expenses: Not hard at all   Food Insecurity: Not on file   Transportation Needs: No Transportation Needs (2/11/2024)    PRAPARE - Transportation     Lack of Transportation (Medical): No     Lack of Transportation (Non-Medical): No   Physical Activity: Not on file   Stress: No Stress Concern Present (2/1/2024)    [employer] [place] [employer] - Occupational Stress Questionnaire     Feeling of Stress : Only a little   Social Connections: Not on file   Intimate Partner Violence: Not on file   Housing Stability: Low Risk  (2/11/2024)    Housing Stability Vital Sign     Unable to Pay for Housing in the Last Year: No     Number of Places Lived in the Last Year: 1     Unstable Housing in the Last Year: No        Family History  Family History   Problem Relation Name Age of Onset    Arthritis Mother      Diabetes Mother      Hyperlipidemia Mother      Other (Cardiac disorder) Father      Diabetes Father      Heart disease Father      Diabetes Sister      Hepatitis Sister          C, chronic    Other (Chronic liver failure without hepatic coma) Sister      Diabetes Brother      Heart disease Brother      Hyperlipidemia Brother      Diabetes Other Sibling     Other (Heart surgery) Other Sibling         Allergies  Allergies   Allergen Reactions    Amoxicillin Unknown    Flu Vac 2020 [id number](Pf) Other     Dizziness, Double vision    Grass Pollen Unknown    House  Dust Unknown    Mold Unknown    Other Dizziness     Dizziness, Double vision    Penicillins Other     unsure    Tree And Shrub Pollen Unknown        Vital Signs  Temp:  [36.3 °C (97.3 °F)-37.1 °C (98.8 °F)] 37.1 °C (98.8 °F)  Heart Rate:  [71-78] 73  Resp:  [16-18] 16  BP: ()/(45-59) 121/58    Home Medications   Prior to Admission Medications   Prescriptions Last Dose Informant Patient Reported? Taking?   DULoxetine (Cymbalta) 60 mg DR capsule 2/10/2024  Yes No   Sig: Take 1 capsule (60 mg) by mouth once daily at bedtime.   Lantus Solostar U-100 Insulin 100 unit/mL (3 mL) pen   No No   Sig: INJECT 40 UNITS AT BEDTIME   Patient taking differently: Inject 30 Units under the skin once daily at bedtime.   alcohol swabs (BD Alcohol Swabs) pads, medicated   Yes No   calcium carbonate-vitamin D3 600 mg-20 mcg (800 unit) tablet 2/10/2024  Yes No   Sig: Take 1 tablet by mouth once daily at bedtime.   cholecalciferol (Vitamin D-3) 25 MCG (1000 UT) tablet 2/11/2024  Yes No   Sig: Take 1 tablet (25 mcg) by mouth once daily.   fenofibrate (Triglide) 160 mg tablet 2/10/2024  No No   Sig: TAKE 1 TABLET DAILY   Patient taking differently: Take 1 tablet (160 mg) by mouth once daily at bedtime.   gabapentin (Neurontin) 100 mg capsule   Yes No   Sig: Take 1 capsule (100 mg) by mouth 3 times a day.   glucagon (Gvoke HypoPen 2-Pack) 1 mg/0.2 mL auto-injector   Yes No   Sig: Use as directed   insulin lispro (HumaLOG) 100 unit/mL injection 2/10/2024  No No   Sig: Inject with meal Up to 60 units a day   levothyroxine (Synthroid, Levoxyl) 88 mcg tablet 2/11/2024  Yes No   Sig: Take 1 tablet (88 mcg) by mouth once daily in the morning. Take before meals.   losartan (Cozaar) 50 mg tablet 2/11/2024  No No   Sig: Take 1 tablet (50 mg) by mouth once daily.   metFORMIN XR (Glucophage-XR) 500 mg 24 hr tablet 2/11/2024  Yes No   Sig: Take 1 tablet (500 mg) by mouth 2 times a day. Do not crush, chew, or split.   multivit/folic acid/vit K1  "(ONE-A-DAY WOMEN'S 50 PLUS ORAL) 2/10/2024  Yes No   Sig: Take 1 tablet by mouth once daily at bedtime. Chew and swallow 1 tablet daily   pen needle, diabetic 31 gauge x 3/16\" needle   Yes No   Si per day   rosuvastatin (Crestor) 40 mg tablet 2024  No No   Sig: TAKE 1 TABLET DAILY      Facility-Administered Medications: None       New Hospital Orders  Current Facility-Administered Medications   Medication Dose Route Frequency Provider Last Rate Last Admin    acetaminophen (Tylenol) tablet 650 mg  650 mg oral q4h PRN OSCAR Bryan        Or    acetaminophen (Tylenol) oral liquid 650 mg  650 mg nasogastric tube q4h PRN OSCAR Bryan        Or    acetaminophen (Tylenol) suppository 650 mg  650 mg rectal q4h PRN OSCAR Bryan        acetaminophen (Tylenol) tablet 650 mg  650 mg oral q4h PRN OSCAR Bryan        Or    acetaminophen (Tylenol) oral liquid 650 mg  650 mg oral q4h PRN OSCAR Bryan        Or    acetaminophen (Tylenol) suppository 650 mg  650 mg rectal q4h PRN OSCAR Bryan        calcium carbonate-vitamin D3 500 mg-5 mcg (200 unit) per tablet 2 tablet  2 tablet oral Nightly OSCAR De Santiago        [START ON 2024] cholecalciferol (Vitamin D-3) tablet 1,000 Units  1,000 Units oral Daily OSCAR De Santiago        dextrose 10 % in water (D10W) infusion  0.3 g/kg/hr intravenous Once PRN OSCAR De Santiago        dextrose 50 % injection 25 g  25 g intravenous q15 min PRN OSCAR De Santiago        DULoxetine (Cymbalta) DR capsule 60 mg  60 mg oral Nightly OSCAR De Santiago        fenofibrate (Triglide) tablet 160 mg  160 mg oral Nightly OSCAR De Santiago        furosemide (Lasix) injection 20 mg  20 mg intravenous Once OSCAR Bryan        gabapentin (Neurontin) capsule 100 mg  100 mg oral TID Miky Blech, APRN-CNP        glucagon (Glucagen) injection 1 mg  1 mg " intramuscular q15 min PRN OSCAR De Santiago        insulin glargine (Lantus) injection 30 Units  30 Units subcutaneous q24h OSCAR De Santiago        insulin lispro (HumaLOG) injection 0-5 Units  0-5 Units subcutaneous Before meals & nightly OSCAR De Santiago        [START ON 2/12/2024] insulin lispro (HumaLOG) injection 60 Units  60 Units subcutaneous TID with meals OSCAR De Santiago        [START ON 2/12/2024] levothyroxine (Synthroid, Levoxyl) tablet 88 mcg  88 mcg oral Daily before breakfast OSCAR De Santiago        [START ON 2/12/2024] losartan (Cozaar) tablet 50 mg  50 mg oral Daily OSCAR De Santiago        metFORMIN XR (Glucophage-XR) 24 hr tablet 500 mg  500 mg oral BID OSCAR De Santiago        ondansetron (Zofran) tablet 4 mg  4 mg oral q8h PRN OSCAR Bryan        Or    ondansetron (Zofran) injection 4 mg  4 mg intravenous q8h PRN OSCAR Bryan        [START ON 2/12/2024] pantoprazole (ProtoNix) EC tablet 40 mg  40 mg oral Daily before breakfast OSCAR Bryan        Or    [START ON 2/12/2024] pantoprazole (ProtoNix) injection 40 mg  40 mg intravenous Daily before breakfast OSCAR Bryan        [START ON 2/12/2024] rosuvastatin (Crestor) tablet 40 mg  40 mg oral Daily OSCAR De Santiago        sodium chloride 0.9% infusion  100 mL/hr intravenous Continuous OSCAR Bryan               Review of Systems   All other systems reviewed and are negative.          Physical Exam  Constitutional:       Appearance: She is ill-appearing.      Comments: Increased fatigue noted Pale   HENT:      Head: Normocephalic and atraumatic.      Nose: Nose normal.      Mouth/Throat:      Mouth: Mucous membranes are moist.      Pharynx: Oropharynx is clear.   Eyes:      Conjunctiva/sclera: Conjunctivae normal.   Cardiovascular:      Rate and Rhythm: Normal rate and regular rhythm.      Pulses: Normal pulses.       "Heart sounds: Normal heart sounds.   Pulmonary:      Effort: Pulmonary effort is normal.      Breath sounds: Normal breath sounds.   Abdominal:      General: Abdomen is flat. Bowel sounds are normal.   Musculoskeletal:      Cervical back: Normal range of motion and neck supple.      Comments: Decreased ROM Weakness noted   Skin:     Coloration: Skin is pale.   Neurological:      Mental Status: Mental status is at baseline.   Psychiatric:         Mood and Affect: Mood normal.            Last Recorded Vitals  Blood pressure 121/58, pulse 73, temperature 37.1 °C (98.8 °F), temperature source Temporal, resp. rate 16, height 1.6 m (5' 3\"), weight 69.3 kg (152 lb 12.5 oz), SpO2 98 %.    Relevant Results  Results for orders placed or performed during the hospital encounter of 02/11/24   Occult Blood, Stool    Specimen: Stool   Result Value Ref Range    Occult Blood, Stool X1 Positive (A) Negative   CBC and Auto Differential   Result Value Ref Range    WBC 3.5 (L) 4.4 - 11.3 x10*3/uL    nRBC 0.0 0.0 - 0.0 /100 WBCs    RBC 1.92 (L) 4.00 - 5.20 x10*6/uL    Hemoglobin 6.0 (LL) 12.0 - 16.0 g/dL    Hematocrit 18.3 (L) 36.0 - 46.0 %    MCV 95 80 - 100 fL    MCH 31.3 26.0 - 34.0 pg    MCHC 32.8 32.0 - 36.0 g/dL    RDW 17.3 (H) 11.5 - 14.5 %    Platelets 48 (L) 150 - 450 x10*3/uL    Immature Granulocytes %, Automated 0.9 0.0 - 0.9 %    Immature Granulocytes Absolute, Automated 0.03 0.00 - 0.50 x10*3/uL   Comprehensive metabolic panel   Result Value Ref Range    Glucose 205 (H) 74 - 99 mg/dL    Sodium 132 (L) 136 - 145 mmol/L    Potassium 4.2 3.5 - 5.3 mmol/L    Chloride 103 98 - 107 mmol/L    Bicarbonate 21 21 - 32 mmol/L    Anion Gap 12 10 - 20 mmol/L    Urea Nitrogen 22 6 - 23 mg/dL    Creatinine 0.90 0.50 - 1.05 mg/dL    eGFR 67 >60 mL/min/1.73m*2    Calcium 8.9 8.6 - 10.3 mg/dL    Albumin 3.9 3.4 - 5.0 g/dL    Alkaline Phosphatase 56 33 - 136 U/L    Total Protein 6.6 6.4 - 8.2 g/dL    AST 16 9 - 39 U/L    Bilirubin, Total 0.6 " 0.0 - 1.2 mg/dL    ALT 10 7 - 45 U/L   Magnesium   Result Value Ref Range    Magnesium 1.68 1.60 - 2.40 mg/dL   aPTT   Result Value Ref Range    aPTT 24 (L) 27 - 38 seconds   Protime-INR   Result Value Ref Range    Protime 13.0 (H) 9.8 - 12.8 seconds    INR 1.2 (H) 0.9 - 1.1   Type and Screen   Result Value Ref Range    ABO TYPE O     Rh TYPE POS     ANTIBODY SCREEN NEG    Sars-CoV-2 and Influenza A/B PCR   Result Value Ref Range    Flu A Result Not Detected Not Detected    Flu B Result Not Detected Not Detected    Coronavirus 2019, PCR Not Detected Not Detected   RSV PCR   Result Value Ref Range    RSV PCR Not Detected Not Detected   POCT GLUCOSE   Result Value Ref Range    POCT Glucose 154 (H) 74 - 99 mg/dL   Prepare RBC: 2 Units   Result Value Ref Range    PRODUCT CODE G4107F66     Unit Number [id number]     Unit ABO O     Unit RH POS     XM INTEP COMP     Dispense Status IS     Blood Expiration Date March 04, 2024 23:59 EST     PRODUCT BLOOD TYPE 5100     UNIT VOLUME 350     PRODUCT CODE U1680I82     Unit Number [id number]     Unit ABO O     Unit RH POS     XM INTEP COMP     Dispense Status IS     Blood Expiration Date March 08, 2024 23:59 EST     PRODUCT BLOOD TYPE 5100     UNIT VOLUME 350    Manual Differential   Result Value Ref Range    Neutrophils %, Manual 51.0 40.0 - 80.0 %    Bands %, Manual 7.0 0.0 - 5.0 %    Lymphocytes %, Manual 33.0 13.0 - 44.0 %    Monocytes %, Manual 5.0 2.0 - 10.0 %    Eosinophils %, Manual 4.0 0.0 - 6.0 %    Basophils %, Manual 0.0 0.0 - 2.0 %    Seg Neutrophils Absolute, Manual 1.79 1.60 - 5.00 x10*3/uL    Bands Absolute, Manual 0.25 0.00 - 0.50 x10*3/uL    Lymphocytes Absolute, Manual 1.16 0.80 - 3.00 x10*3/uL    Monocytes Absolute, Manual 0.18 0.05 - 0.80 x10*3/uL    Eosinophils Absolute, Manual 0.14 0.00 - 0.40 x10*3/uL    Basophils Absolute, Manual 0.00 0.00 - 0.10 x10*3/uL    Total Cells Counted 100     Neutrophils Absolute, Manual 2.04 1.60 - 5.50 x10*3/uL    RBC  Morphology See Below     Hypochromia Mild     RBC Fragments Few     Teardrop Cells Few     Dohle Bodies Present         Imaging   XR abdomen 1 view    Result Date: 2/11/2024  Interpreted By:  Elieser Guzman, STUDY: XR ABDOMEN 1 VIEW;  2/11/2024 4:18 pm   INDICATION: Signs/Symptoms:abd pain c/o constipation.   COMPARISON: CT chest, abdomen and pelvis with contrast 1 February 2024   ACCESSION NUMBER(S): [id number]   ORDERING CLINICIAN: AMOR OSCAR   TECHNIQUE: Frontal supine view of the abdomen   FINDINGS: There is not an overwhelming amount of radiodense colonic stool nor any dilated gas-filled or stool-filled colon   Rectum full of stool   No dilated gas-filled bowel       As above   MACRO: None   Signed by: Elieser Guzman 2/11/2024 4:45 PM Dictation workstation:   [id number]    CT head wo IV contrast    Result Date: 2/11/2024  Interpreted By:  Brett Mccain, STUDY: CT HEAD WO IV CONTRAST  2/11/2024 3:00 pm   INDICATION: Signs/Symptoms:syncope   COMPARISON: None.   ACCESSION NUMBER(S): [id number]   ORDERING CLINICIAN: AMOR OSCAR   TECHNIQUE: Contiguous axial CT images of the brain were obtained without IV contrast.   FINDINGS: The ventricles, cisterns and sulci are prominent, consistent with mild diffuse volume loss. Areas of white matter low attenuation are nonspecific but likely related to chronic microvascular disease. There is intracranial atherosclerosis.   Gray-white differentiation is preserved. No acute intracranial hemorrhage or mass effect. No midline shift. Patent basal cisterns. No extraaxial fluid collections.   The calvaria is intact. Bilateral maxillary antrostomy defects are suspected. There is right maxillary mucosal thickening. Otherwise the visualized paranasal sinuses and mastoid air cells appear clear.       No acute intracranial pathology.     Signed by: Brett Mccain 2/11/2024 3:06 PM Dictation workstation:   [id number]    XR chest 1 view    Result Date: 2/11/2024  Interpreted By:  Thomas  Elieser, STUDY: XR CHEST 1 VIEW;  2/11/2024 2:23 pm   INDICATION: Signs/Symptoms:cp.   COMPARISON: CT chest with contrast 1 February 2024   ACCESSION NUMBER(S): [id number]   ORDERING CLINICIAN: AMOR OSCAR   TECHNIQUE: Single frontal view of the chest; Portable technique   FINDINGS:   Right side MediPort line is unchanged and well positioned   The cardiomediastinal silhouette is unchanged   No consolidative lung opacity   No edema / failure   No large pleural effusion or demonstrable pneumothorax       NO ACUTE DISEASE IN THE CHEST   MACRO: None   Signed by: Elieser Guzman 2/11/2024 2:31 PM Dictation workstation:   [id number]    CT chest abdomen pelvis w IV contrast    Result Date: 2/2/2024  Interpreted By:  Alf Lemus, STUDY: CT CHEST ABDOMEN PELVIS W IV CONTRAST;  2/1/2024 10:29 am   INDICATION: Signs/Symptoms:Met Ovarian Cancer.   COMPARISON: 10/11/2023   ACCESSION NUMBER(S): [id number]   ORDERING CLINICIAN: JAIMIE ROJAS   TECHNIQUE: Helical data acquisition of the chest, abdomen and pelvis was obtained following the intravenous administration of   75 cc of Omnipaque 350. Images were reformatted in axial, coronal, and sagittal planes.   FINDINGS:   CHEST:   HEART AND VESSELS: There are atherosclerotic calcifications of the aorta and its branches. The aorta is unchanged in course and caliber.   The heart is unchanged in size.   No pericardial effusion is seen.   MEDIASTINUM AND LILLIAN, LOWER NECK AND AXILLA: The visualized thyroid gland is within normal limits.   No evidence of thoracic lymphadenopathy by CT criteria.   Esophagus appears within normal limits as seen.   LUNGS AND AIRWAYS: The trachea and central airways are patent. No endobronchial lesion.   No focal areas of consolidation are noted. No effusion or pneumothorax is seen. Minimal scattered areas of atelectasis/scarring noted, most conspicuous at the lung bases.   CHEST WALL AND OSSEOUS STRUCTURES: Right-sided chest port is unchanged in position.  Degenerative changes. No acute process.     ABDOMEN:   LIVER: Hepatic parenchyma demonstrates a decreased attenuation, compatible with fatty infiltration.   BILE DUCTS: Not abnormally dilated.   GALLBLADDER: No calcified stones. No wall thickening.   PANCREAS: Appears unremarkable.   SPLEEN: Stable small calcified granulomas. No acute process.   ADRENAL GLANDS: Appear unremarkable.   KIDNEYS, URETERS, AND BLADDER: The kidneys enhance with contrast material symmetrically. There is no evidence of hydronephrosis. Stable cyst in the inferior pole of the right kidney measuring approximately 6.0 cm. 3 mm nonobstructing calculus within the midpole of the right kidney. The urinary bladder appears grossly unremarkable.   BOWEL: The small and large bowel are normal in caliber and demonstrate no wall thickening.  There is no evidence of a bowel obstruction. Appendix is not identified and there are surgical clips adjacent to the cecum, correlate with history of appendectomy. Although CT has limited sensitivity and specificity for gastric pathology, the stomach appears grossly unremarkable.   RETROPERITONEUM, VESSELS: There is no aneurysmal dilatation of the abdominal aorta. The IVC is within normal limits.  There are atherosclerotic calcifications of the aorta and its branches. No pathologically enlarged retroperitoneal lymph nodes are noted. The previously described prominent left periaortic lymph node inferior to the renal hilum has decreased in size, only measuring approximately 6 mm in short axis on today's exam versus 1.0 cm on the previous study.   PERITONEUM: There is no evidence of pneumoperitoneum.  No ascites or loculated fluid collection noted.  Previously described 9 mm nodule anterior to the distal aspect of the stomach has significantly decreased in size only measuring approximately 4-5 mm on today's exam, image 128 of 225 of series 201. There are several mesenteric lymph nodes which are not pathologically  enlarged by size criteria and are unchanged in size when compared to the previous study. No new or enlarging nodules or lymph nodes.   ABDOMINAL WALL, SOFT TISSUES: No acute process.   SKELETON: Degenerative changes. No acute process.       CT findings compatible with a favorable response to treatment as evidenced by an interval reduction in the size of the previously described nodule/lymph node anterior to the distal stomach, as well as the retroperitoneal lymph nodes.. No new or enlarging soft tissue nodules or lymph nodes.   MACRO: None.   Signed by: Alf Lemus 2/2/2024 3:00 PM Dictation workstation:   [id number]          Assessment/Plan   Principal Problem:    Anemia due to blood loss  73 year old female. PMH Diabetes ,HTN, and metastatic ovarian cancer.  Patient thin pale weak.  In ED HGB 6.0   Ordered 2 units PRBC   Give Lasix 20 mg IVP between units of PRBC   Tele  Monitor   Monitor VS   Repeat labs in AM     Diabetes Type 2  with Neuropathy   HBA1c 8.2   Continue home meds  Lispro Lantus  Neurtontin   Metformin on home r/t CT with Contrast   Order SSI    Diabetic Diet     Major Depression  Continue  home  med Cymbalta     HTN  Monitor VS  Continue  home  med Cozaar     Hypothyroidism   Continue  home  med Levothyroxine     DVT Prophylaxis SCD hose   Fluids: PRN   Electrolytes: replace as needed  Nutrition:  Diabetic   Adjuncts: PIV      Total accumulated time spent face to face and not face to face preparing to see the patient, obtaining and reviewing separately obtained history; performing a medically appropriate examination and/or evaluation; counseling and educating the patient, family; ordering medications, tests, or procedures; referring and communicating with other health care professionals; documenting clinical information in the patient's medical record; independently interpreting results and communicating the results to the patient, family; and care coordination was 40 minutes      Live Rojo  APRN-CNP

## 2024-02-12 NOTE — H&P (VIEW-ONLY)
Weakness, Gen  Associated symptoms include fatigue.   Syncope      This a consult requested for patient who was admitted with anemia.  Patient has a history of metastatic ovarian carcinoma which she is receiving chemotherapy.  This is a 73-year-old female whose history dates back to 2022 when she was noted to have evidence of metastatic ovarian carcinoma.  She underwent neoadjuvant treatment with CarboTaxol and then underwent a bilateral salpingo-oophorectomy extensive lysis of adhesions infracolic omentectomy and resection to R0 on 2023.  By her account she done well after this and developed recurrent disease.  She is currently receiving adjuvant chemotherapy.  She was well until recently when her  noted that she been increasingly weak and was brought to the emergency room.  In the emergency room a CT scan confirmed improvement in her overall disease with favorable response to chemotherapy.  She was noted to have a hemoglobin of 6.0 and hematocrit of 18.3 mean cell volume was 94.  She has been taking Motrin recently for back pain.  She denied any blood in the stool she denied any epigastric pain.  She denied any melena.  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, oral cavity, and pharynx     History of malignant neoplasm of lip    Personal history of other complications of pregnancy, childbirth and the puerperium     History of spontaneous     Personal history of other malignant neoplasm of skin     History of malignant neoplasm of skin          Current Facility-Administered Medications:     acetaminophen (Tylenol) tablet 650 mg, 650 mg, oral, q4h PRN **OR** [DISCONTINUED] acetaminophen (Tylenol) oral liquid 650 mg,  650 mg, nasogastric tube, q4h PRN **OR** acetaminophen (Tylenol) suppository 650 mg, 650 mg, rectal, q4h PRN, OSCAR Bryan    [DISCONTINUED] acetaminophen (Tylenol) tablet 650 mg, 650 mg, oral, q4h PRN **OR** [DISCONTINUED] acetaminophen (Tylenol) oral liquid 650 mg, 650 mg, oral, q4h PRN **OR** acetaminophen (Tylenol) suppository 650 mg, 650 mg, rectal, q4h PRN, OSCAR Bryan    calcium carbonate-vitamin D3 500 mg-5 mcg (200 unit) per tablet 2 tablet, 2 tablet, oral, Nightly, OSCAR De Santiago, 2 tablet at 02/11/24 2138    cholecalciferol (Vitamin D-3) tablet 1,000 Units, 1,000 Units, oral, Daily, OSCAR De Santiago, 1,000 Units at 02/12/24 0832    dextrose 10 % in water (D10W) infusion, 0.3 g/kg/hr, intravenous, Once PRN, OSCAR De Santiago    dextrose 50 % injection 25 g, 25 g, intravenous, q15 min PRN, OSCAR De Santiago    docusate sodium (Colace) capsule 100 mg, 100 mg, oral, BID, OSCAR Weston    DULoxetine (Cymbalta) DR capsule 60 mg, 60 mg, oral, Nightly, OSCAR De Santiago, 60 mg at 02/11/24 2139    famotidine (Pepcid) tablet 20 mg, 20 mg, oral, BID, 20 mg at 02/12/24 0832 **OR** famotidine PF (Pepcid) injection 20 mg, 20 mg, intravenous, BID, OSCAR De Santiago    fenofibrate (Triglide) tablet 160 mg, 160 mg, oral, Nightly, OSCAR De Santiago, 160 mg at 02/11/24 2139    gabapentin (Neurontin) capsule 100 mg, 100 mg, oral, TID, OSCAR De Santiago, 100 mg at 02/12/24 0832    glucagon (Glucagen) injection 1 mg, 1 mg, intramuscular, q15 min PRN, OSCAR De Santiago    insulin glargine (Lantus) injection 30 Units, 30 Units, subcutaneous, q24h, OSCAR De Santiago, 30 Units at 02/11/24 2146    insulin lispro (HumaLOG) injection 0-5 Units, 0-5 Units, subcutaneous, Before meals & nightly, OSCAR De Santiago, 1 Units at 02/11/24 2146    levothyroxine (Synthroid, Levoxyl) tablet 88 mcg, 88 mcg, oral, Daily before  breakfast, OSCAR De Santiago, 88 mcg at 02/12/24 0612    losartan (Cozaar) tablet 50 mg, 50 mg, oral, Daily, OSCAR De Santiago, 50 mg at 02/12/24 0833    melatonin tablet 3 mg, 3 mg, oral, Nightly PRN, OSCAR De Santiago    [Held by provider] metFORMIN XR (Glucophage-XR) 24 hr tablet 500 mg, 500 mg, oral, BID, OSCAR De Santiago    ondansetron (Zofran) tablet 4 mg, 4 mg, oral, q8h PRN **OR** ondansetron (Zofran) injection 4 mg, 4 mg, intravenous, q8h PRN, OSCAR Bryan    pantoprazole (ProtoNix) EC tablet 40 mg, 40 mg, oral, Daily before breakfast, 40 mg at 02/12/24 0612 **OR** pantoprazole (ProtoNix) injection 40 mg, 40 mg, intravenous, Daily before breakfast, OSCAR Bryan    rosuvastatin (Crestor) tablet 40 mg, 40 mg, oral, Daily, OSCAR De Santiago, 40 mg at 02/12/24 0833    sodium chloride 0.9% infusion, 100 mL/hr, intravenous, Continuous, OSCAR Bryan, Last Rate: 100 mL/hr at 02/12/24 0829, 100 mL/hr at 02/12/24 0829     Allergies   Allergen Reactions    Amoxicillin Unknown    Flu Vac 2020 [id number](Pf) Other     Dizziness, Double vision    Grass Pollen Unknown    House Dust Unknown    Mold Unknown    Other Dizziness     Dizziness, Double vision    Penicillins Other     unsure    Tree And Shrub Pollen Unknown        Review of Systems  Review of Systems   Constitutional:  Positive for fatigue.   Cardiovascular:  Positive for syncope.       Objective     Vital signs for last 24 hours:  Temp:  [36.3 °C (97.3 °F)-37.1 °C (98.8 °F)] 36.4 °C (97.5 °F)  Heart Rate:  [71-80] 75  Resp:  [16-18] 16  BP: ()/(45-59) 119/58    Intake/Output this shift:  I/O this shift:  In: 1780 [P.O.:480; I.V.:1000; Blood:300]  Out: -     Physical Exam  Physical Exam  Vitals reviewed. Exam conducted with a chaperone present.   Constitutional:       Appearance: She is underweight.   HENT:      Head: Normocephalic.      Nose: Nose normal.      Mouth/Throat:       Pharynx: Oropharynx is clear.   Cardiovascular:      Rate and Rhythm: Normal rate and regular rhythm.      Heart sounds: Normal heart sounds.   Pulmonary:      Effort: Pulmonary effort is normal.      Breath sounds: Normal breath sounds.   Abdominal:      General: Abdomen is flat.      Palpations: Abdomen is soft. There is no mass.      Tenderness: There is no abdominal tenderness. There is no guarding.      Hernia: No hernia is present.      Comments: Midline incision abdomen.  Stool heme positive   Musculoskeletal:         General: Normal range of motion.      Cervical back: Normal range of motion.   Skin:     General: Skin is warm.   Neurological:      General: No focal deficit present.   Psychiatric:         Mood and Affect: Mood normal.         Labs & Radiology      Labs Reviewed   OCCULT BLOOD X1, STOOL - Abnormal       Result Value    Occult Blood, Stool X1 Positive (*)    CBC WITH AUTO DIFFERENTIAL - Abnormal    WBC 3.5 (*)     nRBC 0.0      RBC 1.92 (*)     Hemoglobin 6.0 (*)     Hematocrit 18.3 (*)     MCV 95      MCH 31.3      MCHC 32.8      RDW 17.3 (*)     Platelets 48 (*)     Immature Granulocytes %, Automated 0.9      Immature Granulocytes Absolute, Automated 0.03      Narrative:     The previously reported component Neutrophils % is no longer being reported.  The previously reported component Lymphocytes % is no longer being reported.  The previously reported component Monocytes % is no longer being reported.  The previously                   reported component Eosinophils % is no longer being reported.  The previously reported component Basophils % is no longer being reported.  The previously reported component Absolute Neutrophils is no longer being reported.  The previously reported                   component Absolute Lymphocytes is no longer being reported.  The previously reported component Absolute Monocytes is no longer being reported.  The previously reported component Absolute Eosinophils  is no longer being reported.  The previously reported                   component Absolute Basophils is no longer being reported.   COMPREHENSIVE METABOLIC PANEL - Abnormal    Glucose 205 (*)     Sodium 132 (*)     Potassium 4.2      Chloride 103      Bicarbonate 21      Anion Gap 12      Urea Nitrogen 22      Creatinine 0.90      eGFR 67      Calcium 8.9      Albumin 3.9      Alkaline Phosphatase 56      Total Protein 6.6      AST 16      Bilirubin, Total 0.6      ALT 10     APTT - Abnormal    aPTT 24 (*)     Narrative:     The APTT is no longer used for monitoring Unfractionated Heparin Therapy. For monitoring Heparin Therapy, use the Heparin Assay.   PROTIME-INR - Abnormal    Protime 13.0 (*)     INR 1.2 (*)    CBC - Abnormal    WBC 5.3      nRBC 0.0      RBC 2.77 (*)     Hemoglobin 8.5 (*)     Hematocrit 25.2 (*)     MCV 91      MCH 30.7      MCHC 33.7      RDW 16.7 (*)     Platelets 41 (*)    BASIC METABOLIC PANEL - Abnormal    Glucose 79      Sodium 136      Potassium 3.4 (*)     Chloride 108 (*)     Bicarbonate 25      Anion Gap <7 (*)     Urea Nitrogen 16      Creatinine 0.89      eGFR 68      Calcium 8.9     POCT GLUCOSE - Abnormal    POCT Glucose 154 (*)    POCT GLUCOSE - Abnormal    POCT Glucose 65 (*)    POCT GLUCOSE - Abnormal    POCT Glucose 183 (*)    MAGNESIUM - Normal    Magnesium 1.68     SARS-COV-2 AND INFLUENZA A/B PCR - Normal    Flu A Result Not Detected      Flu B Result Not Detected      Coronavirus 2019, PCR Not Detected      Narrative:     This assay has received FDA Emergency Use Authorization (EUA) and  is only authorized for the duration of time that circumstances exist to justify the authorization of the emergency use of in vitro diagnostic tests for the detection of SARS-CoV-2 virus and/or diagnosis of COVID-19 infection under section 564(b)(1) of the Act, 21 U.S.C. 360bbb-3(b)(1). Testing for SARS-CoV-2 is only recommended for patients who meet current clinical and/or epidemiological  criteria as defined by federal, state, or local public health directives. This assay is an in vitro diagnostic nucleic acid amplification test for the qualitative detection of SARS-CoV-2, Influenza A, and Influenza B from nasopharyngeal specimens and has been validated for use at [facility]. Negative results do not preclude COVID-19 infections or Influenza A/B infections, and should not be used as the sole basis for diagnosis, treatment, or other management decisions. If Influenza A/B and RSV PCR results are negative, testing for Parainfluenza virus, Adenovirus and Metapneumovirus is routinely performed for [facility] [place] oncology and intensive care inpatients, and is available on other patients by placing an add-on request.    RSV PCR - Normal    RSV PCR Not Detected      Narrative:     This assay is an FDA-cleared, in vitro diagnostic nucleic acid amplification test for the detection of RSV from nasopharyngeal specimens, and has been validated for use at [facility]. Negative results do not preclude RSV infections, and should not be used as the sole basis for diagnosis, treatment, or other management decisions. If Influenza A/B and RSV PCR results are negative, testing for Parainfluenza virus, Adenovirus and Metapneumovirus is routinely performed for pediatric oncology and intensive care inpatients at [facility], and is available on other patients by placing an add-on request.                                       URINE CULTURE   TYPE AND SCREEN    ABO TYPE O      Rh TYPE POS      ANTIBODY SCREEN NEG     URINALYSIS WITH REFLEX MICROSCOPIC   POCT GLUCOSE   POCT GLUCOSE   POCT GLUCOSE   PREPARE RBC    PRODUCT CODE [id number]      Unit Number [id number]      Unit ABO O      Unit RH POS      XM INTEP COMP      Dispense Status IS      Blood Expiration Date March 04, 2024 23:59 EST      PRODUCT BLOOD TYPE 5100      UNIT VOLUME 350      PRODUCT CODE [id number]      Unit Number  [id number]      Unit ABO O      Unit RH POS      XM INTEP COMP      Dispense Status TR      Blood Expiration Date March 08, 2024 23:59 EST      PRODUCT BLOOD TYPE 5100      UNIT VOLUME 350     MANUAL DIFFERENTIAL    Neutrophils %, Manual 51.0      Bands %, Manual 7.0      Lymphocytes %, Manual 33.0      Monocytes %, Manual 5.0      Eosinophils %, Manual 4.0      Basophils %, Manual 0.0      Seg Neutrophils Absolute, Manual 1.79      Bands Absolute, Manual 0.25      Lymphocytes Absolute, Manual 1.16      Monocytes Absolute, Manual 0.18      Eosinophils Absolute, Manual 0.14      Basophils Absolute, Manual 0.00      Total Cells Counted 100      Neutrophils Absolute, Manual 2.04      RBC Morphology See Below      Hypochromia Mild      RBC Fragments Few      Teardrop Cells Few      Dohle Bodies Present     PATH REVIEW-CBC DIFFERENTIAL     CT scan - 2.3.23   IMPRESSION:  CHEST:  1.  No suspicious pulmonary nodule or focal consolidation.  2.  Interval reduction in size of multiple mediastinal lymph nodes,  since 12/23/2022.     ABDOMEN-PELVIS:  1.  Interval decrease 9.8 x 6.6 cm left adnexal mass, previously 11.7  x 9.3 cm on 12/23/2022. Additionally, the previously described right  adnexal lesion has also decreased in size.  2. Interval improvement peritoneal carcinomatosis, abdominopelvic  adenopathy and abdominopelvic ascites compared with the low-dose CT  from 12/19/2022, as described above.  3. Diffuse hepatic steatosis. No focal hepatic lesion.  4. 4 mm right interpolar region nonobstructing calculus.         Impression  Normochromic normocytic anemia.  Possibly related peptic ulcer disease last colonoscopy 2018 in [place].  Heme positive stool.  Plan   Plan-continue IV proton pump inhibitor.  Consider EGD to rule out peptic ulcer disease.  We also discussed colonoscopy.  She did have one in 2018.  Patient elected to think about the recommendations.  I contacted her oncologist.  I will start Carafate in  the interim.

## 2024-02-12 NOTE — CARE PLAN
The patient's goals for the shift include  rest throughout the night.    The clinical goals for the shift include tolerate blood transfusion    Over the shift, the patient did not make progress toward the following goals. Barriers to progression include signs of blood transfusion reaction. Recommendations to address these barriers include monitor vital signs throughout this shift.

## 2024-02-13 ENCOUNTER — ANESTHESIA (OUTPATIENT)
Dept: GASTROENTEROLOGY | Facility: HOSPITAL | Age: 74
End: 2024-02-13
Payer: MEDICARE

## 2024-02-13 ENCOUNTER — ANESTHESIA EVENT (OUTPATIENT)
Dept: GASTROENTEROLOGY | Facility: HOSPITAL | Age: 74
End: 2024-02-13
Payer: MEDICARE

## 2024-02-13 ENCOUNTER — TELEPHONE (OUTPATIENT)
Dept: HEMATOLOGY/ONCOLOGY | Facility: CLINIC | Age: 74
End: 2024-02-13

## 2024-02-13 ENCOUNTER — APPOINTMENT (OUTPATIENT)
Dept: HEMATOLOGY/ONCOLOGY | Facility: CLINIC | Age: 74
End: 2024-02-13
Payer: MEDICARE

## 2024-02-13 ENCOUNTER — APPOINTMENT (OUTPATIENT)
Dept: GASTROENTEROLOGY | Facility: HOSPITAL | Age: 74
End: 2024-02-13
Payer: MEDICARE

## 2024-02-13 ENCOUNTER — PREP FOR PROCEDURE (OUTPATIENT)
Dept: SURGERY | Facility: HOSPITAL | Age: 74
End: 2024-02-13

## 2024-02-13 VITALS
HEART RATE: 72 BPM | TEMPERATURE: 96.8 F | RESPIRATION RATE: 18 BRPM | WEIGHT: 152.78 LBS | BODY MASS INDEX: 27.07 KG/M2 | DIASTOLIC BLOOD PRESSURE: 58 MMHG | OXYGEN SATURATION: 99 % | HEIGHT: 63 IN | SYSTOLIC BLOOD PRESSURE: 113 MMHG

## 2024-02-13 LAB
ANION GAP SERPL CALC-SCNC: 8 MMOL/L (ref 10–20)
BACTERIA UR CULT: NORMAL
BUN SERPL-MCNC: 10 MG/DL (ref 6–23)
CALCIUM SERPL-MCNC: 8.8 MG/DL (ref 8.6–10.3)
CHLORIDE SERPL-SCNC: 113 MMOL/L (ref 98–107)
CO2 SERPL-SCNC: 23 MMOL/L (ref 21–32)
CREAT SERPL-MCNC: 0.9 MG/DL (ref 0.5–1.05)
EGFRCR SERPLBLD CKD-EPI 2021: 67 ML/MIN/1.73M*2
ERYTHROCYTE [DISTWIDTH] IN BLOOD BY AUTOMATED COUNT: 16.6 % (ref 11.5–14.5)
GLUCOSE BLD MANUAL STRIP-MCNC: 89 MG/DL (ref 74–99)
GLUCOSE SERPL-MCNC: 87 MG/DL (ref 74–99)
HCT VFR BLD AUTO: 24.7 % (ref 36–46)
HGB BLD-MCNC: 8 G/DL (ref 12–16)
MCH RBC QN AUTO: 30.4 PG (ref 26–34)
MCHC RBC AUTO-ENTMCNC: 32.4 G/DL (ref 32–36)
MCV RBC AUTO: 94 FL (ref 80–100)
NRBC BLD-RTO: 0 /100 WBCS (ref 0–0)
PATH REVIEW-CBC DIFFERENTIAL: NORMAL
PLATELET # BLD AUTO: 27 X10*3/UL (ref 150–450)
POTASSIUM SERPL-SCNC: 3.9 MMOL/L (ref 3.5–5.3)
RBC # BLD AUTO: 2.63 X10*6/UL (ref 4–5.2)
SODIUM SERPL-SCNC: 140 MMOL/L (ref 136–145)
WBC # BLD AUTO: 7.3 X10*3/UL (ref 4.4–11.3)

## 2024-02-13 PROCEDURE — 85027 COMPLETE CBC AUTOMATED: CPT

## 2024-02-13 PROCEDURE — 88305 TISSUE EXAM BY PATHOLOGIST: CPT | Performed by: PATHOLOGY

## 2024-02-13 PROCEDURE — 99231 SBSQ HOSP IP/OBS SF/LOW 25: CPT

## 2024-02-13 PROCEDURE — 99222 1ST HOSP IP/OBS MODERATE 55: CPT | Performed by: SURGERY

## 2024-02-13 PROCEDURE — 2500000004 HC RX 250 GENERAL PHARMACY W/ HCPCS (ALT 636 FOR OP/ED): Performed by: NURSE ANESTHETIST, CERTIFIED REGISTERED

## 2024-02-13 PROCEDURE — 7100000010 HC PHASE TWO TIME - EACH INCREMENTAL 1 MINUTE

## 2024-02-13 PROCEDURE — 43239 EGD BIOPSY SINGLE/MULTIPLE: CPT | Performed by: SURGERY

## 2024-02-13 PROCEDURE — 2500000004 HC RX 250 GENERAL PHARMACY W/ HCPCS (ALT 636 FOR OP/ED)

## 2024-02-13 PROCEDURE — 82947 ASSAY GLUCOSE BLOOD QUANT: CPT | Mod: 59

## 2024-02-13 PROCEDURE — 3700000001 HC GENERAL ANESTHESIA TIME - INITIAL BASE CHARGE

## 2024-02-13 PROCEDURE — 3700000002 HC GENERAL ANESTHESIA TIME - EACH INCREMENTAL 1 MINUTE

## 2024-02-13 PROCEDURE — 2500000001 HC RX 250 WO HCPCS SELF ADMINISTERED DRUGS (ALT 637 FOR MEDICARE OP): Performed by: NURSE PRACTITIONER

## 2024-02-13 PROCEDURE — 2500000002 HC RX 250 W HCPCS SELF ADMINISTERED DRUGS (ALT 637 FOR MEDICARE OP, ALT 636 FOR OP/ED): Performed by: NURSE PRACTITIONER

## 2024-02-13 PROCEDURE — G0378 HOSPITAL OBSERVATION PER HR: HCPCS

## 2024-02-13 PROCEDURE — 80048 BASIC METABOLIC PNL TOTAL CA: CPT

## 2024-02-13 PROCEDURE — 7100000009 HC PHASE TWO TIME - INITIAL BASE CHARGE

## 2024-02-13 PROCEDURE — 2500000004 HC RX 250 GENERAL PHARMACY W/ HCPCS (ALT 636 FOR OP/ED): Performed by: NURSE PRACTITIONER

## 2024-02-13 PROCEDURE — 88305 TISSUE EXAM BY PATHOLOGIST: CPT | Mod: TC,SUR,GENLAB | Performed by: SURGERY

## 2024-02-13 RX ORDER — PROPOFOL 10 MG/ML
INJECTION, EMULSION INTRAVENOUS AS NEEDED
Status: DISCONTINUED | OUTPATIENT
Start: 2024-02-13 | End: 2024-02-13

## 2024-02-13 RX ORDER — HEPARIN 100 UNIT/ML
5 SYRINGE INTRAVENOUS AS NEEDED
Status: DISCONTINUED | OUTPATIENT
Start: 2024-02-13 | End: 2024-02-13 | Stop reason: HOSPADM

## 2024-02-13 RX ORDER — SUCRALFATE 1 G/1
1 TABLET ORAL
Qty: 56 TABLET | Refills: 0 | Status: SHIPPED | OUTPATIENT
Start: 2024-02-13 | End: 2024-02-27

## 2024-02-13 RX ORDER — INSULIN GLARGINE 100 [IU]/ML
30 INJECTION, SOLUTION SUBCUTANEOUS NIGHTLY
Qty: 9 ML | Refills: 0
Start: 2024-02-13 | End: 2024-04-09 | Stop reason: SDUPTHER

## 2024-02-13 RX ORDER — PANTOPRAZOLE SODIUM 40 MG/1
40 TABLET, DELAYED RELEASE ORAL
Qty: 30 TABLET | Refills: 0 | Status: SHIPPED | OUTPATIENT
Start: 2024-02-14 | End: 2024-03-18 | Stop reason: ALTCHOICE

## 2024-02-13 RX ADMIN — SODIUM CHLORIDE, POTASSIUM CHLORIDE, SODIUM LACTATE AND CALCIUM CHLORIDE: 600; 310; 30; 20 INJECTION, SOLUTION INTRAVENOUS at 08:49

## 2024-02-13 RX ADMIN — PROPOFOL 70 MG: 10 INJECTION, EMULSION INTRAVENOUS at 08:50

## 2024-02-13 RX ADMIN — LOSARTAN POTASSIUM 50 MG: 50 TABLET, FILM COATED ORAL at 10:11

## 2024-02-13 RX ADMIN — ROSUVASTATIN CALCIUM 40 MG: 10 TABLET, FILM COATED ORAL at 10:10

## 2024-02-13 RX ADMIN — PROPOFOL 50 MG: 10 INJECTION, EMULSION INTRAVENOUS at 08:52

## 2024-02-13 RX ADMIN — Medication 500 UNITS: at 11:39

## 2024-02-13 RX ADMIN — PROPOFOL 50 MG: 10 INJECTION, EMULSION INTRAVENOUS at 08:53

## 2024-02-13 RX ADMIN — Medication 1000 UNITS: at 10:11

## 2024-02-13 RX ADMIN — PROPOFOL 30 MG: 10 INJECTION, EMULSION INTRAVENOUS at 08:51

## 2024-02-13 RX ADMIN — SODIUM CHLORIDE 100 ML/HR: 9 INJECTION, SOLUTION INTRAVENOUS at 04:44

## 2024-02-13 SDOH — HEALTH STABILITY: MENTAL HEALTH: CURRENT SMOKER: 0

## 2024-02-13 ASSESSMENT — PAIN - FUNCTIONAL ASSESSMENT
PAIN_FUNCTIONAL_ASSESSMENT: 0-10

## 2024-02-13 ASSESSMENT — PAIN SCALES - GENERAL
PAINLEVEL_OUTOF10: 0 - NO PAIN
PAINLEVEL_OUTOF10: 0 - NO PAIN
PAINLEVEL_OUTOF10: 4
PAIN_LEVEL: 0
PAINLEVEL_OUTOF10: 0 - NO PAIN
PAINLEVEL_OUTOF10: 0 - NO PAIN

## 2024-02-13 NOTE — NURSING NOTE
0731 Critical lab called, platelets of 27. S. Delaney NP made aware. OR staff at bedside getting ready to take to EGD, notified Dr. Duckworth. Dr. Duckworth stated it was okay.

## 2024-02-13 NOTE — DISCHARGE INSTRUCTIONS
Normocytic Normochromic Anemia Discharge Instructions    About this topic  Your body has red blood cells, or RBCs, that carry oxygen to the rest of your body. RBCs are made in your bone marrow. They need a special protein called hemoglobin to carry oxygen. Your body needs iron and other nutrients to make new hemoglobin and blood cells.  Anemia is when the body does not have enough red blood cells. Normocytic normochromic anemia is one kind of anemia. The name talks about the size of the RBC and the amount of hemoglobin in the RBCs. In this kind of anemia, the RBCs are normal size and they have a normal amount of hemoglobin in each cell.  With this kind of anemia, you have less RBCs overall. The amount of hemoglobin in each cell is normal. Because there are less red blood cells, your body has problems getting enough oxygen to the tissues. This kind of anemia is a result of another disease going on in your body.  What care is needed at home?  Ask your doctor what you need to do when you go home. Make sure you ask questions if you do not understand what the doctor says. This way you will know what you need to do.  Get plenty of rest. Sleep when you are feeling tired. Avoid doing tiring activities. Talk to your doctor about all of your drugs. Tell your doctor before you take any new drugs.  Sit or lie down if you feel dizzy or lightheaded.  What follow-up care is needed?  Your doctor may ask you to make visits to the office to check on your progress. Be sure to keep these visits.  You may need to have blood tests to check on your iron and red blood cell counts.  You may need to have other tests to see if you have a chronic health problem or if your chronic problem is getting worse.  What drugs may be needed?  Your doctor my order drugs to:  Give you extra iron to build up your blood  Help build up red blood cells  Fight infection  Fight cancer  Help your kidneys work better  Will physical activity be limited?  No, but  you may want to rest more than you normally do. Talk to your doctor about the right amount of activity for you.  What changes to diet are needed?  Eat a healthy diet. Ask your doctor if you need to make any changes in your diet. Your doctor may ask you to eat more iron.  When do I need to call the doctor?  So tired you cannot finish everyday tasks  Heart is beating very fast, even when you are at rest  Hard to breathe, even when you are at rest  Hands or feet feel numb or cold  Stool is black and tarry or you see blood in your stool  Bruising or bleeding that is not normal  Changes in menstrual periods like lots of bleeding, spotting, or bleeding between cycles  Lightheadedness, dizziness, passing out, or fainting  Teach Back: Helping You Understand  The Teach Back Method helps you understand the information we are giving you. After you talk with the staff, tell them in your own words what you learned. This helps to make sure the staff has described each thing clearly. It also helps to explain things that may have been confusing. Before going home, make sure you can do these:  I can tell you about my condition.  I can tell you about changes I need to make with my diet, drugs, or activities.  I can tell you what I will do if my heart is beating very fast or it is hard to breathe.  Last Reviewed Date  2021-09-02

## 2024-02-13 NOTE — ANESTHESIA PREPROCEDURE EVALUATION
Patient: Catalina Mendoza    Procedure Information       Date/Time: 02/13/24 0900    Scheduled providers: Rhett Duckworth MD    Procedure: EGD    [facility]: [facility]            Relevant Problems   Cardiovascular   (+) Benign essential hypertension   (+) CAD, multiple vessel   (+) Hyperlipidemia   (+) Mixed hyperlipidemia   (+) Rib pain      Endocrine   (+) Acquired hypothyroidism   (+) Diabetic macular edema (CMS/HCC)   (+) Diabetic nephropathy associated with type 1 diabetes mellitus (CMS/HCC)   (+) Hypothyroidism   (+) Type 2 diabetes mellitus (CMS/HCC)   (+) Type 2 diabetes mellitus without complication (CMS/HCC)      /Renal   (+) Diabetic nephropathy associated with type 1 diabetes mellitus (CMS/HCC)   (+) Stage 3a chronic kidney disease (CMS/HCC)      Neuro/Psych   (+) Drug-induced polyneuropathy (CMS/HCC)      GI/Hepatic   (+) Squamous cell carcinoma of skin of lip      Hematology   (+) Acute posthemorrhagic anemia   (+) Anemia due to blood loss      Musculoskeletal   (+) Osteoarthritis      Infectious Disease   (+) Tinea pedis of both feet       Clinical information reviewed:   Tobacco  Allergies  Meds   Med Hx  Surg Hx   Fam Hx  Soc Hx        NPO Detail:  No data recorded     Physical Exam    Airway  Mallampati: II  TM distance: >3 FB  Neck ROM: full     Cardiovascular   Rhythm: regular  Rate: normal     Dental    Pulmonary   (+) decreased breath sounds     Abdominal            Anesthesia Plan    History of general anesthesia?: yes  History of complications of general anesthesia?: no    ASA 3     MAC     The patient is not a current smoker.    intravenous induction   Anesthetic plan and risks discussed with patient.  Use of blood products discussed with patient who consented to blood products.    Plan discussed with attending.

## 2024-02-13 NOTE — NURSING NOTE
To EGD    0915 recd report from Opal in PACU  Spouse in room, updated    0925 back to Room 234.  Spouse remains @ bedside.  Easily aroused, denies pain, wants to go home.  Breakfast ordered.    1019 resting quietly in bed, spouse @ bedside.  Minimal pain/ baseline for pt.  Took am meds.  Still asking if she is discharging today/ will follow through with IDT rounds.    1136 aware discharge in, awaiting Heparin flush order to dc [facility].

## 2024-02-13 NOTE — ANESTHESIA POSTPROCEDURE EVALUATION
Patient: Catalina Mendoza    Procedure Summary       Date: 02/13/24 Room / Location: [facility]    Anesthesia Start: 0847 Anesthesia Stop:     Procedure: EGD Diagnosis: Anemia due to blood loss    Scheduled Providers: Rhett Duckworth MD Responsible Provider: BALJIT Aguilar    Anesthesia Type: MAC ASA Status: 3            Anesthesia Type: MAC    Vitals Value Taken Time   /51 02/13/24 0919   Temp 36.1 °C (97 °F) 02/13/24 0902   Pulse 74 02/13/24 0919   Resp 16 02/13/24 0919   SpO2 94 % 02/13/24 0919       Anesthesia Post Evaluation    Patient location during evaluation: bedside  Patient participation: complete - patient participated  Level of consciousness: awake and alert  Pain score: 0  Pain management: adequate  Airway patency: patent  Cardiovascular status: acceptable  Respiratory status: acceptable  Hydration status: acceptable  Postoperative Nausea and Vomiting: none  Comments: Ok to return to her room 234-A.        No notable events documented.

## 2024-02-13 NOTE — TELEPHONE ENCOUNTER
Called and spoke to Keyur.  Told when pt gets out of the hospital, they can call us and we will arrange for further appointments.  He was agreeable to plan.

## 2024-02-13 NOTE — PROGRESS NOTES
Patient is day 2 OBS for anemia, plan for EGD this morning with possible discharge later today.    Patient continues to deny the need for additional services and/or resources on discharge.         Home no needs  DC plan secure.

## 2024-02-13 NOTE — DISCHARGE SUMMARY
Discharge Diagnosis  Anemia due to blood loss    Issues Requiring Follow-Up  No issues     Discharge Meds     Your medication list        START taking these medications        Instructions Last Dose Given Next Dose Due   pantoprazole 40 mg EC tablet  Commonly known as: ProtoNix  Start taking on: February 14, 2024      Take 1 tablet (40 mg) by mouth once daily in the morning. Take before meals. Do not crush, chew, or split. Do not start before February 14, 2024.       sucralfate 1 gram tablet  Commonly known as: Carafate      Take 1 tablet (1 g) by mouth 4 times a day before meals for 14 days.              CHANGE how you take these medications        Instructions Last Dose Given Next Dose Due   fenofibrate 160 mg tablet  Commonly known as: Triglide  What changed: when to take this      TAKE 1 TABLET DAILY              CONTINUE taking these medications        Instructions Last Dose Given Next Dose Due   BD Alcohol Swabs pads, medicated  Generic drug: alcohol swabs           calcium carbonate-vitamin D3 600 mg-20 mcg (800 unit) tablet           cholecalciferol 25 MCG (1000 UT) tablet  Commonly known as: Vitamin D-3           DULoxetine 60 mg DR capsule  Commonly known as: Cymbalta           gabapentin 100 mg capsule  Commonly known as: Neurontin           Gvoke HypoPen 2-Pack 1 mg/0.2 mL auto-injector  Generic drug: glucagon           insulin lispro 100 unit/mL injection  Commonly known as: HumaLOG      Inject with meal Up to 60 units a day       Lantus Solostar U-100 Insulin 100 unit/mL (3 mL) pen  Generic drug: insulin glargine      Inject 30 Units under the skin once daily at bedtime.       levothyroxine 88 mcg tablet  Commonly known as: Synthroid, Levoxyl           losartan 50 mg tablet  Commonly known as: Cozaar      Take 1 tablet (50 mg) by mouth once daily.       metFORMIN  mg 24 hr tablet  Commonly known as: Glucophage-XR           ONE-A-DAY WOMEN'S 50 PLUS ORAL           pen needle, diabetic 31 gauge x  "3/16\" needle           rosuvastatin 40 mg tablet  Commonly known as: Crestor      TAKE 1 TABLET DAILY                 Where to Get Your Medications        These medications were sent to [id number] - [place], [place] - [address]  [address] [address]      Phone: [phone]   pantoprazole 40 mg EC tablet  sucralfate 1 gram tablet       Information about where to get these medications is not yet available    Ask your nurse or doctor about these medications  Lantus Solostar U-100 Insulin 100 unit/mL (3 mL) pen         Test Results Pending At Discharge  Pending Labs       Order Current Status    Pathologist Review-CBC Differential In process    Surgical Pathology Exam In process    Urine culture In process            Hospital Course   Catalina Mendoza is a 73 y.o. female presented to  [facility] ED from home.  Symptomatic anemia pt w/ hx of metastatic ovarian ca , here with syncopal episode hemoglobin of 7.7 last week at [facility] decided to hold off on transfusion progressive lightheaded dizziness hemoglobin 6, ordered for 2 units transfusion.  In ED CT  of Head No acute intracranial pathology Xray  Abdomen here is not an overwhelming amount of radiodense colonic stool nor any dilated gas-filled or stool-filled colon   Rectum full of stool   No dilated gas-filled bowel.  EKG done at 120 shows sinus rhythm rate of 72 left axis deviation  and Right bundle branch block, LVH . On Exam patient  resting in bed.  Appears pale.  Increase  fatigue noted. Patient drifted  to sleep during exam. No acute distress noted     Hospital Course:  #Anemia due to blood loss  #Metastatic ovarian cancer undergoing chemotherapy  -Last chemo treatment last Tuesday 2/6/24  -H/H on admission 6.0/18.3  -Received 2 units PRBC   -H/H today 8.5/25.2  -Occult stool positive  -Reached out to Oncologist, Dr. Ca; he would like her to have a colonoscopy  -Dr. Duckworth consulted   -Monitor CBC   -Abd " xray: There is not an overwhelming amount of radiodense colonic stool nor  any dilated gas-filled or stool-filled colon. Rectum full of stool. No dilated gas-filled bowel     #Thrombocytopenia  #Pancytopenia   -Plt 41  -RBC 2.77  -WBC 5.3  -Monitor      #DM Type II  -Hgb A1C 8.2  -Continue gabapentin, lantus  -SSI with hypoglycemia protocol  -Monitor BG      #Essential HTN  #HLD  -Continue fenofibrate, losartan, rosuvastatin  -Monitor BP and HR     #Hypothyroidism  -Continue levothyroxine     #Depression  -Continue duloxetine     DVT ppx  -not a candidate due to anemia     F: PRN  E: Replete per protocol  N: Clear liquid   A: Port     Disposition: Pt stable for discharge. EGD normal per Dr. Duckworth. Will start pantoprazole and sucralfate. Will see oncologist next week. Dr. Gilbert info provided for follow up.    Code Status: Full Code    Total cumulative time spent in preparation of this discharge including documentation review, coordination of care with the medical team including PT/SW/care coordinators and treating consultants, discussion with patient and pertinent family members and finalization of prescriptions, followup appointments and this discharge summary was approximately  45 minutes. Over 50% of the time was spent face-to-face counseling the patient on diagnosis, prescriptions, and follow up appointments.     Pertinent Physical Exam At Time of Discharge  Physical Exam  HENT:      Head: Normocephalic.      Nose: Nose normal.      Mouth/Throat:      Pharynx: Oropharynx is clear.   Eyes:      Conjunctiva/sclera: Conjunctivae normal.   Cardiovascular:      Rate and Rhythm: Normal rate and regular rhythm.   Pulmonary:      Effort: Pulmonary effort is normal.      Breath sounds: Normal breath sounds.   Abdominal:      General: Bowel sounds are normal.      Palpations: Abdomen is soft.   Musculoskeletal:         General: Normal range of motion.      Cervical back: Normal range of motion and neck supple.   Skin:      General: Skin is warm and dry.      Coloration: Skin is pale.   Neurological:      General: No focal deficit present.      Mental Status: She is alert and oriented to person, place, and time.   Psychiatric:         Mood and Affect: Mood normal.         Behavior: Behavior normal.     Outpatient Follow-Up  Future Appointments   Date Time Provider Department Center   2/14/2024 10:15 AM Augustina Ch MD [id number] [facility]   2/19/2024  9:15 AM GEN MAMMO 1 GENMAM GEN [place]    2/19/2024  9:45 AM GEN ULTRASOUND 1 GENUS GEN [place] M   2/20/2024 10:30 AM Aleyda Ca MD [id number] [facility]   2/20/2024 11:30 AM INF 10 GEAUGA SCCGEAINF [facility]   2/22/2024 10:30 AM Juju Kenney MD [id number] [facility]   2/27/2024  2:00 PM INF 05 GEAUGA SCCGEAINF [facility]   3/5/2024  2:30 PM INF 11 GEAUGA SCCGEAINF [facility]   3/19/2024  9:30 AM Aleyda Ca MD [id number] [facility]   3/19/2024 10:00 AM INF 11 GEAUGA SCCGEAINF [facility]   3/26/2024 10:00 AM INF 11 GEAUGA SCCGEAINF [facility]   4/2/2024 10:00 AM INF 11 GEAUGA SCCGEAINF [facility]   4/18/2024 10:15 AM Juju Kenney MD [id number] [facility]   5/7/2024 10:40 AM Dorys Crystal MD [id number] [facility]         Radha Baltazar, APRN-CNP

## 2024-02-13 NOTE — CARE PLAN
The patient's goals for the shift include      The clinical goals for the shift include EGD then antic discharge    Discharge home

## 2024-02-13 NOTE — CARE PLAN
The patient's goals for the shift include  main  Problem: Pain - Adult  Goal: Verbalizes/displays adequate comfort level or baseline comfort level  Outcome: Progressing     Problem: Safety - Adult  Goal: Free from fall injury  Outcome: Progressing     Problem: Diabetes  Goal: Achieve decreasing blood glucose levels by end of shift  Outcome: Progressing  Goal: Increase stability of blood glucose readings by end of shift  Outcome: Progressing  Goal: Decrease in ketones present in urine by end of shift  Outcome: Progressing  Goal: Maintain electrolyte levels within acceptable range throughout shift  Outcome: Progressing  Goal: Maintain glucose levels >70mg/dl to <250mg/dl throughout shift  Outcome: Progressing  Goal: No changes in neurological exam by end of shift  Outcome: Progressing  Goal: Learn about and adhere to nutrition recommendations by end of shift  Outcome: Progressing  Goal: Vital signs within normal range for age by end of shift  Outcome: Progressing  Goal: Increase self care and/or family involovement by end of shift  Outcome: Progressing  Goal: Receive DSME education by end of shift  Outcome: Progressing     Problem: Fall/Injury  Goal: Not fall by end of shift  Outcome: Progressing  Goal: Be free from injury by end of the shift  Outcome: Progressing  Goal: Verbalize understanding of personal risk factors for fall in the hospital  Outcome: Progressing  Goal: Verbalize understanding of risk factor reduction measures to prevent injury from fall in the home  Outcome: Progressing  Goal: Use assistive devices by end of the shift  Outcome: Progressing  Goal: Pace activities to prevent fatigue by end of the shift  Outcome: Progressing     Problem: Pain  Goal: Takes deep breaths with improved pain control throughout the shift  Outcome: Progressing  Goal: Turns in bed with improved pain control throughout the shift  Outcome: Progressing  Goal: Walks with improved pain control throughout the shift  Outcome:  Progressing  Goal: Performs ADL's with improved pain control throughout shift  Outcome: Progressing  Goal: Participates in PT with improved pain control throughout the shift  Outcome: Progressing  Goal: Free from opioid side effects throughout the shift  Outcome: Progressing  Goal: Free from acute confusion related to pain meds throughout the shift  Outcome: Progressing   tain afebrile    The clinical goals for the shift include Hgb will be >7 thru DC

## 2024-02-13 NOTE — PROGRESS NOTES
"Catalina Mendoza is a 73 y.o. female on day 0 of admission presenting with Anemia due to blood loss.    Subjective   Discussed with patient's gynecology oncologist.  Appropriate to recommend upper endoscopy.  No abdominal discomfort       Objective     Physical Exam  Constitutional:       Appearance: Normal appearance.   Abdominal:      Palpations: Abdomen is soft. There is no mass.      Tenderness: There is no abdominal tenderness. There is no guarding.      Hernia: No hernia is present.         Last Recorded Vitals  Blood pressure 122/68, pulse 71, temperature 36.5 °C (97.7 °F), temperature source Temporal, resp. rate 19, height 1.6 m (5' 3\"), weight 69.3 kg (152 lb 12.5 oz), SpO2 96 %.  Intake/Output last 3 Shifts:  I/O last 3 completed shifts:  In: 4047.1 (58.4 mL/kg) [P.O.:1280; I.V.:1998.3 (28.8 mL/kg); Blood:768.8]  Out: - (0 mL/kg)   Weight: 69.3 kg     Relevant Results                             Assessment/Plan   Principal Problem:    Anemia due to blood loss    Plan -   ESOPHAGOGASTRODODENOSCOPY/ BIOPSIES   Risks include, but not limited to pain, infection, bleeding, perforation,  missed lesions, aspiration, risk of cardiac, pulmonary, neurologic, locomotor, anesthetic events,  and other unforeseen complications including death.      Rhett Duckworth MD      "

## 2024-02-13 NOTE — Clinical Note
Pt transported off of this unit at this time. Pt Showing no signs of distress or discomfort. Pt safety measures in place. Pt  to transport Pt home Via car at this time.

## 2024-02-13 NOTE — NURSING NOTE
Pt transported via wheelchair for discharge at this time. Pt alert and oriented showing no signs of pain,distress or discomfort. Pt safety measures in place. Pt  to transport pt home via car at this time. Discharge instructions provided and sent home with pt.

## 2024-02-14 ENCOUNTER — OFFICE VISIT (OUTPATIENT)
Dept: ENDOCRINOLOGY | Facility: CLINIC | Age: 74
End: 2024-02-14
Payer: MEDICARE

## 2024-02-14 ENCOUNTER — TELEPHONE (OUTPATIENT)
Dept: PRIMARY CARE | Facility: CLINIC | Age: 74
End: 2024-02-14

## 2024-02-14 VITALS
HEIGHT: 63 IN | DIASTOLIC BLOOD PRESSURE: 70 MMHG | SYSTOLIC BLOOD PRESSURE: 132 MMHG | BODY MASS INDEX: 26.54 KG/M2 | RESPIRATION RATE: 16 BRPM | HEART RATE: 88 BPM | WEIGHT: 149.8 LBS

## 2024-02-14 DIAGNOSIS — I10 BENIGN ESSENTIAL HYPERTENSION: ICD-10-CM

## 2024-02-14 DIAGNOSIS — E10.9 TYPE 1 DIABETES MELLITUS WITHOUT COMPLICATION (MULTI): Primary | ICD-10-CM

## 2024-02-14 DIAGNOSIS — E03.9 ACQUIRED HYPOTHYROIDISM: ICD-10-CM

## 2024-02-14 PROBLEM — E11.9 TYPE 2 DIABETES MELLITUS WITHOUT COMPLICATION (MULTI): Status: RESOLVED | Noted: 2019-12-09 | Resolved: 2024-02-14

## 2024-02-14 PROBLEM — E11.9 TYPE 2 DIABETES MELLITUS (MULTI): Status: RESOLVED | Noted: 2023-04-05 | Resolved: 2024-02-14

## 2024-02-14 PROCEDURE — 3048F LDL-C <100 MG/DL: CPT | Performed by: INTERNAL MEDICINE

## 2024-02-14 PROCEDURE — 3052F HG A1C>EQUAL 8.0%<EQUAL 9.0%: CPT | Performed by: INTERNAL MEDICINE

## 2024-02-14 PROCEDURE — 1036F TOBACCO NON-USER: CPT | Performed by: INTERNAL MEDICINE

## 2024-02-14 PROCEDURE — 1159F MED LIST DOCD IN RCRD: CPT | Performed by: INTERNAL MEDICINE

## 2024-02-14 PROCEDURE — 1157F ADVNC CARE PLAN IN RCRD: CPT | Performed by: INTERNAL MEDICINE

## 2024-02-14 PROCEDURE — 3060F POS MICROALBUMINURIA REV: CPT | Performed by: INTERNAL MEDICINE

## 2024-02-14 PROCEDURE — 3075F SYST BP GE 130 - 139MM HG: CPT | Performed by: INTERNAL MEDICINE

## 2024-02-14 PROCEDURE — 1126F AMNT PAIN NOTED NONE PRSNT: CPT | Performed by: INTERNAL MEDICINE

## 2024-02-14 PROCEDURE — 99214 OFFICE O/P EST MOD 30 MIN: CPT | Performed by: INTERNAL MEDICINE

## 2024-02-14 PROCEDURE — 4010F ACE/ARB THERAPY RXD/TAKEN: CPT | Performed by: INTERNAL MEDICINE

## 2024-02-14 PROCEDURE — 3078F DIAST BP <80 MM HG: CPT | Performed by: INTERNAL MEDICINE

## 2024-02-14 PROCEDURE — 3008F BODY MASS INDEX DOCD: CPT | Performed by: INTERNAL MEDICINE

## 2024-02-14 PROCEDURE — 1160F RVW MEDS BY RX/DR IN RCRD: CPT | Performed by: INTERNAL MEDICINE

## 2024-02-14 RX ORDER — LEVOTHYROXINE SODIUM 112 UG/1
TABLET ORAL
COMMUNITY
Start: 2023-12-18 | End: 2024-03-18 | Stop reason: ALTCHOICE

## 2024-02-14 ASSESSMENT — ENCOUNTER SYMPTOMS
COUGH: 0
NAUSEA: 0
VOMITING: 0
HEADACHES: 0
FATIGUE: 0
PALPITATIONS: 0
FEVER: 0
SHORTNESS OF BREATH: 0
DIARRHEA: 0
CHILLS: 0

## 2024-02-14 NOTE — PROGRESS NOTES
Endocrinology: Follow up visit  Subjective   Patient ID: Catalina Mendoza is a 73 y.o. female who presents for Diabetes (Type 1 ), Hypothyroidism, Hyperlipidemia, and Hypertension.    PCP: Juju Kenney MD    HPI  Since last visit struggling with chemo.   Just discharged after admit for severe anemia.  Feels a little better after transfusion.  Sugars on review of dexcom clarity are fine.  Dealing well with steroids and adjusting meal insulin.  Not eating much but trying to do more protein drinks.  No lows recently  Needs to switch to admelog due to insurance change with next meal insulin fill  Review of Systems   Constitutional:  Negative for chills, fatigue and fever.   Respiratory:  Negative for cough and shortness of breath.    Cardiovascular:  Negative for chest pain and palpitations.   Gastrointestinal:  Negative for diarrhea, nausea and vomiting.   Neurological:  Negative for headaches.       Patient Active Problem List   Diagnosis    Abnormal computed tomography scan    Benign essential hypertension    CAD, multiple vessel    Fissure in skin of foot    Tinea pedis of both feet    Hyperlipidemia    Hypothyroidism    Motion sickness    Osteoarthritis    Pain due to onychomycosis of toenails of both feet    Pain, wrist joint    Urinary symptom or sign    Vitamin D deficiency    Primary malignant neoplasm of ovary with widespread metastatic disease (CMS/HCC)    Overweight with body mass index (BMI) of 28 to 28.9 in adult    Diabetic macular edema (CMS/HCC)    Diabetic nephropathy associated with type 1 diabetes mellitus (CMS/HCC)    Mixed hyperlipidemia    Acquired hypothyroidism    Diabetes mellitus (CMS/HCC)    Actinic keratosis    Scar    Squamous cell carcinoma of skin of lip    Poorly controlled diabetes mellitus (CMS/HCC)    Acute posthemorrhagic anemia    Fracture of bone    History of malignant neoplasm of lip    History of malignant neoplasm of skin    Postoperative state    Rib pain     "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)    Immunodeficiency, unspecified (CMS/HCC)    Drug-induced polyneuropathy (CMS/HCC)    Stage 3a chronic kidney disease (CMS/HCC)    Anemia due to blood loss        Home Meds:  Current Outpatient Medications   Medication Instructions    alcohol swabs (BD Alcohol Swabs) pads, medicated     calcium carbonate-vitamin D3 600 mg-20 mcg (800 unit) tablet 1 tablet, oral, Nightly    cholecalciferol (VITAMIN D-3) 25 mcg, oral, Daily    DULoxetine (CYMBALTA) 60 mg, oral, Nightly    fenofibrate (Triglide) 160 mg tablet TAKE 1 TABLET DAILY    gabapentin (NEURONTIN) 100 mg, oral, 3 times daily    glucagon (Gvoke HypoPen 2-Pack) 1 mg/0.2 mL auto-injector Use as directed    insulin lispro (HumaLOG) 100 unit/mL injection Inject with meal Up to 60 units a day    Lantus Solostar U-100 Insulin 30 Units, subcutaneous, Nightly    levothyroxine (SYNTHROID, LEVOXYL) 88 mcg, oral, Daily before breakfast    losartan (COZAAR) 50 mg, oral, Daily    metFORMIN XR (GLUCOPHAGE-XR) 500 mg, oral, 2 times daily, Do not crush, chew, or split.     multivit/folic acid/vit K1 (ONE-A-DAY WOMEN'S 50 PLUS ORAL) 1 tablet, oral, Nightly, Chew and swallow 1 tablet daily    pantoprazole (PROTONIX) 40 mg, oral, Daily before breakfast, Do not crush, chew, or split.    pen needle, diabetic 31 gauge x 3/16\" needle 4 per day    rosuvastatin (Crestor) 40 mg tablet TAKE 1 TABLET DAILY    sucralfate (CARAFATE) 1 g, oral, 4 times daily before meals and nightly    Synthroid 112 mcg tablet         Allergies   Allergen Reactions    Amoxicillin Unknown    Flu Vac 2020 [id number](Pf) Other     Dizziness, Double vision    Grass Pollen Unknown    House Dust Unknown    Mold Unknown    Other Dizziness     Dizziness, Double vision    Penicillins Other     unsure    Tree And Shrub Pollen Unknown        Objective   Vitals:    02/14/24 1046   BP: 132/70   Pulse: 88   Resp: 16      Vitals:    02/14/24 1046 "   Weight: 67.9 kg (149 lb 12.8 oz)      Body mass index is 26.54 kg/m².   Physical Exam  Constitutional:       Appearance: Normal appearance. She is normal weight.   HENT:      Head: Normocephalic and atraumatic.   Neck:      Thyroid: No thyroid mass, thyromegaly or thyroid tenderness.   Cardiovascular:      Rate and Rhythm: Normal rate and regular rhythm.      Heart sounds: No murmur heard.     No gallop.   Pulmonary:      Effort: Pulmonary effort is normal.      Breath sounds: Normal breath sounds.   Abdominal:      Palpations: Abdomen is soft.      Comments: benign   Neurological:      General: No focal deficit present.      Mental Status: She is alert and oriented to person, place, and time.      Deep Tendon Reflexes: Reflexes are normal and symmetric.   Psychiatric:         Behavior: Behavior is cooperative.         Labs:  Lab Results   Component Value Date    HGBA1C 8.2 (H) 01/22/2024    TSH 3.55 01/22/2024    FREET4 1.09 02/07/2023      Lab Results   Component Value Date    PR1  02/11/2024     Normocytic anemia and thrombocytopenia. Toxic changes in granulocytes. Clinical correlation is recommended.            Assessment/Plan   Problem List Items Addressed This Visit       Benign essential hypertension    Acquired hypothyroidism    Diabetes mellitus (CMS/HCC) - Primary   Dm1:  Sugars are doing well given recent events  Discussed no issues with moving to admelog  Encouraged her to call or message with concerns  Hypothyroidism:  Recent tsh normal  Htn:  Bp controlled  Follow up in 4 months      Electronically signed by:  Augustina Ch MD 02/14/24 9:34 PM

## 2024-02-14 NOTE — TELEPHONE ENCOUNTER
Transition of Care    Inpatient facility: [facility]   Discharge diagnosis: Anemia   Discharged to: home  Discharge date: 2/13/24  Initial Call date: 2/14/24  Spoke with patient/caregiver: patient                                                                      Do you need assistance  visits prior to your PCP visit: No  Home health care ordered: yes  Have you been contacted by home care and have a start of care date: Yes  Are you taking medications as prescribed at discharge: Yes    Referral to [facility] Pharmacist: No  Patient advised to bring all medications to PCP follow-up appointment.  Patient advised to follow discharge instructions until provider follow-up.  TCM visit date: 2/15/24  TCM provider visit with: Juju Kenney MD

## 2024-02-15 ENCOUNTER — TELEMEDICINE (OUTPATIENT)
Dept: PRIMARY CARE | Facility: CLINIC | Age: 74
End: 2024-02-15
Payer: MEDICARE

## 2024-02-15 DIAGNOSIS — R10.12 LEFT UPPER QUADRANT ABDOMINAL PAIN: ICD-10-CM

## 2024-02-15 DIAGNOSIS — E03.9 HYPOTHYROIDISM, UNSPECIFIED TYPE: ICD-10-CM

## 2024-02-15 DIAGNOSIS — I10 HYPERTENSION, UNSPECIFIED TYPE: ICD-10-CM

## 2024-02-15 DIAGNOSIS — E11.69 DIABETES MELLITUS TYPE 2 IN OBESE: ICD-10-CM

## 2024-02-15 DIAGNOSIS — I25.10 CAD, MULTIPLE VESSEL: ICD-10-CM

## 2024-02-15 DIAGNOSIS — C56.9 PRIMARY MALIGNANT NEOPLASM OF OVARY WITH WIDESPREAD METASTATIC DISEASE, UNSPECIFIED LATERALITY (MULTI): ICD-10-CM

## 2024-02-15 DIAGNOSIS — E78.00 HYPERCHOLESTEREMIA: ICD-10-CM

## 2024-02-15 DIAGNOSIS — C80.0 PRIMARY MALIGNANT NEOPLASM OF OVARY WITH WIDESPREAD METASTATIC DISEASE, UNSPECIFIED LATERALITY (MULTI): ICD-10-CM

## 2024-02-15 DIAGNOSIS — E66.9 DIABETES MELLITUS TYPE 2 IN OBESE: ICD-10-CM

## 2024-02-15 DIAGNOSIS — D50.0 ANEMIA DUE TO BLOOD LOSS: Primary | ICD-10-CM

## 2024-02-15 PROCEDURE — 3008F BODY MASS INDEX DOCD: CPT | Performed by: INTERNAL MEDICINE

## 2024-02-15 PROCEDURE — 3048F LDL-C <100 MG/DL: CPT | Performed by: INTERNAL MEDICINE

## 2024-02-15 PROCEDURE — 1126F AMNT PAIN NOTED NONE PRSNT: CPT | Performed by: INTERNAL MEDICINE

## 2024-02-15 PROCEDURE — 1160F RVW MEDS BY RX/DR IN RCRD: CPT | Performed by: INTERNAL MEDICINE

## 2024-02-15 PROCEDURE — 3052F HG A1C>EQUAL 8.0%<EQUAL 9.0%: CPT | Performed by: INTERNAL MEDICINE

## 2024-02-15 PROCEDURE — 1036F TOBACCO NON-USER: CPT | Performed by: INTERNAL MEDICINE

## 2024-02-15 PROCEDURE — 1157F ADVNC CARE PLAN IN RCRD: CPT | Performed by: INTERNAL MEDICINE

## 2024-02-15 PROCEDURE — 4010F ACE/ARB THERAPY RXD/TAKEN: CPT | Performed by: INTERNAL MEDICINE

## 2024-02-15 PROCEDURE — 99496 TRANSJ CARE MGMT HIGH F2F 7D: CPT | Performed by: INTERNAL MEDICINE

## 2024-02-15 PROCEDURE — 3060F POS MICROALBUMINURIA REV: CPT | Performed by: INTERNAL MEDICINE

## 2024-02-15 PROCEDURE — 1159F MED LIST DOCD IN RCRD: CPT | Performed by: INTERNAL MEDICINE

## 2024-02-15 NOTE — PROGRESS NOTES
Subjective   Patient ID: Catalina Mendoza is a 73 y.o. female who presents for Hospital Follow-up (TCM-  Anemia).  HPI    Tele visit    TCM    Dx symptomatic anemia due to blood loss       EGD negative      2 units blood transfused    LUQ pain x 2-21 unchanged  Now goes around upper abdomen  Before cancer  CT June rev'd responded well to chemo  On cymbalta   Saw cardio work up neg  pain mgmt pending     patient had surgery for stage III ovarian cancer on March 13, 2023.  Currently in chemo.  Oncology following  Now metastatic     DM -2  on insulin no side effects. Endocrine following.      Hypercholesterolemia on therapy no side effects     Hypothyroid on therapy no side effects     Hypertension  on therapy gets dizzy when stands up  on losartan 50 mg daily   follow     CAD stable on therapy no side effects     Diet and exercise reviewed           Review of Systems   All other systems reviewed and are negative.      Objective   LMP  (LMP Unknown)   Lab Results   Component Value Date    WBC 7.3 02/13/2024    HGB 8.0 (L) 02/13/2024    HCT 24.7 (L) 02/13/2024    PLT 27 (LL) 02/13/2024    CHOL 126 01/22/2024    TRIG 236 (H) 01/22/2024    HDL 29.3 01/22/2024    ALT 10 02/11/2024    AST 16 02/11/2024     02/13/2024    K 3.9 02/13/2024     (H) 02/13/2024    CREATININE 0.90 02/13/2024    BUN 10 02/13/2024    CO2 23 02/13/2024    TSH 3.55 01/22/2024    INR 1.2 (H) 02/11/2024    HGBA1C 8.2 (H) 01/22/2024           Physical Exam  Constitutional:       Appearance: Normal appearance.      Comments: pale   Pulmonary:      Effort: Pulmonary effort is normal.   Neurological:      Mental Status: She is alert.   Psychiatric:         Mood and Affect: Mood normal.         Problem List Items Addressed This Visit             ICD-10-CM    CAD, multiple vessel I25.10    Hypothyroidism E03.9    Primary malignant neoplasm of ovary with widespread metastatic disease (CMS/HCC) C56.9, C80.0    Anemia due to blood loss - Primary  D50.0     Other Visit Diagnoses         Codes    Diabetes mellitus type 2 in obese (CMS/HCC)     E11.69, E66.9    Left upper quadrant abdominal pain     R10.12    Hypertension, unspecified type     I10    Hypercholesteremia     E78.00          Assessment/Plan     Tele visit    TCM    Dx symptomatic anemia due to blood loss       EGD negative      2 units blood transfused      Feeling a little better      Oncology following labs next week    LUQ pain x 2-21 unchanged  Now goes around upper abdomen  Before cancer  CT June rev'd responded well to chemo  On cymbalta   Saw cardio work up neg  pain mgmt pending     patient had surgery for stage III ovarian cancer on March 13, 2023.  Currently in chemo.  Oncology following  Now metastatic     DM -2  on insulin no side effects. Endocrine following.      Hypercholesterolemia on therapy no side effects     Hypothyroid on therapy no side effects     Hypertension  on therapy gets dizzy when stands up  on losartan 50 mg daily   follow     CAD stable on therapy no side effects     Diet and exercise reviewed    Mammogram 2-23 / per oncology  Dexa n/a  Colonoscopy 2017  due 2027  CT chest lung cancer screening n/a  GYN n/a  immunizations rev'd RSV, COVID, Pneumo  (visual loss with flu yrs ago)  BMI 26.3    Follow up as scheduled

## 2024-02-19 ENCOUNTER — HOSPITAL ENCOUNTER (OUTPATIENT)
Dept: RADIOLOGY | Facility: HOSPITAL | Age: 74
Discharge: HOME | End: 2024-02-19
Payer: MEDICARE

## 2024-02-19 ENCOUNTER — LAB (OUTPATIENT)
Dept: LAB | Facility: LAB | Age: 74
End: 2024-02-19
Payer: MEDICARE

## 2024-02-19 ENCOUNTER — APPOINTMENT (OUTPATIENT)
Dept: RADIOLOGY | Facility: HOSPITAL | Age: 74
End: 2024-02-19
Payer: MEDICARE

## 2024-02-19 DIAGNOSIS — C80.0 PRIMARY MALIGNANT NEOPLASM OF OVARY WITH WIDESPREAD METASTATIC DISEASE, UNSPECIFIED LATERALITY (MULTI): ICD-10-CM

## 2024-02-19 DIAGNOSIS — C56.9 PRIMARY MALIGNANT NEOPLASM OF OVARY WITH WIDESPREAD METASTATIC DISEASE, UNSPECIFIED LATERALITY (MULTI): ICD-10-CM

## 2024-02-19 DIAGNOSIS — C56.9 MALIGNANT NEOPLASM OF UNSPECIFIED OVARY (MULTI): ICD-10-CM

## 2024-02-19 DIAGNOSIS — C44.02 SQUAMOUS CELL CARCINOMA OF SKIN OF LIP: ICD-10-CM

## 2024-02-19 DIAGNOSIS — R97.8 OTHER ABNORMAL TUMOR MARKERS: ICD-10-CM

## 2024-02-19 LAB
ALBUMIN SERPL BCP-MCNC: 4.5 G/DL (ref 3.4–5)
ALP SERPL-CCNC: 83 U/L (ref 33–136)
ALT SERPL W P-5'-P-CCNC: 13 U/L (ref 7–45)
ANION GAP SERPL CALC-SCNC: 14 MMOL/L (ref 10–20)
APPEARANCE UR: ABNORMAL
AST SERPL W P-5'-P-CCNC: 21 U/L (ref 9–39)
BASOPHILS # BLD MANUAL: 0.16 X10*3/UL (ref 0–0.1)
BASOPHILS NFR BLD MANUAL: 1 %
BILIRUB SERPL-MCNC: 0.4 MG/DL (ref 0–1.2)
BILIRUB UR STRIP.AUTO-MCNC: NEGATIVE MG/DL
BUN SERPL-MCNC: 13 MG/DL (ref 6–23)
CALCIUM SERPL-MCNC: 9.6 MG/DL (ref 8.6–10.3)
CANCER AG125 SERPL-ACNC: 28.3 U/ML (ref 0–30.2)
CHLORIDE SERPL-SCNC: 102 MMOL/L (ref 98–107)
CO2 SERPL-SCNC: 24 MMOL/L (ref 21–32)
COLOR UR: YELLOW
CREAT SERPL-MCNC: 1.01 MG/DL (ref 0.5–1.05)
EGFRCR SERPLBLD CKD-EPI 2021: 59 ML/MIN/1.73M*2
EOSINOPHIL # BLD MANUAL: 0 X10*3/UL (ref 0–0.4)
EOSINOPHIL NFR BLD MANUAL: 0 %
ERYTHROCYTE [DISTWIDTH] IN BLOOD BY AUTOMATED COUNT: 17.9 % (ref 11.5–14.5)
GLUCOSE SERPL-MCNC: 253 MG/DL (ref 74–99)
GLUCOSE UR STRIP.AUTO-MCNC: ABNORMAL MG/DL
HCT VFR BLD AUTO: 34.3 % (ref 36–46)
HGB BLD-MCNC: 10.6 G/DL (ref 12–16)
IMM GRANULOCYTES # BLD AUTO: 1.51 X10*3/UL (ref 0–0.5)
IMM GRANULOCYTES NFR BLD AUTO: 9.3 % (ref 0–0.9)
KETONES UR STRIP.AUTO-MCNC: NEGATIVE MG/DL
LEUKOCYTE ESTERASE UR QL STRIP.AUTO: ABNORMAL
LYMPHOCYTES # BLD MANUAL: 2.93 X10*3/UL (ref 0.8–3)
LYMPHOCYTES NFR BLD MANUAL: 18 %
MCH RBC QN AUTO: 30.5 PG (ref 26–34)
MCHC RBC AUTO-ENTMCNC: 30.9 G/DL (ref 32–36)
MCV RBC AUTO: 99 FL (ref 80–100)
METAMYELOCYTES # BLD MANUAL: 0.16 X10*3/UL
METAMYELOCYTES NFR BLD MANUAL: 1 %
MONOCYTES # BLD MANUAL: 0.98 X10*3/UL (ref 0.05–0.8)
MONOCYTES NFR BLD MANUAL: 6 %
MUCOUS THREADS #/AREA URNS AUTO: ABNORMAL /LPF
MYELOCYTES # BLD MANUAL: 0.33 X10*3/UL
MYELOCYTES NFR BLD MANUAL: 2 %
NEUTROPHILS # BLD MANUAL: 11.41 X10*3/UL (ref 1.6–5.5)
NEUTS BAND # BLD MANUAL: 1.14 X10*3/UL (ref 0–0.5)
NEUTS BAND NFR BLD MANUAL: 7 %
NEUTS SEG # BLD MANUAL: 10.27 X10*3/UL (ref 1.6–5)
NEUTS SEG NFR BLD MANUAL: 63 %
NITRITE UR QL STRIP.AUTO: NEGATIVE
NRBC BLD-RTO: 0.2 /100 WBCS (ref 0–0)
PH UR STRIP.AUTO: 5 [PH]
PLATELET # BLD AUTO: 322 X10*3/UL (ref 150–450)
POLYCHROMASIA BLD QL SMEAR: ABNORMAL
POTASSIUM SERPL-SCNC: 4.7 MMOL/L (ref 3.5–5.3)
PROT SERPL-MCNC: 7.3 G/DL (ref 6.4–8.2)
PROT UR STRIP.AUTO-MCNC: ABNORMAL MG/DL
RBC # BLD AUTO: 3.47 X10*6/UL (ref 4–5.2)
RBC # UR STRIP.AUTO: NEGATIVE /UL
RBC #/AREA URNS AUTO: ABNORMAL /HPF
RBC MORPH BLD: ABNORMAL
SODIUM SERPL-SCNC: 135 MMOL/L (ref 136–145)
SP GR UR STRIP.AUTO: 1.02
SQUAMOUS #/AREA URNS AUTO: ABNORMAL /HPF
TOTAL CELLS COUNTED BLD: 100
TRANS CELLS #/AREA UR COMP ASSIST: ABNORMAL /HPF
UROBILINOGEN UR STRIP.AUTO-MCNC: <2 MG/DL
VARIANT LYMPHS # BLD MANUAL: 0.33 X10*3/UL (ref 0–0.3)
VARIANT LYMPHS NFR BLD: 2 %
WBC # BLD AUTO: 16.3 X10*3/UL (ref 4.4–11.3)
WBC #/AREA URNS AUTO: >50 /HPF

## 2024-02-19 PROCEDURE — 85007 BL SMEAR W/DIFF WBC COUNT: CPT

## 2024-02-19 PROCEDURE — 36415 COLL VENOUS BLD VENIPUNCTURE: CPT

## 2024-02-19 PROCEDURE — 85027 COMPLETE CBC AUTOMATED: CPT

## 2024-02-19 PROCEDURE — 81001 URINALYSIS AUTO W/SCOPE: CPT

## 2024-02-19 PROCEDURE — 77062 BREAST TOMOSYNTHESIS BI: CPT

## 2024-02-19 PROCEDURE — 86304 IMMUNOASSAY TUMOR CA 125: CPT

## 2024-02-19 PROCEDURE — G0279 TOMOSYNTHESIS, MAMMO: HCPCS | Performed by: RADIOLOGY

## 2024-02-19 PROCEDURE — 77066 DX MAMMO INCL CAD BI: CPT | Performed by: RADIOLOGY

## 2024-02-19 PROCEDURE — 80053 COMPREHEN METABOLIC PANEL: CPT

## 2024-02-20 ENCOUNTER — OFFICE VISIT (OUTPATIENT)
Dept: HEMATOLOGY/ONCOLOGY | Facility: CLINIC | Age: 74
End: 2024-02-20
Payer: MEDICARE

## 2024-02-20 ENCOUNTER — INFUSION (OUTPATIENT)
Dept: HEMATOLOGY/ONCOLOGY | Facility: CLINIC | Age: 74
End: 2024-02-20
Payer: MEDICARE

## 2024-02-20 VITALS
WEIGHT: 148.37 LBS | OXYGEN SATURATION: 96 % | DIASTOLIC BLOOD PRESSURE: 73 MMHG | TEMPERATURE: 97.5 F | RESPIRATION RATE: 17 BRPM | BODY MASS INDEX: 26.28 KG/M2 | HEART RATE: 88 BPM | SYSTOLIC BLOOD PRESSURE: 136 MMHG

## 2024-02-20 DIAGNOSIS — C80.0 PRIMARY MALIGNANT NEOPLASM OF OVARY WITH WIDESPREAD METASTATIC DISEASE, UNSPECIFIED LATERALITY (MULTI): ICD-10-CM

## 2024-02-20 DIAGNOSIS — C44.02 SQUAMOUS CELL CARCINOMA OF SKIN OF LIP: ICD-10-CM

## 2024-02-20 DIAGNOSIS — C56.9 PRIMARY MALIGNANT NEOPLASM OF OVARY WITH WIDESPREAD METASTATIC DISEASE, UNSPECIFIED LATERALITY (MULTI): ICD-10-CM

## 2024-02-20 LAB
LABORATORY COMMENT REPORT: NORMAL
PATH REPORT.FINAL DX SPEC: NORMAL
PATH REPORT.GROSS SPEC: NORMAL
PATH REPORT.TOTAL CANCER: NORMAL

## 2024-02-20 PROCEDURE — 2500000004 HC RX 250 GENERAL PHARMACY W/ HCPCS (ALT 636 FOR OP/ED): Mod: JZ,JG | Performed by: INTERNAL MEDICINE

## 2024-02-20 PROCEDURE — 2500000004 HC RX 250 GENERAL PHARMACY W/ HCPCS (ALT 636 FOR OP/ED): Performed by: INTERNAL MEDICINE

## 2024-02-20 PROCEDURE — 3008F BODY MASS INDEX DOCD: CPT | Performed by: INTERNAL MEDICINE

## 2024-02-20 PROCEDURE — 96417 CHEMO IV INFUS EACH ADDL SEQ: CPT

## 2024-02-20 PROCEDURE — 1157F ADVNC CARE PLAN IN RCRD: CPT | Performed by: INTERNAL MEDICINE

## 2024-02-20 PROCEDURE — 96375 TX/PRO/DX INJ NEW DRUG ADDON: CPT | Mod: INF

## 2024-02-20 PROCEDURE — 3078F DIAST BP <80 MM HG: CPT | Performed by: INTERNAL MEDICINE

## 2024-02-20 PROCEDURE — 3052F HG A1C>EQUAL 8.0%<EQUAL 9.0%: CPT | Performed by: INTERNAL MEDICINE

## 2024-02-20 PROCEDURE — 1160F RVW MEDS BY RX/DR IN RCRD: CPT | Performed by: INTERNAL MEDICINE

## 2024-02-20 PROCEDURE — 1125F AMNT PAIN NOTED PAIN PRSNT: CPT | Performed by: INTERNAL MEDICINE

## 2024-02-20 PROCEDURE — 99215 OFFICE O/P EST HI 40 MIN: CPT | Performed by: INTERNAL MEDICINE

## 2024-02-20 PROCEDURE — 3075F SYST BP GE 130 - 139MM HG: CPT | Performed by: INTERNAL MEDICINE

## 2024-02-20 PROCEDURE — 1159F MED LIST DOCD IN RCRD: CPT | Performed by: INTERNAL MEDICINE

## 2024-02-20 PROCEDURE — 96413 CHEMO IV INFUSION 1 HR: CPT

## 2024-02-20 PROCEDURE — 3048F LDL-C <100 MG/DL: CPT | Performed by: INTERNAL MEDICINE

## 2024-02-20 PROCEDURE — 4010F ACE/ARB THERAPY RXD/TAKEN: CPT | Performed by: INTERNAL MEDICINE

## 2024-02-20 PROCEDURE — 3060F POS MICROALBUMINURIA REV: CPT | Performed by: INTERNAL MEDICINE

## 2024-02-20 PROCEDURE — 1036F TOBACCO NON-USER: CPT | Performed by: INTERNAL MEDICINE

## 2024-02-20 RX ORDER — EPINEPHRINE 0.3 MG/.3ML
0.3 INJECTION SUBCUTANEOUS EVERY 5 MIN PRN
Status: DISCONTINUED | OUTPATIENT
Start: 2024-02-20 | End: 2024-02-20 | Stop reason: HOSPADM

## 2024-02-20 RX ORDER — DIPHENHYDRAMINE HYDROCHLORIDE 50 MG/ML
50 INJECTION INTRAMUSCULAR; INTRAVENOUS AS NEEDED
Status: DISCONTINUED | OUTPATIENT
Start: 2024-02-20 | End: 2024-02-20 | Stop reason: HOSPADM

## 2024-02-20 RX ORDER — PROCHLORPERAZINE EDISYLATE 5 MG/ML
10 INJECTION INTRAMUSCULAR; INTRAVENOUS EVERY 6 HOURS PRN
Status: DISCONTINUED | OUTPATIENT
Start: 2024-02-20 | End: 2024-02-20 | Stop reason: HOSPADM

## 2024-02-20 RX ORDER — ONDANSETRON HYDROCHLORIDE 2 MG/ML
8 INJECTION, SOLUTION INTRAVENOUS ONCE
Status: COMPLETED | OUTPATIENT
Start: 2024-02-20 | End: 2024-02-20

## 2024-02-20 RX ORDER — ALBUTEROL SULFATE 0.83 MG/ML
3 SOLUTION RESPIRATORY (INHALATION) AS NEEDED
Status: DISCONTINUED | OUTPATIENT
Start: 2024-02-20 | End: 2024-02-20 | Stop reason: HOSPADM

## 2024-02-20 RX ORDER — FAMOTIDINE 10 MG/ML
20 INJECTION INTRAVENOUS ONCE AS NEEDED
Status: DISCONTINUED | OUTPATIENT
Start: 2024-02-20 | End: 2024-02-20 | Stop reason: HOSPADM

## 2024-02-20 RX ORDER — HEPARIN 100 UNIT/ML
500 SYRINGE INTRAVENOUS AS NEEDED
Status: CANCELLED | OUTPATIENT
Start: 2024-02-20

## 2024-02-20 RX ORDER — HEPARIN 100 UNIT/ML
500 SYRINGE INTRAVENOUS AS NEEDED
Status: DISCONTINUED | OUTPATIENT
Start: 2024-02-20 | End: 2024-02-20 | Stop reason: HOSPADM

## 2024-02-20 RX ORDER — PROCHLORPERAZINE MALEATE 10 MG
10 TABLET ORAL EVERY 6 HOURS PRN
Status: DISCONTINUED | OUTPATIENT
Start: 2024-02-20 | End: 2024-02-20 | Stop reason: HOSPADM

## 2024-02-20 RX ORDER — HEPARIN SODIUM,PORCINE/PF 10 UNIT/ML
50 SYRINGE (ML) INTRAVENOUS AS NEEDED
Status: CANCELLED | OUTPATIENT
Start: 2024-02-20

## 2024-02-20 RX ADMIN — ONDANSETRON 8 MG: 2 INJECTION INTRAMUSCULAR; INTRAVENOUS at 12:19

## 2024-02-20 RX ADMIN — TOPOTECAN 7 MG: 1 INJECTION, SOLUTION, CONCENTRATE INTRAVENOUS at 12:40

## 2024-02-20 RX ADMIN — BEVACIZUMAB-AWWB 700 MG: 400 INJECTION, SOLUTION INTRAVENOUS at 13:22

## 2024-02-20 RX ADMIN — HEPARIN 500 UNITS: 100 SYRINGE at 13:49

## 2024-02-20 ASSESSMENT — PAIN SCALES - GENERAL: PAINLEVEL: 3

## 2024-02-20 NOTE — PATIENT INSTRUCTIONS
Today you met with Dr. Ca.  You will continue on your treatment plan as it.  No changes.  Happy your blood counts have improved since your transfusion.  Remember those symptoms that you were feeling and if you start to feel them again do not hesitate to call the office and let us know.      Please call the office for any questions or concerns.  We ask that you notify us 5-7 days before your medications need refilled.  HARDEEP Bailey is strongly encouraging all patients to access their MyChart for all results and to communicate with their provider for non-urgent messages.  If an urgent/same day/sick concern response is needed, please call the office at [phone]. Thank You!

## 2024-02-20 NOTE — SIGNIFICANT EVENT
02/20/24 1147   Prechemo Checklist   Has the patient been in the hospital, ED, or urgent care since last date of service No   Chemo/Immuno Consent Signed Yes   Protocol/Indications Verified Yes   Confirmed to previous date/time of medication Yes   Compared to previous dose Yes   All medications are dated accurately Yes   Pregnancy Test Negative Not applicable   Parameters Met Yes   Provider Notified Yes   Is Patient Proceeding With Treatment? Yes   BSA/Weight-Height Verified Yes   Dose Calculations Verified Yes

## 2024-02-20 NOTE — PROGRESS NOTES
Patient ID: Catalina Mendoza is a 73 y.o. female.  Referring Physician: Aleyda Ca MD  [address] [place] [address]  Primary Care Provider: Juju Kenney MD  Visit Type:  Follow Up     Subjective    HPI  Ovarian cancer with malignant ascites.  CT scan gave 12 19 2022.  compared with 12/23 2022.  Mediastinal and abdominal lymphadenopathy which demonstrated abnormal  FDG [id number]/19/2052.  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 Discussed with patient       woman [presented at 72 years old] who presents today with abdominal distention and CT scan showing omental nodularity and extensive abdominal adenopathy.  Her  is 2320 with  elevation in CA 19-9 as well.  INR guided paracentesis as well as cytology of malignant ascitic fluid is pending at this time.  Clinical diagnosis and impression is that of ovarian adenocarcinoma stage III\4.  Full staging CT scans ordered and pathology  reports pending.      04/04/2023: Below is notes copied from OB/GYN.  # [facility]   - We reviewed the typical pathophysiology and management of ovarian cancer, we discussed that ovarian cancer is usually managed with a combination of chemotherapy and surgery  - We reviewed her imaging    - Chest lymph nodes specifically were significantly improved prior to 3rd chemo   - Schedule visit for genetic counseling to r/o hereditary cancer syndromes and/or targeted therapy markers  - Continue monitoring tumor markers  - She is not a candidate for HIPEC  - We discussed the plan for adjuvant chemo following surgery. Adjuvant chemotherapy cycles after surgery can be managed by gyn onc or med onc   12/27/2022-2/7/2023: 3 Cycles Carbo Taxol: Had surgery and resumed Chemotherapy 4/13/2023-5/23/2023.  was  still in double digits of 85.  Patient not a candidate for olaparib.     06/13/2023: Most recent  is 18 well within normal.  We will discussed with patient with the option to  observe and to intervene if we should see a  rise or morphologic evidence of disease recurrence.  Patient is platinum sensitive and has had  good response at this time.  Maintenance or other intervention will be dependent on either a rising  or morphologic evidence of disease persistence.    On Topotecan and Bevacizumab D1 Cycle 5 today:     Review of Systems - Oncology   IMPRESSION:  CT findings compatible with a favorable response to treatment as evidenced by an interval reduction in the size of the previously described nodule/lymph node anterior to the distal stomach, as well  as the retroperitoneal lymph nodes.. No new or enlarging soft tissue nodules or lymph nodes.      Signed by: Alf Lemus 2/2/2024  Objective   BSA: 1.73 meters squared  /73 (BP Location: Right arm)   Pulse 88   Temp 36.4 °C (97.5 °F)   Resp 17   Wt 67.3 kg (148 lb 5.9 oz)   LMP  (LMP Unknown)   SpO2 96%   BMI 26.28 kg/m²      has a past medical history of Encounter for immunization (10/03/2016), Other injury of unspecified body region, initial encounter, Personal history of malignant neoplasm of unspecified site of lip, oral cavity, and pharynx, Personal history of other complications of pregnancy, childbirth and the puerperium, and Personal history of other malignant neoplasm of skin.   has a past surgical history that includes Hysterectomy (09/28/2015); Appendectomy (09/28/2015); Other surgical history (09/28/2015); Eye surgery (06/20/2016); Cholecystectomy (06/20/2016); and US guided abdominal paracentesis (12/15/2022).  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, started on NACT with carbo/taxol  - 2/7/23: S/p cycle 3 with good interval response  - 3/13/23: Diagnostic laparoscopy, BSO, extensive MIKAELA, bilateral ureterolysis, infracolic omentectomy, abdominal wall and mesenteric biopsies, complete gross resection to R0  - 5/23/23: Chemo completed     Primary malignant neoplasm of ovary with widespread metastatic disease (CMS/HCC)   4/5/2023 Initial Diagnosis    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 She has never been exposed to tobacco smoke. She has never used smokeless tobacco.  She  reports no history of alcohol use.  She  reports no history of drug use.    Physical Exam    WBC   Date/Time Value Ref Range Status   02/19/2024 10:04 AM 16.3 (H) 4.4 - 11.3 x10*3/uL Final   02/13/2024 06:03 AM 7.3 4.4 - 11.3 x10*3/uL Final   02/12/2024 06:15 AM 5.3 4.4 - 11.3 x10*3/uL Final     nRBC   Date Value Ref Range Status   02/19/2024 0.2 (H) 0.0 - 0.0 /100 WBCs Final   02/13/2024 0.0 0.0 - 0.0 /100 WBCs Final   02/12/2024 0.0 0.0 - 0.0 /100 WBCs Final     RBC   Date Value Ref Range Status   02/19/2024 3.47 (L) 4.00 - 5.20 x10*6/uL Final   02/13/2024 2.63 (L) 4.00 - 5.20 x10*6/uL Final   02/12/2024 2.77 (L) 4.00 - 5.20 x10*6/uL Final     Hemoglobin   Date Value Ref Range Status   02/19/2024 10.6 (L) 12.0 - 16.0 g/dL Final   02/13/2024 8.0 (L) 12.0 - 16.0 g/dL Final   02/12/2024 8.5 (L) 12.0 - 16.0 g/dL Final     Hematocrit   Date  Value Ref Range Status   02/19/2024 34.3 (L) 36.0 - 46.0 % Final   02/13/2024 24.7 (L) 36.0 - 46.0 % Final   02/12/2024 25.2 (L) 36.0 - 46.0 % Final     MCV   Date/Time Value Ref Range Status   02/19/2024 10:04 AM 99 80 - 100 fL Final   02/13/2024 06:03 AM 94 80 - 100 fL Final   02/12/2024 06:15 AM 91 80 - 100 fL Final     MCH   Date/Time Value Ref Range Status   02/19/2024 10:04 AM 30.5 26.0 - 34.0 pg Final   02/13/2024 06:03 AM 30.4 26.0 - 34.0 pg Final   02/12/2024 06:15 AM 30.7 26.0 - 34.0 pg Final     MCHC   Date/Time Value Ref Range Status   02/19/2024 10:04 AM 30.9 (L) 32.0 - 36.0 g/dL Final   02/13/2024 06:03 AM 32.4 32.0 - 36.0 g/dL Final   02/12/2024 06:15 AM 33.7 32.0 - 36.0 g/dL Final     RDW   Date/Time Value Ref Range Status   02/19/2024 10:04 AM 17.9 (H) 11.5 - 14.5 % Final   02/13/2024 06:03 AM 16.6 (H) 11.5 - 14.5 % Final   02/12/2024 06:15 AM 16.7 (H) 11.5 - 14.5 % Final     Platelets   Date/Time Value Ref Range Status   02/19/2024 10:04  150 - 450 x10*3/uL Final   02/13/2024 06:03 AM 27 (LL) 150 - 450 x10*3/uL Final   02/12/2024 06:15 AM 41 (L) 150 - 450 x10*3/uL Final     MPV   Date/Time Value Ref Range Status   10/27/2023 08:21 AM 9.8 7.5 - 11.5 fL Final     Neutrophils %   Date/Time Value Ref Range Status   01/29/2024 11:23 AM 33.9 40.0 - 80.0 % Final   01/08/2024 10:17 AM 27.5 40.0 - 80.0 % Final   12/26/2023 11:00 AM 50.6 40.0 - 80.0 % Final     Immature Granulocytes %, Automated   Date/Time Value Ref Range Status   02/19/2024 10:04 AM 9.3 (H) 0.0 - 0.9 % Final     Comment:     Immature Granulocyte Count (IG) includes promyelocytes, myelocytes and metamyelocytes but does not include bands. Percent differential counts (%) should be interpreted in the context of the absolute cell counts (cells/UL).   02/11/2024 01:40 PM 0.9 0.0 - 0.9 % Final     Comment:     Immature Granulocyte Count (IG) includes promyelocytes, myelocytes and metamyelocytes but does not include bands. Percent  differential counts (%) should be interpreted in the context of the absolute cell counts (cells/UL).   02/05/2024 09:02 AM 0.3 0.0 - 0.9 % Final     Comment:     Immature Granulocyte Count (IG) includes promyelocytes, myelocytes and metamyelocytes but does not include bands. Percent differential counts (%) should be interpreted in the context of the absolute cell counts (cells/UL).     Lymphocytes %, Manual   Date/Time Value Ref Range Status   02/19/2024 10:04 AM 18.0 13.0 - 44.0 % Final   02/11/2024 01:40 PM 33.0 13.0 - 44.0 % Final   02/05/2024 09:02 AM 58.0 13.0 - 44.0 % Final     Monocytes %, Manual   Date/Time Value Ref Range Status   02/19/2024 10:04 AM 6.0 2.0 - 10.0 % Final   02/11/2024 01:40 PM 5.0 2.0 - 10.0 % Final   02/05/2024 09:02 AM 1.0 2.0 - 10.0 % Final     Eosinophils %, Manual   Date/Time Value Ref Range Status   02/19/2024 10:04 AM 0.0 0.0 - 6.0 % Final   02/11/2024 01:40 PM 4.0 0.0 - 6.0 % Final   02/05/2024 09:02 AM 7.0 0.0 - 6.0 % Final     Basophils %, Manual   Date/Time Value Ref Range Status   02/19/2024 10:04 AM 1.0 0.0 - 2.0 % Final   02/11/2024 01:40 PM 0.0 0.0 - 2.0 % Final   02/05/2024 09:02 AM 2.0 0.0 - 2.0 % Final     Neutrophils Absolute   Date/Time Value Ref Range Status   01/29/2024 11:23 AM 1.34 (L) 1.60 - 5.50 x10*3/uL Final     Comment:     Percent differential counts (%) should be interpreted in the context of the absolute cell counts (cells/uL).   01/08/2024 10:17 AM 0.94 (L) 1.60 - 5.50 x10*3/uL Final     Comment:     Percent differential counts (%) should be interpreted in the context of the absolute cell counts (cells/uL).   12/26/2023 11:00 AM 4.65 1.60 - 5.50 x10*3/uL Final     Comment:     Percent differential counts (%) should be interpreted in the context of the absolute cell counts (cells/uL).     Immature Granulocytes Absolute, Automated   Date/Time Value Ref Range Status   02/19/2024 10:04 AM 1.51 (H) 0.00 - 0.50 x10*3/uL Final   02/11/2024 01:40 PM 0.03 0.00 -  "0.50 x10*3/uL Final   02/05/2024 09:02 AM 0.01 0.00 - 0.50 x10*3/uL Final     Lymphocytes Absolute   Date/Time Value Ref Range Status   01/29/2024 11:23 AM 2.14 0.80 - 3.00 x10*3/uL Final   01/08/2024 10:17 AM 2.12 0.80 - 3.00 x10*3/uL Final   12/26/2023 11:00 AM 3.20 (H) 0.80 - 3.00 x10*3/uL Final     Monocytes Absolute   Date/Time Value Ref Range Status   01/29/2024 11:23 AM 0.27 0.05 - 0.80 x10*3/uL Final   01/08/2024 10:17 AM 0.16 0.05 - 0.80 x10*3/uL Final   12/26/2023 11:00 AM 0.87 (H) 0.05 - 0.80 x10*3/uL Final     Eosinophils Absolute, Manual   Date/Time Value Ref Range Status   02/19/2024 10:04 AM 0.00 0.00 - 0.40 x10*3/uL Final   02/11/2024 01:40 PM 0.14 0.00 - 0.40 x10*3/uL Final   02/05/2024 09:02 AM 0.28 0.00 - 0.40 x10*3/uL Final     Basophils Absolute, Manual   Date/Time Value Ref Range Status   02/19/2024 10:04 AM 0.16 (H) 0.00 - 0.10 x10*3/uL Final   02/11/2024 01:40 PM 0.00 0.00 - 0.10 x10*3/uL Final   02/05/2024 09:02 AM 0.08 0.00 - 0.10 x10*3/uL Final       No components found for: \"PT\"  aPTT   Date/Time Value Ref Range Status   02/11/2024 01:40 PM 24 (L) 27 - 38 seconds Final       Assessment/Plan      Patient with high-grade histopathologic hide risk recurrent ovarian cancer.  Initially treated with platinum based therapy with recurrence currently receiving topotecan plus Avastin.   on the downward trend ordered today.  CT chest abdomen and pelvis ordered for evaluation.      IMPRESSION:  CT findings compatible with a favorable response to treatment as evidenced by an interval reduction in the size of the previously described nodule/lymph node anterior to the distal stomach, as well  as the retroperitoneal lymph nodes.. No new or enlarging soft tissue nodules or lymph nodes.      Signed by: Alf Lemus 2/2/2024    Cleared for chemotherapy.  Previous chemotherapy resulted in cytopenias and currently on cytokine support. On Sucralfate and Protonix for 2 weeks:     Diagnoses and all orders for " this visit:  Squamous cell carcinoma of skin of lip  -     Clinic Appointment Request  Primary malignant neoplasm of ovary with widespread metastatic disease, unspecified laterality (CMS/HCC)  -     Clinic Appointment Request           Aleyda Ca MD

## 2024-02-20 NOTE — PROGRESS NOTES
1355. Infusions completed without incident. Schedule reviewed then Dc'd via independent / ambulatory

## 2024-02-22 ENCOUNTER — APPOINTMENT (OUTPATIENT)
Dept: PRIMARY CARE | Facility: CLINIC | Age: 74
End: 2024-02-22
Payer: MEDICARE

## 2024-02-26 ENCOUNTER — LAB (OUTPATIENT)
Dept: LAB | Facility: LAB | Age: 74
End: 2024-02-26
Payer: MEDICARE

## 2024-02-26 DIAGNOSIS — C56.9 PRIMARY MALIGNANT NEOPLASM OF OVARY WITH WIDESPREAD METASTATIC DISEASE, UNSPECIFIED LATERALITY (MULTI): ICD-10-CM

## 2024-02-26 DIAGNOSIS — C44.02 SQUAMOUS CELL CARCINOMA OF SKIN OF LIP: ICD-10-CM

## 2024-02-26 DIAGNOSIS — C80.0 PRIMARY MALIGNANT NEOPLASM OF OVARY WITH WIDESPREAD METASTATIC DISEASE, UNSPECIFIED LATERALITY (MULTI): ICD-10-CM

## 2024-02-26 LAB
BASOPHILS # BLD AUTO: 0.07 X10*3/UL (ref 0–0.1)
BASOPHILS NFR BLD AUTO: 1.9 %
DACRYOCYTES BLD QL SMEAR: NORMAL
EOSINOPHIL # BLD AUTO: 0.08 X10*3/UL (ref 0–0.4)
EOSINOPHIL NFR BLD AUTO: 2.2 %
ERYTHROCYTE [DISTWIDTH] IN BLOOD BY AUTOMATED COUNT: 16.7 % (ref 11.5–14.5)
HCT VFR BLD AUTO: 31.1 % (ref 36–46)
HGB BLD-MCNC: 9.7 G/DL (ref 12–16)
IMM GRANULOCYTES # BLD AUTO: 0.01 X10*3/UL (ref 0–0.5)
IMM GRANULOCYTES NFR BLD AUTO: 0.3 % (ref 0–0.9)
LYMPHOCYTES # BLD AUTO: 2.04 X10*3/UL (ref 0.8–3)
LYMPHOCYTES NFR BLD AUTO: 56.5 %
MCH RBC QN AUTO: 30.4 PG (ref 26–34)
MCHC RBC AUTO-ENTMCNC: 31.2 G/DL (ref 32–36)
MCV RBC AUTO: 98 FL (ref 80–100)
MONOCYTES # BLD AUTO: 0.44 X10*3/UL (ref 0.05–0.8)
MONOCYTES NFR BLD AUTO: 12.2 %
NEUTROPHILS # BLD AUTO: 0.97 X10*3/UL (ref 1.6–5.5)
NEUTROPHILS NFR BLD AUTO: 26.9 %
NRBC BLD-RTO: 0 /100 WBCS (ref 0–0)
PLATELET # BLD AUTO: 437 X10*3/UL (ref 150–450)
RBC # BLD AUTO: 3.19 X10*6/UL (ref 4–5.2)
RBC MORPH BLD: NORMAL
SCHISTOCYTES BLD QL SMEAR: NORMAL
WBC # BLD AUTO: 3.6 X10*3/UL (ref 4.4–11.3)

## 2024-02-27 ENCOUNTER — INFUSION (OUTPATIENT)
Dept: HEMATOLOGY/ONCOLOGY | Facility: CLINIC | Age: 74
End: 2024-02-27
Payer: MEDICARE

## 2024-02-27 VITALS
TEMPERATURE: 97 F | DIASTOLIC BLOOD PRESSURE: 72 MMHG | SYSTOLIC BLOOD PRESSURE: 114 MMHG | WEIGHT: 148.15 LBS | RESPIRATION RATE: 18 BRPM | OXYGEN SATURATION: 97 % | BODY MASS INDEX: 26.24 KG/M2 | HEART RATE: 88 BPM

## 2024-02-27 DIAGNOSIS — C80.0 PRIMARY MALIGNANT NEOPLASM OF OVARY WITH WIDESPREAD METASTATIC DISEASE, UNSPECIFIED LATERALITY (MULTI): ICD-10-CM

## 2024-02-27 DIAGNOSIS — C56.9 PRIMARY MALIGNANT NEOPLASM OF OVARY WITH WIDESPREAD METASTATIC DISEASE, UNSPECIFIED LATERALITY (MULTI): ICD-10-CM

## 2024-02-27 DIAGNOSIS — C44.02 SQUAMOUS CELL CARCINOMA OF SKIN OF LIP: ICD-10-CM

## 2024-02-27 DIAGNOSIS — G62.0 DRUG-INDUCED POLYNEUROPATHY (MULTI): Primary | ICD-10-CM

## 2024-02-27 PROCEDURE — 2500000004 HC RX 250 GENERAL PHARMACY W/ HCPCS (ALT 636 FOR OP/ED): Performed by: INTERNAL MEDICINE

## 2024-02-27 PROCEDURE — 96413 CHEMO IV INFUSION 1 HR: CPT

## 2024-02-27 PROCEDURE — 96375 TX/PRO/DX INJ NEW DRUG ADDON: CPT | Mod: INF

## 2024-02-27 RX ORDER — ONDANSETRON HYDROCHLORIDE 2 MG/ML
8 INJECTION, SOLUTION INTRAVENOUS ONCE
Status: COMPLETED | OUTPATIENT
Start: 2024-02-27 | End: 2024-02-27

## 2024-02-27 RX ORDER — PROCHLORPERAZINE MALEATE 10 MG
10 TABLET ORAL EVERY 6 HOURS PRN
Status: DISCONTINUED | OUTPATIENT
Start: 2024-02-27 | End: 2024-02-27 | Stop reason: HOSPADM

## 2024-02-27 RX ORDER — HEPARIN SODIUM,PORCINE/PF 10 UNIT/ML
50 SYRINGE (ML) INTRAVENOUS AS NEEDED
Status: DISCONTINUED | OUTPATIENT
Start: 2024-02-27 | End: 2024-02-27 | Stop reason: HOSPADM

## 2024-02-27 RX ORDER — DULOXETIN HYDROCHLORIDE 60 MG/1
60 CAPSULE, DELAYED RELEASE ORAL NIGHTLY
Qty: 30 CAPSULE | Refills: 6 | Status: SHIPPED | OUTPATIENT
Start: 2024-02-27 | End: 2024-02-27 | Stop reason: ALTCHOICE

## 2024-02-27 RX ORDER — FAMOTIDINE 10 MG/ML
20 INJECTION INTRAVENOUS ONCE AS NEEDED
Status: DISCONTINUED | OUTPATIENT
Start: 2024-02-27 | End: 2024-02-27 | Stop reason: HOSPADM

## 2024-02-27 RX ORDER — EPINEPHRINE 0.3 MG/.3ML
0.3 INJECTION SUBCUTANEOUS EVERY 5 MIN PRN
Status: DISCONTINUED | OUTPATIENT
Start: 2024-02-27 | End: 2024-02-27 | Stop reason: HOSPADM

## 2024-02-27 RX ORDER — HEPARIN 100 UNIT/ML
500 SYRINGE INTRAVENOUS AS NEEDED
Status: DISCONTINUED | OUTPATIENT
Start: 2024-02-27 | End: 2024-02-27 | Stop reason: HOSPADM

## 2024-02-27 RX ORDER — ALBUTEROL SULFATE 0.83 MG/ML
3 SOLUTION RESPIRATORY (INHALATION) AS NEEDED
Status: DISCONTINUED | OUTPATIENT
Start: 2024-02-27 | End: 2024-02-27 | Stop reason: HOSPADM

## 2024-02-27 RX ORDER — DIPHENHYDRAMINE HYDROCHLORIDE 50 MG/ML
50 INJECTION INTRAMUSCULAR; INTRAVENOUS AS NEEDED
Status: DISCONTINUED | OUTPATIENT
Start: 2024-02-27 | End: 2024-02-27 | Stop reason: HOSPADM

## 2024-02-27 RX ORDER — HEPARIN 100 UNIT/ML
500 SYRINGE INTRAVENOUS AS NEEDED
Status: CANCELLED | OUTPATIENT
Start: 2024-02-27

## 2024-02-27 RX ORDER — DULOXETIN HYDROCHLORIDE 60 MG/1
60 CAPSULE, DELAYED RELEASE ORAL NIGHTLY
Qty: 30 CAPSULE | Refills: 6 | Status: SHIPPED | OUTPATIENT
Start: 2024-02-27 | End: 2024-03-28

## 2024-02-27 RX ORDER — HEPARIN SODIUM,PORCINE/PF 10 UNIT/ML
50 SYRINGE (ML) INTRAVENOUS AS NEEDED
Status: CANCELLED | OUTPATIENT
Start: 2024-02-27

## 2024-02-27 RX ORDER — PROCHLORPERAZINE EDISYLATE 5 MG/ML
10 INJECTION INTRAMUSCULAR; INTRAVENOUS EVERY 6 HOURS PRN
Status: DISCONTINUED | OUTPATIENT
Start: 2024-02-27 | End: 2024-02-27 | Stop reason: HOSPADM

## 2024-02-27 RX ADMIN — TOPOTECAN 7 MG: 1 INJECTION, SOLUTION, CONCENTRATE INTRAVENOUS at 15:17

## 2024-02-27 RX ADMIN — ONDANSETRON 8 MG: 2 INJECTION INTRAMUSCULAR; INTRAVENOUS at 14:03

## 2024-02-27 RX ADMIN — HEPARIN 500 UNITS: 100 SYRINGE at 16:00

## 2024-02-27 ASSESSMENT — PAIN SCALES - GENERAL: PAINLEVEL: 1

## 2024-03-01 ENCOUNTER — OFFICE VISIT (OUTPATIENT)
Dept: SURGERY | Facility: CLINIC | Age: 74
End: 2024-03-01
Payer: MEDICARE

## 2024-03-01 VITALS
HEART RATE: 84 BPM | BODY MASS INDEX: 25.27 KG/M2 | TEMPERATURE: 98 F | WEIGHT: 148 LBS | DIASTOLIC BLOOD PRESSURE: 70 MMHG | SYSTOLIC BLOOD PRESSURE: 128 MMHG | HEIGHT: 64 IN

## 2024-03-01 DIAGNOSIS — C80.0 PRIMARY MALIGNANT NEOPLASM OF OVARY WITH WIDESPREAD METASTATIC DISEASE, UNSPECIFIED LATERALITY (MULTI): Primary | ICD-10-CM

## 2024-03-01 DIAGNOSIS — D64.9 NORMOCHROMIC NORMOCYTIC ANEMIA: ICD-10-CM

## 2024-03-01 DIAGNOSIS — C56.9 PRIMARY MALIGNANT NEOPLASM OF OVARY WITH WIDESPREAD METASTATIC DISEASE, UNSPECIFIED LATERALITY (MULTI): Primary | ICD-10-CM

## 2024-03-01 PROCEDURE — 1157F ADVNC CARE PLAN IN RCRD: CPT | Performed by: SURGERY

## 2024-03-01 PROCEDURE — 1160F RVW MEDS BY RX/DR IN RCRD: CPT | Performed by: SURGERY

## 2024-03-01 PROCEDURE — 3008F BODY MASS INDEX DOCD: CPT | Performed by: SURGERY

## 2024-03-01 PROCEDURE — 1125F AMNT PAIN NOTED PAIN PRSNT: CPT | Performed by: SURGERY

## 2024-03-01 PROCEDURE — 4010F ACE/ARB THERAPY RXD/TAKEN: CPT | Performed by: SURGERY

## 2024-03-01 PROCEDURE — 1036F TOBACCO NON-USER: CPT | Performed by: SURGERY

## 2024-03-01 PROCEDURE — 3060F POS MICROALBUMINURIA REV: CPT | Performed by: SURGERY

## 2024-03-01 PROCEDURE — 99212 OFFICE O/P EST SF 10 MIN: CPT | Performed by: SURGERY

## 2024-03-01 PROCEDURE — 3078F DIAST BP <80 MM HG: CPT | Performed by: SURGERY

## 2024-03-01 PROCEDURE — 3074F SYST BP LT 130 MM HG: CPT | Performed by: SURGERY

## 2024-03-01 PROCEDURE — 3052F HG A1C>EQUAL 8.0%<EQUAL 9.0%: CPT | Performed by: SURGERY

## 2024-03-01 PROCEDURE — 1159F MED LIST DOCD IN RCRD: CPT | Performed by: SURGERY

## 2024-03-01 PROCEDURE — 3048F LDL-C <100 MG/DL: CPT | Performed by: SURGERY

## 2024-03-01 NOTE — PATIENT INSTRUCTIONS
You doing well since her hospital discharge.  As we noted your upper scope did not confirm an ulcer.  Biopsies were negative.  You appear to have chronic disease as a result of your ovarian cancer.  You should continue with a high-protein diet consuming approximate 75 g of protein a day.  I will follow-up with you as needed.

## 2024-03-01 NOTE — PROGRESS NOTES
Patient ID: Catalina Mendoza is a 73 y.o. female.  Following up after EGD    Subjective   HPI  Feeling much better.  EGD was negative.  Biopsies were negative.    Review of Systems   All other systems reviewed and are negative.      Objective   Physical Exam  Constitutional:       Appearance: Normal appearance.   Abdominal:      Palpations: Abdomen is soft. There is no mass.      Tenderness: There is no abdominal tenderness. There is no guarding.     Pathology review  FINAL DIAGNOSIS   A. STOMACH FUNDUS BIOPSY:   Mild reactive gastritis/gastropathy  No H. 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  No ulcer noted on EGD.  Anemia likely related to malignant disease.  Continue protein shakes and chemotherapy per oncology.  Follow as needed

## 2024-03-04 ENCOUNTER — LAB (OUTPATIENT)
Dept: LAB | Facility: LAB | Age: 74
End: 2024-03-04
Payer: MEDICARE

## 2024-03-04 DIAGNOSIS — C44.02 SQUAMOUS CELL CARCINOMA OF SKIN OF LIP: ICD-10-CM

## 2024-03-04 DIAGNOSIS — C80.0 PRIMARY MALIGNANT NEOPLASM OF OVARY WITH WIDESPREAD METASTATIC DISEASE, UNSPECIFIED LATERALITY (MULTI): ICD-10-CM

## 2024-03-04 DIAGNOSIS — C56.9 PRIMARY MALIGNANT NEOPLASM OF OVARY WITH WIDESPREAD METASTATIC DISEASE, UNSPECIFIED LATERALITY (MULTI): ICD-10-CM

## 2024-03-04 LAB
ALBUMIN SERPL BCP-MCNC: 4.2 G/DL (ref 3.4–5)
ALP SERPL-CCNC: 56 U/L (ref 33–136)
ALT SERPL W P-5'-P-CCNC: 14 U/L (ref 7–45)
ANION GAP SERPL CALC-SCNC: 13 MMOL/L (ref 10–20)
AST SERPL W P-5'-P-CCNC: 22 U/L (ref 9–39)
BASOPHILS # BLD AUTO: 0.05 X10*3/UL (ref 0–0.1)
BASOPHILS NFR BLD AUTO: 1.2 %
BILIRUB SERPL-MCNC: 0.5 MG/DL (ref 0–1.2)
BUN SERPL-MCNC: 23 MG/DL (ref 6–23)
CALCIUM SERPL-MCNC: 9.8 MG/DL (ref 8.6–10.3)
CHLORIDE SERPL-SCNC: 104 MMOL/L (ref 98–107)
CO2 SERPL-SCNC: 25 MMOL/L (ref 21–32)
CREAT SERPL-MCNC: 1.02 MG/DL (ref 0.5–1.05)
DACRYOCYTES BLD QL SMEAR: NORMAL
EGFRCR SERPLBLD CKD-EPI 2021: 58 ML/MIN/1.73M*2
EOSINOPHIL # BLD AUTO: 0.08 X10*3/UL (ref 0–0.4)
EOSINOPHIL NFR BLD AUTO: 1.9 %
ERYTHROCYTE [DISTWIDTH] IN BLOOD BY AUTOMATED COUNT: 16.4 % (ref 11.5–14.5)
GLUCOSE SERPL-MCNC: 145 MG/DL (ref 74–99)
HCT VFR BLD AUTO: 28.3 % (ref 36–46)
HGB BLD-MCNC: 9.1 G/DL (ref 12–16)
IMM GRANULOCYTES # BLD AUTO: 0 X10*3/UL (ref 0–0.5)
IMM GRANULOCYTES NFR BLD AUTO: 0 % (ref 0–0.9)
LYMPHOCYTES # BLD AUTO: 2.73 X10*3/UL (ref 0.8–3)
LYMPHOCYTES NFR BLD AUTO: 63.9 %
MCH RBC QN AUTO: 30.7 PG (ref 26–34)
MCHC RBC AUTO-ENTMCNC: 32.2 G/DL (ref 32–36)
MCV RBC AUTO: 96 FL (ref 80–100)
MONOCYTES # BLD AUTO: 0.23 X10*3/UL (ref 0.05–0.8)
MONOCYTES NFR BLD AUTO: 5.4 %
NEUTROPHILS # BLD AUTO: 1.18 X10*3/UL (ref 1.6–5.5)
NEUTROPHILS NFR BLD AUTO: 27.6 %
NRBC BLD-RTO: 0 /100 WBCS (ref 0–0)
PLATELET # BLD AUTO: 185 X10*3/UL (ref 150–450)
POTASSIUM SERPL-SCNC: 4.5 MMOL/L (ref 3.5–5.3)
PROT SERPL-MCNC: 7 G/DL (ref 6.4–8.2)
RBC # BLD AUTO: 2.96 X10*6/UL (ref 4–5.2)
RBC MORPH BLD: NORMAL
SCHISTOCYTES BLD QL SMEAR: NORMAL
SODIUM SERPL-SCNC: 137 MMOL/L (ref 136–145)
WBC # BLD AUTO: 4.3 X10*3/UL (ref 4.4–11.3)

## 2024-03-04 PROCEDURE — 85025 COMPLETE CBC W/AUTO DIFF WBC: CPT

## 2024-03-04 PROCEDURE — 80053 COMPREHEN METABOLIC PANEL: CPT

## 2024-03-05 ENCOUNTER — INFUSION (OUTPATIENT)
Dept: HEMATOLOGY/ONCOLOGY | Facility: CLINIC | Age: 74
End: 2024-03-05
Payer: MEDICARE

## 2024-03-05 VITALS
BODY MASS INDEX: 26 KG/M2 | RESPIRATION RATE: 18 BRPM | OXYGEN SATURATION: 96 % | SYSTOLIC BLOOD PRESSURE: 132 MMHG | WEIGHT: 149.14 LBS | HEART RATE: 89 BPM | TEMPERATURE: 97.3 F | DIASTOLIC BLOOD PRESSURE: 78 MMHG

## 2024-03-05 DIAGNOSIS — C80.0 PRIMARY MALIGNANT NEOPLASM OF OVARY WITH WIDESPREAD METASTATIC DISEASE, UNSPECIFIED LATERALITY (MULTI): ICD-10-CM

## 2024-03-05 DIAGNOSIS — C56.9 PRIMARY MALIGNANT NEOPLASM OF OVARY WITH WIDESPREAD METASTATIC DISEASE, UNSPECIFIED LATERALITY (MULTI): ICD-10-CM

## 2024-03-05 DIAGNOSIS — C44.02 SQUAMOUS CELL CARCINOMA OF SKIN OF LIP: ICD-10-CM

## 2024-03-05 PROCEDURE — 2500000004 HC RX 250 GENERAL PHARMACY W/ HCPCS (ALT 636 FOR OP/ED): Performed by: INTERNAL MEDICINE

## 2024-03-05 PROCEDURE — 96417 CHEMO IV INFUS EACH ADDL SEQ: CPT

## 2024-03-05 PROCEDURE — 96372 THER/PROPH/DIAG INJ SC/IM: CPT

## 2024-03-05 PROCEDURE — 96413 CHEMO IV INFUSION 1 HR: CPT

## 2024-03-05 PROCEDURE — 96372 THER/PROPH/DIAG INJ SC/IM: CPT | Performed by: INTERNAL MEDICINE

## 2024-03-05 PROCEDURE — 96377 APPLICATON ON-BODY INJECTOR: CPT

## 2024-03-05 PROCEDURE — 96375 TX/PRO/DX INJ NEW DRUG ADDON: CPT | Mod: INF

## 2024-03-05 RX ORDER — HEPARIN 100 UNIT/ML
500 SYRINGE INTRAVENOUS AS NEEDED
Status: DISCONTINUED | OUTPATIENT
Start: 2024-03-05 | End: 2024-03-05 | Stop reason: HOSPADM

## 2024-03-05 RX ORDER — ONDANSETRON HYDROCHLORIDE 2 MG/ML
8 INJECTION, SOLUTION INTRAVENOUS ONCE
Status: COMPLETED | OUTPATIENT
Start: 2024-03-05 | End: 2024-03-05

## 2024-03-05 RX ORDER — ALBUTEROL SULFATE 0.83 MG/ML
3 SOLUTION RESPIRATORY (INHALATION) AS NEEDED
Status: DISCONTINUED | OUTPATIENT
Start: 2024-03-05 | End: 2024-03-05 | Stop reason: HOSPADM

## 2024-03-05 RX ORDER — HEPARIN 100 UNIT/ML
500 SYRINGE INTRAVENOUS AS NEEDED
Status: CANCELLED | OUTPATIENT
Start: 2024-03-05

## 2024-03-05 RX ORDER — PROCHLORPERAZINE EDISYLATE 5 MG/ML
10 INJECTION INTRAMUSCULAR; INTRAVENOUS EVERY 6 HOURS PRN
Status: DISCONTINUED | OUTPATIENT
Start: 2024-03-05 | End: 2024-03-05 | Stop reason: HOSPADM

## 2024-03-05 RX ORDER — EPINEPHRINE 0.3 MG/.3ML
0.3 INJECTION SUBCUTANEOUS EVERY 5 MIN PRN
Status: DISCONTINUED | OUTPATIENT
Start: 2024-03-05 | End: 2024-03-05 | Stop reason: HOSPADM

## 2024-03-05 RX ORDER — PROCHLORPERAZINE MALEATE 10 MG
10 TABLET ORAL EVERY 6 HOURS PRN
Status: DISCONTINUED | OUTPATIENT
Start: 2024-03-05 | End: 2024-03-05 | Stop reason: HOSPADM

## 2024-03-05 RX ORDER — DIPHENHYDRAMINE HYDROCHLORIDE 50 MG/ML
50 INJECTION INTRAMUSCULAR; INTRAVENOUS AS NEEDED
Status: DISCONTINUED | OUTPATIENT
Start: 2024-03-05 | End: 2024-03-05 | Stop reason: HOSPADM

## 2024-03-05 RX ORDER — FAMOTIDINE 10 MG/ML
20 INJECTION INTRAVENOUS ONCE AS NEEDED
Status: DISCONTINUED | OUTPATIENT
Start: 2024-03-05 | End: 2024-03-05 | Stop reason: HOSPADM

## 2024-03-05 RX ORDER — HEPARIN SODIUM,PORCINE/PF 10 UNIT/ML
50 SYRINGE (ML) INTRAVENOUS AS NEEDED
Status: CANCELLED | OUTPATIENT
Start: 2024-03-05

## 2024-03-05 RX ADMIN — ONDANSETRON 8 MG: 2 INJECTION INTRAMUSCULAR; INTRAVENOUS at 14:58

## 2024-03-05 RX ADMIN — PEGFILGRASTIM 6 MG: KIT SUBCUTANEOUS at 17:20

## 2024-03-05 RX ADMIN — TOPOTECAN HYDROCHLORIDE 7 MG: 4 INJECTION, POWDER, LYOPHILIZED, FOR SOLUTION INTRAVENOUS at 16:30

## 2024-03-05 RX ADMIN — BEVACIZUMAB-AWWB 700 MG: 100 INJECTION, SOLUTION INTRAVENOUS at 16:02

## 2024-03-05 RX ADMIN — HEPARIN 500 UNITS: 100 SYRINGE at 15:15

## 2024-03-05 ASSESSMENT — PAIN SCALES - GENERAL: PAINLEVEL: 0-NO PAIN

## 2024-03-05 NOTE — SIGNIFICANT EVENT
03/05/24 1438   Prechemo Checklist   Has the patient been in the hospital, ED, or urgent care since last date of service No   Chemo/Immuno Consent Signed Yes   Protocol/Indications Verified Yes   Confirmed to previous date/time of medication Yes   Compared to previous dose Yes   All medications are dated accurately Yes   Pregnancy Test Negative Not applicable   Parameters Met Yes   Provider Notified Yes   Is Patient Proceeding With Treatment? Yes   BSA/Weight-Height Verified Yes   Dose Calculations Verified Yes

## 2024-03-05 NOTE — PROGRESS NOTES
1520. Infusion completed without incident. Schedule reviewed then Dc'd via independent / ambulatory

## 2024-03-08 PROBLEM — R10.12 LEFT UPPER QUADRANT ABDOMINAL PAIN: Status: ACTIVE | Noted: 2024-03-08

## 2024-03-18 ENCOUNTER — LAB (OUTPATIENT)
Dept: LAB | Facility: LAB | Age: 74
End: 2024-03-18
Payer: MEDICARE

## 2024-03-18 ENCOUNTER — OFFICE VISIT (OUTPATIENT)
Dept: PRIMARY CARE | Facility: CLINIC | Age: 74
End: 2024-03-18
Payer: MEDICARE

## 2024-03-18 VITALS
WEIGHT: 146.6 LBS | BODY MASS INDEX: 25.56 KG/M2 | SYSTOLIC BLOOD PRESSURE: 130 MMHG | HEART RATE: 89 BPM | OXYGEN SATURATION: 98 % | DIASTOLIC BLOOD PRESSURE: 82 MMHG

## 2024-03-18 DIAGNOSIS — D50.0 ANEMIA DUE TO BLOOD LOSS: Primary | ICD-10-CM

## 2024-03-18 DIAGNOSIS — C56.9 MALIGNANT NEOPLASM OF OVARY, UNSPECIFIED LATERALITY (MULTI): ICD-10-CM

## 2024-03-18 DIAGNOSIS — C56.9 PRIMARY MALIGNANT NEOPLASM OF OVARY WITH WIDESPREAD METASTATIC DISEASE, UNSPECIFIED LATERALITY (MULTI): ICD-10-CM

## 2024-03-18 DIAGNOSIS — I10 HYPERTENSION, UNSPECIFIED TYPE: ICD-10-CM

## 2024-03-18 DIAGNOSIS — R10.12 LEFT UPPER QUADRANT ABDOMINAL PAIN: ICD-10-CM

## 2024-03-18 DIAGNOSIS — C80.0 PRIMARY MALIGNANT NEOPLASM OF OVARY WITH WIDESPREAD METASTATIC DISEASE, UNSPECIFIED LATERALITY (MULTI): ICD-10-CM

## 2024-03-18 DIAGNOSIS — E11.65 POORLY CONTROLLED DIABETES MELLITUS (MULTI): ICD-10-CM

## 2024-03-18 DIAGNOSIS — C44.02 SQUAMOUS CELL CARCINOMA OF SKIN OF LIP: ICD-10-CM

## 2024-03-18 DIAGNOSIS — I25.10 CAD, MULTIPLE VESSEL: ICD-10-CM

## 2024-03-18 DIAGNOSIS — E03.9 HYPOTHYROIDISM, UNSPECIFIED TYPE: ICD-10-CM

## 2024-03-18 DIAGNOSIS — E78.00 HYPERCHOLESTEREMIA: ICD-10-CM

## 2024-03-18 LAB
APPEARANCE UR: ABNORMAL
BASOPHILS # BLD AUTO: 0.07 X10*3/UL (ref 0–0.1)
BASOPHILS NFR BLD AUTO: 0.6 %
BILIRUB UR STRIP.AUTO-MCNC: NEGATIVE MG/DL
COLOR UR: YELLOW
EOSINOPHIL # BLD AUTO: 0.58 X10*3/UL (ref 0–0.4)
EOSINOPHIL NFR BLD AUTO: 4.6 %
ERYTHROCYTE [DISTWIDTH] IN BLOOD BY AUTOMATED COUNT: 19.7 % (ref 11.5–14.5)
GLUCOSE UR STRIP.AUTO-MCNC: ABNORMAL MG/DL
HCT VFR BLD AUTO: 30.1 % (ref 36–46)
HGB BLD-MCNC: 9.2 G/DL (ref 12–16)
IMM GRANULOCYTES # BLD AUTO: 0.56 X10*3/UL (ref 0–0.5)
IMM GRANULOCYTES NFR BLD AUTO: 4.4 % (ref 0–0.9)
KETONES UR STRIP.AUTO-MCNC: NEGATIVE MG/DL
LEUKOCYTE ESTERASE UR QL STRIP.AUTO: NEGATIVE
LYMPHOCYTES # BLD AUTO: 3.38 X10*3/UL (ref 0.8–3)
LYMPHOCYTES NFR BLD AUTO: 26.8 %
MCH RBC QN AUTO: 30.5 PG (ref 26–34)
MCHC RBC AUTO-ENTMCNC: 30.6 G/DL (ref 32–36)
MCV RBC AUTO: 100 FL (ref 80–100)
MONOCYTES # BLD AUTO: 0.92 X10*3/UL (ref 0.05–0.8)
MONOCYTES NFR BLD AUTO: 7.3 %
NEUTROPHILS # BLD AUTO: 7.08 X10*3/UL (ref 1.6–5.5)
NEUTROPHILS NFR BLD AUTO: 56.3 %
NITRITE UR QL STRIP.AUTO: NEGATIVE
NRBC BLD-RTO: 0.2 /100 WBCS (ref 0–0)
PH UR STRIP.AUTO: 7 [PH]
PLATELET # BLD AUTO: 290 X10*3/UL (ref 150–450)
PROT UR STRIP.AUTO-MCNC: NEGATIVE MG/DL
RBC # BLD AUTO: 3.02 X10*6/UL (ref 4–5.2)
RBC # UR STRIP.AUTO: NEGATIVE /UL
SP GR UR STRIP.AUTO: 1.01
UROBILINOGEN UR STRIP.AUTO-MCNC: <2 MG/DL
WBC # BLD AUTO: 12.6 X10*3/UL (ref 4.4–11.3)

## 2024-03-18 PROCEDURE — 99214 OFFICE O/P EST MOD 30 MIN: CPT | Performed by: INTERNAL MEDICINE

## 2024-03-18 PROCEDURE — 3060F POS MICROALBUMINURIA REV: CPT | Performed by: INTERNAL MEDICINE

## 2024-03-18 PROCEDURE — 3008F BODY MASS INDEX DOCD: CPT | Performed by: INTERNAL MEDICINE

## 2024-03-18 PROCEDURE — 3048F LDL-C <100 MG/DL: CPT | Performed by: INTERNAL MEDICINE

## 2024-03-18 PROCEDURE — 3075F SYST BP GE 130 - 139MM HG: CPT | Performed by: INTERNAL MEDICINE

## 2024-03-18 PROCEDURE — 1159F MED LIST DOCD IN RCRD: CPT | Performed by: INTERNAL MEDICINE

## 2024-03-18 PROCEDURE — 1036F TOBACCO NON-USER: CPT | Performed by: INTERNAL MEDICINE

## 2024-03-18 PROCEDURE — 3052F HG A1C>EQUAL 8.0%<EQUAL 9.0%: CPT | Performed by: INTERNAL MEDICINE

## 2024-03-18 PROCEDURE — 1160F RVW MEDS BY RX/DR IN RCRD: CPT | Performed by: INTERNAL MEDICINE

## 2024-03-18 PROCEDURE — 1157F ADVNC CARE PLAN IN RCRD: CPT | Performed by: INTERNAL MEDICINE

## 2024-03-18 PROCEDURE — 3079F DIAST BP 80-89 MM HG: CPT | Performed by: INTERNAL MEDICINE

## 2024-03-18 PROCEDURE — 4010F ACE/ARB THERAPY RXD/TAKEN: CPT | Performed by: INTERNAL MEDICINE

## 2024-03-18 ASSESSMENT — ENCOUNTER SYMPTOMS
LOSS OF SENSATION IN FEET: 0
OCCASIONAL FEELINGS OF UNSTEADINESS: 0
DEPRESSION: 0

## 2024-03-18 NOTE — PROGRESS NOTES
Subjective   Patient ID: Catalina Mendoza is a 73 y.o. female who presents for Follow-up (6 week).  HPI    Follow up TCM        anemia due to blood loss stable      s/p 2 units      EGD negative      Feeling a little better      Oncology following      LUQ pain x 2-21 unchanged  Now goes around upper abdomen  Before cancer  CT June rev'd responded well to chemo  On cymbalta   Saw cardio work up neg  pain mgmt pending     patient had surgery for stage III ovarian cancer on March 13, 2023.  Currently in chemo.  Oncology following  Now metastatic     DM -2  on insulin no side effects. Endocrine following.   HBA1C 8.2  1-24   this am     Hypercholesterolemia on therapy no side effects     Hypothyroid on therapy no side effects     Hypertension stable  on therapy gets dizzy when stands up  on losartan 50 mg daily   follow     CAD stable on therapy no side effects     Diet and exercise reviewed    Review of Systems   All other systems reviewed and are negative.      Objective   /82   Pulse 89   Wt 66.5 kg (146 lb 9.6 oz)   LMP  (LMP Unknown)   SpO2 98%   BMI 25.56 kg/m²   Lab Results   Component Value Date    WBC 12.6 (H) 03/18/2024    HGB 9.2 (L) 03/18/2024    HCT 30.1 (L) 03/18/2024     03/18/2024    CHOL 126 01/22/2024    TRIG 236 (H) 01/22/2024    HDL 29.3 01/22/2024    ALT 14 03/04/2024    AST 22 03/04/2024     03/04/2024    K 4.5 03/04/2024     03/04/2024    CREATININE 1.02 03/04/2024    BUN 23 03/04/2024    CO2 25 03/04/2024    TSH 3.55 01/22/2024    INR 1.2 (H) 02/11/2024    HGBA1C 8.2 (H) 01/22/2024           Physical Exam  Vitals reviewed.   Constitutional:       Appearance: Normal appearance. She is normal weight.   HENT:      Head: Normocephalic and atraumatic.      Mouth/Throat:      Pharynx: No posterior oropharyngeal erythema.   Eyes:      General: No scleral icterus.     Conjunctiva/sclera: Conjunctivae normal.      Pupils: Pupils are equal, round, and reactive to  light.   Cardiovascular:      Rate and Rhythm: Normal rate and regular rhythm.      Heart sounds: Normal heart sounds.   Pulmonary:      Effort: No respiratory distress.      Breath sounds: No wheezing.   Abdominal:      General: Abdomen is flat. Bowel sounds are normal. There is no distension.      Palpations: Abdomen is soft. There is no mass.      Tenderness: There is no abdominal tenderness. There is no rebound.   Musculoskeletal:         General: Normal range of motion.      Cervical back: Normal range of motion and neck supple.   Skin:     General: Skin is warm and dry.   Neurological:      General: No focal deficit present.      Mental Status: She is alert and oriented to person, place, and time. Mental status is at baseline.   Psychiatric:         Mood and Affect: Mood normal.         Behavior: Behavior normal.         Thought Content: Thought content normal.         Judgment: Judgment normal.         Problem List Items Addressed This Visit             ICD-10-CM    CAD, multiple vessel I25.10    Hypothyroidism E03.9    Poorly controlled diabetes mellitus (CMS/HCC) E11.65    Anemia due to blood loss - Primary D50.0    Left upper quadrant abdominal pain R10.12     Other Visit Diagnoses         Codes    Malignant neoplasm of ovary, unspecified laterality (CMS/HCC)     C56.9    Hypercholesteremia     E78.00    Hypertension, unspecified type     I10    BMI 25.0-25.9,adult     Z68.25          Assessment/Plan     Follow up TCM        anemia due to blood loss stable      s/p 2 units      EGD negative      Feeling a little better      Oncology following      LUQ pain x 2-21 unchanged  Now goes around upper abdomen  Before cancer  CT June rev'd responded well to chemo  On cymbalta   Saw cardio work up neg  pain mgmt pending     patient had surgery for stage III ovarian cancer on March 13, 2023.  Currently in chemo.  Oncology following  Now metastatic  Notes rev'd     DM -2  on insulin no side effects. Endocrine  following.   HBA1C 8.2  1-24   this am     Hypercholesterolemia on therapy no side effects     Hypothyroid on therapy no side effects     Hypertension stable  on therapy gets dizzy when stands up  on losartan 50 mg daily   follow     CAD stable on therapy no side effects     Diet and exercise reviewed    Mammogram 2-24 / per oncology  Dexa n/a  Colonoscopy 2017  due 2027  CT chest lung cancer screening n/a  GYN n/a  immunizations rev'd RSV, COVID, Pneumo  (visual loss with flu yrs ago)  BMI 25.5    Follow up 1 month    Patient was identified as a fall risk. Risk prevention instructions provided.

## 2024-03-19 ENCOUNTER — OFFICE VISIT (OUTPATIENT)
Dept: HEMATOLOGY/ONCOLOGY | Facility: CLINIC | Age: 74
End: 2024-03-19
Payer: MEDICARE

## 2024-03-19 ENCOUNTER — INFUSION (OUTPATIENT)
Dept: HEMATOLOGY/ONCOLOGY | Facility: CLINIC | Age: 74
End: 2024-03-19
Payer: MEDICARE

## 2024-03-19 VITALS
TEMPERATURE: 96.8 F | SYSTOLIC BLOOD PRESSURE: 116 MMHG | HEART RATE: 97 BPM | BODY MASS INDEX: 25.83 KG/M2 | WEIGHT: 148.15 LBS | RESPIRATION RATE: 16 BRPM | OXYGEN SATURATION: 97 % | DIASTOLIC BLOOD PRESSURE: 69 MMHG

## 2024-03-19 DIAGNOSIS — C80.0 PRIMARY MALIGNANT NEOPLASM OF OVARY WITH WIDESPREAD METASTATIC DISEASE, UNSPECIFIED LATERALITY (MULTI): ICD-10-CM

## 2024-03-19 DIAGNOSIS — C56.9 PRIMARY MALIGNANT NEOPLASM OF OVARY WITH WIDESPREAD METASTATIC DISEASE, UNSPECIFIED LATERALITY (MULTI): ICD-10-CM

## 2024-03-19 DIAGNOSIS — C44.02 SQUAMOUS CELL CARCINOMA OF SKIN OF LIP: ICD-10-CM

## 2024-03-19 LAB
FERRITIN SERPL-MCNC: 295 NG/ML (ref 8–150)
IRON SATN MFR SERPL: 25 % (ref 25–45)
IRON SERPL-MCNC: 104 UG/DL (ref 35–150)
TIBC SERPL-MCNC: 410 UG/DL (ref 240–445)
UIBC SERPL-MCNC: 306 UG/DL (ref 110–370)
VIT B12 SERPL-MCNC: 1922 PG/ML (ref 211–911)

## 2024-03-19 PROCEDURE — 3078F DIAST BP <80 MM HG: CPT | Performed by: INTERNAL MEDICINE

## 2024-03-19 PROCEDURE — 1157F ADVNC CARE PLAN IN RCRD: CPT | Performed by: INTERNAL MEDICINE

## 2024-03-19 PROCEDURE — 1036F TOBACCO NON-USER: CPT | Performed by: INTERNAL MEDICINE

## 2024-03-19 PROCEDURE — 4010F ACE/ARB THERAPY RXD/TAKEN: CPT | Performed by: INTERNAL MEDICINE

## 2024-03-19 PROCEDURE — 1159F MED LIST DOCD IN RCRD: CPT | Performed by: INTERNAL MEDICINE

## 2024-03-19 PROCEDURE — 3048F LDL-C <100 MG/DL: CPT | Performed by: INTERNAL MEDICINE

## 2024-03-19 PROCEDURE — 82607 VITAMIN B-12: CPT | Mod: GEALAB

## 2024-03-19 PROCEDURE — 96375 TX/PRO/DX INJ NEW DRUG ADDON: CPT | Mod: INF

## 2024-03-19 PROCEDURE — 96413 CHEMO IV INFUSION 1 HR: CPT

## 2024-03-19 PROCEDURE — 99214 OFFICE O/P EST MOD 30 MIN: CPT | Performed by: INTERNAL MEDICINE

## 2024-03-19 PROCEDURE — 2500000004 HC RX 250 GENERAL PHARMACY W/ HCPCS (ALT 636 FOR OP/ED): Performed by: INTERNAL MEDICINE

## 2024-03-19 PROCEDURE — 96417 CHEMO IV INFUS EACH ADDL SEQ: CPT

## 2024-03-19 PROCEDURE — 3052F HG A1C>EQUAL 8.0%<EQUAL 9.0%: CPT | Performed by: INTERNAL MEDICINE

## 2024-03-19 PROCEDURE — 1125F AMNT PAIN NOTED PAIN PRSNT: CPT | Performed by: INTERNAL MEDICINE

## 2024-03-19 PROCEDURE — 1160F RVW MEDS BY RX/DR IN RCRD: CPT | Performed by: INTERNAL MEDICINE

## 2024-03-19 PROCEDURE — 3074F SYST BP LT 130 MM HG: CPT | Performed by: INTERNAL MEDICINE

## 2024-03-19 PROCEDURE — 3060F POS MICROALBUMINURIA REV: CPT | Performed by: INTERNAL MEDICINE

## 2024-03-19 PROCEDURE — 82728 ASSAY OF FERRITIN: CPT

## 2024-03-19 PROCEDURE — 3008F BODY MASS INDEX DOCD: CPT | Performed by: INTERNAL MEDICINE

## 2024-03-19 PROCEDURE — 83540 ASSAY OF IRON: CPT

## 2024-03-19 RX ORDER — ALBUTEROL SULFATE 0.83 MG/ML
3 SOLUTION RESPIRATORY (INHALATION) AS NEEDED
Status: DISCONTINUED | OUTPATIENT
Start: 2024-03-19 | End: 2024-03-19 | Stop reason: HOSPADM

## 2024-03-19 RX ORDER — PROCHLORPERAZINE EDISYLATE 5 MG/ML
10 INJECTION INTRAMUSCULAR; INTRAVENOUS EVERY 6 HOURS PRN
Status: DISCONTINUED | OUTPATIENT
Start: 2024-03-19 | End: 2024-03-19 | Stop reason: HOSPADM

## 2024-03-19 RX ORDER — PROCHLORPERAZINE MALEATE 10 MG
10 TABLET ORAL EVERY 6 HOURS PRN
Status: DISCONTINUED | OUTPATIENT
Start: 2024-03-19 | End: 2024-03-19 | Stop reason: HOSPADM

## 2024-03-19 RX ORDER — HEPARIN SODIUM,PORCINE/PF 10 UNIT/ML
50 SYRINGE (ML) INTRAVENOUS AS NEEDED
Status: CANCELLED | OUTPATIENT
Start: 2024-03-19

## 2024-03-19 RX ORDER — EPINEPHRINE 0.3 MG/.3ML
0.3 INJECTION SUBCUTANEOUS EVERY 5 MIN PRN
Status: DISCONTINUED | OUTPATIENT
Start: 2024-03-19 | End: 2024-03-19 | Stop reason: HOSPADM

## 2024-03-19 RX ORDER — HEPARIN 100 UNIT/ML
500 SYRINGE INTRAVENOUS AS NEEDED
Status: CANCELLED | OUTPATIENT
Start: 2024-03-19

## 2024-03-19 RX ORDER — ONDANSETRON HYDROCHLORIDE 2 MG/ML
8 INJECTION, SOLUTION INTRAVENOUS ONCE
Status: COMPLETED | OUTPATIENT
Start: 2024-03-19 | End: 2024-03-19

## 2024-03-19 RX ORDER — FAMOTIDINE 10 MG/ML
20 INJECTION INTRAVENOUS ONCE AS NEEDED
Status: DISCONTINUED | OUTPATIENT
Start: 2024-03-19 | End: 2024-03-19 | Stop reason: HOSPADM

## 2024-03-19 RX ORDER — DIPHENHYDRAMINE HYDROCHLORIDE 50 MG/ML
50 INJECTION INTRAMUSCULAR; INTRAVENOUS AS NEEDED
Status: DISCONTINUED | OUTPATIENT
Start: 2024-03-19 | End: 2024-03-19 | Stop reason: HOSPADM

## 2024-03-19 RX ORDER — HEPARIN 100 UNIT/ML
500 SYRINGE INTRAVENOUS AS NEEDED
Status: DISCONTINUED | OUTPATIENT
Start: 2024-03-19 | End: 2024-03-19 | Stop reason: HOSPADM

## 2024-03-19 RX ADMIN — HEPARIN 500 UNITS: 100 SYRINGE at 12:51

## 2024-03-19 RX ADMIN — ONDANSETRON 8 MG: 2 INJECTION INTRAMUSCULAR; INTRAVENOUS at 11:00

## 2024-03-19 RX ADMIN — TOPOTECAN HYDROCHLORIDE 7 MG: 4 INJECTION, POWDER, LYOPHILIZED, FOR SOLUTION INTRAVENOUS at 11:36

## 2024-03-19 RX ADMIN — BEVACIZUMAB-AWWB 700 MG: 400 INJECTION, SOLUTION INTRAVENOUS at 12:19

## 2024-03-19 ASSESSMENT — PAIN SCALES - GENERAL: PAINLEVEL: 2

## 2024-03-19 NOTE — PATIENT INSTRUCTIONS
Today you met with Dr. Ca.  Additional labs were ordered (B12, tumor marker and iron).  A CT scan was ordered and will be reviewed in 2 weeks.  You are looking great and feeling it too.  Yeah!  Your hemoglobin is holding!    Please call the office for any questions or concerns.  We ask that you notify us 5-7 days before your medications need refilled.  HARDEEP Bailey is strongly encouraging all patients to access their MyChart for all results and to communicate with their provider for non-urgent messages.  If an urgent/same day/sick concern response is needed, please call the office at [phone]. Thank You!

## 2024-03-19 NOTE — PROGRESS NOTES
Patient ID: Catalina Mendoza is a 73 y.o. female.  Referring Physician: Aleyda Ca MD  [id number] [place] [address]  Primary Care Provider: Juju Kenney MD  Visit Type:  Follow Up     Subjective    HPI  Ovarian cancer with malignant ascites.  CT scan gave 12 19 2022.  compared with 12/23 2022.  Mediastinal and abdominal lymphadenopathy which demonstrated abnormal  FDG [id number].  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 Discussed with patient       woman [presented at 72 years old] who presents today with abdominal distention and CT scan showing omental nodularity and extensive abdominal adenopathy.  Her  is 2320 with  elevation in CA 19-9 as well.  INR guided paracentesis as well as cytology of malignant ascitic fluid is pending at this time.  Clinical diagnosis and impression is that of ovarian adenocarcinoma stage III\4.  Full staging CT scans ordered and pathology  reports pending.      04/04/2023: Below is notes copied from OB/GYN.  # [facility]GSOC   - We reviewed the typical pathophysiology and management of ovarian cancer, we discussed that ovarian cancer is usually managed with a combination of chemotherapy and surgery  - We reviewed her imaging    - Chest lymph nodes specifically were significantly improved prior to 3rd chemo   - Schedule visit for genetic counseling to r/o hereditary cancer syndromes and/or targeted therapy markers  - Continue monitoring tumor markers  - She is not a candidate for HIPEC  - We discussed the plan for adjuvant chemo following surgery. Adjuvant chemotherapy cycles after surgery can be managed by gyn onc or med onc   12/27/2022-2/7/2023: 3 Cycles Carbo Taxol: Had surgery and resumed Chemotherapy 4/13/2023-5/23/2023.  was  still in double digits of 85.  Patient not a candidate for olaparib.     06/13/2023: Most recent  is 18 well within normal.  We will discussed with patient with the option to  observe and to intervene if we should see a  rise or morphologic evidence of disease recurrence.  Patient is platinum sensitive and has had  good response at this time.  Maintenance or other intervention will be dependent on either a rising  or morphologic evidence of disease persistence.    On Topotecan and Bevacizumab D1 Cycle 6 today:     Review of Systems - Oncology   IMPRESSION:  CT findings compatible with a favorable response to treatment as evidenced by an interval reduction in the size of the previously described nodule/lymph node anterior to the distal stomach, as well  as the retroperitoneal lymph nodes.. No new or enlarging soft tissue nodules or lymph nodes.      Signed by: Alf Lemus 2/2/2024  Objective   BSA: 1.74 meters squared  /69 (BP Location: Left arm, Patient Position: Sitting, BP Cuff Size: Adult)   Pulse 97   Temp 36 °C (96.8 °F) (Temporal)   Resp 16   Wt 67.2 kg (148 lb 2.4 oz)   LMP  (LMP Unknown)   SpO2 97%   BMI 25.83 kg/m²      has a past medical history of Encounter for immunization (10/03/2016), Other injury of unspecified body region, initial encounter, Personal history of malignant neoplasm of unspecified site of lip, oral cavity, and pharynx, Personal history of other complications of pregnancy, childbirth and the puerperium, and Personal history of other malignant neoplasm of skin.   has a past surgical history that includes Hysterectomy (09/28/2015); Appendectomy (09/28/2015); Other surgical history (09/28/2015); Eye surgery (06/20/2016); Cholecystectomy (06/20/2016); and US guided abdominal paracentesis (12/15/2022).  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, started on NACT with carbo/taxol  - 2/7/23: S/p cycle 3 with good interval response  - 3/13/23: Diagnostic laparoscopy, BSO, extensive MIKAELA, bilateral ureterolysis, infracolic omentectomy, abdominal wall and mesenteric biopsies, complete gross resection to R0  - 5/23/23: Chemo completed     Primary malignant neoplasm of ovary with widespread metastatic disease (CMS/HCC)   4/5/2023 Initial Diagnosis    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 She has never been exposed to tobacco smoke. She has never used smokeless tobacco.  She  reports no history of alcohol use.  She  reports no history of drug use.    Physical Exam    WBC   Date/Time Value Ref Range Status   03/18/2024 08:45 AM 12.6 (H) 4.4 - 11.3 x10*3/uL Final   03/04/2024 12:13 PM 4.3 (L) 4.4 - 11.3 x10*3/uL Final   02/26/2024 09:00 AM 3.6 (L) 4.4 - 11.3 x10*3/uL Final     nRBC   Date Value Ref Range Status   03/18/2024 0.2 (H) 0.0 - 0.0 /100 WBCs Final   03/04/2024 0.0 0.0 - 0.0 /100 WBCs Final   02/26/2024 0.0 0.0 - 0.0 /100 WBCs Final     RBC   Date Value Ref Range Status   03/18/2024 3.02 (L) 4.00 - 5.20 x10*6/uL Final   03/04/2024 2.96 (L) 4.00 - 5.20 x10*6/uL Final   02/26/2024 3.19 (L) 4.00 - 5.20 x10*6/uL Final     Hemoglobin   Date Value Ref Range Status   03/18/2024 9.2 (L) 12.0 - 16.0 g/dL Final   03/04/2024 9.1 (L) 12.0 - 16.0 g/dL Final    02/26/2024 9.7 (L) 12.0 - 16.0 g/dL Final     Hematocrit   Date Value Ref Range Status   03/18/2024 30.1 (L) 36.0 - 46.0 % Final   03/04/2024 28.3 (L) 36.0 - 46.0 % Final   02/26/2024 31.1 (L) 36.0 - 46.0 % Final     MCV   Date/Time Value Ref Range Status   03/18/2024 08:45  80 - 100 fL Final   03/04/2024 12:13 PM 96 80 - 100 fL Final   02/26/2024 09:00 AM 98 80 - 100 fL Final     MCH   Date/Time Value Ref Range Status   03/18/2024 08:45 AM 30.5 26.0 - 34.0 pg Final   03/04/2024 12:13 PM 30.7 26.0 - 34.0 pg Final   02/26/2024 09:00 AM 30.4 26.0 - 34.0 pg Final     MCHC   Date/Time Value Ref Range Status   03/18/2024 08:45 AM 30.6 (L) 32.0 - 36.0 g/dL Final   03/04/2024 12:13 PM 32.2 32.0 - 36.0 g/dL Final   02/26/2024 09:00 AM 31.2 (L) 32.0 - 36.0 g/dL Final     RDW   Date/Time Value Ref Range Status   03/18/2024 08:45 AM 19.7 (H) 11.5 - 14.5 % Final   03/04/2024 12:13 PM 16.4 (H) 11.5 - 14.5 % Final   02/26/2024 09:00 AM 16.7 (H) 11.5 - 14.5 % Final     Platelets   Date/Time Value Ref Range Status   03/18/2024 08:45  150 - 450 x10*3/uL Final   03/04/2024 12:13  150 - 450 x10*3/uL Final   02/26/2024 09:00  150 - 450 x10*3/uL Final     MPV   Date/Time Value Ref Range Status   10/27/2023 08:21 AM 9.8 7.5 - 11.5 fL Final     Neutrophils %   Date/Time Value Ref Range Status   03/18/2024 08:45 AM 56.3 40.0 - 80.0 % Final   03/04/2024 12:13 PM 27.6 40.0 - 80.0 % Final   02/26/2024 09:00 AM 26.9 40.0 - 80.0 % Final     Immature Granulocytes %, Automated   Date/Time Value Ref Range Status   03/18/2024 08:45 AM 4.4 (H) 0.0 - 0.9 % Final     Comment:     Immature Granulocyte Count (IG) includes promyelocytes, myelocytes and metamyelocytes but does not include bands. Percent differential counts (%) should be interpreted in the context of the absolute cell counts (cells/UL).   03/04/2024 12:13 PM 0.0 0.0 - 0.9 % Final     Comment:     Immature Granulocyte Count (IG) includes promyelocytes, myelocytes  and metamyelocytes but does not include bands. Percent differential counts (%) should be interpreted in the context of the absolute cell counts (cells/UL).   02/26/2024 09:00 AM 0.3 0.0 - 0.9 % Final     Comment:     Immature Granulocyte Count (IG) includes promyelocytes, myelocytes and metamyelocytes but does not include bands. Percent differential counts (%) should be interpreted in the context of the absolute cell counts (cells/UL).     Lymphocytes %, Manual   Date/Time Value Ref Range Status   02/19/2024 10:04 AM 18.0 13.0 - 44.0 % Final   02/11/2024 01:40 PM 33.0 13.0 - 44.0 % Final   02/05/2024 09:02 AM 58.0 13.0 - 44.0 % Final     Lymphocytes %   Date/Time Value Ref Range Status   03/18/2024 08:45 AM 26.8 13.0 - 44.0 % Final   03/04/2024 12:13 PM 63.9 13.0 - 44.0 % Final   02/26/2024 09:00 AM 56.5 13.0 - 44.0 % Final     Monocytes %, Manual   Date/Time Value Ref Range Status   02/19/2024 10:04 AM 6.0 2.0 - 10.0 % Final   02/11/2024 01:40 PM 5.0 2.0 - 10.0 % Final   02/05/2024 09:02 AM 1.0 2.0 - 10.0 % Final     Monocytes %   Date/Time Value Ref Range Status   03/18/2024 08:45 AM 7.3 2.0 - 10.0 % Final   03/04/2024 12:13 PM 5.4 2.0 - 10.0 % Final   02/26/2024 09:00 AM 12.2 2.0 - 10.0 % Final     Eosinophils %, Manual   Date/Time Value Ref Range Status   02/19/2024 10:04 AM 0.0 0.0 - 6.0 % Final   02/11/2024 01:40 PM 4.0 0.0 - 6.0 % Final   02/05/2024 09:02 AM 7.0 0.0 - 6.0 % Final     Eosinophils %   Date/Time Value Ref Range Status   03/18/2024 08:45 AM 4.6 0.0 - 6.0 % Final   03/04/2024 12:13 PM 1.9 0.0 - 6.0 % Final   02/26/2024 09:00 AM 2.2 0.0 - 6.0 % Final     Basophils %, Manual   Date/Time Value Ref Range Status   02/19/2024 10:04 AM 1.0 0.0 - 2.0 % Final   02/11/2024 01:40 PM 0.0 0.0 - 2.0 % Final   02/05/2024 09:02 AM 2.0 0.0 - 2.0 % Final     Basophils %   Date/Time Value Ref Range Status   03/18/2024 08:45 AM 0.6 0.0 - 2.0 % Final   03/04/2024 12:13 PM 1.2 0.0 - 2.0 % Final   02/26/2024 09:00 AM  1.9 0.0 - 2.0 % Final     Neutrophils Absolute   Date/Time Value Ref Range Status   03/18/2024 08:45 AM 7.08 (H) 1.60 - 5.50 x10*3/uL Final     Comment:     Percent differential counts (%) should be interpreted in the context of the absolute cell counts (cells/uL).   03/04/2024 12:13 PM 1.18 (L) 1.60 - 5.50 x10*3/uL Final     Comment:     Percent differential counts (%) should be interpreted in the context of the absolute cell counts (cells/uL).   02/26/2024 09:00 AM 0.97 (L) 1.60 - 5.50 x10*3/uL Final     Comment:     Percent differential counts (%) should be interpreted in the context of the absolute cell counts (cells/uL).     Immature Granulocytes Absolute, Automated   Date/Time Value Ref Range Status   03/18/2024 08:45 AM 0.56 (H) 0.00 - 0.50 x10*3/uL Final   03/04/2024 12:13 PM 0.00 0.00 - 0.50 x10*3/uL Final   02/26/2024 09:00 AM 0.01 0.00 - 0.50 x10*3/uL Final     Lymphocytes Absolute   Date/Time Value Ref Range Status   03/18/2024 08:45 AM 3.38 (H) 0.80 - 3.00 x10*3/uL Final   03/04/2024 12:13 PM 2.73 0.80 - 3.00 x10*3/uL Final   02/26/2024 09:00 AM 2.04 0.80 - 3.00 x10*3/uL Final     Monocytes Absolute   Date/Time Value Ref Range Status   03/18/2024 08:45 AM 0.92 (H) 0.05 - 0.80 x10*3/uL Final   03/04/2024 12:13 PM 0.23 0.05 - 0.80 x10*3/uL Final   02/26/2024 09:00 AM 0.44 0.05 - 0.80 x10*3/uL Final     Eosinophils Absolute   Date/Time Value Ref Range Status   03/18/2024 08:45 AM 0.58 (H) 0.00 - 0.40 x10*3/uL Final   03/04/2024 12:13 PM 0.08 0.00 - 0.40 x10*3/uL Final   02/26/2024 09:00 AM 0.08 0.00 - 0.40 x10*3/uL Final     Eosinophils Absolute, Manual   Date/Time Value Ref Range Status   02/19/2024 10:04 AM 0.00 0.00 - 0.40 x10*3/uL Final   02/11/2024 01:40 PM 0.14 0.00 - 0.40 x10*3/uL Final   02/05/2024 09:02 AM 0.28 0.00 - 0.40 x10*3/uL Final     Basophils Absolute   Date/Time Value Ref Range Status   03/18/2024 08:45 AM 0.07 0.00 - 0.10 x10*3/uL Final   03/04/2024 12:13 PM 0.05 0.00 - 0.10 x10*3/uL  "Final   02/26/2024 09:00 AM 0.07 0.00 - 0.10 x10*3/uL Final     Basophils Absolute, Manual   Date/Time Value Ref Range Status   02/19/2024 10:04 AM 0.16 (H) 0.00 - 0.10 x10*3/uL Final   02/11/2024 01:40 PM 0.00 0.00 - 0.10 x10*3/uL Final   02/05/2024 09:02 AM 0.08 0.00 - 0.10 x10*3/uL Final       No components found for: \"PT\"  aPTT   Date/Time Value Ref Range Status   02/11/2024 01:40 PM 24 (L) 27 - 38 seconds Final       Assessment/Plan    Proceed with C6: Tumor marker indicating response      Patient with high-grade histopathologic hide risk recurrent ovarian cancer.  Initially treated with platinum based therapy with recurrence currently receiving topotecan plus Avastin.   on the downward trend ordered today.  CT chest abdomen and pelvis ordered for evaluation.      IMPRESSION:  CT findings compatible with a favorable response to treatment as evidenced by an interval reduction in the size of the previously described nodule/lymph node anterior to the distal stomach, as well  as the retroperitoneal lymph nodes.. No new or enlarging soft tissue nodules or lymph nodes.      Signed by: Alf Lemus 2/2/2024    Cleared for chemotherapy.  Previous chemotherapy resulted in cytopenias and currently on cytokine support. On Sucralfate and Protonix for 2 weeks:     Diagnoses and all orders for this visit:  Squamous cell carcinoma of skin of lip  -     Clinic Appointment Request  -     ; Standing  -     CT abdomen pelvis w IV contrast; Future  -     Vitamin B12; Future  -     Ferritin; Future  -     Iron and TIBC; Future  -     Clinic Appointment Request [facility]; Future  -     Infusion Appointment Request [id number]; Future  Primary malignant neoplasm of ovary with widespread metastatic disease, unspecified laterality (CMS/HCC)  -     Clinic Appointment Request  -     ; Standing  -     CT abdomen pelvis w IV contrast; Future  -     Vitamin B12; Future  -     Ferritin; Future  -     Iron and " [facility]; Future  -     Clinic Appointment Request [facility]; Future  -     Infusion Appointment Request [id number] INFUSION; Future           Henry-Tesfaye Rojas MD

## 2024-03-25 ENCOUNTER — LAB (OUTPATIENT)
Dept: LAB | Facility: LAB | Age: 74
End: 2024-03-25
Payer: MEDICARE

## 2024-03-25 DIAGNOSIS — C44.02 SQUAMOUS CELL CARCINOMA OF SKIN OF LIP: ICD-10-CM

## 2024-03-25 DIAGNOSIS — C56.9 PRIMARY MALIGNANT NEOPLASM OF OVARY WITH WIDESPREAD METASTATIC DISEASE, UNSPECIFIED LATERALITY (MULTI): ICD-10-CM

## 2024-03-25 DIAGNOSIS — C80.0 PRIMARY MALIGNANT NEOPLASM OF OVARY WITH WIDESPREAD METASTATIC DISEASE, UNSPECIFIED LATERALITY (MULTI): ICD-10-CM

## 2024-03-25 LAB
BASOPHILS # BLD AUTO: 0.08 X10*3/UL (ref 0–0.1)
BASOPHILS NFR BLD AUTO: 2.2 %
CANCER AG125 SERPL-ACNC: 30.6 U/ML (ref 0–30.2)
EOSINOPHIL # BLD AUTO: 0.12 X10*3/UL (ref 0–0.4)
EOSINOPHIL NFR BLD AUTO: 3.3 %
ERYTHROCYTE [DISTWIDTH] IN BLOOD BY AUTOMATED COUNT: 18.4 % (ref 11.5–14.5)
HCT VFR BLD AUTO: 30 % (ref 36–46)
HGB BLD-MCNC: 9.4 G/DL (ref 12–16)
IMM GRANULOCYTES # BLD AUTO: 0 X10*3/UL (ref 0–0.5)
IMM GRANULOCYTES NFR BLD AUTO: 0 % (ref 0–0.9)
LYMPHOCYTES # BLD AUTO: 2.15 X10*3/UL (ref 0.8–3)
LYMPHOCYTES NFR BLD AUTO: 59.9 %
MCH RBC QN AUTO: 31.4 PG (ref 26–34)
MCHC RBC AUTO-ENTMCNC: 31.3 G/DL (ref 32–36)
MCV RBC AUTO: 100 FL (ref 80–100)
MONOCYTES # BLD AUTO: 0.27 X10*3/UL (ref 0.05–0.8)
MONOCYTES NFR BLD AUTO: 7.5 %
NEUTROPHILS # BLD AUTO: 0.97 X10*3/UL (ref 1.6–5.5)
NEUTROPHILS NFR BLD AUTO: 27.1 %
NRBC BLD-RTO: 0 /100 WBCS (ref 0–0)
OVALOCYTES BLD QL SMEAR: NORMAL
PLATELET # BLD AUTO: 403 X10*3/UL (ref 150–450)
RBC # BLD AUTO: 2.99 X10*6/UL (ref 4–5.2)
RBC MORPH BLD: NORMAL
WBC # BLD AUTO: 3.6 X10*3/UL (ref 4.4–11.3)

## 2024-03-25 PROCEDURE — 86304 IMMUNOASSAY TUMOR CA 125: CPT

## 2024-03-26 ENCOUNTER — INFUSION (OUTPATIENT)
Dept: HEMATOLOGY/ONCOLOGY | Facility: CLINIC | Age: 74
End: 2024-03-26
Payer: MEDICARE

## 2024-03-26 VITALS
RESPIRATION RATE: 19 BRPM | BODY MASS INDEX: 25.39 KG/M2 | SYSTOLIC BLOOD PRESSURE: 105 MMHG | TEMPERATURE: 95.9 F | DIASTOLIC BLOOD PRESSURE: 66 MMHG | WEIGHT: 145.61 LBS | HEART RATE: 107 BPM | OXYGEN SATURATION: 99 %

## 2024-03-26 DIAGNOSIS — C44.02 SQUAMOUS CELL CARCINOMA OF SKIN OF LIP: ICD-10-CM

## 2024-03-26 DIAGNOSIS — C80.0 PRIMARY MALIGNANT NEOPLASM OF OVARY WITH WIDESPREAD METASTATIC DISEASE, UNSPECIFIED LATERALITY (MULTI): ICD-10-CM

## 2024-03-26 DIAGNOSIS — C56.9 PRIMARY MALIGNANT NEOPLASM OF OVARY WITH WIDESPREAD METASTATIC DISEASE, UNSPECIFIED LATERALITY (MULTI): ICD-10-CM

## 2024-03-26 PROCEDURE — 96375 TX/PRO/DX INJ NEW DRUG ADDON: CPT | Mod: INF

## 2024-03-26 PROCEDURE — 2500000004 HC RX 250 GENERAL PHARMACY W/ HCPCS (ALT 636 FOR OP/ED): Performed by: INTERNAL MEDICINE

## 2024-03-26 PROCEDURE — 96413 CHEMO IV INFUSION 1 HR: CPT

## 2024-03-26 RX ORDER — FAMOTIDINE 10 MG/ML
20 INJECTION INTRAVENOUS ONCE AS NEEDED
Status: DISCONTINUED | OUTPATIENT
Start: 2024-03-26 | End: 2024-03-26 | Stop reason: HOSPADM

## 2024-03-26 RX ORDER — HEPARIN 100 UNIT/ML
500 SYRINGE INTRAVENOUS AS NEEDED
Status: DISCONTINUED | OUTPATIENT
Start: 2024-03-26 | End: 2024-03-26 | Stop reason: HOSPADM

## 2024-03-26 RX ORDER — ONDANSETRON HYDROCHLORIDE 2 MG/ML
8 INJECTION, SOLUTION INTRAVENOUS ONCE
Status: COMPLETED | OUTPATIENT
Start: 2024-03-26 | End: 2024-03-26

## 2024-03-26 RX ORDER — PROCHLORPERAZINE EDISYLATE 5 MG/ML
10 INJECTION INTRAMUSCULAR; INTRAVENOUS EVERY 6 HOURS PRN
Status: DISCONTINUED | OUTPATIENT
Start: 2024-03-26 | End: 2024-03-26 | Stop reason: HOSPADM

## 2024-03-26 RX ORDER — PROCHLORPERAZINE MALEATE 10 MG
10 TABLET ORAL EVERY 6 HOURS PRN
Status: DISCONTINUED | OUTPATIENT
Start: 2024-03-26 | End: 2024-03-26 | Stop reason: HOSPADM

## 2024-03-26 RX ORDER — HEPARIN 100 UNIT/ML
500 SYRINGE INTRAVENOUS AS NEEDED
Status: CANCELLED | OUTPATIENT
Start: 2024-03-26

## 2024-03-26 RX ORDER — HEPARIN SODIUM,PORCINE/PF 10 UNIT/ML
50 SYRINGE (ML) INTRAVENOUS AS NEEDED
Status: CANCELLED | OUTPATIENT
Start: 2024-03-26

## 2024-03-26 RX ORDER — ALBUTEROL SULFATE 0.83 MG/ML
3 SOLUTION RESPIRATORY (INHALATION) AS NEEDED
Status: DISCONTINUED | OUTPATIENT
Start: 2024-03-26 | End: 2024-03-26 | Stop reason: HOSPADM

## 2024-03-26 RX ORDER — EPINEPHRINE 0.3 MG/.3ML
0.3 INJECTION SUBCUTANEOUS EVERY 5 MIN PRN
Status: DISCONTINUED | OUTPATIENT
Start: 2024-03-26 | End: 2024-03-26 | Stop reason: HOSPADM

## 2024-03-26 RX ORDER — DIPHENHYDRAMINE HYDROCHLORIDE 50 MG/ML
50 INJECTION INTRAMUSCULAR; INTRAVENOUS AS NEEDED
Status: DISCONTINUED | OUTPATIENT
Start: 2024-03-26 | End: 2024-03-26 | Stop reason: HOSPADM

## 2024-03-26 RX ADMIN — ONDANSETRON 8 MG: 2 INJECTION INTRAMUSCULAR; INTRAVENOUS at 10:16

## 2024-03-26 RX ADMIN — TOPOTECAN HYDROCHLORIDE 7 MG: 4 INJECTION, POWDER, LYOPHILIZED, FOR SOLUTION INTRAVENOUS at 10:58

## 2024-03-26 RX ADMIN — HEPARIN 500 UNITS: 100 SYRINGE at 11:46

## 2024-03-26 ASSESSMENT — PAIN SCALES - GENERAL: PAINLEVEL: 0-NO PAIN

## 2024-03-26 NOTE — PROGRESS NOTES
Patient tolerated topotecan treatment well, discharged to home with follow up appointments in place. Patient agreed to plan and verbalized understanding using teach back method.

## 2024-03-29 ENCOUNTER — INFUSION (OUTPATIENT)
Dept: HEMATOLOGY/ONCOLOGY | Facility: HOSPITAL | Age: 74
End: 2024-03-29
Payer: MEDICARE

## 2024-03-29 ENCOUNTER — HOSPITAL ENCOUNTER (OUTPATIENT)
Dept: RADIOLOGY | Facility: HOSPITAL | Age: 74
Discharge: HOME | End: 2024-03-29
Payer: MEDICARE

## 2024-03-29 VITALS
HEART RATE: 91 BPM | OXYGEN SATURATION: 98 % | SYSTOLIC BLOOD PRESSURE: 109 MMHG | TEMPERATURE: 96.4 F | DIASTOLIC BLOOD PRESSURE: 56 MMHG | RESPIRATION RATE: 18 BRPM

## 2024-03-29 DIAGNOSIS — C80.0 PRIMARY MALIGNANT NEOPLASM OF OVARY WITH WIDESPREAD METASTATIC DISEASE, UNSPECIFIED LATERALITY (MULTI): ICD-10-CM

## 2024-03-29 DIAGNOSIS — C56.9 PRIMARY MALIGNANT NEOPLASM OF OVARY WITH WIDESPREAD METASTATIC DISEASE, UNSPECIFIED LATERALITY (MULTI): ICD-10-CM

## 2024-03-29 DIAGNOSIS — C44.02 SQUAMOUS CELL CARCINOMA OF SKIN OF LIP: ICD-10-CM

## 2024-03-29 PROCEDURE — 2500000004 HC RX 250 GENERAL PHARMACY W/ HCPCS (ALT 636 FOR OP/ED): Performed by: INTERNAL MEDICINE

## 2024-03-29 PROCEDURE — 74177 CT ABD & PELVIS W/CONTRAST: CPT

## 2024-03-29 PROCEDURE — 2550000001 HC RX 255 CONTRASTS: Performed by: INTERNAL MEDICINE

## 2024-03-29 PROCEDURE — 74177 CT ABD & PELVIS W/CONTRAST: CPT | Performed by: RADIOLOGY

## 2024-03-29 PROCEDURE — 96523 IRRIG DRUG DELIVERY DEVICE: CPT

## 2024-03-29 RX ORDER — HEPARIN 100 UNIT/ML
500 SYRINGE INTRAVENOUS AS NEEDED
Status: CANCELLED | OUTPATIENT
Start: 2024-03-29

## 2024-03-29 RX ORDER — HEPARIN 100 UNIT/ML
500 SYRINGE INTRAVENOUS AS NEEDED
Status: DISCONTINUED | OUTPATIENT
Start: 2024-03-29 | End: 2024-03-29 | Stop reason: HOSPADM

## 2024-03-29 RX ORDER — HEPARIN SODIUM,PORCINE/PF 10 UNIT/ML
50 SYRINGE (ML) INTRAVENOUS AS NEEDED
Status: CANCELLED | OUTPATIENT
Start: 2024-03-29

## 2024-03-29 RX ADMIN — Medication 500 UNITS: at 11:17

## 2024-03-29 RX ADMIN — IOHEXOL 75 ML: 350 INJECTION, SOLUTION INTRAVENOUS at 11:13

## 2024-03-29 ASSESSMENT — ENCOUNTER SYMPTOMS
DEPRESSION: 0
LOSS OF SENSATION IN FEET: 0
OCCASIONAL FEELINGS OF UNSTEADINESS: 0

## 2024-03-29 ASSESSMENT — PAIN SCALES - GENERAL: PAINLEVEL: 2

## 2024-03-30 DIAGNOSIS — I10 HYPERTENSION, UNSPECIFIED TYPE: ICD-10-CM

## 2024-04-01 ENCOUNTER — LAB (OUTPATIENT)
Dept: LAB | Facility: LAB | Age: 74
End: 2024-04-01
Payer: MEDICARE

## 2024-04-01 DIAGNOSIS — C44.02 SQUAMOUS CELL CARCINOMA OF SKIN OF LIP: ICD-10-CM

## 2024-04-01 DIAGNOSIS — E10.9 TYPE 1 DIABETES MELLITUS WITHOUT COMPLICATION (MULTI): ICD-10-CM

## 2024-04-01 DIAGNOSIS — C80.0 PRIMARY MALIGNANT NEOPLASM OF OVARY WITH WIDESPREAD METASTATIC DISEASE, UNSPECIFIED LATERALITY (MULTI): ICD-10-CM

## 2024-04-01 DIAGNOSIS — C56.9 PRIMARY MALIGNANT NEOPLASM OF OVARY WITH WIDESPREAD METASTATIC DISEASE, UNSPECIFIED LATERALITY (MULTI): ICD-10-CM

## 2024-04-01 LAB — CANCER AG125 SERPL-ACNC: 30.9 U/ML (ref 0–30.2)

## 2024-04-01 PROCEDURE — 86304 IMMUNOASSAY TUMOR CA 125: CPT

## 2024-04-01 RX ORDER — INSULIN LISPRO 100 [IU]/ML
INJECTION, SOLUTION INTRAVENOUS; SUBCUTANEOUS
Qty: 60 ML | Refills: 3 | Status: SHIPPED | OUTPATIENT
Start: 2024-04-01 | End: 2024-04-03 | Stop reason: SDUPTHER

## 2024-04-01 RX ORDER — LOSARTAN POTASSIUM 50 MG/1
50 TABLET ORAL DAILY
Qty: 30 TABLET | Refills: 0 | Status: SHIPPED | OUTPATIENT
Start: 2024-04-01 | End: 2024-04-18 | Stop reason: SDUPTHER

## 2024-04-02 ENCOUNTER — INFUSION (OUTPATIENT)
Dept: HEMATOLOGY/ONCOLOGY | Facility: CLINIC | Age: 74
End: 2024-04-02
Payer: MEDICARE

## 2024-04-02 ENCOUNTER — OFFICE VISIT (OUTPATIENT)
Dept: HEMATOLOGY/ONCOLOGY | Facility: CLINIC | Age: 74
End: 2024-04-02
Payer: MEDICARE

## 2024-04-02 VITALS
DIASTOLIC BLOOD PRESSURE: 67 MMHG | TEMPERATURE: 97.3 F | OXYGEN SATURATION: 100 % | BODY MASS INDEX: 25.75 KG/M2 | RESPIRATION RATE: 16 BRPM | SYSTOLIC BLOOD PRESSURE: 125 MMHG | WEIGHT: 147.71 LBS | HEART RATE: 98 BPM

## 2024-04-02 DIAGNOSIS — C80.0 PRIMARY MALIGNANT NEOPLASM OF OVARY WITH WIDESPREAD METASTATIC DISEASE, UNSPECIFIED LATERALITY (MULTI): Primary | ICD-10-CM

## 2024-04-02 DIAGNOSIS — C56.9 PRIMARY MALIGNANT NEOPLASM OF OVARY WITH WIDESPREAD METASTATIC DISEASE, UNSPECIFIED LATERALITY (MULTI): Primary | ICD-10-CM

## 2024-04-02 DIAGNOSIS — C44.02 SQUAMOUS CELL CARCINOMA OF SKIN OF LIP: ICD-10-CM

## 2024-04-02 PROBLEM — D64.89 OTHER SPECIFIED ANEMIAS: Status: ACTIVE | Noted: 2024-04-02

## 2024-04-02 LAB
ALBUMIN SERPL BCP-MCNC: 4.2 G/DL (ref 3.4–5)
ALP SERPL-CCNC: 53 U/L (ref 33–136)
ALT SERPL W P-5'-P-CCNC: 17 U/L (ref 7–45)
ANION GAP SERPL CALC-SCNC: 11 MMOL/L (ref 10–20)
AST SERPL W P-5'-P-CCNC: 25 U/L (ref 9–39)
BASOPHILS # BLD AUTO: 0.02 X10*3/UL (ref 0–0.1)
BASOPHILS NFR BLD AUTO: 0.6 %
BILIRUB SERPL-MCNC: 0.4 MG/DL (ref 0–1.2)
BUN SERPL-MCNC: 16 MG/DL (ref 6–23)
CALCIUM SERPL-MCNC: 9.3 MG/DL (ref 8.6–10.3)
CHLORIDE SERPL-SCNC: 105 MMOL/L (ref 98–107)
CO2 SERPL-SCNC: 23 MMOL/L (ref 21–32)
CREAT SERPL-MCNC: 0.84 MG/DL (ref 0.5–1.05)
EGFRCR SERPLBLD CKD-EPI 2021: 73 ML/MIN/1.73M*2
EOSINOPHIL # BLD AUTO: 0.11 X10*3/UL (ref 0–0.4)
EOSINOPHIL NFR BLD AUTO: 3.4 %
ERYTHROCYTE [DISTWIDTH] IN BLOOD BY AUTOMATED COUNT: 17.1 % (ref 11.5–14.5)
GLUCOSE SERPL-MCNC: 253 MG/DL (ref 74–99)
HCT VFR BLD AUTO: 24.4 % (ref 36–46)
HGB BLD-MCNC: 7.8 G/DL (ref 12–16)
IMM GRANULOCYTES # BLD AUTO: 0 X10*3/UL (ref 0–0.5)
IMM GRANULOCYTES NFR BLD AUTO: 0 % (ref 0–0.9)
LYMPHOCYTES # BLD AUTO: 2 X10*3/UL (ref 0.8–3)
LYMPHOCYTES NFR BLD AUTO: 61.2 %
MCH RBC QN AUTO: 31 PG (ref 26–34)
MCHC RBC AUTO-ENTMCNC: 32 G/DL (ref 32–36)
MCV RBC AUTO: 97 FL (ref 80–100)
MONOCYTES # BLD AUTO: 0.11 X10*3/UL (ref 0.05–0.8)
MONOCYTES NFR BLD AUTO: 3.4 %
NEUTROPHILS # BLD AUTO: 1.03 X10*3/UL (ref 1.6–5.5)
NEUTROPHILS NFR BLD AUTO: 31.4 %
NRBC BLD-RTO: ABNORMAL /100{WBCS}
OVALOCYTES BLD QL SMEAR: NORMAL
PLATELET # BLD AUTO: 84 X10*3/UL (ref 150–450)
POTASSIUM SERPL-SCNC: 4.3 MMOL/L (ref 3.5–5.3)
PROT SERPL-MCNC: 7 G/DL (ref 6.4–8.2)
RBC # BLD AUTO: 2.52 X10*6/UL (ref 4–5.2)
RBC MORPH BLD: NORMAL
SODIUM SERPL-SCNC: 135 MMOL/L (ref 136–145)
WBC # BLD AUTO: 3.3 X10*3/UL (ref 4.4–11.3)

## 2024-04-02 PROCEDURE — 4010F ACE/ARB THERAPY RXD/TAKEN: CPT | Performed by: INTERNAL MEDICINE

## 2024-04-02 PROCEDURE — 1157F ADVNC CARE PLAN IN RCRD: CPT | Performed by: INTERNAL MEDICINE

## 2024-04-02 PROCEDURE — 96417 CHEMO IV INFUS EACH ADDL SEQ: CPT

## 2024-04-02 PROCEDURE — 3074F SYST BP LT 130 MM HG: CPT | Performed by: INTERNAL MEDICINE

## 2024-04-02 PROCEDURE — 1125F AMNT PAIN NOTED PAIN PRSNT: CPT | Performed by: INTERNAL MEDICINE

## 2024-04-02 PROCEDURE — 3060F POS MICROALBUMINURIA REV: CPT | Performed by: INTERNAL MEDICINE

## 2024-04-02 PROCEDURE — 3078F DIAST BP <80 MM HG: CPT | Performed by: INTERNAL MEDICINE

## 2024-04-02 PROCEDURE — 80053 COMPREHEN METABOLIC PANEL: CPT

## 2024-04-02 PROCEDURE — 96372 THER/PROPH/DIAG INJ SC/IM: CPT | Performed by: INTERNAL MEDICINE

## 2024-04-02 PROCEDURE — 96375 TX/PRO/DX INJ NEW DRUG ADDON: CPT | Mod: INF

## 2024-04-02 PROCEDURE — 2500000004 HC RX 250 GENERAL PHARMACY W/ HCPCS (ALT 636 FOR OP/ED): Mod: JZ,JG | Performed by: INTERNAL MEDICINE

## 2024-04-02 PROCEDURE — 3048F LDL-C <100 MG/DL: CPT | Performed by: INTERNAL MEDICINE

## 2024-04-02 PROCEDURE — 96413 CHEMO IV INFUSION 1 HR: CPT

## 2024-04-02 PROCEDURE — 1159F MED LIST DOCD IN RCRD: CPT | Performed by: INTERNAL MEDICINE

## 2024-04-02 PROCEDURE — 96377 APPLICATON ON-BODY INJECTOR: CPT

## 2024-04-02 PROCEDURE — 85025 COMPLETE CBC W/AUTO DIFF WBC: CPT

## 2024-04-02 PROCEDURE — 99214 OFFICE O/P EST MOD 30 MIN: CPT | Performed by: INTERNAL MEDICINE

## 2024-04-02 PROCEDURE — 99214 OFFICE O/P EST MOD 30 MIN: CPT | Mod: 25 | Performed by: INTERNAL MEDICINE

## 2024-04-02 PROCEDURE — 1160F RVW MEDS BY RX/DR IN RCRD: CPT | Performed by: INTERNAL MEDICINE

## 2024-04-02 PROCEDURE — 3008F BODY MASS INDEX DOCD: CPT | Performed by: INTERNAL MEDICINE

## 2024-04-02 PROCEDURE — 3052F HG A1C>EQUAL 8.0%<EQUAL 9.0%: CPT | Performed by: INTERNAL MEDICINE

## 2024-04-02 RX ORDER — DIPHENHYDRAMINE HYDROCHLORIDE 50 MG/ML
50 INJECTION INTRAMUSCULAR; INTRAVENOUS AS NEEDED
Status: CANCELLED | OUTPATIENT
Start: 2024-05-14

## 2024-04-02 RX ORDER — HEPARIN 100 UNIT/ML
500 SYRINGE INTRAVENOUS AS NEEDED
Status: CANCELLED | OUTPATIENT
Start: 2024-04-02

## 2024-04-02 RX ORDER — ALBUTEROL SULFATE 0.83 MG/ML
3 SOLUTION RESPIRATORY (INHALATION) AS NEEDED
Status: CANCELLED | OUTPATIENT
Start: 2024-04-23

## 2024-04-02 RX ORDER — DIPHENHYDRAMINE HYDROCHLORIDE 50 MG/ML
50 INJECTION INTRAMUSCULAR; INTRAVENOUS AS NEEDED
Status: CANCELLED | OUTPATIENT
Start: 2024-05-21

## 2024-04-02 RX ORDER — PROCHLORPERAZINE MALEATE 5 MG
10 TABLET ORAL EVERY 6 HOURS PRN
Status: CANCELLED | OUTPATIENT
Start: 2024-04-23

## 2024-04-02 RX ORDER — HEPARIN SODIUM,PORCINE/PF 10 UNIT/ML
50 SYRINGE (ML) INTRAVENOUS AS NEEDED
Status: CANCELLED | OUTPATIENT
Start: 2024-04-02

## 2024-04-02 RX ORDER — ONDANSETRON HYDROCHLORIDE 2 MG/ML
8 INJECTION, SOLUTION INTRAVENOUS ONCE
Status: COMPLETED | OUTPATIENT
Start: 2024-04-02 | End: 2024-04-02

## 2024-04-02 RX ORDER — PROCHLORPERAZINE EDISYLATE 5 MG/ML
10 INJECTION INTRAMUSCULAR; INTRAVENOUS EVERY 6 HOURS PRN
Status: CANCELLED | OUTPATIENT
Start: 2024-04-23

## 2024-04-02 RX ORDER — FAMOTIDINE 10 MG/ML
20 INJECTION INTRAVENOUS ONCE AS NEEDED
Status: CANCELLED | OUTPATIENT
Start: 2024-05-28

## 2024-04-02 RX ORDER — ALBUTEROL SULFATE 0.83 MG/ML
3 SOLUTION RESPIRATORY (INHALATION) AS NEEDED
Status: CANCELLED | OUTPATIENT
Start: 2024-04-30

## 2024-04-02 RX ORDER — PROCHLORPERAZINE MALEATE 10 MG
10 TABLET ORAL EVERY 6 HOURS PRN
Status: DISCONTINUED | OUTPATIENT
Start: 2024-04-02 | End: 2024-04-02 | Stop reason: HOSPADM

## 2024-04-02 RX ORDER — EPINEPHRINE 0.3 MG/.3ML
0.3 INJECTION SUBCUTANEOUS EVERY 5 MIN PRN
Status: CANCELLED | OUTPATIENT
Start: 2024-04-23

## 2024-04-02 RX ORDER — EPINEPHRINE 0.3 MG/.3ML
0.3 INJECTION SUBCUTANEOUS EVERY 5 MIN PRN
Status: CANCELLED | OUTPATIENT
Start: 2024-04-02

## 2024-04-02 RX ORDER — PROCHLORPERAZINE MALEATE 5 MG
10 TABLET ORAL EVERY 6 HOURS PRN
Status: CANCELLED | OUTPATIENT
Start: 2024-04-30

## 2024-04-02 RX ORDER — EPINEPHRINE 0.3 MG/.3ML
0.3 INJECTION SUBCUTANEOUS EVERY 5 MIN PRN
Status: CANCELLED | OUTPATIENT
Start: 2024-05-14

## 2024-04-02 RX ORDER — HEPARIN 100 UNIT/ML
500 SYRINGE INTRAVENOUS AS NEEDED
Status: DISCONTINUED | OUTPATIENT
Start: 2024-04-02 | End: 2024-04-02 | Stop reason: HOSPADM

## 2024-04-02 RX ORDER — ONDANSETRON HYDROCHLORIDE 2 MG/ML
8 INJECTION, SOLUTION INTRAVENOUS ONCE
Status: CANCELLED | OUTPATIENT
Start: 2024-04-30

## 2024-04-02 RX ORDER — PROCHLORPERAZINE MALEATE 5 MG
10 TABLET ORAL EVERY 6 HOURS PRN
Status: CANCELLED | OUTPATIENT
Start: 2024-05-28

## 2024-04-02 RX ORDER — PROCHLORPERAZINE EDISYLATE 5 MG/ML
10 INJECTION INTRAMUSCULAR; INTRAVENOUS EVERY 6 HOURS PRN
Status: CANCELLED | OUTPATIENT
Start: 2024-04-30

## 2024-04-02 RX ORDER — EPINEPHRINE 0.3 MG/.3ML
0.3 INJECTION SUBCUTANEOUS EVERY 5 MIN PRN
Status: CANCELLED | OUTPATIENT
Start: 2024-04-30

## 2024-04-02 RX ORDER — DIPHENHYDRAMINE HYDROCHLORIDE 50 MG/ML
50 INJECTION INTRAMUSCULAR; INTRAVENOUS AS NEEDED
Status: CANCELLED | OUTPATIENT
Start: 2024-05-28

## 2024-04-02 RX ORDER — ONDANSETRON HYDROCHLORIDE 2 MG/ML
8 INJECTION, SOLUTION INTRAVENOUS ONCE
Status: CANCELLED | OUTPATIENT
Start: 2024-05-21

## 2024-04-02 RX ORDER — DIPHENHYDRAMINE HYDROCHLORIDE 50 MG/ML
50 INJECTION INTRAMUSCULAR; INTRAVENOUS AS NEEDED
Status: CANCELLED | OUTPATIENT
Start: 2024-04-23

## 2024-04-02 RX ORDER — DIPHENHYDRAMINE HYDROCHLORIDE 50 MG/ML
50 INJECTION INTRAMUSCULAR; INTRAVENOUS AS NEEDED
Status: CANCELLED | OUTPATIENT
Start: 2024-04-30

## 2024-04-02 RX ORDER — PROCHLORPERAZINE EDISYLATE 5 MG/ML
10 INJECTION INTRAMUSCULAR; INTRAVENOUS EVERY 6 HOURS PRN
Status: CANCELLED | OUTPATIENT
Start: 2024-05-14

## 2024-04-02 RX ORDER — FAMOTIDINE 10 MG/ML
20 INJECTION INTRAVENOUS ONCE AS NEEDED
Status: CANCELLED | OUTPATIENT
Start: 2024-04-02

## 2024-04-02 RX ORDER — ALBUTEROL SULFATE 0.83 MG/ML
3 SOLUTION RESPIRATORY (INHALATION) AS NEEDED
Status: DISCONTINUED | OUTPATIENT
Start: 2024-04-02 | End: 2024-04-02 | Stop reason: HOSPADM

## 2024-04-02 RX ORDER — ONDANSETRON HYDROCHLORIDE 2 MG/ML
8 INJECTION, SOLUTION INTRAVENOUS ONCE
Status: CANCELLED | OUTPATIENT
Start: 2024-05-28

## 2024-04-02 RX ORDER — FAMOTIDINE 10 MG/ML
20 INJECTION INTRAVENOUS ONCE AS NEEDED
Status: CANCELLED | OUTPATIENT
Start: 2024-04-16

## 2024-04-02 RX ORDER — EPINEPHRINE 0.3 MG/.3ML
0.3 INJECTION SUBCUTANEOUS EVERY 5 MIN PRN
Status: DISCONTINUED | OUTPATIENT
Start: 2024-04-02 | End: 2024-04-02 | Stop reason: HOSPADM

## 2024-04-02 RX ORDER — PROCHLORPERAZINE MALEATE 5 MG
10 TABLET ORAL EVERY 6 HOURS PRN
Status: CANCELLED | OUTPATIENT
Start: 2024-04-16

## 2024-04-02 RX ORDER — PROCHLORPERAZINE MALEATE 5 MG
10 TABLET ORAL EVERY 6 HOURS PRN
Status: CANCELLED | OUTPATIENT
Start: 2024-05-14

## 2024-04-02 RX ORDER — EPINEPHRINE 0.3 MG/.3ML
0.3 INJECTION SUBCUTANEOUS EVERY 5 MIN PRN
Status: CANCELLED | OUTPATIENT
Start: 2024-05-28

## 2024-04-02 RX ORDER — ONDANSETRON HYDROCHLORIDE 2 MG/ML
8 INJECTION, SOLUTION INTRAVENOUS ONCE
Status: CANCELLED | OUTPATIENT
Start: 2024-05-14

## 2024-04-02 RX ORDER — FAMOTIDINE 10 MG/ML
20 INJECTION INTRAVENOUS ONCE AS NEEDED
Status: CANCELLED | OUTPATIENT
Start: 2024-05-21

## 2024-04-02 RX ORDER — EPINEPHRINE 0.3 MG/.3ML
0.3 INJECTION SUBCUTANEOUS EVERY 5 MIN PRN
Status: CANCELLED | OUTPATIENT
Start: 2024-05-21

## 2024-04-02 RX ORDER — FAMOTIDINE 10 MG/ML
20 INJECTION INTRAVENOUS ONCE AS NEEDED
Status: DISCONTINUED | OUTPATIENT
Start: 2024-04-02 | End: 2024-04-02 | Stop reason: HOSPADM

## 2024-04-02 RX ORDER — DIPHENHYDRAMINE HYDROCHLORIDE 50 MG/ML
50 INJECTION INTRAMUSCULAR; INTRAVENOUS AS NEEDED
Status: CANCELLED | OUTPATIENT
Start: 2024-04-02

## 2024-04-02 RX ORDER — PROCHLORPERAZINE EDISYLATE 5 MG/ML
10 INJECTION INTRAMUSCULAR; INTRAVENOUS EVERY 6 HOURS PRN
Status: CANCELLED | OUTPATIENT
Start: 2024-04-16

## 2024-04-02 RX ORDER — ALBUTEROL SULFATE 0.83 MG/ML
3 SOLUTION RESPIRATORY (INHALATION) AS NEEDED
Status: CANCELLED | OUTPATIENT
Start: 2024-05-21

## 2024-04-02 RX ORDER — FAMOTIDINE 10 MG/ML
20 INJECTION INTRAVENOUS ONCE AS NEEDED
Status: CANCELLED | OUTPATIENT
Start: 2024-04-30

## 2024-04-02 RX ORDER — FAMOTIDINE 10 MG/ML
20 INJECTION INTRAVENOUS ONCE AS NEEDED
Status: CANCELLED | OUTPATIENT
Start: 2024-05-14

## 2024-04-02 RX ORDER — ALBUTEROL SULFATE 0.83 MG/ML
3 SOLUTION RESPIRATORY (INHALATION) AS NEEDED
Status: CANCELLED | OUTPATIENT
Start: 2024-04-16

## 2024-04-02 RX ORDER — PROCHLORPERAZINE EDISYLATE 5 MG/ML
10 INJECTION INTRAMUSCULAR; INTRAVENOUS EVERY 6 HOURS PRN
Status: CANCELLED | OUTPATIENT
Start: 2024-05-28

## 2024-04-02 RX ORDER — DIPHENHYDRAMINE HYDROCHLORIDE 50 MG/ML
50 INJECTION INTRAMUSCULAR; INTRAVENOUS AS NEEDED
Status: CANCELLED | OUTPATIENT
Start: 2024-04-16

## 2024-04-02 RX ORDER — ALBUTEROL SULFATE 0.83 MG/ML
3 SOLUTION RESPIRATORY (INHALATION) AS NEEDED
Status: CANCELLED | OUTPATIENT
Start: 2024-04-02

## 2024-04-02 RX ORDER — PROCHLORPERAZINE EDISYLATE 5 MG/ML
10 INJECTION INTRAMUSCULAR; INTRAVENOUS EVERY 6 HOURS PRN
Status: CANCELLED | OUTPATIENT
Start: 2024-05-21

## 2024-04-02 RX ORDER — PROCHLORPERAZINE EDISYLATE 5 MG/ML
10 INJECTION INTRAMUSCULAR; INTRAVENOUS EVERY 6 HOURS PRN
Status: DISCONTINUED | OUTPATIENT
Start: 2024-04-02 | End: 2024-04-02 | Stop reason: HOSPADM

## 2024-04-02 RX ORDER — ONDANSETRON HYDROCHLORIDE 2 MG/ML
8 INJECTION, SOLUTION INTRAVENOUS ONCE
Status: CANCELLED | OUTPATIENT
Start: 2024-04-23

## 2024-04-02 RX ORDER — ALBUTEROL SULFATE 0.83 MG/ML
3 SOLUTION RESPIRATORY (INHALATION) AS NEEDED
Status: CANCELLED | OUTPATIENT
Start: 2024-05-28

## 2024-04-02 RX ORDER — ONDANSETRON HYDROCHLORIDE 2 MG/ML
8 INJECTION, SOLUTION INTRAVENOUS ONCE
Status: CANCELLED | OUTPATIENT
Start: 2024-04-16

## 2024-04-02 RX ORDER — DIPHENHYDRAMINE HYDROCHLORIDE 50 MG/ML
50 INJECTION INTRAMUSCULAR; INTRAVENOUS AS NEEDED
Status: DISCONTINUED | OUTPATIENT
Start: 2024-04-02 | End: 2024-04-02 | Stop reason: HOSPADM

## 2024-04-02 RX ORDER — PROCHLORPERAZINE MALEATE 5 MG
10 TABLET ORAL EVERY 6 HOURS PRN
Status: CANCELLED | OUTPATIENT
Start: 2024-05-21

## 2024-04-02 RX ORDER — FAMOTIDINE 10 MG/ML
20 INJECTION INTRAVENOUS ONCE AS NEEDED
Status: CANCELLED | OUTPATIENT
Start: 2024-04-23

## 2024-04-02 RX ORDER — ALBUTEROL SULFATE 0.83 MG/ML
3 SOLUTION RESPIRATORY (INHALATION) AS NEEDED
Status: CANCELLED | OUTPATIENT
Start: 2024-05-14

## 2024-04-02 RX ORDER — EPINEPHRINE 0.3 MG/.3ML
0.3 INJECTION SUBCUTANEOUS EVERY 5 MIN PRN
Status: CANCELLED | OUTPATIENT
Start: 2024-04-16

## 2024-04-02 RX ADMIN — HEPARIN 500 UNITS: 100 SYRINGE at 13:38

## 2024-04-02 RX ADMIN — PEGFILGRASTIM 6 MG: KIT SUBCUTANEOUS at 13:36

## 2024-04-02 RX ADMIN — TOPOTECAN HYDROCHLORIDE 7 MG: 4 INJECTION, POWDER, LYOPHILIZED, FOR SOLUTION INTRAVENOUS at 12:28

## 2024-04-02 RX ADMIN — ONDANSETRON 8 MG: 2 INJECTION INTRAMUSCULAR; INTRAVENOUS at 11:55

## 2024-04-02 RX ADMIN — BEVACIZUMAB-AWWB 700 MG: 400 INJECTION, SOLUTION INTRAVENOUS at 13:08

## 2024-04-02 ASSESSMENT — PAIN SCALES - GENERAL: PAINLEVEL: 2

## 2024-04-02 NOTE — PATIENT INSTRUCTIONS
Today you met with Dr. Ca.  Your recent labs and imaging were reviewed.  Hey- control is a win.  You are fighting well and holding your own.  :)    Stay this treatment regimen.  Dr Ca will see you in about 3 months, repeating your marker before that visit.  Lab orders were entered for [place] the day before.  No need for B12 or iron.    Please call the office for any questions or concerns.  Review your schedule prior to leaving [facility].  We ask that you notify us 5-7 days before your medications need refilled.  [facility] is strongly encouraging all patients to access their MyChart for all results and to communicate with their provider for non-urgent messages.  If an urgent/same day/sick concern response is needed, please call the office at [phone]. Thank You!

## 2024-04-02 NOTE — PROGRESS NOTES
Patient tolerated topotecan/avastin treatment well, discharged to home with follow up appointments in place.  Patient to return on 4/5/24 for possible blood transfusion. Patient agreed to plan and verbalized understanding using teach back method.

## 2024-04-03 DIAGNOSIS — E10.9 TYPE 1 DIABETES MELLITUS WITHOUT COMPLICATION (MULTI): ICD-10-CM

## 2024-04-03 RX ORDER — INSULIN LISPRO 100 [IU]/ML
INJECTION, SOLUTION INTRAVENOUS; SUBCUTANEOUS
Qty: 60 ML | Refills: 3 | Status: SHIPPED | OUTPATIENT
Start: 2024-04-03 | End: 2024-06-10 | Stop reason: ALTCHOICE

## 2024-04-04 ASSESSMENT — ENCOUNTER SYMPTOMS
FATIGUE: 1
EYES NEGATIVE: 1

## 2024-04-05 ENCOUNTER — INFUSION (OUTPATIENT)
Dept: HEMATOLOGY/ONCOLOGY | Facility: CLINIC | Age: 74
End: 2024-04-05
Payer: MEDICARE

## 2024-04-05 VITALS
OXYGEN SATURATION: 98 % | DIASTOLIC BLOOD PRESSURE: 72 MMHG | HEART RATE: 73 BPM | RESPIRATION RATE: 18 BRPM | SYSTOLIC BLOOD PRESSURE: 128 MMHG | TEMPERATURE: 97.7 F | BODY MASS INDEX: 25.85 KG/M2 | WEIGHT: 148.26 LBS

## 2024-04-05 DIAGNOSIS — C44.02 SQUAMOUS CELL CARCINOMA OF SKIN OF LIP: ICD-10-CM

## 2024-04-05 DIAGNOSIS — C80.0 PRIMARY MALIGNANT NEOPLASM OF OVARY WITH WIDESPREAD METASTATIC DISEASE, UNSPECIFIED LATERALITY (MULTI): ICD-10-CM

## 2024-04-05 DIAGNOSIS — C56.9 PRIMARY MALIGNANT NEOPLASM OF OVARY WITH WIDESPREAD METASTATIC DISEASE, UNSPECIFIED LATERALITY (MULTI): ICD-10-CM

## 2024-04-05 DIAGNOSIS — C78.6 SECONDARY MALIGNANT NEOPLASM OF RETROPERITONEUM AND PERITONEUM (MULTI): Primary | ICD-10-CM

## 2024-04-05 DIAGNOSIS — D63.0 ANEMIA IN NEOPLASTIC DISEASE: ICD-10-CM

## 2024-04-05 LAB
ABO GROUP (TYPE) IN BLOOD: NORMAL
ABO GROUP (TYPE) IN BLOOD: NORMAL
ANTIBODY SCREEN: NORMAL
BLOOD EXPIRATION DATE: NORMAL
DISPENSE STATUS: NORMAL
ERYTHROCYTE [DISTWIDTH] IN BLOOD BY AUTOMATED COUNT: 17.1 % (ref 11.5–14.5)
HCT VFR BLD AUTO: 22 % (ref 36–46)
HGB BLD-MCNC: 7.1 G/DL (ref 12–16)
MCH RBC QN AUTO: 31.6 PG (ref 26–34)
MCHC RBC AUTO-ENTMCNC: 32.3 G/DL (ref 32–36)
MCV RBC AUTO: 98 FL (ref 80–100)
PLATELET # BLD AUTO: 44 X10*3/UL (ref 150–450)
PRODUCT BLOOD TYPE: 5100
PRODUCT CODE: NORMAL
RBC # BLD AUTO: 2.25 X10*6/UL (ref 4–5.2)
RH FACTOR (ANTIGEN D): NORMAL
RH FACTOR (ANTIGEN D): NORMAL
UNIT ABO: NORMAL
UNIT NUMBER: NORMAL
UNIT RH: NORMAL
UNIT VOLUME: 500
WBC # BLD AUTO: 15.3 X10*3/UL (ref 4.4–11.3)
XM INTEP: NORMAL

## 2024-04-05 PROCEDURE — 2500000004 HC RX 250 GENERAL PHARMACY W/ HCPCS (ALT 636 FOR OP/ED): Performed by: INTERNAL MEDICINE

## 2024-04-05 PROCEDURE — 86920 COMPATIBILITY TEST SPIN: CPT

## 2024-04-05 PROCEDURE — P9040 RBC LEUKOREDUCED IRRADIATED: HCPCS

## 2024-04-05 PROCEDURE — 36430 TRANSFUSION BLD/BLD COMPNT: CPT

## 2024-04-05 PROCEDURE — 86901 BLOOD TYPING SEROLOGIC RH(D): CPT

## 2024-04-05 PROCEDURE — 85027 COMPLETE CBC AUTOMATED: CPT

## 2024-04-05 RX ORDER — EPINEPHRINE 0.3 MG/.3ML
0.3 INJECTION SUBCUTANEOUS EVERY 5 MIN PRN
Status: DISCONTINUED | OUTPATIENT
Start: 2024-04-05 | End: 2024-04-05 | Stop reason: HOSPADM

## 2024-04-05 RX ORDER — DIPHENHYDRAMINE HYDROCHLORIDE 50 MG/ML
50 INJECTION INTRAMUSCULAR; INTRAVENOUS AS NEEDED
Status: DISCONTINUED | OUTPATIENT
Start: 2024-04-05 | End: 2024-04-05 | Stop reason: HOSPADM

## 2024-04-05 RX ORDER — ALBUTEROL SULFATE 0.83 MG/ML
3 SOLUTION RESPIRATORY (INHALATION) AS NEEDED
Status: CANCELLED | OUTPATIENT
Start: 2024-04-05

## 2024-04-05 RX ORDER — HEPARIN 100 UNIT/ML
500 SYRINGE INTRAVENOUS AS NEEDED
Status: CANCELLED | OUTPATIENT
Start: 2024-04-05

## 2024-04-05 RX ORDER — DIPHENHYDRAMINE HYDROCHLORIDE 50 MG/ML
50 INJECTION INTRAMUSCULAR; INTRAVENOUS AS NEEDED
Status: CANCELLED | OUTPATIENT
Start: 2024-04-05

## 2024-04-05 RX ORDER — HEPARIN 100 UNIT/ML
500 SYRINGE INTRAVENOUS AS NEEDED
Status: DISCONTINUED | OUTPATIENT
Start: 2024-04-05 | End: 2024-04-05 | Stop reason: HOSPADM

## 2024-04-05 RX ORDER — FAMOTIDINE 10 MG/ML
20 INJECTION INTRAVENOUS ONCE AS NEEDED
Status: CANCELLED | OUTPATIENT
Start: 2024-04-05

## 2024-04-05 RX ORDER — HEPARIN SODIUM,PORCINE/PF 10 UNIT/ML
50 SYRINGE (ML) INTRAVENOUS AS NEEDED
Status: CANCELLED | OUTPATIENT
Start: 2024-04-05

## 2024-04-05 RX ORDER — ALBUTEROL SULFATE 0.83 MG/ML
3 SOLUTION RESPIRATORY (INHALATION) AS NEEDED
Status: DISCONTINUED | OUTPATIENT
Start: 2024-04-05 | End: 2024-04-05 | Stop reason: HOSPADM

## 2024-04-05 RX ORDER — FAMOTIDINE 10 MG/ML
20 INJECTION INTRAVENOUS ONCE AS NEEDED
Status: DISCONTINUED | OUTPATIENT
Start: 2024-04-05 | End: 2024-04-05 | Stop reason: HOSPADM

## 2024-04-05 RX ORDER — EPINEPHRINE 0.3 MG/.3ML
0.3 INJECTION SUBCUTANEOUS EVERY 5 MIN PRN
Status: CANCELLED | OUTPATIENT
Start: 2024-04-05

## 2024-04-05 RX ADMIN — HEPARIN 500 UNITS: 100 SYRINGE at 15:13

## 2024-04-05 ASSESSMENT — PAIN SCALES - GENERAL: PAINLEVEL: 3

## 2024-04-05 NOTE — PROGRESS NOTES
Patient tolerated blood transfusion well, discharged to home with follow up appointments in place. Patient agreed to plan and verbalized understanding using teach back method.

## 2024-04-09 DIAGNOSIS — E10.9 TYPE 1 DIABETES MELLITUS WITHOUT COMPLICATION (MULTI): ICD-10-CM

## 2024-04-09 DIAGNOSIS — E10.9 TYPE 1 DIABETES MELLITUS WITHOUT COMPLICATION (MULTI): Primary | ICD-10-CM

## 2024-04-09 RX ORDER — METFORMIN HYDROCHLORIDE 500 MG/1
500 TABLET, EXTENDED RELEASE ORAL 2 TIMES DAILY
Qty: 180 TABLET | Refills: 3 | Status: SHIPPED | OUTPATIENT
Start: 2024-04-09

## 2024-04-09 RX ORDER — INSULIN GLARGINE 100 [IU]/ML
30 INJECTION, SOLUTION SUBCUTANEOUS NIGHTLY
Qty: 27 ML | Refills: 3 | Status: SHIPPED | OUTPATIENT
Start: 2024-04-09 | End: 2025-04-09

## 2024-04-09 RX ORDER — INSULIN LISPRO 100 [IU]/ML
INJECTION, SOLUTION INTRAVENOUS; SUBCUTANEOUS
Qty: 60 ML | Refills: 3 | Status: CANCELLED | OUTPATIENT
Start: 2024-04-09

## 2024-04-09 RX ORDER — INSULIN LISPRO 100 [IU]/ML
INJECTION, SOLUTION INTRAVENOUS; SUBCUTANEOUS
Qty: 60 ML | Refills: 3 | Status: SHIPPED | OUTPATIENT
Start: 2024-04-09

## 2024-04-09 RX ORDER — LEVOTHYROXINE SODIUM 88 UG/1
88 TABLET ORAL
Qty: 90 TABLET | Refills: 3 | Status: SHIPPED | OUTPATIENT
Start: 2024-04-09 | End: 2024-04-11 | Stop reason: SDUPTHER

## 2024-04-10 DIAGNOSIS — E78.5 HYPERLIPIDEMIA, UNSPECIFIED HYPERLIPIDEMIA TYPE: ICD-10-CM

## 2024-04-10 RX ORDER — ROSUVASTATIN CALCIUM 40 MG/1
40 TABLET, COATED ORAL DAILY
Qty: 30 TABLET | Refills: 0 | Status: SHIPPED | OUTPATIENT
Start: 2024-04-10

## 2024-04-11 DIAGNOSIS — E10.9 TYPE 1 DIABETES MELLITUS WITHOUT COMPLICATION (MULTI): ICD-10-CM

## 2024-04-11 RX ORDER — LEVOTHYROXINE SODIUM 88 UG/1
88 TABLET ORAL
Qty: 90 TABLET | Refills: 3 | Status: SHIPPED | OUTPATIENT
Start: 2024-04-11

## 2024-04-15 ENCOUNTER — LAB (OUTPATIENT)
Dept: LAB | Facility: LAB | Age: 74
End: 2024-04-15
Payer: MEDICARE

## 2024-04-15 DIAGNOSIS — C80.0 PRIMARY MALIGNANT NEOPLASM OF OVARY WITH WIDESPREAD METASTATIC DISEASE, UNSPECIFIED LATERALITY (MULTI): ICD-10-CM

## 2024-04-15 DIAGNOSIS — C44.02 SQUAMOUS CELL CARCINOMA OF SKIN OF LIP: ICD-10-CM

## 2024-04-15 DIAGNOSIS — C56.9 PRIMARY MALIGNANT NEOPLASM OF OVARY WITH WIDESPREAD METASTATIC DISEASE, UNSPECIFIED LATERALITY (MULTI): ICD-10-CM

## 2024-04-15 LAB
ALBUMIN SERPL BCP-MCNC: 4.4 G/DL (ref 3.4–5)
ALP SERPL-CCNC: 81 U/L (ref 33–136)
ALT SERPL W P-5'-P-CCNC: 14 U/L (ref 7–45)
ANION GAP SERPL CALC-SCNC: 12 MMOL/L (ref 10–20)
APPEARANCE UR: ABNORMAL
AST SERPL W P-5'-P-CCNC: 22 U/L (ref 9–39)
BACTERIA #/AREA URNS AUTO: ABNORMAL /HPF
BASOPHILS # BLD MANUAL: 0.17 X10*3/UL (ref 0–0.1)
BASOPHILS NFR BLD MANUAL: 1 %
BILIRUB SERPL-MCNC: 0.3 MG/DL (ref 0–1.2)
BILIRUB UR STRIP.AUTO-MCNC: NEGATIVE MG/DL
BUN SERPL-MCNC: 15 MG/DL (ref 6–23)
CALCIUM SERPL-MCNC: 10.4 MG/DL (ref 8.6–10.3)
CAOX CRY #/AREA UR COMP ASSIST: ABNORMAL /HPF
CHLORIDE SERPL-SCNC: 103 MMOL/L (ref 98–107)
CO2 SERPL-SCNC: 25 MMOL/L (ref 21–32)
COLOR UR: YELLOW
CREAT SERPL-MCNC: 1.07 MG/DL (ref 0.5–1.05)
EGFRCR SERPLBLD CKD-EPI 2021: 55 ML/MIN/1.73M*2
EOSINOPHIL # BLD MANUAL: 0.17 X10*3/UL (ref 0–0.4)
EOSINOPHIL NFR BLD MANUAL: 1 %
ERYTHROCYTE [DISTWIDTH] IN BLOOD BY AUTOMATED COUNT: 17.8 % (ref 11.5–14.5)
GLUCOSE SERPL-MCNC: 226 MG/DL (ref 74–99)
GLUCOSE UR STRIP.AUTO-MCNC: ABNORMAL MG/DL
HCT VFR BLD AUTO: 29.4 % (ref 36–46)
HGB BLD-MCNC: 9.4 G/DL (ref 12–16)
HYALINE CASTS #/AREA URNS AUTO: ABNORMAL /LPF
IMM GRANULOCYTES # BLD AUTO: 1.35 X10*3/UL (ref 0–0.5)
IMM GRANULOCYTES NFR BLD AUTO: 7.7 % (ref 0–0.9)
KETONES UR STRIP.AUTO-MCNC: ABNORMAL MG/DL
LEUKOCYTE ESTERASE UR QL STRIP.AUTO: ABNORMAL
LYMPHOCYTES # BLD MANUAL: 4.7 X10*3/UL (ref 0.8–3)
LYMPHOCYTES NFR BLD MANUAL: 27 %
MCH RBC QN AUTO: 32.9 PG (ref 26–34)
MCHC RBC AUTO-ENTMCNC: 32 G/DL (ref 32–36)
MCV RBC AUTO: 103 FL (ref 80–100)
METAMYELOCYTES # BLD MANUAL: 0.17 X10*3/UL
METAMYELOCYTES NFR BLD MANUAL: 1 %
MONOCYTES # BLD MANUAL: 0.52 X10*3/UL (ref 0.05–0.8)
MONOCYTES NFR BLD MANUAL: 3 %
MUCOUS THREADS #/AREA URNS AUTO: ABNORMAL /LPF
MYELOCYTES # BLD MANUAL: 0.35 X10*3/UL
MYELOCYTES NFR BLD MANUAL: 2 %
NEUTROPHILS # BLD MANUAL: 11.31 X10*3/UL (ref 1.6–5.5)
NEUTS BAND # BLD MANUAL: 0.7 X10*3/UL (ref 0–0.5)
NEUTS BAND NFR BLD MANUAL: 4 %
NEUTS SEG # BLD MANUAL: 10.61 X10*3/UL (ref 1.6–5)
NEUTS SEG NFR BLD MANUAL: 61 %
NITRITE UR QL STRIP.AUTO: NEGATIVE
NRBC BLD-RTO: 0.3 /100 WBCS (ref 0–0)
PH UR STRIP.AUTO: 5.5 [PH]
PLATELET # BLD AUTO: 285 X10*3/UL (ref 150–450)
POLYCHROMASIA BLD QL SMEAR: ABNORMAL
POTASSIUM SERPL-SCNC: 4.4 MMOL/L (ref 3.5–5.3)
PROT SERPL-MCNC: 7.6 G/DL (ref 6.4–8.2)
PROT UR STRIP.AUTO-MCNC: ABNORMAL MG/DL
RBC # BLD AUTO: 2.86 X10*6/UL (ref 4–5.2)
RBC # UR STRIP.AUTO: NEGATIVE /UL
RBC #/AREA URNS AUTO: ABNORMAL /HPF
RBC MORPH BLD: ABNORMAL
SODIUM SERPL-SCNC: 136 MMOL/L (ref 136–145)
SP GR UR STRIP.AUTO: 1.02
SQUAMOUS #/AREA URNS AUTO: ABNORMAL /HPF
TOTAL CELLS COUNTED BLD: 100
UROBILINOGEN UR STRIP.AUTO-MCNC: NORMAL MG/DL
WBC # BLD AUTO: 17.4 X10*3/UL (ref 4.4–11.3)
WBC #/AREA URNS AUTO: >50 /HPF

## 2024-04-16 ENCOUNTER — INFUSION (OUTPATIENT)
Dept: HEMATOLOGY/ONCOLOGY | Facility: CLINIC | Age: 74
End: 2024-04-16
Payer: MEDICARE

## 2024-04-16 ENCOUNTER — HOSPITAL ENCOUNTER (OUTPATIENT)
Dept: RADIOLOGY | Facility: HOSPITAL | Age: 74
Discharge: HOME | End: 2024-04-16
Payer: MEDICARE

## 2024-04-16 VITALS
OXYGEN SATURATION: 98 % | RESPIRATION RATE: 19 BRPM | HEART RATE: 80 BPM | WEIGHT: 145.83 LBS | SYSTOLIC BLOOD PRESSURE: 133 MMHG | BODY MASS INDEX: 25.43 KG/M2 | DIASTOLIC BLOOD PRESSURE: 74 MMHG | TEMPERATURE: 96.3 F

## 2024-04-16 DIAGNOSIS — C80.0 PRIMARY MALIGNANT NEOPLASM OF OVARY WITH WIDESPREAD METASTATIC DISEASE, UNSPECIFIED LATERALITY (MULTI): ICD-10-CM

## 2024-04-16 DIAGNOSIS — C44.02 SQUAMOUS CELL CARCINOMA OF SKIN OF LIP: ICD-10-CM

## 2024-04-16 DIAGNOSIS — C56.9 PRIMARY MALIGNANT NEOPLASM OF OVARY WITH WIDESPREAD METASTATIC DISEASE, UNSPECIFIED LATERALITY (MULTI): ICD-10-CM

## 2024-04-16 DIAGNOSIS — C56.9 PRIMARY MALIGNANT NEOPLASM OF OVARY WITH WIDESPREAD METASTATIC DISEASE, UNSPECIFIED LATERALITY (MULTI): Primary | ICD-10-CM

## 2024-04-16 DIAGNOSIS — C78.6 SECONDARY MALIGNANT NEOPLASM OF RETROPERITONEUM AND PERITONEUM (MULTI): ICD-10-CM

## 2024-04-16 DIAGNOSIS — C80.0 PRIMARY MALIGNANT NEOPLASM OF OVARY WITH WIDESPREAD METASTATIC DISEASE, UNSPECIFIED LATERALITY (MULTI): Primary | ICD-10-CM

## 2024-04-16 PROCEDURE — 2500000004 HC RX 250 GENERAL PHARMACY W/ HCPCS (ALT 636 FOR OP/ED): Performed by: INTERNAL MEDICINE

## 2024-04-16 PROCEDURE — 71260 CT THORAX DX C+: CPT | Performed by: RADIOLOGY

## 2024-04-16 PROCEDURE — 96413 CHEMO IV INFUSION 1 HR: CPT

## 2024-04-16 PROCEDURE — 96417 CHEMO IV INFUS EACH ADDL SEQ: CPT

## 2024-04-16 PROCEDURE — 71260 CT THORAX DX C+: CPT

## 2024-04-16 PROCEDURE — 2550000001 HC RX 255 CONTRASTS: Performed by: INTERNAL MEDICINE

## 2024-04-16 PROCEDURE — 96375 TX/PRO/DX INJ NEW DRUG ADDON: CPT | Mod: INF

## 2024-04-16 RX ORDER — HEPARIN 100 UNIT/ML
500 SYRINGE INTRAVENOUS AS NEEDED
Status: CANCELLED | OUTPATIENT
Start: 2024-04-16

## 2024-04-16 RX ORDER — PROCHLORPERAZINE EDISYLATE 5 MG/ML
10 INJECTION INTRAMUSCULAR; INTRAVENOUS EVERY 6 HOURS PRN
Status: DISCONTINUED | OUTPATIENT
Start: 2024-04-16 | End: 2024-04-16 | Stop reason: HOSPADM

## 2024-04-16 RX ORDER — EPINEPHRINE 0.3 MG/.3ML
0.3 INJECTION SUBCUTANEOUS EVERY 5 MIN PRN
Status: DISCONTINUED | OUTPATIENT
Start: 2024-04-16 | End: 2024-04-16 | Stop reason: HOSPADM

## 2024-04-16 RX ORDER — LEVOFLOXACIN 500 MG/1
500 TABLET, FILM COATED ORAL DAILY
Qty: 5 TABLET | Refills: 0 | Status: SHIPPED | OUTPATIENT
Start: 2024-04-16 | End: 2024-04-21

## 2024-04-16 RX ORDER — ONDANSETRON HYDROCHLORIDE 2 MG/ML
8 INJECTION, SOLUTION INTRAVENOUS ONCE
Status: COMPLETED | OUTPATIENT
Start: 2024-04-16 | End: 2024-04-16

## 2024-04-16 RX ORDER — ALBUTEROL SULFATE 0.83 MG/ML
3 SOLUTION RESPIRATORY (INHALATION) AS NEEDED
Status: DISCONTINUED | OUTPATIENT
Start: 2024-04-16 | End: 2024-04-16 | Stop reason: HOSPADM

## 2024-04-16 RX ORDER — HEPARIN 100 UNIT/ML
500 SYRINGE INTRAVENOUS AS NEEDED
Status: DISCONTINUED | OUTPATIENT
Start: 2024-04-16 | End: 2024-04-16 | Stop reason: HOSPADM

## 2024-04-16 RX ORDER — HEPARIN SODIUM,PORCINE/PF 10 UNIT/ML
50 SYRINGE (ML) INTRAVENOUS AS NEEDED
Status: CANCELLED | OUTPATIENT
Start: 2024-04-16

## 2024-04-16 RX ORDER — FAMOTIDINE 10 MG/ML
20 INJECTION INTRAVENOUS ONCE AS NEEDED
Status: DISCONTINUED | OUTPATIENT
Start: 2024-04-16 | End: 2024-04-16 | Stop reason: HOSPADM

## 2024-04-16 RX ORDER — DIPHENHYDRAMINE HYDROCHLORIDE 50 MG/ML
50 INJECTION INTRAMUSCULAR; INTRAVENOUS AS NEEDED
Status: DISCONTINUED | OUTPATIENT
Start: 2024-04-16 | End: 2024-04-16 | Stop reason: HOSPADM

## 2024-04-16 RX ORDER — PROCHLORPERAZINE MALEATE 10 MG
10 TABLET ORAL EVERY 6 HOURS PRN
Status: DISCONTINUED | OUTPATIENT
Start: 2024-04-16 | End: 2024-04-16 | Stop reason: HOSPADM

## 2024-04-16 RX ORDER — LEVOFLOXACIN 500 MG/1
500 TABLET, FILM COATED ORAL DAILY
Qty: 5 TABLET | Refills: 0 | Status: SHIPPED | OUTPATIENT
Start: 2024-04-16 | End: 2024-04-16

## 2024-04-16 RX ADMIN — IOHEXOL 50 ML: 350 INJECTION, SOLUTION INTRAVENOUS at 09:37

## 2024-04-16 RX ADMIN — BEVACIZUMAB-AWWB 700 MG: 400 INJECTION, SOLUTION INTRAVENOUS at 11:09

## 2024-04-16 RX ADMIN — HEPARIN 500 UNITS: 100 SYRINGE at 11:51

## 2024-04-16 RX ADMIN — ONDANSETRON 8 MG: 2 INJECTION INTRAMUSCULAR; INTRAVENOUS at 09:49

## 2024-04-16 RX ADMIN — TOPOTECAN HYDROCHLORIDE 7 MG: 4 INJECTION, POWDER, LYOPHILIZED, FOR SOLUTION INTRAVENOUS at 10:27

## 2024-04-16 ASSESSMENT — PAIN SCALES - GENERAL: PAINLEVEL: 0-NO PAIN

## 2024-04-16 NOTE — PROGRESS NOTES
Patient tolerated treatment well, UA results reviewed by Dr. Ca, antibiotic sent to patient's pharmacy, patient aware and will pick it up on the way home, patient discharged to home with follow up appointments in place. Patient agreed to plan and verbalized understanding using teach back method.

## 2024-04-18 ENCOUNTER — APPOINTMENT (OUTPATIENT)
Dept: PRIMARY CARE | Facility: CLINIC | Age: 74
End: 2024-04-18
Payer: MEDICARE

## 2024-04-18 DIAGNOSIS — I10 HYPERTENSION, UNSPECIFIED TYPE: ICD-10-CM

## 2024-04-18 DIAGNOSIS — E78.5 HYPERLIPIDEMIA, UNSPECIFIED HYPERLIPIDEMIA TYPE: ICD-10-CM

## 2024-04-18 RX ORDER — FENOFIBRATE 160 MG/1
160 TABLET ORAL DAILY
Qty: 90 TABLET | Refills: 0 | Status: SHIPPED | OUTPATIENT
Start: 2024-04-18

## 2024-04-18 RX ORDER — LOSARTAN POTASSIUM 50 MG/1
50 TABLET ORAL DAILY
Qty: 90 TABLET | Refills: 0 | Status: SHIPPED | OUTPATIENT
Start: 2024-04-18

## 2024-04-22 ENCOUNTER — LAB (OUTPATIENT)
Dept: LAB | Facility: LAB | Age: 74
End: 2024-04-22
Payer: MEDICARE

## 2024-04-22 DIAGNOSIS — C56.9 PRIMARY MALIGNANT NEOPLASM OF OVARY WITH WIDESPREAD METASTATIC DISEASE, UNSPECIFIED LATERALITY (MULTI): ICD-10-CM

## 2024-04-22 DIAGNOSIS — C80.0 PRIMARY MALIGNANT NEOPLASM OF OVARY WITH WIDESPREAD METASTATIC DISEASE, UNSPECIFIED LATERALITY (MULTI): ICD-10-CM

## 2024-04-22 DIAGNOSIS — C44.02 SQUAMOUS CELL CARCINOMA OF SKIN OF LIP: ICD-10-CM

## 2024-04-22 LAB
BASOPHILS # BLD AUTO: 0.04 X10*3/UL (ref 0–0.1)
BASOPHILS NFR BLD AUTO: 1 %
EOSINOPHIL # BLD AUTO: 0.08 X10*3/UL (ref 0–0.4)
EOSINOPHIL NFR BLD AUTO: 2 %
ERYTHROCYTE [DISTWIDTH] IN BLOOD BY AUTOMATED COUNT: 17.2 % (ref 11.5–14.5)
HCT VFR BLD AUTO: 28 % (ref 36–46)
HGB BLD-MCNC: 9.1 G/DL (ref 12–16)
IMM GRANULOCYTES # BLD AUTO: 0.01 X10*3/UL (ref 0–0.5)
IMM GRANULOCYTES NFR BLD AUTO: 0.2 % (ref 0–0.9)
LYMPHOCYTES # BLD AUTO: 2.33 X10*3/UL (ref 0.8–3)
LYMPHOCYTES NFR BLD AUTO: 57 %
MCH RBC QN AUTO: 32.3 PG (ref 26–34)
MCHC RBC AUTO-ENTMCNC: 32.5 G/DL (ref 32–36)
MCV RBC AUTO: 99 FL (ref 80–100)
MONOCYTES # BLD AUTO: 0.38 X10*3/UL (ref 0.05–0.8)
MONOCYTES NFR BLD AUTO: 9.3 %
NEUTROPHILS # BLD AUTO: 1.25 X10*3/UL (ref 1.6–5.5)
NEUTROPHILS NFR BLD AUTO: 30.5 %
NRBC BLD-RTO: 0 /100 WBCS (ref 0–0)
PLATELET # BLD AUTO: 378 X10*3/UL (ref 150–450)
RBC # BLD AUTO: 2.82 X10*6/UL (ref 4–5.2)
WBC # BLD AUTO: 4.1 X10*3/UL (ref 4.4–11.3)

## 2024-04-23 ENCOUNTER — INFUSION (OUTPATIENT)
Dept: HEMATOLOGY/ONCOLOGY | Facility: CLINIC | Age: 74
End: 2024-04-23
Payer: MEDICARE

## 2024-04-23 VITALS
BODY MASS INDEX: 25.56 KG/M2 | RESPIRATION RATE: 19 BRPM | TEMPERATURE: 97 F | WEIGHT: 146.61 LBS | SYSTOLIC BLOOD PRESSURE: 125 MMHG | OXYGEN SATURATION: 100 % | HEART RATE: 93 BPM | DIASTOLIC BLOOD PRESSURE: 69 MMHG

## 2024-04-23 DIAGNOSIS — C56.9 PRIMARY MALIGNANT NEOPLASM OF OVARY WITH WIDESPREAD METASTATIC DISEASE, UNSPECIFIED LATERALITY (MULTI): ICD-10-CM

## 2024-04-23 DIAGNOSIS — C80.0 PRIMARY MALIGNANT NEOPLASM OF OVARY WITH WIDESPREAD METASTATIC DISEASE, UNSPECIFIED LATERALITY (MULTI): ICD-10-CM

## 2024-04-23 DIAGNOSIS — C44.02 SQUAMOUS CELL CARCINOMA OF SKIN OF LIP: ICD-10-CM

## 2024-04-23 PROCEDURE — 2500000004 HC RX 250 GENERAL PHARMACY W/ HCPCS (ALT 636 FOR OP/ED): Performed by: INTERNAL MEDICINE

## 2024-04-23 PROCEDURE — 96375 TX/PRO/DX INJ NEW DRUG ADDON: CPT | Mod: INF

## 2024-04-23 PROCEDURE — 96413 CHEMO IV INFUSION 1 HR: CPT

## 2024-04-23 RX ORDER — ALBUTEROL SULFATE 0.83 MG/ML
3 SOLUTION RESPIRATORY (INHALATION) AS NEEDED
Status: DISCONTINUED | OUTPATIENT
Start: 2024-04-23 | End: 2024-04-23 | Stop reason: HOSPADM

## 2024-04-23 RX ORDER — EPINEPHRINE 0.3 MG/.3ML
0.3 INJECTION SUBCUTANEOUS EVERY 5 MIN PRN
Status: DISCONTINUED | OUTPATIENT
Start: 2024-04-23 | End: 2024-04-23 | Stop reason: HOSPADM

## 2024-04-23 RX ORDER — HEPARIN SODIUM,PORCINE/PF 10 UNIT/ML
50 SYRINGE (ML) INTRAVENOUS AS NEEDED
Status: CANCELLED | OUTPATIENT
Start: 2024-04-23

## 2024-04-23 RX ORDER — PROCHLORPERAZINE MALEATE 10 MG
10 TABLET ORAL EVERY 6 HOURS PRN
Status: DISCONTINUED | OUTPATIENT
Start: 2024-04-23 | End: 2024-04-23 | Stop reason: HOSPADM

## 2024-04-23 RX ORDER — FAMOTIDINE 10 MG/ML
20 INJECTION INTRAVENOUS ONCE AS NEEDED
Status: DISCONTINUED | OUTPATIENT
Start: 2024-04-23 | End: 2024-04-23 | Stop reason: HOSPADM

## 2024-04-23 RX ORDER — PROCHLORPERAZINE EDISYLATE 5 MG/ML
10 INJECTION INTRAMUSCULAR; INTRAVENOUS EVERY 6 HOURS PRN
Status: DISCONTINUED | OUTPATIENT
Start: 2024-04-23 | End: 2024-04-23 | Stop reason: HOSPADM

## 2024-04-23 RX ORDER — ONDANSETRON HYDROCHLORIDE 2 MG/ML
8 INJECTION, SOLUTION INTRAVENOUS ONCE
Status: COMPLETED | OUTPATIENT
Start: 2024-04-23 | End: 2024-04-23

## 2024-04-23 RX ORDER — HEPARIN 100 UNIT/ML
500 SYRINGE INTRAVENOUS AS NEEDED
Status: DISCONTINUED | OUTPATIENT
Start: 2024-04-23 | End: 2024-04-23 | Stop reason: HOSPADM

## 2024-04-23 RX ORDER — DIPHENHYDRAMINE HYDROCHLORIDE 50 MG/ML
50 INJECTION INTRAMUSCULAR; INTRAVENOUS AS NEEDED
Status: DISCONTINUED | OUTPATIENT
Start: 2024-04-23 | End: 2024-04-23 | Stop reason: HOSPADM

## 2024-04-23 RX ORDER — HEPARIN 100 UNIT/ML
500 SYRINGE INTRAVENOUS AS NEEDED
Status: CANCELLED | OUTPATIENT
Start: 2024-04-23

## 2024-04-23 RX ADMIN — ONDANSETRON 8 MG: 2 INJECTION INTRAMUSCULAR; INTRAVENOUS at 14:13

## 2024-04-23 RX ADMIN — TOPOTECAN HYDROCHLORIDE 7 MG: 4 INJECTION, POWDER, LYOPHILIZED, FOR SOLUTION INTRAVENOUS at 14:37

## 2024-04-23 RX ADMIN — HEPARIN 500 UNITS: 100 SYRINGE at 15:20

## 2024-04-23 ASSESSMENT — PAIN SCALES - GENERAL: PAINLEVEL: 2

## 2024-04-29 ENCOUNTER — LAB (OUTPATIENT)
Dept: LAB | Facility: LAB | Age: 74
End: 2024-04-29
Payer: MEDICARE

## 2024-04-29 DIAGNOSIS — C80.0 PRIMARY MALIGNANT NEOPLASM OF OVARY WITH WIDESPREAD METASTATIC DISEASE, UNSPECIFIED LATERALITY (MULTI): ICD-10-CM

## 2024-04-29 DIAGNOSIS — C44.02 SQUAMOUS CELL CARCINOMA OF SKIN OF LIP: ICD-10-CM

## 2024-04-29 DIAGNOSIS — C56.9 PRIMARY MALIGNANT NEOPLASM OF OVARY WITH WIDESPREAD METASTATIC DISEASE, UNSPECIFIED LATERALITY (MULTI): ICD-10-CM

## 2024-04-29 LAB
ALBUMIN SERPL BCP-MCNC: 4.4 G/DL (ref 3.4–5)
ALP SERPL-CCNC: 48 U/L (ref 33–136)
ALT SERPL W P-5'-P-CCNC: 13 U/L (ref 7–45)
ANION GAP SERPL CALC-SCNC: 14 MMOL/L (ref 10–20)
AST SERPL W P-5'-P-CCNC: 21 U/L (ref 9–39)
BASOPHILS # BLD AUTO: 0.03 X10*3/UL (ref 0–0.1)
BASOPHILS NFR BLD AUTO: 0.7 %
BILIRUB SERPL-MCNC: 0.5 MG/DL (ref 0–1.2)
BUN SERPL-MCNC: 32 MG/DL (ref 6–23)
CALCIUM SERPL-MCNC: 10.1 MG/DL (ref 8.6–10.3)
CHLORIDE SERPL-SCNC: 104 MMOL/L (ref 98–107)
CO2 SERPL-SCNC: 23 MMOL/L (ref 21–32)
CREAT SERPL-MCNC: 0.94 MG/DL (ref 0.5–1.05)
DACRYOCYTES BLD QL SMEAR: NORMAL
EGFRCR SERPLBLD CKD-EPI 2021: 64 ML/MIN/1.73M*2
EOSINOPHIL # BLD AUTO: 0.02 X10*3/UL (ref 0–0.4)
EOSINOPHIL NFR BLD AUTO: 0.5 %
ERYTHROCYTE [DISTWIDTH] IN BLOOD BY AUTOMATED COUNT: 16.2 % (ref 11.5–14.5)
GIANT PLATELETS BLD QL SMEAR: NORMAL
GLUCOSE SERPL-MCNC: 282 MG/DL (ref 74–99)
HCT VFR BLD AUTO: 25 % (ref 36–46)
HGB BLD-MCNC: 8 G/DL (ref 12–16)
IMM GRANULOCYTES # BLD AUTO: 0.01 X10*3/UL (ref 0–0.5)
IMM GRANULOCYTES NFR BLD AUTO: 0.2 % (ref 0–0.9)
LYMPHOCYTES # BLD AUTO: 2.16 X10*3/UL (ref 0.8–3)
LYMPHOCYTES NFR BLD AUTO: 51.7 %
MCH RBC QN AUTO: 32.3 PG (ref 26–34)
MCHC RBC AUTO-ENTMCNC: 32 G/DL (ref 32–36)
MCV RBC AUTO: 101 FL (ref 80–100)
MONOCYTES # BLD AUTO: 0.19 X10*3/UL (ref 0.05–0.8)
MONOCYTES NFR BLD AUTO: 4.5 %
NEUTROPHILS # BLD AUTO: 1.77 X10*3/UL (ref 1.6–5.5)
NEUTROPHILS NFR BLD AUTO: 42.4 %
NRBC BLD-RTO: 0 /100 WBCS (ref 0–0)
OVALOCYTES BLD QL SMEAR: NORMAL
PLATELET # BLD AUTO: 137 X10*3/UL (ref 150–450)
POTASSIUM SERPL-SCNC: 4.5 MMOL/L (ref 3.5–5.3)
PROT SERPL-MCNC: 7.3 G/DL (ref 6.4–8.2)
RBC # BLD AUTO: 2.48 X10*6/UL (ref 4–5.2)
RBC MORPH BLD: NORMAL
SCHISTOCYTES BLD QL SMEAR: NORMAL
SODIUM SERPL-SCNC: 136 MMOL/L (ref 136–145)
WBC # BLD AUTO: 4.2 X10*3/UL (ref 4.4–11.3)

## 2024-04-30 ENCOUNTER — INFUSION (OUTPATIENT)
Dept: HEMATOLOGY/ONCOLOGY | Facility: CLINIC | Age: 74
End: 2024-04-30
Payer: MEDICARE

## 2024-04-30 VITALS
TEMPERATURE: 97.5 F | BODY MASS INDEX: 25.52 KG/M2 | OXYGEN SATURATION: 99 % | HEART RATE: 94 BPM | DIASTOLIC BLOOD PRESSURE: 69 MMHG | RESPIRATION RATE: 16 BRPM | WEIGHT: 146.39 LBS | SYSTOLIC BLOOD PRESSURE: 111 MMHG

## 2024-04-30 DIAGNOSIS — C56.9 PRIMARY MALIGNANT NEOPLASM OF OVARY WITH WIDESPREAD METASTATIC DISEASE, UNSPECIFIED LATERALITY (MULTI): ICD-10-CM

## 2024-04-30 DIAGNOSIS — C80.0 PRIMARY MALIGNANT NEOPLASM OF OVARY WITH WIDESPREAD METASTATIC DISEASE, UNSPECIFIED LATERALITY (MULTI): ICD-10-CM

## 2024-04-30 DIAGNOSIS — C44.02 SQUAMOUS CELL CARCINOMA OF SKIN OF LIP: ICD-10-CM

## 2024-04-30 PROCEDURE — 96417 CHEMO IV INFUS EACH ADDL SEQ: CPT

## 2024-04-30 PROCEDURE — 96375 TX/PRO/DX INJ NEW DRUG ADDON: CPT | Mod: INF

## 2024-04-30 PROCEDURE — 2500000004 HC RX 250 GENERAL PHARMACY W/ HCPCS (ALT 636 FOR OP/ED): Performed by: INTERNAL MEDICINE

## 2024-04-30 PROCEDURE — 2500000004 HC RX 250 GENERAL PHARMACY W/ HCPCS (ALT 636 FOR OP/ED): Mod: JZ,JG | Performed by: INTERNAL MEDICINE

## 2024-04-30 PROCEDURE — 96413 CHEMO IV INFUSION 1 HR: CPT

## 2024-04-30 PROCEDURE — 96377 APPLICATON ON-BODY INJECTOR: CPT

## 2024-04-30 RX ORDER — ALBUTEROL SULFATE 0.83 MG/ML
3 SOLUTION RESPIRATORY (INHALATION) AS NEEDED
Status: DISCONTINUED | OUTPATIENT
Start: 2024-04-30 | End: 2024-04-30 | Stop reason: HOSPADM

## 2024-04-30 RX ORDER — EPINEPHRINE 0.3 MG/.3ML
0.3 INJECTION SUBCUTANEOUS EVERY 5 MIN PRN
Status: DISCONTINUED | OUTPATIENT
Start: 2024-04-30 | End: 2024-04-30 | Stop reason: HOSPADM

## 2024-04-30 RX ORDER — DIPHENHYDRAMINE HYDROCHLORIDE 50 MG/ML
50 INJECTION INTRAMUSCULAR; INTRAVENOUS AS NEEDED
Status: DISCONTINUED | OUTPATIENT
Start: 2024-04-30 | End: 2024-04-30 | Stop reason: HOSPADM

## 2024-04-30 RX ORDER — HEPARIN 100 UNIT/ML
500 SYRINGE INTRAVENOUS AS NEEDED
Status: CANCELLED | OUTPATIENT
Start: 2024-04-30

## 2024-04-30 RX ORDER — ONDANSETRON HYDROCHLORIDE 2 MG/ML
8 INJECTION, SOLUTION INTRAVENOUS ONCE
Status: COMPLETED | OUTPATIENT
Start: 2024-04-30 | End: 2024-04-30

## 2024-04-30 RX ORDER — PROCHLORPERAZINE MALEATE 10 MG
10 TABLET ORAL EVERY 6 HOURS PRN
Status: DISCONTINUED | OUTPATIENT
Start: 2024-04-30 | End: 2024-04-30 | Stop reason: HOSPADM

## 2024-04-30 RX ORDER — FAMOTIDINE 10 MG/ML
20 INJECTION INTRAVENOUS ONCE AS NEEDED
Status: DISCONTINUED | OUTPATIENT
Start: 2024-04-30 | End: 2024-04-30 | Stop reason: HOSPADM

## 2024-04-30 RX ORDER — HEPARIN SODIUM,PORCINE/PF 10 UNIT/ML
50 SYRINGE (ML) INTRAVENOUS AS NEEDED
Status: CANCELLED | OUTPATIENT
Start: 2024-04-30

## 2024-04-30 RX ORDER — PROCHLORPERAZINE EDISYLATE 5 MG/ML
10 INJECTION INTRAMUSCULAR; INTRAVENOUS EVERY 6 HOURS PRN
Status: DISCONTINUED | OUTPATIENT
Start: 2024-04-30 | End: 2024-04-30 | Stop reason: HOSPADM

## 2024-04-30 RX ORDER — HEPARIN 100 UNIT/ML
500 SYRINGE INTRAVENOUS AS NEEDED
Status: DISCONTINUED | OUTPATIENT
Start: 2024-04-30 | End: 2024-04-30 | Stop reason: HOSPADM

## 2024-04-30 RX ADMIN — PEGFILGRASTIM 6 MG: KIT SUBCUTANEOUS at 14:06

## 2024-04-30 RX ADMIN — TOPOTECAN 7 MG: 1 INJECTION INTRAVENOUS at 14:01

## 2024-04-30 RX ADMIN — ONDANSETRON 8 MG: 2 INJECTION INTRAMUSCULAR; INTRAVENOUS at 12:53

## 2024-04-30 RX ADMIN — BEVACIZUMAB-AWWB 700 MG: 400 INJECTION, SOLUTION INTRAVENOUS at 13:22

## 2024-04-30 ASSESSMENT — PAIN SCALES - GENERAL: PAINLEVEL: 2

## 2024-05-03 ENCOUNTER — INFUSION (OUTPATIENT)
Dept: HEMATOLOGY/ONCOLOGY | Facility: CLINIC | Age: 74
End: 2024-05-03
Payer: MEDICARE

## 2024-05-03 VITALS
BODY MASS INDEX: 25.74 KG/M2 | WEIGHT: 147.6 LBS | HEART RATE: 78 BPM | DIASTOLIC BLOOD PRESSURE: 70 MMHG | TEMPERATURE: 98.1 F | OXYGEN SATURATION: 95 % | SYSTOLIC BLOOD PRESSURE: 115 MMHG | RESPIRATION RATE: 18 BRPM

## 2024-05-03 DIAGNOSIS — C56.9 PRIMARY MALIGNANT NEOPLASM OF OVARY WITH WIDESPREAD METASTATIC DISEASE, UNSPECIFIED LATERALITY (MULTI): ICD-10-CM

## 2024-05-03 DIAGNOSIS — D63.0 ANEMIA IN NEOPLASTIC DISEASE: ICD-10-CM

## 2024-05-03 DIAGNOSIS — C44.02 SQUAMOUS CELL CARCINOMA OF SKIN OF LIP: ICD-10-CM

## 2024-05-03 DIAGNOSIS — C80.0 PRIMARY MALIGNANT NEOPLASM OF OVARY WITH WIDESPREAD METASTATIC DISEASE, UNSPECIFIED LATERALITY (MULTI): ICD-10-CM

## 2024-05-03 LAB
ABO GROUP (TYPE) IN BLOOD: NORMAL
ALBUMIN SERPL BCP-MCNC: 4 G/DL (ref 3.4–5)
ALP SERPL-CCNC: 60 U/L (ref 33–136)
ALT SERPL W P-5'-P-CCNC: 16 U/L (ref 7–45)
ANION GAP SERPL CALC-SCNC: 13 MMOL/L (ref 10–20)
ANTIBODY SCREEN: NORMAL
AST SERPL W P-5'-P-CCNC: 21 U/L (ref 9–39)
BASOPHILS # BLD MANUAL: 0 X10*3/UL (ref 0–0.1)
BASOPHILS NFR BLD MANUAL: 0 %
BILIRUB SERPL-MCNC: 0.8 MG/DL (ref 0–1.2)
BLOOD EXPIRATION DATE: NORMAL
BUN SERPL-MCNC: 24 MG/DL (ref 6–23)
CALCIUM SERPL-MCNC: 9.2 MG/DL (ref 8.6–10.3)
CHLORIDE SERPL-SCNC: 103 MMOL/L (ref 98–107)
CO2 SERPL-SCNC: 22 MMOL/L (ref 21–32)
CREAT SERPL-MCNC: 1.01 MG/DL (ref 0.5–1.05)
DACRYOCYTES BLD QL SMEAR: ABNORMAL
DISPENSE STATUS: NORMAL
EGFRCR SERPLBLD CKD-EPI 2021: 59 ML/MIN/1.73M*2
EOSINOPHIL # BLD MANUAL: 0.19 X10*3/UL (ref 0–0.4)
EOSINOPHIL NFR BLD MANUAL: 1 %
ERYTHROCYTE [DISTWIDTH] IN BLOOD BY AUTOMATED COUNT: 16.3 % (ref 11.5–14.5)
GLUCOSE SERPL-MCNC: 334 MG/DL (ref 74–99)
HCT VFR BLD AUTO: 22 % (ref 36–46)
HGB BLD-MCNC: 7.2 G/DL (ref 12–16)
IMM GRANULOCYTES # BLD AUTO: 2.31 X10*3/UL (ref 0–0.5)
IMM GRANULOCYTES NFR BLD AUTO: 12.3 % (ref 0–0.9)
LYMPHOCYTES # BLD MANUAL: 2.24 X10*3/UL (ref 0.8–3)
LYMPHOCYTES NFR BLD MANUAL: 12 %
MCH RBC QN AUTO: 32.9 PG (ref 26–34)
MCHC RBC AUTO-ENTMCNC: 32.7 G/DL (ref 32–36)
MCV RBC AUTO: 101 FL (ref 80–100)
METAMYELOCYTES # BLD MANUAL: 0.37 X10*3/UL
METAMYELOCYTES NFR BLD MANUAL: 2 %
MONOCYTES # BLD MANUAL: 0 X10*3/UL (ref 0.05–0.8)
MONOCYTES NFR BLD MANUAL: 0 %
NEUTROPHILS # BLD MANUAL: 15.9 X10*3/UL (ref 1.6–5.5)
NEUTS BAND # BLD MANUAL: 3.74 X10*3/UL (ref 0–0.5)
NEUTS BAND NFR BLD MANUAL: 20 %
NEUTS SEG # BLD MANUAL: 12.16 X10*3/UL (ref 1.6–5)
NEUTS SEG NFR BLD MANUAL: 65 %
NRBC BLD-RTO: ABNORMAL /100{WBCS}
OVALOCYTES BLD QL SMEAR: ABNORMAL
PLATELET # BLD AUTO: 79 X10*3/UL (ref 150–450)
POTASSIUM SERPL-SCNC: 4.8 MMOL/L (ref 3.5–5.3)
PRODUCT BLOOD TYPE: 5100
PRODUCT CODE: NORMAL
PROT SERPL-MCNC: 6.8 G/DL (ref 6.4–8.2)
RBC # BLD AUTO: 2.19 X10*6/UL (ref 4–5.2)
RBC MORPH BLD: ABNORMAL
RH FACTOR (ANTIGEN D): NORMAL
SODIUM SERPL-SCNC: 133 MMOL/L (ref 136–145)
TOTAL CELLS COUNTED BLD: 100
UNIT ABO: NORMAL
UNIT NUMBER: NORMAL
UNIT RH: NORMAL
UNIT VOLUME: 350
WBC # BLD AUTO: 18.7 X10*3/UL (ref 4.4–11.3)
XM INTEP: NORMAL

## 2024-05-03 PROCEDURE — 85027 COMPLETE CBC AUTOMATED: CPT

## 2024-05-03 PROCEDURE — 86901 BLOOD TYPING SEROLOGIC RH(D): CPT

## 2024-05-03 PROCEDURE — 84075 ASSAY ALKALINE PHOSPHATASE: CPT

## 2024-05-03 PROCEDURE — 86920 COMPATIBILITY TEST SPIN: CPT

## 2024-05-03 PROCEDURE — 85007 BL SMEAR W/DIFF WBC COUNT: CPT

## 2024-05-03 PROCEDURE — P9040 RBC LEUKOREDUCED IRRADIATED: HCPCS

## 2024-05-03 PROCEDURE — 2500000004 HC RX 250 GENERAL PHARMACY W/ HCPCS (ALT 636 FOR OP/ED): Performed by: INTERNAL MEDICINE

## 2024-05-03 PROCEDURE — 36430 TRANSFUSION BLD/BLD COMPNT: CPT

## 2024-05-03 RX ORDER — HEPARIN 100 UNIT/ML
500 SYRINGE INTRAVENOUS AS NEEDED
Status: CANCELLED | OUTPATIENT
Start: 2024-05-03

## 2024-05-03 RX ORDER — FAMOTIDINE 10 MG/ML
20 INJECTION INTRAVENOUS ONCE AS NEEDED
Status: CANCELLED | OUTPATIENT
Start: 2024-05-03

## 2024-05-03 RX ORDER — DIPHENHYDRAMINE HYDROCHLORIDE 50 MG/ML
50 INJECTION INTRAMUSCULAR; INTRAVENOUS AS NEEDED
Status: CANCELLED | OUTPATIENT
Start: 2024-05-03

## 2024-05-03 RX ORDER — ALBUTEROL SULFATE 0.83 MG/ML
3 SOLUTION RESPIRATORY (INHALATION) AS NEEDED
Status: CANCELLED | OUTPATIENT
Start: 2024-05-03

## 2024-05-03 RX ORDER — HEPARIN SODIUM,PORCINE/PF 10 UNIT/ML
50 SYRINGE (ML) INTRAVENOUS AS NEEDED
Status: CANCELLED | OUTPATIENT
Start: 2024-05-03

## 2024-05-03 RX ORDER — DIPHENHYDRAMINE HYDROCHLORIDE 50 MG/ML
50 INJECTION INTRAMUSCULAR; INTRAVENOUS AS NEEDED
Status: DISCONTINUED | OUTPATIENT
Start: 2024-05-03 | End: 2024-05-03 | Stop reason: HOSPADM

## 2024-05-03 RX ORDER — ALBUTEROL SULFATE 0.83 MG/ML
3 SOLUTION RESPIRATORY (INHALATION) AS NEEDED
Status: DISCONTINUED | OUTPATIENT
Start: 2024-05-03 | End: 2024-05-03 | Stop reason: HOSPADM

## 2024-05-03 RX ORDER — EPINEPHRINE 0.3 MG/.3ML
0.3 INJECTION SUBCUTANEOUS EVERY 5 MIN PRN
Status: CANCELLED | OUTPATIENT
Start: 2024-05-03

## 2024-05-03 RX ORDER — HEPARIN 100 UNIT/ML
500 SYRINGE INTRAVENOUS AS NEEDED
Status: DISCONTINUED | OUTPATIENT
Start: 2024-05-03 | End: 2024-05-03 | Stop reason: HOSPADM

## 2024-05-03 RX ORDER — EPINEPHRINE 0.3 MG/.3ML
0.3 INJECTION SUBCUTANEOUS EVERY 5 MIN PRN
Status: DISCONTINUED | OUTPATIENT
Start: 2024-05-03 | End: 2024-05-03 | Stop reason: HOSPADM

## 2024-05-03 RX ORDER — FAMOTIDINE 10 MG/ML
20 INJECTION INTRAVENOUS ONCE AS NEEDED
Status: DISCONTINUED | OUTPATIENT
Start: 2024-05-03 | End: 2024-05-03 | Stop reason: HOSPADM

## 2024-05-03 RX ADMIN — HEPARIN 500 UNITS: 100 SYRINGE at 14:35

## 2024-05-03 ASSESSMENT — PAIN SCALES - GENERAL: PAINLEVEL: 0-NO PAIN

## 2024-05-03 NOTE — PROGRESS NOTES
Pt arrived for count check with . Reports severe SOB when doing anything other than sitting/laying, requiring frequent stops, often accompanied by lightheadedness/dizziness. Pt reports fear of falling, especially when trying to go up stairs or inclines.     Did walking pulse-ox post infusion and O2 stats stayed over 96% and pt did not report feeling symptomatic. Will recheck walking pulse-ox if needed in future visits.

## 2024-05-03 NOTE — PATIENT INSTRUCTIONS
For constipation: Warm Prune juice.     If no BM for 4 days, try a laxative like Miralax or Lactulose.     For Nausea or a sour stomach, we are specifically concerned if it is bad enough that you don't want to eat or drink. You don't have any active orders/refills for nausea medications, but the ones you currently have at home can be used if needed. Zofran (ondansetron) can cause constipation, but Compazine (prochlorperazine) does not. If you find that you need additional prescriptions, do not hesitate to reach out and we will see what we can do.

## 2024-05-03 NOTE — PROGRESS NOTES
Patient ID: Catalina Mendoza is a 73 y.o. female.  Referring Physician: Aleyda Ca MD  [id number]  [place] [phone]  Primary Care Provider: Juju Kenney MD  Visit Type:  {Visit Type:90824}     {Telehealth Consent:87920}    Subjective    HPI    Review of Systems - Oncology     Objective   BSA: 1.73 meters squared  /74 (BP Location: Right arm)   Pulse 89   Temp 36 °C (96.8 °F)   Resp 16   Wt 67 kg (147 lb 9.6 oz)   LMP  (LMP Unknown)   SpO2 97%   BMI 25.74 kg/m²      has a past medical history of Encounter for immunization (10/03/2016), Other injury of unspecified body region, initial encounter, Personal history of malignant neoplasm of unspecified site of lip, oral cavity, and pharynx, Personal history of other complications of pregnancy, childbirth and the puerperium, and Personal history of other malignant neoplasm of skin.   has a past surgical history that includes Hysterectomy (09/28/2015); Appendectomy (09/28/2015); Other surgical history (09/28/2015); Eye surgery (06/20/2016); Cholecystectomy (06/20/2016); and US guided abdominal paracentesis (12/15/2022).  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, started on NACT with carbo/taxol  - 2/7/23: S/p cycle 3 with good interval response  - 3/13/23: Diagnostic laparoscopy, BSO, extensive MIKAELA, bilateral ureterolysis, infracolic omentectomy, abdominal wall and  mesenteric biopsies, complete gross resection to R0  - 5/23/23: Chemo completed     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 She has never been exposed to tobacco smoke. She has never used smokeless tobacco.  She  reports no history of alcohol use.  She  reports no history of drug use.    Physical Exam    WBC   Date/Time Value Ref Range Status   05/03/2024 09:52 AM 18.7 (H) 4.4 - 11.3 x10*3/uL Final   04/29/2024 12:37 PM 4.2 (L) 4.4 - 11.3 x10*3/uL Final   04/22/2024 10:43 AM 4.1 (L) 4.4 - 11.3 x10*3/uL Final     nRBC   Date Value Ref Range Status   05/03/2024   Final     Comment:     Not Measured   04/29/2024 0.0 0.0 - 0.0 /100 WBCs Final   04/22/2024 0.0 0.0 - 0.0 /100 WBCs Final     RBC   Date Value Ref Range Status   05/03/2024 2.19 (L) 4.00 - 5.20 x10*6/uL Final   04/29/2024 2.48 (L) 4.00 - 5.20 x10*6/uL Final   04/22/2024 2.82 (L) 4.00 - 5.20 x10*6/uL Final     Hemoglobin   Date Value Ref Range Status   05/03/2024 7.2 (L) 12.0 - 16.0 g/dL Final   04/29/2024 8.0 (L) 12.0 - 16.0 g/dL Final   04/22/2024 9.1 (L) 12.0 - 16.0 g/dL Final     Hematocrit   Date Value Ref Range Status   05/03/2024 22.0 (L) 36.0 - 46.0 % Final   04/29/2024 25.0 (L) 36.0 - 46.0 % Final   04/22/2024 28.0 (L) 36.0 - 46.0 % Final     MCV   Date/Time Value Ref Range Status   05/03/2024 09:52  (H) 80 - 100 fL Final   04/29/2024 12:37  (H) 80 - 100 fL Final   04/22/2024 10:43 AM 99 80 - 100 fL Final     MCH   Date/Time Value Ref Range Status   05/03/2024 09:52 AM  32.9 26.0 - 34.0 pg Final   04/29/2024 12:37 PM 32.3 26.0 - 34.0 pg Final   04/22/2024 10:43 AM 32.3 26.0 - 34.0 pg Final     MCHC   Date/Time Value Ref Range Status   05/03/2024 09:52 AM 32.7 32.0 - 36.0 g/dL Final   04/29/2024 12:37 PM 32.0 32.0 - 36.0 g/dL Final   04/22/2024 10:43 AM 32.5 32.0 - 36.0 g/dL Final     RDW   Date/Time Value Ref Range Status   05/03/2024 09:52 AM 16.3 (H) 11.5 - 14.5 % Final   04/29/2024 12:37 PM 16.2 (H) 11.5 - 14.5 % Final   04/22/2024 10:43 AM 17.2 (H) 11.5 - 14.5 % Final     Platelets   Date/Time Value Ref Range Status   05/03/2024 09:52 AM 79 (L) 150 - 450 x10*3/uL Final     Comment:     Platelet count verified by smear review.   04/29/2024 12:37  (L) 150 - 450 x10*3/uL Final     Comment:     Platelet count verified by smear review.   04/22/2024 10:43  150 - 450 x10*3/uL Final     MPV   Date/Time Value Ref Range Status   10/27/2023 08:21 AM 9.8 7.5 - 11.5 fL Final     Neutrophils %   Date/Time Value Ref Range Status   04/29/2024 12:37 PM 42.4 40.0 - 80.0 % Final   04/22/2024 10:43 AM 30.5 40.0 - 80.0 % Final   04/02/2024 10:16 AM 31.4 40.0 - 80.0 % Final     Immature Granulocytes %, Automated   Date/Time Value Ref Range Status   05/03/2024 09:52 AM 12.3 (H) 0.0 - 0.9 % Final     Comment:     Immature Granulocyte Count (IG) includes promyelocytes, myelocytes and metamyelocytes but does not include bands. Percent differential counts (%) should be interpreted in the context of the absolute cell counts (cells/UL).   04/29/2024 12:37 PM 0.2 0.0 - 0.9 % Final     Comment:     Immature Granulocyte Count (IG) includes promyelocytes, myelocytes and metamyelocytes but does not include bands. Percent differential counts (%) should be interpreted in the context of the absolute cell counts (cells/UL).   04/22/2024 10:43 AM 0.2 0.0 - 0.9 % Final     Comment:     Immature Granulocyte Count (IG) includes promyelocytes, myelocytes and metamyelocytes but does not include bands. Percent  differential counts (%) should be interpreted in the context of the absolute cell counts (cells/UL).     Lymphocytes %, Manual   Date/Time Value Ref Range Status   05/03/2024 09:52 AM 12.0 13.0 - 44.0 % Final   04/15/2024 01:07 PM 27.0 13.0 - 44.0 % Final   02/19/2024 10:04 AM 18.0 13.0 - 44.0 % Final     Lymphocytes %   Date/Time Value Ref Range Status   04/29/2024 12:37 PM 51.7 13.0 - 44.0 % Final   04/22/2024 10:43 AM 57.0 13.0 - 44.0 % Final   04/02/2024 10:16 AM 61.2 13.0 - 44.0 % Final     Monocytes %, Manual   Date/Time Value Ref Range Status   05/03/2024 09:52 AM 0.0 2.0 - 10.0 % Final   04/15/2024 01:07 PM 3.0 2.0 - 10.0 % Final   02/19/2024 10:04 AM 6.0 2.0 - 10.0 % Final     Monocytes %   Date/Time Value Ref Range Status   04/29/2024 12:37 PM 4.5 2.0 - 10.0 % Final   04/22/2024 10:43 AM 9.3 2.0 - 10.0 % Final   04/02/2024 10:16 AM 3.4 2.0 - 10.0 % Final     Eosinophils %, Manual   Date/Time Value Ref Range Status   05/03/2024 09:52 AM 1.0 0.0 - 6.0 % Final   04/15/2024 01:07 PM 1.0 0.0 - 6.0 % Final   02/19/2024 10:04 AM 0.0 0.0 - 6.0 % Final     Eosinophils %   Date/Time Value Ref Range Status   04/29/2024 12:37 PM 0.5 0.0 - 6.0 % Final   04/22/2024 10:43 AM 2.0 0.0 - 6.0 % Final   04/02/2024 10:16 AM 3.4 0.0 - 6.0 % Final     Basophils %, Manual   Date/Time Value Ref Range Status   05/03/2024 09:52 AM 0.0 0.0 - 2.0 % Final   04/15/2024 01:07 PM 1.0 0.0 - 2.0 % Final   02/19/2024 10:04 AM 1.0 0.0 - 2.0 % Final     Basophils %   Date/Time Value Ref Range Status   04/29/2024 12:37 PM 0.7 0.0 - 2.0 % Final   04/22/2024 10:43 AM 1.0 0.0 - 2.0 % Final   04/02/2024 10:16 AM 0.6 0.0 - 2.0 % Final     Neutrophils Absolute   Date/Time Value Ref Range Status   04/29/2024 12:37 PM 1.77 1.60 - 5.50 x10*3/uL Final     Comment:     Percent differential counts (%) should be interpreted in the context of the absolute cell counts (cells/uL).   04/22/2024 10:43 AM 1.25 (L) 1.60 - 5.50 x10*3/uL Final     Comment:      Percent differential counts (%) should be interpreted in the context of the absolute cell counts (cells/uL).   04/02/2024 10:16 AM 1.03 (L) 1.60 - 5.50 x10*3/uL Final     Comment:     Percent differential counts (%) should be interpreted in the context of the absolute cell counts (cells/uL).     Immature Granulocytes Absolute, Automated   Date/Time Value Ref Range Status   05/03/2024 09:52 AM 2.31 (H) 0.00 - 0.50 x10*3/uL Final   04/29/2024 12:37 PM 0.01 0.00 - 0.50 x10*3/uL Final   04/22/2024 10:43 AM 0.01 0.00 - 0.50 x10*3/uL Final     Lymphocytes Absolute   Date/Time Value Ref Range Status   04/29/2024 12:37 PM 2.16 0.80 - 3.00 x10*3/uL Final   04/22/2024 10:43 AM 2.33 0.80 - 3.00 x10*3/uL Final   04/02/2024 10:16 AM 2.00 0.80 - 3.00 x10*3/uL Final     Monocytes Absolute   Date/Time Value Ref Range Status   04/29/2024 12:37 PM 0.19 0.05 - 0.80 x10*3/uL Final   04/22/2024 10:43 AM 0.38 0.05 - 0.80 x10*3/uL Final   04/02/2024 10:16 AM 0.11 0.05 - 0.80 x10*3/uL Final     Eosinophils Absolute   Date/Time Value Ref Range Status   04/29/2024 12:37 PM 0.02 0.00 - 0.40 x10*3/uL Final   04/22/2024 10:43 AM 0.08 0.00 - 0.40 x10*3/uL Final   04/02/2024 10:16 AM 0.11 0.00 - 0.40 x10*3/uL Final     Eosinophils Absolute, Manual   Date/Time Value Ref Range Status   05/03/2024 09:52 AM 0.19 0.00 - 0.40 x10*3/uL Final   04/15/2024 01:07 PM 0.17 0.00 - 0.40 x10*3/uL Final   02/19/2024 10:04 AM 0.00 0.00 - 0.40 x10*3/uL Final     Basophils Absolute   Date/Time Value Ref Range Status   04/29/2024 12:37 PM 0.03 0.00 - 0.10 x10*3/uL Final     Comment:     Automated WBC differential has been confirmed by manual smear.   04/22/2024 10:43 AM 0.04 0.00 - 0.10 x10*3/uL Final   04/02/2024 10:16 AM 0.02 0.00 - 0.10 x10*3/uL Final     Basophils Absolute, Manual   Date/Time Value Ref Range Status   05/03/2024 09:52 AM 0.00 0.00 - 0.10 x10*3/uL Final   04/15/2024 01:07 PM 0.17 (H) 0.00 - 0.10 x10*3/uL Final   02/19/2024 10:04 AM 0.16 (H) 0.00 -  "0.10 x10*3/uL Final       No components found for: \"PT\"  aPTT   Date/Time Value Ref Range Status   02/11/2024 01:40 PM 24 (L) 27 - 38 seconds Final       Assessment/Plan           {Assess/PlanSmartLinks:30512}         INF 05 GEAUGA                         "

## 2024-05-06 NOTE — PROGRESS NOTES
Patient ID: Catalina Mendoza is a 73 y.o. female.  Referring Physician: No referring provider defined for this encounter.  Primary Care Provider: Juju Kenney MD    Subjective    Interval History:  Notes abdominal pain and nausea about 2x weekly, she changes positions trying to relieve with minimal benefit, denies a Hx of acid reflux, denies ever taking Pepcid or similar Rxs. States she was negative when tested for an ulcer at the hospital recently. Reports ongoing fatigue and is worried she damaged her anus area in relation to constipation, she takes Colace with some relief and is experiencing bowel incontinence of mucus/stool discharge occasionally q2 weeks. The patient is interested in clinical trials in the future. Catalina is complaining of extreme fatigue with treatments, otherwise is interested in a possible chemo break and transferring overall oncology care primarily here, she is also concerned with her life expectancy/prognosis, and setting up her cremation, donating organs, etc.    She denies fever, chills, chest pain, SOB, nausea, vomiting, diarrhea, constipation, dysuria, or any other concerning signs of symptoms.            PMH: type 1 diabetes, managed with endo; lip cancer x2, multiple skin cancers     Past Surgical History: laparoscopic hysterectomy, tubal ligation, laparoscopic appendectomy, several cyst removals     Family History: Cancer of unknown type in maternal aunt. Otherwise denies a history of gyn related cancers including ovarian, endometrial, breast,  pancreas, and GI cancers. Father and siblings have significant history of heart disease, father and 3 brothers have had major heart surgery.     Social History: Denies a history of smoking, alcohol use or recreational drug use. The patient is retired and currently lives with , son  Hussain and daughter in law Sanna; patient and  relocated here from [place] as they initially sought care there for concerning  symptoms, and significant experienced delays in communication of results and lack of followup.      OBGYN History: The patient is a , experienced multiple miscarriages. She used OCP in distant past prior to hysterectomy for variable amounts  of time. She has not used HRT.       Screening:  -Pap smear: s/p hysterectomy  -Mammogram: 22, benign but right field not entirely visualized, recommended repeat screen   -Colonoscopy: 2018    Objective    BSA: 1.72 meters squared  /80 (BP Location: Left arm, Patient Position: Sitting, BP Cuff Size: Adult)   Pulse 83   Temp 35.5 °C (95.9 °F) (Temporal)   Resp 16   Wt 66.4 kg (146 lb 6.2 oz)   LMP  (LMP Unknown)   SpO2 98%   BMI 25.52 kg/m²      Physical Exam  Constitutional:       General: She is not in acute distress.     Appearance: Normal appearance. She is not toxic-appearing.   HENT:      Head: Normocephalic.      Mouth/Throat:      Mouth: Mucous membranes are moist.      Pharynx: Oropharynx is clear.   Eyes:      Extraocular Movements: Extraocular movements intact.      Conjunctiva/sclera: Conjunctivae normal.      Pupils: Pupils are equal, round, and reactive to light.   Cardiovascular:      Rate and Rhythm: Normal rate and regular rhythm.      Heart sounds: Normal heart sounds. No murmur heard.     No friction rub. No gallop.   Pulmonary:      Effort: Pulmonary effort is normal.      Breath sounds: Normal breath sounds. No wheezing or rhonchi.   Abdominal:      General: Bowel sounds are normal. There is no distension.      Palpations: Abdomen is soft.      Tenderness: There is no abdominal tenderness.   Musculoskeletal:         General: Normal range of motion.      Cervical back: Normal range of motion.   Skin:     General: Skin is warm.   Neurological:      General: No focal deficit present.      Mental Status: She is alert and oriented to person, place, and time.   Psychiatric:         Mood and Affect: Mood normal.         Behavior: Behavior  normal.       CT chest w IV contrast  Narrative: Interpreted By:  Carlo Jones,   STUDY:  CT CHEST W IV CONTRAST;  4/16/2024 9:36 am      INDICATION:  72 y/o   F with  Signs/Symptoms:SOB  Ovariian Cancer.      LIMITATIONS:  None.      ACCESSION NUMBER(S):  [id number]      ORDERING CLINICIAN:  JAIMIE ROJAS      TECHNIQUE:  After the administration of IV nonionic contrast, thin-section images  were obtained  from the thoracic inlet down through the diaphragm.  Sagittal and coronal reconstruction images were generated.  Mediastinal, lung, bone, and liver windows were reviewed. IV contrast  agent is Omnipaque 350, 75 mL.      COMPARISON:  None      FINDINGS:  CHEST WALL/BASE OF THE NECK:  Stable right-sided central venous MediPort.  No thyromegaly or thyroid mass.      LUNGS/ PLEURA/ AND TRACHEA:  No mass or pneumonia in either lung.  No pleural effusion.  No pneumothorax.  The trachea was grossly intact.      MEDIASTINUM/LILLIAN:  No suspicious mediastinal, hilar, or axillary mass or adenopathy.  No cardiomegaly. At least moderate coronary artery calcifications  involving LAD, left circumflex, and RCA vessels. No pericardial  effusion. No thoracic aortic aneurysm or dissection. Very mild mural  calcifications in the thoracic aorta.      BONES:  No destructive lytic or blastic bone lesion.  There is  mild-to-moderate disc space narrowing and endplate osteophytosis  throughout the thoracic spine.      UPPER ABDOMEN:  Multiple calcified splenic granulomas. Liver density is diffusely  decreased. The remainder of the imaged upper abdomen was grossly  intact.      Impression: At least moderate three-vessel bilateral coronary artery  calcifications.      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      Decreased liver density is nonspecific but probably due to fatty  infiltration.      Calcified splenic granulomas.      No mass or pneumonia in either lung. No suspicious thoracic  adenopathy in this exam.      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 woman here today for post op visit following surgery for stage IV likely HGSOC, S/P Diagnostic laparoscopy, BSO, extensive MIKAELA, bilateral ureterolysis, infracolic omentectomy, abdominal wall and mesenteric biopsies, complete gross resection to R0 on 3/13/23 currently on Mathieu/ishan for PROC.  Co-morbid conditions: T1DM, HGSOC, skin cancer  PS: 0     # HGSOC, platinum resistant   - We reviewed the typical pathophysiology and management of ovarian cancer, we discussed that ovarian cancer is usually managed with a combination of chemotherapy and surgery  - We reviewed her imaging    - Continue monitoring tumor markers  - Genetics with two VUS, BRCA neg   - Recurrence being managed by Dr. Ca, currently on mathiue/ishan  - We reviewed her current imaging which shows no evidence of disease.  She is fairly symptomatic from her current chemotherapy, we discussed treatment options to bring up with her oncologist including  possible chemotherapy holiday.    - We did discuss that recurrent cancer is likely to will return and that she will likely have to go on chemotherapies or other medications in the future  - Plan for a follow up visit in 6 months.    # Chemotherapy induced neuropathy  - Taking vitamin B6  - Self discontinued Gabapentin  - Doing well on 60mg Duloxetine daily, will continue    # Abdominal pain  - Discussed plan for Pepcid and miralax for constipation, if pain isn't resolved with these two treatment modalities would consider a GI referral.        Scribe Attestation  By signing my name below, I, Lisa Spencer   attest that this documentation has been prepared under the direction and in the presence of Dorys Crystal MD.     Provider Attestation - Scribe documentation    All medical record entries made by the Scribe were at my direction and personally dictated by me. I have reviewed the chart and agree that the record accurately reflects my personal performance of the history, physical exam, discussion and plan.    Dorys Crystal MD

## 2024-05-07 ENCOUNTER — OFFICE VISIT (OUTPATIENT)
Dept: GYNECOLOGIC ONCOLOGY | Facility: CLINIC | Age: 74
End: 2024-05-07
Payer: MEDICARE

## 2024-05-07 VITALS
RESPIRATION RATE: 16 BRPM | WEIGHT: 146.39 LBS | OXYGEN SATURATION: 98 % | TEMPERATURE: 95.9 F | DIASTOLIC BLOOD PRESSURE: 80 MMHG | SYSTOLIC BLOOD PRESSURE: 139 MMHG | BODY MASS INDEX: 25.52 KG/M2 | HEART RATE: 83 BPM

## 2024-05-07 DIAGNOSIS — G62.0 DRUG-INDUCED POLYNEUROPATHY (MULTI): ICD-10-CM

## 2024-05-07 DIAGNOSIS — C56.9 PRIMARY MALIGNANT NEOPLASM OF OVARY WITH WIDESPREAD METASTATIC DISEASE, UNSPECIFIED LATERALITY (MULTI): Primary | ICD-10-CM

## 2024-05-07 DIAGNOSIS — C80.0 PRIMARY MALIGNANT NEOPLASM OF OVARY WITH WIDESPREAD METASTATIC DISEASE, UNSPECIFIED LATERALITY (MULTI): Primary | ICD-10-CM

## 2024-05-07 PROCEDURE — 1159F MED LIST DOCD IN RCRD: CPT | Performed by: STUDENT IN AN ORGANIZED HEALTH CARE EDUCATION/TRAINING PROGRAM

## 2024-05-07 PROCEDURE — 3060F POS MICROALBUMINURIA REV: CPT | Performed by: STUDENT IN AN ORGANIZED HEALTH CARE EDUCATION/TRAINING PROGRAM

## 2024-05-07 PROCEDURE — 3079F DIAST BP 80-89 MM HG: CPT | Performed by: STUDENT IN AN ORGANIZED HEALTH CARE EDUCATION/TRAINING PROGRAM

## 2024-05-07 PROCEDURE — 4010F ACE/ARB THERAPY RXD/TAKEN: CPT | Performed by: STUDENT IN AN ORGANIZED HEALTH CARE EDUCATION/TRAINING PROGRAM

## 2024-05-07 PROCEDURE — 99214 OFFICE O/P EST MOD 30 MIN: CPT | Performed by: STUDENT IN AN ORGANIZED HEALTH CARE EDUCATION/TRAINING PROGRAM

## 2024-05-07 PROCEDURE — 1160F RVW MEDS BY RX/DR IN RCRD: CPT | Performed by: STUDENT IN AN ORGANIZED HEALTH CARE EDUCATION/TRAINING PROGRAM

## 2024-05-07 PROCEDURE — 1125F AMNT PAIN NOTED PAIN PRSNT: CPT | Performed by: STUDENT IN AN ORGANIZED HEALTH CARE EDUCATION/TRAINING PROGRAM

## 2024-05-07 PROCEDURE — 1157F ADVNC CARE PLAN IN RCRD: CPT | Performed by: STUDENT IN AN ORGANIZED HEALTH CARE EDUCATION/TRAINING PROGRAM

## 2024-05-07 PROCEDURE — 3052F HG A1C>EQUAL 8.0%<EQUAL 9.0%: CPT | Performed by: STUDENT IN AN ORGANIZED HEALTH CARE EDUCATION/TRAINING PROGRAM

## 2024-05-07 PROCEDURE — 3048F LDL-C <100 MG/DL: CPT | Performed by: STUDENT IN AN ORGANIZED HEALTH CARE EDUCATION/TRAINING PROGRAM

## 2024-05-07 PROCEDURE — 3075F SYST BP GE 130 - 139MM HG: CPT | Performed by: STUDENT IN AN ORGANIZED HEALTH CARE EDUCATION/TRAINING PROGRAM

## 2024-05-07 PROCEDURE — 3008F BODY MASS INDEX DOCD: CPT | Performed by: STUDENT IN AN ORGANIZED HEALTH CARE EDUCATION/TRAINING PROGRAM

## 2024-05-07 PROCEDURE — 1036F TOBACCO NON-USER: CPT | Performed by: STUDENT IN AN ORGANIZED HEALTH CARE EDUCATION/TRAINING PROGRAM

## 2024-05-07 ASSESSMENT — PAIN SCALES - GENERAL: PAINLEVEL: 4

## 2024-05-13 ENCOUNTER — LAB (OUTPATIENT)
Dept: LAB | Facility: LAB | Age: 74
End: 2024-05-13
Payer: MEDICARE

## 2024-05-13 DIAGNOSIS — C44.02 SQUAMOUS CELL CARCINOMA OF SKIN OF LIP: ICD-10-CM

## 2024-05-13 DIAGNOSIS — C80.0 PRIMARY MALIGNANT NEOPLASM OF OVARY WITH WIDESPREAD METASTATIC DISEASE, UNSPECIFIED LATERALITY (MULTI): ICD-10-CM

## 2024-05-13 DIAGNOSIS — C56.9 PRIMARY MALIGNANT NEOPLASM OF OVARY WITH WIDESPREAD METASTATIC DISEASE, UNSPECIFIED LATERALITY (MULTI): ICD-10-CM

## 2024-05-13 LAB
ALBUMIN SERPL BCP-MCNC: 4.6 G/DL (ref 3.4–5)
ALP SERPL-CCNC: 100 U/L (ref 33–136)
ALT SERPL W P-5'-P-CCNC: 14 U/L (ref 7–45)
ANION GAP SERPL CALC-SCNC: 13 MMOL/L (ref 10–20)
APPEARANCE UR: ABNORMAL
AST SERPL W P-5'-P-CCNC: 20 U/L (ref 9–39)
BASOPHILS # BLD AUTO: 0.11 X10*3/UL (ref 0–0.1)
BASOPHILS NFR BLD AUTO: 0.7 %
BILIRUB SERPL-MCNC: 0.5 MG/DL (ref 0–1.2)
BILIRUB UR STRIP.AUTO-MCNC: NEGATIVE MG/DL
BUN SERPL-MCNC: 21 MG/DL (ref 6–23)
CALCIUM SERPL-MCNC: 10 MG/DL (ref 8.6–10.3)
CHLORIDE SERPL-SCNC: 102 MMOL/L (ref 98–107)
CO2 SERPL-SCNC: 25 MMOL/L (ref 21–32)
COLOR UR: YELLOW
CREAT SERPL-MCNC: 1.04 MG/DL (ref 0.5–1.05)
EGFRCR SERPLBLD CKD-EPI 2021: 57 ML/MIN/1.73M*2
EOSINOPHIL # BLD AUTO: 0.31 X10*3/UL (ref 0–0.4)
EOSINOPHIL NFR BLD AUTO: 1.9 %
ERYTHROCYTE [DISTWIDTH] IN BLOOD BY AUTOMATED COUNT: 19.4 % (ref 11.5–14.5)
GLUCOSE SERPL-MCNC: 341 MG/DL (ref 74–99)
GLUCOSE UR STRIP.AUTO-MCNC: ABNORMAL MG/DL
HCT VFR BLD AUTO: 32.8 % (ref 36–46)
HGB BLD-MCNC: 10.2 G/DL (ref 12–16)
IMM GRANULOCYTES # BLD AUTO: 0.83 X10*3/UL (ref 0–0.5)
IMM GRANULOCYTES NFR BLD AUTO: 5 % (ref 0–0.9)
KETONES UR STRIP.AUTO-MCNC: NEGATIVE MG/DL
LEUKOCYTE ESTERASE UR QL STRIP.AUTO: ABNORMAL
LYMPHOCYTES # BLD AUTO: 3.83 X10*3/UL (ref 0.8–3)
LYMPHOCYTES NFR BLD AUTO: 22.9 %
MCH RBC QN AUTO: 31.7 PG (ref 26–34)
MCHC RBC AUTO-ENTMCNC: 31.1 G/DL (ref 32–36)
MCV RBC AUTO: 102 FL (ref 80–100)
MONOCYTES # BLD AUTO: 1.04 X10*3/UL (ref 0.05–0.8)
MONOCYTES NFR BLD AUTO: 6.2 %
MUCOUS THREADS #/AREA URNS AUTO: ABNORMAL /LPF
NEUTROPHILS # BLD AUTO: 10.6 X10*3/UL (ref 1.6–5.5)
NEUTROPHILS NFR BLD AUTO: 63.3 %
NITRITE UR QL STRIP.AUTO: NEGATIVE
NRBC BLD-RTO: 0.2 /100 WBCS (ref 0–0)
PH UR STRIP.AUTO: 5.5 [PH]
PLATELET # BLD AUTO: 273 X10*3/UL (ref 150–450)
POTASSIUM SERPL-SCNC: 4.4 MMOL/L (ref 3.5–5.3)
PROT SERPL-MCNC: 7.6 G/DL (ref 6.4–8.2)
PROT UR STRIP.AUTO-MCNC: ABNORMAL MG/DL
RBC # BLD AUTO: 3.22 X10*6/UL (ref 4–5.2)
RBC # UR STRIP.AUTO: NEGATIVE /UL
RBC #/AREA URNS AUTO: ABNORMAL /HPF
SODIUM SERPL-SCNC: 136 MMOL/L (ref 136–145)
SP GR UR STRIP.AUTO: 1.02
SQUAMOUS #/AREA URNS AUTO: ABNORMAL /HPF
TRANS CELLS #/AREA UR COMP ASSIST: ABNORMAL /HPF
UROBILINOGEN UR STRIP.AUTO-MCNC: NORMAL MG/DL
WBC # BLD AUTO: 16.7 X10*3/UL (ref 4.4–11.3)
WBC #/AREA URNS AUTO: >50 /HPF
WBC CLUMPS #/AREA URNS AUTO: ABNORMAL /HPF

## 2024-05-14 ENCOUNTER — INFUSION (OUTPATIENT)
Dept: HEMATOLOGY/ONCOLOGY | Facility: CLINIC | Age: 74
End: 2024-05-14
Payer: MEDICARE

## 2024-05-14 VITALS
RESPIRATION RATE: 17 BRPM | OXYGEN SATURATION: 99 % | WEIGHT: 146.72 LBS | TEMPERATURE: 96.4 F | BODY MASS INDEX: 25.58 KG/M2 | SYSTOLIC BLOOD PRESSURE: 119 MMHG | HEART RATE: 92 BPM | DIASTOLIC BLOOD PRESSURE: 69 MMHG

## 2024-05-14 DIAGNOSIS — C56.9 PRIMARY MALIGNANT NEOPLASM OF OVARY WITH WIDESPREAD METASTATIC DISEASE, UNSPECIFIED LATERALITY (MULTI): ICD-10-CM

## 2024-05-14 DIAGNOSIS — C80.0 PRIMARY MALIGNANT NEOPLASM OF OVARY WITH WIDESPREAD METASTATIC DISEASE, UNSPECIFIED LATERALITY (MULTI): ICD-10-CM

## 2024-05-14 DIAGNOSIS — C44.02 SQUAMOUS CELL CARCINOMA OF SKIN OF LIP: ICD-10-CM

## 2024-05-14 PROCEDURE — 96417 CHEMO IV INFUS EACH ADDL SEQ: CPT

## 2024-05-14 PROCEDURE — 2500000004 HC RX 250 GENERAL PHARMACY W/ HCPCS (ALT 636 FOR OP/ED): Performed by: INTERNAL MEDICINE

## 2024-05-14 PROCEDURE — 96413 CHEMO IV INFUSION 1 HR: CPT

## 2024-05-14 PROCEDURE — 96375 TX/PRO/DX INJ NEW DRUG ADDON: CPT | Mod: INF

## 2024-05-14 RX ORDER — FAMOTIDINE 10 MG/ML
20 INJECTION INTRAVENOUS ONCE AS NEEDED
Status: DISCONTINUED | OUTPATIENT
Start: 2024-05-14 | End: 2024-05-14 | Stop reason: HOSPADM

## 2024-05-14 RX ORDER — HEPARIN 100 UNIT/ML
500 SYRINGE INTRAVENOUS AS NEEDED
Status: CANCELLED | OUTPATIENT
Start: 2024-05-14

## 2024-05-14 RX ORDER — HEPARIN SODIUM,PORCINE/PF 10 UNIT/ML
50 SYRINGE (ML) INTRAVENOUS AS NEEDED
Status: DISCONTINUED | OUTPATIENT
Start: 2024-05-14 | End: 2024-05-14 | Stop reason: HOSPADM

## 2024-05-14 RX ORDER — PROCHLORPERAZINE MALEATE 10 MG
10 TABLET ORAL EVERY 6 HOURS PRN
Status: DISCONTINUED | OUTPATIENT
Start: 2024-05-14 | End: 2024-05-14 | Stop reason: HOSPADM

## 2024-05-14 RX ORDER — HEPARIN 100 UNIT/ML
500 SYRINGE INTRAVENOUS AS NEEDED
Status: DISCONTINUED | OUTPATIENT
Start: 2024-05-14 | End: 2024-05-14 | Stop reason: HOSPADM

## 2024-05-14 RX ORDER — DIPHENHYDRAMINE HYDROCHLORIDE 50 MG/ML
50 INJECTION INTRAMUSCULAR; INTRAVENOUS AS NEEDED
Status: DISCONTINUED | OUTPATIENT
Start: 2024-05-14 | End: 2024-05-14 | Stop reason: HOSPADM

## 2024-05-14 RX ORDER — PROCHLORPERAZINE EDISYLATE 5 MG/ML
10 INJECTION INTRAMUSCULAR; INTRAVENOUS EVERY 6 HOURS PRN
Status: DISCONTINUED | OUTPATIENT
Start: 2024-05-14 | End: 2024-05-14 | Stop reason: HOSPADM

## 2024-05-14 RX ORDER — ONDANSETRON HYDROCHLORIDE 2 MG/ML
8 INJECTION, SOLUTION INTRAVENOUS ONCE
Status: COMPLETED | OUTPATIENT
Start: 2024-05-14 | End: 2024-05-14

## 2024-05-14 RX ORDER — ALBUTEROL SULFATE 0.83 MG/ML
3 SOLUTION RESPIRATORY (INHALATION) AS NEEDED
Status: DISCONTINUED | OUTPATIENT
Start: 2024-05-14 | End: 2024-05-14 | Stop reason: HOSPADM

## 2024-05-14 RX ORDER — HEPARIN SODIUM,PORCINE/PF 10 UNIT/ML
50 SYRINGE (ML) INTRAVENOUS AS NEEDED
Status: CANCELLED | OUTPATIENT
Start: 2024-05-14

## 2024-05-14 RX ORDER — EPINEPHRINE 0.3 MG/.3ML
0.3 INJECTION SUBCUTANEOUS EVERY 5 MIN PRN
Status: DISCONTINUED | OUTPATIENT
Start: 2024-05-14 | End: 2024-05-14 | Stop reason: HOSPADM

## 2024-05-14 RX ADMIN — TOPOTECAN 7 MG: 1 INJECTION INTRAVENOUS at 16:03

## 2024-05-14 RX ADMIN — ONDANSETRON 8 MG: 2 INJECTION INTRAMUSCULAR; INTRAVENOUS at 15:26

## 2024-05-14 RX ADMIN — BEVACIZUMAB-AWWB 700 MG: 400 INJECTION, SOLUTION INTRAVENOUS at 16:44

## 2024-05-14 RX ADMIN — HEPARIN 500 UNITS: 100 SYRINGE at 17:12

## 2024-05-14 ASSESSMENT — PAIN SCALES - GENERAL: PAINLEVEL: 3

## 2024-05-20 ENCOUNTER — LAB (OUTPATIENT)
Dept: LAB | Facility: LAB | Age: 74
End: 2024-05-20
Payer: MEDICARE

## 2024-05-20 DIAGNOSIS — C80.0 PRIMARY MALIGNANT NEOPLASM OF OVARY WITH WIDESPREAD METASTATIC DISEASE, UNSPECIFIED LATERALITY (MULTI): ICD-10-CM

## 2024-05-20 DIAGNOSIS — C56.9 PRIMARY MALIGNANT NEOPLASM OF OVARY WITH WIDESPREAD METASTATIC DISEASE, UNSPECIFIED LATERALITY (MULTI): ICD-10-CM

## 2024-05-20 DIAGNOSIS — C44.02 SQUAMOUS CELL CARCINOMA OF SKIN OF LIP: ICD-10-CM

## 2024-05-20 LAB
ALBUMIN SERPL BCP-MCNC: 4 G/DL (ref 3.4–5)
ALP SERPL-CCNC: 58 U/L (ref 33–136)
ALT SERPL W P-5'-P-CCNC: 47 U/L (ref 7–45)
ANION GAP SERPL CALC-SCNC: 11 MMOL/L (ref 10–20)
AST SERPL W P-5'-P-CCNC: 64 U/L (ref 9–39)
BASOPHILS # BLD AUTO: 0.03 X10*3/UL (ref 0–0.1)
BASOPHILS NFR BLD AUTO: 0.8 %
BILIRUB SERPL-MCNC: 0.5 MG/DL (ref 0–1.2)
BUN SERPL-MCNC: 22 MG/DL (ref 6–23)
CALCIUM SERPL-MCNC: 9.4 MG/DL (ref 8.6–10.3)
CHLORIDE SERPL-SCNC: 106 MMOL/L (ref 98–107)
CO2 SERPL-SCNC: 24 MMOL/L (ref 21–32)
CREAT SERPL-MCNC: 0.93 MG/DL (ref 0.5–1.05)
EGFRCR SERPLBLD CKD-EPI 2021: 65 ML/MIN/1.73M*2
EOSINOPHIL # BLD AUTO: 0.1 X10*3/UL (ref 0–0.4)
EOSINOPHIL NFR BLD AUTO: 2.8 %
ERYTHROCYTE [DISTWIDTH] IN BLOOD BY AUTOMATED COUNT: 18.6 % (ref 11.5–14.5)
GLUCOSE SERPL-MCNC: 188 MG/DL (ref 74–99)
HCT VFR BLD AUTO: 28 % (ref 36–46)
HGB BLD-MCNC: 8.9 G/DL (ref 12–16)
IMM GRANULOCYTES # BLD AUTO: 0.01 X10*3/UL (ref 0–0.5)
IMM GRANULOCYTES NFR BLD AUTO: 0.3 % (ref 0–0.9)
LYMPHOCYTES # BLD AUTO: 2.2 X10*3/UL (ref 0.8–3)
LYMPHOCYTES NFR BLD AUTO: 62.1 %
MCH RBC QN AUTO: 32.1 PG (ref 26–34)
MCHC RBC AUTO-ENTMCNC: 31.8 G/DL (ref 32–36)
MCV RBC AUTO: 101 FL (ref 80–100)
MONOCYTES # BLD AUTO: 0.22 X10*3/UL (ref 0.05–0.8)
MONOCYTES NFR BLD AUTO: 6.2 %
NEUTROPHILS # BLD AUTO: 0.98 X10*3/UL (ref 1.6–5.5)
NEUTROPHILS NFR BLD AUTO: 27.8 %
NRBC BLD-RTO: 0 /100 WBCS (ref 0–0)
PLATELET # BLD AUTO: 328 X10*3/UL (ref 150–450)
POTASSIUM SERPL-SCNC: 4.2 MMOL/L (ref 3.5–5.3)
PROT SERPL-MCNC: 6.8 G/DL (ref 6.4–8.2)
RBC # BLD AUTO: 2.77 X10*6/UL (ref 4–5.2)
RBC MORPH BLD: NORMAL
SODIUM SERPL-SCNC: 137 MMOL/L (ref 136–145)
WBC # BLD AUTO: 3.5 X10*3/UL (ref 4.4–11.3)

## 2024-05-21 ENCOUNTER — INFUSION (OUTPATIENT)
Dept: HEMATOLOGY/ONCOLOGY | Facility: CLINIC | Age: 74
End: 2024-05-21
Payer: MEDICARE

## 2024-05-21 VITALS
BODY MASS INDEX: 25.89 KG/M2 | SYSTOLIC BLOOD PRESSURE: 146 MMHG | OXYGEN SATURATION: 100 % | RESPIRATION RATE: 18 BRPM | TEMPERATURE: 97 F | DIASTOLIC BLOOD PRESSURE: 81 MMHG | WEIGHT: 148.48 LBS | HEART RATE: 75 BPM

## 2024-05-21 DIAGNOSIS — C80.0 PRIMARY MALIGNANT NEOPLASM OF OVARY WITH WIDESPREAD METASTATIC DISEASE, UNSPECIFIED LATERALITY (MULTI): ICD-10-CM

## 2024-05-21 DIAGNOSIS — C56.9 PRIMARY MALIGNANT NEOPLASM OF OVARY WITH WIDESPREAD METASTATIC DISEASE, UNSPECIFIED LATERALITY (MULTI): ICD-10-CM

## 2024-05-21 DIAGNOSIS — C44.02 SQUAMOUS CELL CARCINOMA OF SKIN OF LIP: ICD-10-CM

## 2024-05-21 PROCEDURE — 2500000004 HC RX 250 GENERAL PHARMACY W/ HCPCS (ALT 636 FOR OP/ED): Performed by: INTERNAL MEDICINE

## 2024-05-21 PROCEDURE — 96413 CHEMO IV INFUSION 1 HR: CPT

## 2024-05-21 PROCEDURE — 96375 TX/PRO/DX INJ NEW DRUG ADDON: CPT | Mod: INF

## 2024-05-21 RX ORDER — HEPARIN 100 UNIT/ML
500 SYRINGE INTRAVENOUS AS NEEDED
Status: DISCONTINUED | OUTPATIENT
Start: 2024-05-21 | End: 2024-05-22 | Stop reason: HOSPADM

## 2024-05-21 RX ORDER — HEPARIN SODIUM,PORCINE/PF 10 UNIT/ML
50 SYRINGE (ML) INTRAVENOUS AS NEEDED
Status: CANCELLED | OUTPATIENT
Start: 2024-05-21

## 2024-05-21 RX ORDER — ONDANSETRON HYDROCHLORIDE 2 MG/ML
8 INJECTION, SOLUTION INTRAVENOUS ONCE
Status: COMPLETED | OUTPATIENT
Start: 2024-05-21 | End: 2024-05-21

## 2024-05-21 RX ORDER — PROCHLORPERAZINE EDISYLATE 5 MG/ML
10 INJECTION INTRAMUSCULAR; INTRAVENOUS EVERY 6 HOURS PRN
Status: DISCONTINUED | OUTPATIENT
Start: 2024-05-21 | End: 2024-05-22 | Stop reason: HOSPADM

## 2024-05-21 RX ORDER — DIPHENHYDRAMINE HYDROCHLORIDE 50 MG/ML
50 INJECTION INTRAMUSCULAR; INTRAVENOUS AS NEEDED
Status: DISCONTINUED | OUTPATIENT
Start: 2024-05-21 | End: 2024-05-22 | Stop reason: HOSPADM

## 2024-05-21 RX ORDER — FAMOTIDINE 10 MG/ML
20 INJECTION INTRAVENOUS ONCE AS NEEDED
Status: DISCONTINUED | OUTPATIENT
Start: 2024-05-21 | End: 2024-05-22 | Stop reason: HOSPADM

## 2024-05-21 RX ORDER — PROCHLORPERAZINE MALEATE 10 MG
10 TABLET ORAL EVERY 6 HOURS PRN
Status: DISCONTINUED | OUTPATIENT
Start: 2024-05-21 | End: 2024-05-22 | Stop reason: HOSPADM

## 2024-05-21 RX ORDER — EPINEPHRINE 0.3 MG/.3ML
0.3 INJECTION SUBCUTANEOUS EVERY 5 MIN PRN
Status: DISCONTINUED | OUTPATIENT
Start: 2024-05-21 | End: 2024-05-22 | Stop reason: HOSPADM

## 2024-05-21 RX ORDER — HEPARIN 100 UNIT/ML
500 SYRINGE INTRAVENOUS AS NEEDED
Status: CANCELLED | OUTPATIENT
Start: 2024-05-21

## 2024-05-21 RX ORDER — ALBUTEROL SULFATE 0.83 MG/ML
3 SOLUTION RESPIRATORY (INHALATION) AS NEEDED
Status: DISCONTINUED | OUTPATIENT
Start: 2024-05-21 | End: 2024-05-22 | Stop reason: HOSPADM

## 2024-05-21 RX ADMIN — HEPARIN 500 UNITS: 100 SYRINGE at 14:59

## 2024-05-21 RX ADMIN — TOPOTECAN 7 MG: 1 INJECTION, SOLUTION, CONCENTRATE INTRAVENOUS at 14:16

## 2024-05-21 RX ADMIN — ONDANSETRON 8 MG: 2 INJECTION INTRAMUSCULAR; INTRAVENOUS at 13:31

## 2024-05-28 ENCOUNTER — INFUSION (OUTPATIENT)
Dept: HEMATOLOGY/ONCOLOGY | Facility: CLINIC | Age: 74
End: 2024-05-28
Payer: MEDICARE

## 2024-05-28 VITALS
TEMPERATURE: 97.9 F | WEIGHT: 146.72 LBS | SYSTOLIC BLOOD PRESSURE: 130 MMHG | HEART RATE: 78 BPM | DIASTOLIC BLOOD PRESSURE: 74 MMHG | BODY MASS INDEX: 25.58 KG/M2 | OXYGEN SATURATION: 98 % | RESPIRATION RATE: 16 BRPM

## 2024-05-28 DIAGNOSIS — C56.9 PRIMARY MALIGNANT NEOPLASM OF OVARY WITH WIDESPREAD METASTATIC DISEASE, UNSPECIFIED LATERALITY (MULTI): Primary | ICD-10-CM

## 2024-05-28 DIAGNOSIS — D63.0 ANEMIA IN NEOPLASTIC DISEASE: ICD-10-CM

## 2024-05-28 DIAGNOSIS — C44.02 SQUAMOUS CELL CARCINOMA OF SKIN OF LIP: ICD-10-CM

## 2024-05-28 DIAGNOSIS — C80.0 PRIMARY MALIGNANT NEOPLASM OF OVARY WITH WIDESPREAD METASTATIC DISEASE, UNSPECIFIED LATERALITY (MULTI): Primary | ICD-10-CM

## 2024-05-28 LAB
ABO GROUP (TYPE) IN BLOOD: NORMAL
ALBUMIN SERPL BCP-MCNC: 4.3 G/DL (ref 3.4–5)
ALP SERPL-CCNC: 50 U/L (ref 33–136)
ALT SERPL W P-5'-P-CCNC: 27 U/L (ref 7–45)
ANION GAP SERPL CALC-SCNC: 14 MMOL/L (ref 10–20)
ANTIBODY SCREEN: NORMAL
AST SERPL W P-5'-P-CCNC: 25 U/L (ref 9–39)
BASOPHILS # BLD AUTO: 0.02 X10*3/UL (ref 0–0.1)
BASOPHILS NFR BLD AUTO: 0.7 %
BILIRUB SERPL-MCNC: 0.6 MG/DL (ref 0–1.2)
BUN SERPL-MCNC: 20 MG/DL (ref 6–23)
CALCIUM SERPL-MCNC: 9.8 MG/DL (ref 8.6–10.3)
CANCER AG125 SERPL-ACNC: 39.2 U/ML (ref 0–30.2)
CHLORIDE SERPL-SCNC: 103 MMOL/L (ref 98–107)
CO2 SERPL-SCNC: 22 MMOL/L (ref 21–32)
CREAT SERPL-MCNC: 1.05 MG/DL (ref 0.5–1.05)
EGFRCR SERPLBLD CKD-EPI 2021: 56 ML/MIN/1.73M*2
EOSINOPHIL # BLD AUTO: 0.08 X10*3/UL (ref 0–0.4)
EOSINOPHIL NFR BLD AUTO: 2.8 %
ERYTHROCYTE [DISTWIDTH] IN BLOOD BY AUTOMATED COUNT: 17.8 % (ref 11.5–14.5)
GLUCOSE SERPL-MCNC: 301 MG/DL (ref 74–99)
HCT VFR BLD AUTO: 22.9 % (ref 36–46)
HGB BLD-MCNC: 7.3 G/DL (ref 12–16)
HYPOCHROMIA BLD QL SMEAR: NORMAL
IMM GRANULOCYTES # BLD AUTO: 0.01 X10*3/UL (ref 0–0.5)
IMM GRANULOCYTES NFR BLD AUTO: 0.4 % (ref 0–0.9)
LYMPHOCYTES # BLD AUTO: 1.93 X10*3/UL (ref 0.8–3)
LYMPHOCYTES NFR BLD AUTO: 68.2 %
MCH RBC QN AUTO: 31.7 PG (ref 26–34)
MCHC RBC AUTO-ENTMCNC: 31.9 G/DL (ref 32–36)
MCV RBC AUTO: 100 FL (ref 80–100)
MONOCYTES # BLD AUTO: 0.13 X10*3/UL (ref 0.05–0.8)
MONOCYTES NFR BLD AUTO: 4.6 %
NEUTROPHILS # BLD AUTO: 0.66 X10*3/UL (ref 1.6–5.5)
NEUTROPHILS NFR BLD AUTO: 23.3 %
NRBC BLD-RTO: ABNORMAL /100{WBCS}
PLATELET # BLD AUTO: 64 X10*3/UL (ref 150–450)
POTASSIUM SERPL-SCNC: 4.9 MMOL/L (ref 3.5–5.3)
PROT SERPL-MCNC: 7.1 G/DL (ref 6.4–8.2)
RBC # BLD AUTO: 2.3 X10*6/UL (ref 4–5.2)
RBC MORPH BLD: NORMAL
RH FACTOR (ANTIGEN D): NORMAL
SODIUM SERPL-SCNC: 134 MMOL/L (ref 136–145)
WBC # BLD AUTO: 2.8 X10*3/UL (ref 4.4–11.3)

## 2024-05-28 PROCEDURE — 86920 COMPATIBILITY TEST SPIN: CPT

## 2024-05-28 PROCEDURE — 80053 COMPREHEN METABOLIC PANEL: CPT

## 2024-05-28 PROCEDURE — 2500000004 HC RX 250 GENERAL PHARMACY W/ HCPCS (ALT 636 FOR OP/ED): Performed by: INTERNAL MEDICINE

## 2024-05-28 PROCEDURE — 86901 BLOOD TYPING SEROLOGIC RH(D): CPT

## 2024-05-28 PROCEDURE — 85025 COMPLETE CBC W/AUTO DIFF WBC: CPT

## 2024-05-28 PROCEDURE — 86304 IMMUNOASSAY TUMOR CA 125: CPT | Mod: GEALAB

## 2024-05-28 PROCEDURE — 36591 DRAW BLOOD OFF VENOUS DEVICE: CPT

## 2024-05-28 RX ORDER — EPINEPHRINE 0.3 MG/.3ML
0.3 INJECTION SUBCUTANEOUS EVERY 5 MIN PRN
Status: CANCELLED | OUTPATIENT
Start: 2024-05-28

## 2024-05-28 RX ORDER — ALBUTEROL SULFATE 0.83 MG/ML
3 SOLUTION RESPIRATORY (INHALATION) AS NEEDED
Status: CANCELLED | OUTPATIENT
Start: 2024-05-28

## 2024-05-28 RX ORDER — HEPARIN 100 UNIT/ML
500 SYRINGE INTRAVENOUS AS NEEDED
Status: DISCONTINUED | OUTPATIENT
Start: 2024-05-28 | End: 2024-05-28 | Stop reason: HOSPADM

## 2024-05-28 RX ORDER — HEPARIN SODIUM,PORCINE/PF 10 UNIT/ML
50 SYRINGE (ML) INTRAVENOUS AS NEEDED
Status: CANCELLED | OUTPATIENT
Start: 2024-05-28

## 2024-05-28 RX ORDER — HEPARIN 100 UNIT/ML
500 SYRINGE INTRAVENOUS AS NEEDED
Status: CANCELLED | OUTPATIENT
Start: 2024-05-28

## 2024-05-28 RX ORDER — DIPHENHYDRAMINE HYDROCHLORIDE 50 MG/ML
50 INJECTION INTRAMUSCULAR; INTRAVENOUS AS NEEDED
Status: CANCELLED | OUTPATIENT
Start: 2024-05-28

## 2024-05-28 RX ORDER — FAMOTIDINE 10 MG/ML
20 INJECTION INTRAVENOUS ONCE AS NEEDED
Status: CANCELLED | OUTPATIENT
Start: 2024-05-28

## 2024-05-28 RX ADMIN — HEPARIN 500 UNITS: 100 SYRINGE at 13:57

## 2024-05-28 ASSESSMENT — PAIN SCALES - GENERAL: PAINLEVEL: 0-NO PAIN

## 2024-05-30 ENCOUNTER — INFUSION (OUTPATIENT)
Dept: HEMATOLOGY/ONCOLOGY | Facility: CLINIC | Age: 74
End: 2024-05-30
Payer: MEDICARE

## 2024-05-30 VITALS
SYSTOLIC BLOOD PRESSURE: 134 MMHG | WEIGHT: 147.16 LBS | BODY MASS INDEX: 25.66 KG/M2 | RESPIRATION RATE: 18 BRPM | HEART RATE: 81 BPM | DIASTOLIC BLOOD PRESSURE: 72 MMHG | TEMPERATURE: 97.3 F | OXYGEN SATURATION: 100 %

## 2024-05-30 DIAGNOSIS — D63.0 ANEMIA IN NEOPLASTIC DISEASE: Primary | ICD-10-CM

## 2024-05-30 DIAGNOSIS — C44.02 SQUAMOUS CELL CARCINOMA OF SKIN OF LIP: ICD-10-CM

## 2024-05-30 DIAGNOSIS — C80.0 PRIMARY MALIGNANT NEOPLASM OF OVARY WITH WIDESPREAD METASTATIC DISEASE, UNSPECIFIED LATERALITY (MULTI): ICD-10-CM

## 2024-05-30 DIAGNOSIS — C56.9 PRIMARY MALIGNANT NEOPLASM OF OVARY WITH WIDESPREAD METASTATIC DISEASE, UNSPECIFIED LATERALITY (MULTI): ICD-10-CM

## 2024-05-30 LAB
BASOPHILS # BLD AUTO: 0.02 X10*3/UL (ref 0–0.1)
BASOPHILS NFR BLD AUTO: 0.4 %
EOSINOPHIL # BLD AUTO: 0.14 X10*3/UL (ref 0–0.4)
EOSINOPHIL NFR BLD AUTO: 3 %
ERYTHROCYTE [DISTWIDTH] IN BLOOD BY AUTOMATED COUNT: 17.9 % (ref 11.5–14.5)
HCT VFR BLD AUTO: 22.6 % (ref 36–46)
HGB BLD-MCNC: 7.2 G/DL (ref 12–16)
IMM GRANULOCYTES # BLD AUTO: 0.01 X10*3/UL (ref 0–0.5)
IMM GRANULOCYTES NFR BLD AUTO: 0.2 % (ref 0–0.9)
LYMPHOCYTES # BLD AUTO: 2.84 X10*3/UL (ref 0.8–3)
LYMPHOCYTES NFR BLD AUTO: 61.6 %
MCH RBC QN AUTO: 31.9 PG (ref 26–34)
MCHC RBC AUTO-ENTMCNC: 31.9 G/DL (ref 32–36)
MCV RBC AUTO: 100 FL (ref 80–100)
MONOCYTES # BLD AUTO: 0.23 X10*3/UL (ref 0.05–0.8)
MONOCYTES NFR BLD AUTO: 5 %
NEUTROPHILS # BLD AUTO: 1.37 X10*3/UL (ref 1.6–5.5)
NEUTROPHILS NFR BLD AUTO: 29.8 %
PLATELET # BLD AUTO: 57 X10*3/UL (ref 150–450)
RBC # BLD AUTO: 2.26 X10*6/UL (ref 4–5.2)
WBC # BLD AUTO: 4.6 X10*3/UL (ref 4.4–11.3)

## 2024-05-30 PROCEDURE — 2500000004 HC RX 250 GENERAL PHARMACY W/ HCPCS (ALT 636 FOR OP/ED): Performed by: INTERNAL MEDICINE

## 2024-05-30 PROCEDURE — 96375 TX/PRO/DX INJ NEW DRUG ADDON: CPT | Mod: INF

## 2024-05-30 PROCEDURE — 85025 COMPLETE CBC W/AUTO DIFF WBC: CPT

## 2024-05-30 PROCEDURE — 96377 APPLICATON ON-BODY INJECTOR: CPT

## 2024-05-30 PROCEDURE — 96417 CHEMO IV INFUS EACH ADDL SEQ: CPT

## 2024-05-30 PROCEDURE — 96413 CHEMO IV INFUSION 1 HR: CPT

## 2024-05-30 RX ORDER — EPINEPHRINE 0.3 MG/.3ML
0.3 INJECTION SUBCUTANEOUS EVERY 5 MIN PRN
Status: DISCONTINUED | OUTPATIENT
Start: 2024-05-30 | End: 2024-05-30 | Stop reason: HOSPADM

## 2024-05-30 RX ORDER — HEPARIN 100 UNIT/ML
500 SYRINGE INTRAVENOUS AS NEEDED
Status: CANCELLED | OUTPATIENT
Start: 2024-05-30

## 2024-05-30 RX ORDER — ALBUTEROL SULFATE 0.83 MG/ML
3 SOLUTION RESPIRATORY (INHALATION) AS NEEDED
Status: CANCELLED | OUTPATIENT
Start: 2024-05-30

## 2024-05-30 RX ORDER — PROCHLORPERAZINE MALEATE 10 MG
10 TABLET ORAL EVERY 6 HOURS PRN
Status: DISCONTINUED | OUTPATIENT
Start: 2024-05-30 | End: 2024-05-30 | Stop reason: HOSPADM

## 2024-05-30 RX ORDER — DIPHENHYDRAMINE HYDROCHLORIDE 50 MG/ML
50 INJECTION INTRAMUSCULAR; INTRAVENOUS AS NEEDED
Status: DISCONTINUED | OUTPATIENT
Start: 2024-05-30 | End: 2024-05-30 | Stop reason: HOSPADM

## 2024-05-30 RX ORDER — FAMOTIDINE 10 MG/ML
20 INJECTION INTRAVENOUS ONCE AS NEEDED
Status: CANCELLED | OUTPATIENT
Start: 2024-05-30

## 2024-05-30 RX ORDER — HEPARIN SODIUM,PORCINE/PF 10 UNIT/ML
50 SYRINGE (ML) INTRAVENOUS AS NEEDED
Status: CANCELLED | OUTPATIENT
Start: 2024-05-30

## 2024-05-30 RX ORDER — FAMOTIDINE 10 MG/ML
20 INJECTION INTRAVENOUS ONCE AS NEEDED
Status: DISCONTINUED | OUTPATIENT
Start: 2024-05-30 | End: 2024-05-30 | Stop reason: HOSPADM

## 2024-05-30 RX ORDER — EPINEPHRINE 0.3 MG/.3ML
0.3 INJECTION SUBCUTANEOUS EVERY 5 MIN PRN
Status: CANCELLED | OUTPATIENT
Start: 2024-05-30

## 2024-05-30 RX ORDER — PROCHLORPERAZINE EDISYLATE 5 MG/ML
10 INJECTION INTRAMUSCULAR; INTRAVENOUS EVERY 6 HOURS PRN
Status: DISCONTINUED | OUTPATIENT
Start: 2024-05-30 | End: 2024-05-30 | Stop reason: HOSPADM

## 2024-05-30 RX ORDER — ALBUTEROL SULFATE 0.83 MG/ML
3 SOLUTION RESPIRATORY (INHALATION) AS NEEDED
Status: DISCONTINUED | OUTPATIENT
Start: 2024-05-30 | End: 2024-05-30 | Stop reason: HOSPADM

## 2024-05-30 RX ORDER — ONDANSETRON HYDROCHLORIDE 2 MG/ML
8 INJECTION, SOLUTION INTRAVENOUS ONCE
Status: COMPLETED | OUTPATIENT
Start: 2024-05-30 | End: 2024-05-30

## 2024-05-30 RX ORDER — DIPHENHYDRAMINE HYDROCHLORIDE 50 MG/ML
50 INJECTION INTRAMUSCULAR; INTRAVENOUS AS NEEDED
Status: CANCELLED | OUTPATIENT
Start: 2024-05-30

## 2024-05-30 RX ORDER — HEPARIN 100 UNIT/ML
500 SYRINGE INTRAVENOUS AS NEEDED
Status: DISCONTINUED | OUTPATIENT
Start: 2024-05-30 | End: 2024-05-30 | Stop reason: HOSPADM

## 2024-05-30 RX ADMIN — ONDANSETRON 8 MG: 2 INJECTION INTRAMUSCULAR; INTRAVENOUS at 13:05

## 2024-05-30 RX ADMIN — PEGFILGRASTIM 6 MG: KIT SUBCUTANEOUS at 14:50

## 2024-05-30 RX ADMIN — SODIUM CHLORIDE, PRESERVATIVE FREE 500 UNITS: 5 INJECTION INTRAVENOUS at 15:00

## 2024-05-30 RX ADMIN — BEVACIZUMAB-AWWB 700 MG: 400 INJECTION, SOLUTION INTRAVENOUS at 14:25

## 2024-05-30 RX ADMIN — TOPOTECAN 7 MG: 1 INJECTION, SOLUTION, CONCENTRATE INTRAVENOUS at 13:36

## 2024-05-30 ASSESSMENT — PAIN SCALES - GENERAL: PAINLEVEL: 3

## 2024-05-30 NOTE — SIGNIFICANT EVENT
05/30/24 1237   Prechemo Checklist   Has the patient been in the hospital, ED, or urgent care since last date of service No   Chemo/Immuno Consent Completed and Signed Yes   Protocol/Indications Verified Yes   Confirmed to previous date/time of medication Yes   Compared to previous dose Yes   All medications are dated accurately Yes   Pregnancy Test Negative Not applicable   Parameters Met Yes   Provider Notified Yes  (Dr Ca)   Is Patient Proceeding With Treatment? Yes   BSA/Weight-Height Verified Yes   Dose Calculations Verified (current, total, cumulative) Yes

## 2024-05-30 NOTE — PROGRESS NOTES
1500. Pt came today for count checks. Day 15 of Topotecan / Bevacizumab was given (held on 5.28.24). Infusions tolerated without incident. Pt will return tomorrow for 1 UPRBC's.

## 2024-05-30 NOTE — PROGRESS NOTES
Patient ID: Catalina Mendoza is a 73 y.o. female.  Referring Physician: Aleyda Ca MD  [id number] [place] [phone]  Primary Care Provider: Juju Kenney MD  Visit Type:  {Visit Type:47707}     {Telehealth Consent:72426}    Subjective    HPI    Review of Systems - Oncology     Objective   BSA: 1.73 meters squared  /72 (BP Location: Right arm, Patient Position: Sitting, BP Cuff Size: Adult)   Pulse 81   Temp 36.3 °C (97.3 °F) (Temporal)   Resp 18   Wt 66.7 kg (147 lb 2.5 oz)   LMP  (LMP Unknown)   SpO2 100%   BMI 25.66 kg/m²      has a past medical history of Encounter for immunization (10/03/2016), Other injury of unspecified body region, initial encounter, Personal history of malignant neoplasm of unspecified site of lip, oral cavity, and pharynx, Personal history of other complications of pregnancy, childbirth and the puerperium, and Personal history of other malignant neoplasm of skin.   has a past surgical history that includes Hysterectomy (09/28/2015); Appendectomy (09/28/2015); Other surgical history (09/28/2015); Eye surgery (06/20/2016); Cholecystectomy (06/20/2016); and US guided abdominal paracentesis (12/15/2022).  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, started on NACT with carbo/taxol  - 2/7/23: S/p cycle 3 with good interval response  - 3/13/23: Diagnostic laparoscopy, BSO, extensive MIKAELA,  bilateral ureterolysis, infracolic omentectomy, abdominal wall and mesenteric biopsies, complete gross resection to R0  - 5/23/23: Chemo completed     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 She has never been exposed to tobacco smoke. She has never used smokeless tobacco.  She  reports no history of alcohol use.  She  reports no history of drug use.    Physical Exam    WBC   Date/Time Value Ref Range Status   05/30/2024 12:02 PM 4.6 4.4 - 11.3 x10*3/uL Final   05/28/2024 12:02 PM 2.8 (L) 4.4 - 11.3 x10*3/uL Final   05/20/2024 11:02 AM 3.5 (L) 4.4 - 11.3 x10*3/uL Final     nRBC   Date Value Ref Range Status   05/28/2024   Final     Comment:     Not Measured   05/20/2024 0.0 0.0 - 0.0 /100 WBCs Final   05/13/2024 0.2 (H) 0.0 - 0.0 /100 WBCs Final     RBC   Date Value Ref Range Status   05/30/2024 2.26 (L) 4.00 - 5.20 x10*6/uL Final   05/28/2024 2.30 (L) 4.00 - 5.20 x10*6/uL Final   05/20/2024 2.77 (L) 4.00 - 5.20 x10*6/uL Final     Hemoglobin   Date Value Ref Range Status   05/30/2024 7.2 (L) 12.0 - 16.0 g/dL Final   05/28/2024 7.3 (L) 12.0 - 16.0 g/dL Final   05/20/2024 8.9 (L) 12.0 - 16.0 g/dL Final     Hematocrit   Date Value Ref Range Status   05/30/2024 22.6 (L) 36.0 - 46.0 % Final   05/28/2024 22.9 (L) 36.0 - 46.0 % Final   05/20/2024 28.0 (L) 36.0 - 46.0 % Final     MCV   Date/Time Value Ref Range Status   05/30/2024 12:02  80 - 100 fL Final   05/28/2024 12:02  80 - 100 fL Final   05/20/2024 11:02  (H) 80 - 100 fL Final      MCH   Date/Time Value Ref Range Status   05/30/2024 12:02 PM 31.9 26.0 - 34.0 pg Final   05/28/2024 12:02 PM 31.7 26.0 - 34.0 pg Final   05/20/2024 11:02 AM 32.1 26.0 - 34.0 pg Final     MCHC   Date/Time Value Ref Range Status   05/30/2024 12:02 PM 31.9 (L) 32.0 - 36.0 g/dL Final   05/28/2024 12:02 PM 31.9 (L) 32.0 - 36.0 g/dL Final   05/20/2024 11:02 AM 31.8 (L) 32.0 - 36.0 g/dL Final     RDW   Date/Time Value Ref Range Status   05/30/2024 12:02 PM 17.9 (H) 11.5 - 14.5 % Final   05/28/2024 12:02 PM 17.8 (H) 11.5 - 14.5 % Final   05/20/2024 11:02 AM 18.6 (H) 11.5 - 14.5 % Final     Platelets   Date/Time Value Ref Range Status   05/30/2024 12:02 PM 57 (L) 150 - 450 x10*3/uL Final   05/28/2024 12:02 PM 64 (L) 150 - 450 x10*3/uL Final   05/20/2024 11:02  150 - 450 x10*3/uL Final     MPV   Date/Time Value Ref Range Status   10/27/2023 08:21 AM 9.8 7.5 - 11.5 fL Final     Neutrophils %   Date/Time Value Ref Range Status   05/30/2024 12:02 PM 29.8 40.0 - 80.0 % Final   05/28/2024 12:02 PM 23.3 40.0 - 80.0 % Final   05/20/2024 11:02 AM 27.8 40.0 - 80.0 % Final     Immature Granulocytes %, Automated   Date/Time Value Ref Range Status   05/30/2024 12:02 PM 0.2 0.0 - 0.9 % Final     Comment:     Immature Granulocyte Count (IG) includes promyelocytes, myelocytes and metamyelocytes but does not include bands. Percent differential counts (%) should be interpreted in the context of the absolute cell counts (cells/UL).   05/28/2024 12:02 PM 0.4 0.0 - 0.9 % Final     Comment:     Immature Granulocyte Count (IG) includes promyelocytes, myelocytes and metamyelocytes but does not include bands. Percent differential counts (%) should be interpreted in the context of the absolute cell counts (cells/UL).   05/20/2024 11:02 AM 0.3 0.0 - 0.9 % Final     Comment:     Immature Granulocyte Count (IG) includes promyelocytes, myelocytes and metamyelocytes but does not include bands. Percent differential counts (%) should be  interpreted in the context of the absolute cell counts (cells/UL).     Lymphocytes %, Manual   Date/Time Value Ref Range Status   05/03/2024 09:52 AM 12.0 13.0 - 44.0 % Final   04/15/2024 01:07 PM 27.0 13.0 - 44.0 % Final   02/19/2024 10:04 AM 18.0 13.0 - 44.0 % Final     Lymphocytes %   Date/Time Value Ref Range Status   05/30/2024 12:02 PM 61.6 13.0 - 44.0 % Final   05/28/2024 12:02 PM 68.2 13.0 - 44.0 % Final   05/20/2024 11:02 AM 62.1 13.0 - 44.0 % Final     Monocytes %, Manual   Date/Time Value Ref Range Status   05/03/2024 09:52 AM 0.0 2.0 - 10.0 % Final   04/15/2024 01:07 PM 3.0 2.0 - 10.0 % Final   02/19/2024 10:04 AM 6.0 2.0 - 10.0 % Final     Monocytes %   Date/Time Value Ref Range Status   05/30/2024 12:02 PM 5.0 2.0 - 10.0 % Final   05/28/2024 12:02 PM 4.6 2.0 - 10.0 % Final   05/20/2024 11:02 AM 6.2 2.0 - 10.0 % Final     Eosinophils %, Manual   Date/Time Value Ref Range Status   05/03/2024 09:52 AM 1.0 0.0 - 6.0 % Final   04/15/2024 01:07 PM 1.0 0.0 - 6.0 % Final   02/19/2024 10:04 AM 0.0 0.0 - 6.0 % Final     Eosinophils %   Date/Time Value Ref Range Status   05/30/2024 12:02 PM 3.0 0.0 - 6.0 % Final   05/28/2024 12:02 PM 2.8 0.0 - 6.0 % Final   05/20/2024 11:02 AM 2.8 0.0 - 6.0 % Final     Basophils %, Manual   Date/Time Value Ref Range Status   05/03/2024 09:52 AM 0.0 0.0 - 2.0 % Final   04/15/2024 01:07 PM 1.0 0.0 - 2.0 % Final   02/19/2024 10:04 AM 1.0 0.0 - 2.0 % Final     Basophils %   Date/Time Value Ref Range Status   05/30/2024 12:02 PM 0.4 0.0 - 2.0 % Final   05/28/2024 12:02 PM 0.7 0.0 - 2.0 % Final   05/20/2024 11:02 AM 0.8 0.0 - 2.0 % Final     Neutrophils Absolute   Date/Time Value Ref Range Status   05/30/2024 12:02 PM 1.37 (L) 1.60 - 5.50 x10*3/uL Final     Comment:     Percent differential counts (%) should be interpreted in the context of the absolute cell counts (cells/uL).   05/28/2024 12:02 PM 0.66 (L) 1.60 - 5.50 x10*3/uL Final     Comment:     Percent differential counts (%)  should be interpreted in the context of the absolute cell counts (cells/uL).   05/20/2024 11:02 AM 0.98 (L) 1.60 - 5.50 x10*3/uL Final     Comment:     Percent differential counts (%) should be interpreted in the context of the absolute cell counts (cells/uL).     Immature Granulocytes Absolute, Automated   Date/Time Value Ref Range Status   05/30/2024 12:02 PM 0.01 0.00 - 0.50 x10*3/uL Final   05/28/2024 12:02 PM 0.01 0.00 - 0.50 x10*3/uL Final   05/20/2024 11:02 AM 0.01 0.00 - 0.50 x10*3/uL Final     Lymphocytes Absolute   Date/Time Value Ref Range Status   05/30/2024 12:02 PM 2.84 0.80 - 3.00 x10*3/uL Final   05/28/2024 12:02 PM 1.93 0.80 - 3.00 x10*3/uL Final   05/20/2024 11:02 AM 2.20 0.80 - 3.00 x10*3/uL Final     Monocytes Absolute   Date/Time Value Ref Range Status   05/30/2024 12:02 PM 0.23 0.05 - 0.80 x10*3/uL Final   05/28/2024 12:02 PM 0.13 0.05 - 0.80 x10*3/uL Final   05/20/2024 11:02 AM 0.22 0.05 - 0.80 x10*3/uL Final     Eosinophils Absolute   Date/Time Value Ref Range Status   05/30/2024 12:02 PM 0.14 0.00 - 0.40 x10*3/uL Final   05/28/2024 12:02 PM 0.08 0.00 - 0.40 x10*3/uL Final   05/20/2024 11:02 AM 0.10 0.00 - 0.40 x10*3/uL Final     Eosinophils Absolute, Manual   Date/Time Value Ref Range Status   05/03/2024 09:52 AM 0.19 0.00 - 0.40 x10*3/uL Final   04/15/2024 01:07 PM 0.17 0.00 - 0.40 x10*3/uL Final   02/19/2024 10:04 AM 0.00 0.00 - 0.40 x10*3/uL Final     Basophils Absolute   Date/Time Value Ref Range Status   05/30/2024 12:02 PM 0.02 0.00 - 0.10 x10*3/uL Final   05/28/2024 12:02 PM 0.02 0.00 - 0.10 x10*3/uL Final   05/20/2024 11:02 AM 0.03 0.00 - 0.10 x10*3/uL Final     Comment:     Automated WBC differential has been confirmed by manual smear.     Basophils Absolute, Manual   Date/Time Value Ref Range Status   05/03/2024 09:52 AM 0.00 0.00 - 0.10 x10*3/uL Final   04/15/2024 01:07 PM 0.17 (H) 0.00 - 0.10 x10*3/uL Final   02/19/2024 10:04 AM 0.16 (H) 0.00 - 0.10 x10*3/uL Final       No  "components found for: \"PT\"  aPTT   Date/Time Value Ref Range Status   02/11/2024 01:40 PM 24 (L) 27 - 38 seconds Final       Assessment/Plan           {Assess/PlanSmartLinks:26691}         INF 11 JUANGA                         "

## 2024-05-31 ENCOUNTER — INFUSION (OUTPATIENT)
Dept: HEMATOLOGY/ONCOLOGY | Facility: CLINIC | Age: 74
End: 2024-05-31
Payer: MEDICARE

## 2024-05-31 VITALS
TEMPERATURE: 97.2 F | RESPIRATION RATE: 16 BRPM | SYSTOLIC BLOOD PRESSURE: 112 MMHG | OXYGEN SATURATION: 98 % | BODY MASS INDEX: 25.87 KG/M2 | DIASTOLIC BLOOD PRESSURE: 74 MMHG | HEART RATE: 72 BPM | WEIGHT: 148.37 LBS

## 2024-05-31 DIAGNOSIS — D63.0 ANEMIA IN NEOPLASTIC DISEASE: ICD-10-CM

## 2024-05-31 DIAGNOSIS — C56.9 PRIMARY MALIGNANT NEOPLASM OF OVARY WITH WIDESPREAD METASTATIC DISEASE, UNSPECIFIED LATERALITY (MULTI): ICD-10-CM

## 2024-05-31 DIAGNOSIS — C80.0 PRIMARY MALIGNANT NEOPLASM OF OVARY WITH WIDESPREAD METASTATIC DISEASE, UNSPECIFIED LATERALITY (MULTI): ICD-10-CM

## 2024-05-31 LAB
BLOOD EXPIRATION DATE: NORMAL
DISPENSE STATUS: NORMAL
PRODUCT BLOOD TYPE: 5100
PRODUCT CODE: NORMAL
UNIT ABO: NORMAL
UNIT NUMBER: NORMAL
UNIT RH: NORMAL
UNIT VOLUME: 350
XM INTEP: NORMAL

## 2024-05-31 PROCEDURE — 2500000004 HC RX 250 GENERAL PHARMACY W/ HCPCS (ALT 636 FOR OP/ED): Performed by: INTERNAL MEDICINE

## 2024-05-31 PROCEDURE — 36430 TRANSFUSION BLD/BLD COMPNT: CPT

## 2024-05-31 PROCEDURE — P9040 RBC LEUKOREDUCED IRRADIATED: HCPCS

## 2024-05-31 RX ORDER — PROCHLORPERAZINE MALEATE 5 MG
10 TABLET ORAL EVERY 6 HOURS PRN
OUTPATIENT
Start: 2024-07-16

## 2024-05-31 RX ORDER — DIPHENHYDRAMINE HYDROCHLORIDE 50 MG/ML
50 INJECTION INTRAMUSCULAR; INTRAVENOUS AS NEEDED
OUTPATIENT
Start: 2024-08-13

## 2024-05-31 RX ORDER — ONDANSETRON HYDROCHLORIDE 2 MG/ML
8 INJECTION, SOLUTION INTRAVENOUS ONCE
OUTPATIENT
Start: 2024-09-24

## 2024-05-31 RX ORDER — EPINEPHRINE 0.3 MG/.3ML
0.3 INJECTION SUBCUTANEOUS EVERY 5 MIN PRN
OUTPATIENT
Start: 2024-07-16

## 2024-05-31 RX ORDER — FAMOTIDINE 10 MG/ML
20 INJECTION INTRAVENOUS ONCE AS NEEDED
OUTPATIENT
Start: 2024-09-17

## 2024-05-31 RX ORDER — EPINEPHRINE 0.3 MG/.3ML
0.3 INJECTION SUBCUTANEOUS EVERY 5 MIN PRN
Status: DISCONTINUED | OUTPATIENT
Start: 2024-05-31 | End: 2024-05-31 | Stop reason: HOSPADM

## 2024-05-31 RX ORDER — EPINEPHRINE 0.3 MG/.3ML
0.3 INJECTION SUBCUTANEOUS EVERY 5 MIN PRN
OUTPATIENT
Start: 2024-06-25

## 2024-05-31 RX ORDER — FAMOTIDINE 10 MG/ML
20 INJECTION INTRAVENOUS ONCE AS NEEDED
OUTPATIENT
Start: 2024-08-27

## 2024-05-31 RX ORDER — FAMOTIDINE 10 MG/ML
20 INJECTION INTRAVENOUS ONCE AS NEEDED
OUTPATIENT
Start: 2024-09-10

## 2024-05-31 RX ORDER — PROCHLORPERAZINE EDISYLATE 5 MG/ML
10 INJECTION INTRAMUSCULAR; INTRAVENOUS EVERY 6 HOURS PRN
OUTPATIENT
Start: 2024-09-10

## 2024-05-31 RX ORDER — FAMOTIDINE 10 MG/ML
20 INJECTION INTRAVENOUS ONCE AS NEEDED
OUTPATIENT
Start: 2024-08-13

## 2024-05-31 RX ORDER — ALBUTEROL SULFATE 0.83 MG/ML
3 SOLUTION RESPIRATORY (INHALATION) AS NEEDED
Status: DISCONTINUED | OUTPATIENT
Start: 2024-05-31 | End: 2024-05-31 | Stop reason: HOSPADM

## 2024-05-31 RX ORDER — DIPHENHYDRAMINE HYDROCHLORIDE 50 MG/ML
50 INJECTION INTRAMUSCULAR; INTRAVENOUS AS NEEDED
OUTPATIENT
Start: 2024-08-27

## 2024-05-31 RX ORDER — ALBUTEROL SULFATE 0.83 MG/ML
3 SOLUTION RESPIRATORY (INHALATION) AS NEEDED
OUTPATIENT
Start: 2024-07-16

## 2024-05-31 RX ORDER — ALBUTEROL SULFATE 0.83 MG/ML
3 SOLUTION RESPIRATORY (INHALATION) AS NEEDED
OUTPATIENT
Start: 2024-09-24

## 2024-05-31 RX ORDER — FAMOTIDINE 10 MG/ML
20 INJECTION INTRAVENOUS ONCE AS NEEDED
OUTPATIENT
Start: 2024-06-18

## 2024-05-31 RX ORDER — DIPHENHYDRAMINE HYDROCHLORIDE 50 MG/ML
50 INJECTION INTRAMUSCULAR; INTRAVENOUS AS NEEDED
OUTPATIENT
Start: 2024-07-16

## 2024-05-31 RX ORDER — DIPHENHYDRAMINE HYDROCHLORIDE 50 MG/ML
50 INJECTION INTRAMUSCULAR; INTRAVENOUS AS NEEDED
OUTPATIENT
Start: 2024-07-30

## 2024-05-31 RX ORDER — ALBUTEROL SULFATE 0.83 MG/ML
3 SOLUTION RESPIRATORY (INHALATION) AS NEEDED
OUTPATIENT
Start: 2024-07-23

## 2024-05-31 RX ORDER — DIPHENHYDRAMINE HYDROCHLORIDE 50 MG/ML
50 INJECTION INTRAMUSCULAR; INTRAVENOUS AS NEEDED
OUTPATIENT
Start: 2024-06-25

## 2024-05-31 RX ORDER — ALBUTEROL SULFATE 0.83 MG/ML
3 SOLUTION RESPIRATORY (INHALATION) AS NEEDED
OUTPATIENT
Start: 2024-09-10

## 2024-05-31 RX ORDER — EPINEPHRINE 0.3 MG/.3ML
0.3 INJECTION SUBCUTANEOUS EVERY 5 MIN PRN
OUTPATIENT
Start: 2024-05-31

## 2024-05-31 RX ORDER — PROCHLORPERAZINE MALEATE 5 MG
10 TABLET ORAL EVERY 6 HOURS PRN
OUTPATIENT
Start: 2024-08-27

## 2024-05-31 RX ORDER — EPINEPHRINE 0.3 MG/.3ML
0.3 INJECTION SUBCUTANEOUS EVERY 5 MIN PRN
OUTPATIENT
Start: 2024-07-02

## 2024-05-31 RX ORDER — PROCHLORPERAZINE EDISYLATE 5 MG/ML
10 INJECTION INTRAMUSCULAR; INTRAVENOUS EVERY 6 HOURS PRN
OUTPATIENT
Start: 2024-09-24

## 2024-05-31 RX ORDER — ALBUTEROL SULFATE 0.83 MG/ML
3 SOLUTION RESPIRATORY (INHALATION) AS NEEDED
OUTPATIENT
Start: 2024-08-13

## 2024-05-31 RX ORDER — EPINEPHRINE 0.3 MG/.3ML
0.3 INJECTION SUBCUTANEOUS EVERY 5 MIN PRN
OUTPATIENT
Start: 2024-09-10

## 2024-05-31 RX ORDER — ONDANSETRON HYDROCHLORIDE 2 MG/ML
8 INJECTION, SOLUTION INTRAVENOUS ONCE
OUTPATIENT
Start: 2024-06-18

## 2024-05-31 RX ORDER — PROCHLORPERAZINE EDISYLATE 5 MG/ML
10 INJECTION INTRAMUSCULAR; INTRAVENOUS EVERY 6 HOURS PRN
OUTPATIENT
Start: 2024-07-30

## 2024-05-31 RX ORDER — PROCHLORPERAZINE EDISYLATE 5 MG/ML
10 INJECTION INTRAMUSCULAR; INTRAVENOUS EVERY 6 HOURS PRN
OUTPATIENT
Start: 2024-06-18

## 2024-05-31 RX ORDER — PROCHLORPERAZINE MALEATE 5 MG
10 TABLET ORAL EVERY 6 HOURS PRN
OUTPATIENT
Start: 2024-09-24

## 2024-05-31 RX ORDER — PROCHLORPERAZINE EDISYLATE 5 MG/ML
10 INJECTION INTRAMUSCULAR; INTRAVENOUS EVERY 6 HOURS PRN
OUTPATIENT
Start: 2024-08-20

## 2024-05-31 RX ORDER — EPINEPHRINE 0.3 MG/.3ML
0.3 INJECTION SUBCUTANEOUS EVERY 5 MIN PRN
OUTPATIENT
Start: 2024-07-30

## 2024-05-31 RX ORDER — ALBUTEROL SULFATE 0.83 MG/ML
3 SOLUTION RESPIRATORY (INHALATION) AS NEEDED
OUTPATIENT
Start: 2024-08-27

## 2024-05-31 RX ORDER — FAMOTIDINE 10 MG/ML
20 INJECTION INTRAVENOUS ONCE AS NEEDED
OUTPATIENT
Start: 2024-07-23

## 2024-05-31 RX ORDER — PROCHLORPERAZINE MALEATE 5 MG
10 TABLET ORAL EVERY 6 HOURS PRN
OUTPATIENT
Start: 2024-06-25

## 2024-05-31 RX ORDER — ONDANSETRON HYDROCHLORIDE 2 MG/ML
8 INJECTION, SOLUTION INTRAVENOUS ONCE
OUTPATIENT
Start: 2024-08-13

## 2024-05-31 RX ORDER — PROCHLORPERAZINE MALEATE 5 MG
10 TABLET ORAL EVERY 6 HOURS PRN
OUTPATIENT
Start: 2024-06-18

## 2024-05-31 RX ORDER — FAMOTIDINE 10 MG/ML
20 INJECTION INTRAVENOUS ONCE AS NEEDED
OUTPATIENT
Start: 2024-06-25

## 2024-05-31 RX ORDER — PROCHLORPERAZINE MALEATE 5 MG
10 TABLET ORAL EVERY 6 HOURS PRN
OUTPATIENT
Start: 2024-07-23

## 2024-05-31 RX ORDER — FAMOTIDINE 10 MG/ML
20 INJECTION INTRAVENOUS ONCE AS NEEDED
OUTPATIENT
Start: 2024-07-02

## 2024-05-31 RX ORDER — ONDANSETRON HYDROCHLORIDE 2 MG/ML
8 INJECTION, SOLUTION INTRAVENOUS ONCE
OUTPATIENT
Start: 2024-09-10

## 2024-05-31 RX ORDER — PROCHLORPERAZINE EDISYLATE 5 MG/ML
10 INJECTION INTRAMUSCULAR; INTRAVENOUS EVERY 6 HOURS PRN
OUTPATIENT
Start: 2024-08-13

## 2024-05-31 RX ORDER — EPINEPHRINE 0.3 MG/.3ML
0.3 INJECTION SUBCUTANEOUS EVERY 5 MIN PRN
OUTPATIENT
Start: 2024-08-27

## 2024-05-31 RX ORDER — FAMOTIDINE 10 MG/ML
20 INJECTION INTRAVENOUS ONCE AS NEEDED
OUTPATIENT
Start: 2024-08-20

## 2024-05-31 RX ORDER — EPINEPHRINE 0.3 MG/.3ML
0.3 INJECTION SUBCUTANEOUS EVERY 5 MIN PRN
OUTPATIENT
Start: 2024-09-17

## 2024-05-31 RX ORDER — PROCHLORPERAZINE EDISYLATE 5 MG/ML
10 INJECTION INTRAMUSCULAR; INTRAVENOUS EVERY 6 HOURS PRN
OUTPATIENT
Start: 2024-08-27

## 2024-05-31 RX ORDER — ONDANSETRON HYDROCHLORIDE 2 MG/ML
8 INJECTION, SOLUTION INTRAVENOUS ONCE
OUTPATIENT
Start: 2024-08-27

## 2024-05-31 RX ORDER — EPINEPHRINE 0.3 MG/.3ML
0.3 INJECTION SUBCUTANEOUS EVERY 5 MIN PRN
OUTPATIENT
Start: 2024-06-18

## 2024-05-31 RX ORDER — DIPHENHYDRAMINE HYDROCHLORIDE 50 MG/ML
50 INJECTION INTRAMUSCULAR; INTRAVENOUS AS NEEDED
OUTPATIENT
Start: 2024-07-23

## 2024-05-31 RX ORDER — ONDANSETRON HYDROCHLORIDE 2 MG/ML
8 INJECTION, SOLUTION INTRAVENOUS ONCE
OUTPATIENT
Start: 2024-07-02

## 2024-05-31 RX ORDER — HEPARIN 100 UNIT/ML
500 SYRINGE INTRAVENOUS AS NEEDED
OUTPATIENT
Start: 2024-05-31

## 2024-05-31 RX ORDER — PROCHLORPERAZINE MALEATE 5 MG
10 TABLET ORAL EVERY 6 HOURS PRN
OUTPATIENT
Start: 2024-09-10

## 2024-05-31 RX ORDER — FAMOTIDINE 10 MG/ML
20 INJECTION INTRAVENOUS ONCE AS NEEDED
Status: DISCONTINUED | OUTPATIENT
Start: 2024-05-31 | End: 2024-05-31 | Stop reason: HOSPADM

## 2024-05-31 RX ORDER — EPINEPHRINE 0.3 MG/.3ML
0.3 INJECTION SUBCUTANEOUS EVERY 5 MIN PRN
OUTPATIENT
Start: 2024-08-20

## 2024-05-31 RX ORDER — PROCHLORPERAZINE EDISYLATE 5 MG/ML
10 INJECTION INTRAMUSCULAR; INTRAVENOUS EVERY 6 HOURS PRN
OUTPATIENT
Start: 2024-07-02

## 2024-05-31 RX ORDER — ALBUTEROL SULFATE 0.83 MG/ML
3 SOLUTION RESPIRATORY (INHALATION) AS NEEDED
OUTPATIENT
Start: 2024-08-20

## 2024-05-31 RX ORDER — PROCHLORPERAZINE MALEATE 5 MG
10 TABLET ORAL EVERY 6 HOURS PRN
OUTPATIENT
Start: 2024-08-20

## 2024-05-31 RX ORDER — FAMOTIDINE 10 MG/ML
20 INJECTION INTRAVENOUS ONCE AS NEEDED
OUTPATIENT
Start: 2024-09-24

## 2024-05-31 RX ORDER — ONDANSETRON HYDROCHLORIDE 2 MG/ML
8 INJECTION, SOLUTION INTRAVENOUS ONCE
OUTPATIENT
Start: 2024-09-17

## 2024-05-31 RX ORDER — ONDANSETRON HYDROCHLORIDE 2 MG/ML
8 INJECTION, SOLUTION INTRAVENOUS ONCE
OUTPATIENT
Start: 2024-07-23

## 2024-05-31 RX ORDER — PROCHLORPERAZINE EDISYLATE 5 MG/ML
10 INJECTION INTRAMUSCULAR; INTRAVENOUS EVERY 6 HOURS PRN
OUTPATIENT
Start: 2024-09-17

## 2024-05-31 RX ORDER — FAMOTIDINE 10 MG/ML
20 INJECTION INTRAVENOUS ONCE AS NEEDED
OUTPATIENT
Start: 2024-05-31

## 2024-05-31 RX ORDER — PROCHLORPERAZINE MALEATE 5 MG
10 TABLET ORAL EVERY 6 HOURS PRN
OUTPATIENT
Start: 2024-09-17

## 2024-05-31 RX ORDER — ONDANSETRON HYDROCHLORIDE 2 MG/ML
8 INJECTION, SOLUTION INTRAVENOUS ONCE
OUTPATIENT
Start: 2024-07-16

## 2024-05-31 RX ORDER — EPINEPHRINE 0.3 MG/.3ML
0.3 INJECTION SUBCUTANEOUS EVERY 5 MIN PRN
OUTPATIENT
Start: 2024-07-23

## 2024-05-31 RX ORDER — DIPHENHYDRAMINE HYDROCHLORIDE 50 MG/ML
50 INJECTION INTRAMUSCULAR; INTRAVENOUS AS NEEDED
OUTPATIENT
Start: 2024-09-17

## 2024-05-31 RX ORDER — ONDANSETRON HYDROCHLORIDE 2 MG/ML
8 INJECTION, SOLUTION INTRAVENOUS ONCE
OUTPATIENT
Start: 2024-07-30

## 2024-05-31 RX ORDER — DIPHENHYDRAMINE HYDROCHLORIDE 50 MG/ML
50 INJECTION INTRAMUSCULAR; INTRAVENOUS AS NEEDED
Status: DISCONTINUED | OUTPATIENT
Start: 2024-05-31 | End: 2024-05-31 | Stop reason: HOSPADM

## 2024-05-31 RX ORDER — PROCHLORPERAZINE MALEATE 5 MG
10 TABLET ORAL EVERY 6 HOURS PRN
OUTPATIENT
Start: 2024-07-30

## 2024-05-31 RX ORDER — ALBUTEROL SULFATE 0.83 MG/ML
3 SOLUTION RESPIRATORY (INHALATION) AS NEEDED
OUTPATIENT
Start: 2024-06-18

## 2024-05-31 RX ORDER — ALBUTEROL SULFATE 0.83 MG/ML
3 SOLUTION RESPIRATORY (INHALATION) AS NEEDED
OUTPATIENT
Start: 2024-07-02

## 2024-05-31 RX ORDER — DIPHENHYDRAMINE HYDROCHLORIDE 50 MG/ML
50 INJECTION INTRAMUSCULAR; INTRAVENOUS AS NEEDED
OUTPATIENT
Start: 2024-06-18

## 2024-05-31 RX ORDER — PROCHLORPERAZINE EDISYLATE 5 MG/ML
10 INJECTION INTRAMUSCULAR; INTRAVENOUS EVERY 6 HOURS PRN
OUTPATIENT
Start: 2024-07-16

## 2024-05-31 RX ORDER — DIPHENHYDRAMINE HYDROCHLORIDE 50 MG/ML
50 INJECTION INTRAMUSCULAR; INTRAVENOUS AS NEEDED
OUTPATIENT
Start: 2024-07-02

## 2024-05-31 RX ORDER — FAMOTIDINE 10 MG/ML
20 INJECTION INTRAVENOUS ONCE AS NEEDED
OUTPATIENT
Start: 2024-07-30

## 2024-05-31 RX ORDER — ALBUTEROL SULFATE 0.83 MG/ML
3 SOLUTION RESPIRATORY (INHALATION) AS NEEDED
OUTPATIENT
Start: 2024-06-25

## 2024-05-31 RX ORDER — DIPHENHYDRAMINE HYDROCHLORIDE 50 MG/ML
50 INJECTION INTRAMUSCULAR; INTRAVENOUS AS NEEDED
OUTPATIENT
Start: 2024-05-31

## 2024-05-31 RX ORDER — FAMOTIDINE 10 MG/ML
20 INJECTION INTRAVENOUS ONCE AS NEEDED
OUTPATIENT
Start: 2024-07-16

## 2024-05-31 RX ORDER — HEPARIN 100 UNIT/ML
500 SYRINGE INTRAVENOUS AS NEEDED
Status: DISCONTINUED | OUTPATIENT
Start: 2024-05-31 | End: 2024-05-31 | Stop reason: HOSPADM

## 2024-05-31 RX ORDER — DIPHENHYDRAMINE HYDROCHLORIDE 50 MG/ML
50 INJECTION INTRAMUSCULAR; INTRAVENOUS AS NEEDED
OUTPATIENT
Start: 2024-09-10

## 2024-05-31 RX ORDER — ALBUTEROL SULFATE 0.83 MG/ML
3 SOLUTION RESPIRATORY (INHALATION) AS NEEDED
OUTPATIENT
Start: 2024-07-30

## 2024-05-31 RX ORDER — ALBUTEROL SULFATE 0.83 MG/ML
3 SOLUTION RESPIRATORY (INHALATION) AS NEEDED
OUTPATIENT
Start: 2024-05-31

## 2024-05-31 RX ORDER — PROCHLORPERAZINE MALEATE 5 MG
10 TABLET ORAL EVERY 6 HOURS PRN
OUTPATIENT
Start: 2024-08-13

## 2024-05-31 RX ORDER — DIPHENHYDRAMINE HYDROCHLORIDE 50 MG/ML
50 INJECTION INTRAMUSCULAR; INTRAVENOUS AS NEEDED
OUTPATIENT
Start: 2024-09-24

## 2024-05-31 RX ORDER — EPINEPHRINE 0.3 MG/.3ML
0.3 INJECTION SUBCUTANEOUS EVERY 5 MIN PRN
OUTPATIENT
Start: 2024-08-13

## 2024-05-31 RX ORDER — EPINEPHRINE 0.3 MG/.3ML
0.3 INJECTION SUBCUTANEOUS EVERY 5 MIN PRN
OUTPATIENT
Start: 2024-09-24

## 2024-05-31 RX ORDER — DIPHENHYDRAMINE HYDROCHLORIDE 50 MG/ML
50 INJECTION INTRAMUSCULAR; INTRAVENOUS AS NEEDED
OUTPATIENT
Start: 2024-08-20

## 2024-05-31 RX ORDER — HEPARIN SODIUM,PORCINE/PF 10 UNIT/ML
50 SYRINGE (ML) INTRAVENOUS AS NEEDED
OUTPATIENT
Start: 2024-05-31

## 2024-05-31 RX ORDER — ONDANSETRON HYDROCHLORIDE 2 MG/ML
8 INJECTION, SOLUTION INTRAVENOUS ONCE
OUTPATIENT
Start: 2024-06-25

## 2024-05-31 RX ORDER — PROCHLORPERAZINE EDISYLATE 5 MG/ML
10 INJECTION INTRAMUSCULAR; INTRAVENOUS EVERY 6 HOURS PRN
OUTPATIENT
Start: 2024-06-25

## 2024-05-31 RX ORDER — PROCHLORPERAZINE MALEATE 5 MG
10 TABLET ORAL EVERY 6 HOURS PRN
OUTPATIENT
Start: 2024-07-02

## 2024-05-31 RX ORDER — ONDANSETRON HYDROCHLORIDE 2 MG/ML
8 INJECTION, SOLUTION INTRAVENOUS ONCE
OUTPATIENT
Start: 2024-08-20

## 2024-05-31 RX ORDER — PROCHLORPERAZINE EDISYLATE 5 MG/ML
10 INJECTION INTRAMUSCULAR; INTRAVENOUS EVERY 6 HOURS PRN
OUTPATIENT
Start: 2024-07-23

## 2024-05-31 RX ORDER — ALBUTEROL SULFATE 0.83 MG/ML
3 SOLUTION RESPIRATORY (INHALATION) AS NEEDED
OUTPATIENT
Start: 2024-09-17

## 2024-05-31 RX ADMIN — HEPARIN 500 UNITS: 100 SYRINGE at 14:50

## 2024-05-31 ASSESSMENT — PAIN SCALES - GENERAL: PAINLEVEL: 2

## 2024-05-31 NOTE — PROGRESS NOTES
1450. 1 UPRBC's tolerated without incident. VS monitored. Schedule reviewed then Dc'd via independent / ambulatory.

## 2024-05-31 NOTE — PROGRESS NOTES
Patient ID: Catalina Mendoza is a 73 y.o. female.  Referring Physician: Aleyda Ca MD  [id number] [place] [address]  Primary Care Provider: Juju Kenney MD  Visit Type:  {Visit Type:75799}     {Telehealth Consent:19767}    Subjective    HPI    Review of Systems - Oncology     Objective   BSA: There is no height or weight on file to calculate BSA.  LMP  (LMP Unknown)      has a past medical history of Encounter for immunization (10/03/2016), Other injury of unspecified body region, initial encounter, Personal history of malignant neoplasm of unspecified site of lip, oral cavity, and pharynx, Personal history of other complications of pregnancy, childbirth and the puerperium, and Personal history of other malignant neoplasm of skin.   has a past surgical history that includes Hysterectomy (09/28/2015); Appendectomy (09/28/2015); Other surgical history (09/28/2015); Eye surgery (06/20/2016); Cholecystectomy (06/20/2016); and US guided abdominal paracentesis (12/15/2022).  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, started on NACT with carbo/taxol  - 2/7/23: S/p cycle 3 with good interval response  - 3/13/23: Diagnostic laparoscopy, BSO, extensive MIKAELA, bilateral ureterolysis, infracolic omentectomy, abdominal wall and mesenteric biopsies, complete gross resection to R0  - 5/23/23: Chemo completed     Primary malignant neoplasm of  ovary with widespread metastatic disease (Multi)   4/5/2023 Initial Diagnosis    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 She has never been exposed to tobacco smoke. She has never used smokeless tobacco.  She  reports no history of alcohol use.  She  reports no history of drug use.    Physical Exam    WBC   Date/Time Value Ref Range Status   05/30/2024 12:02 PM 4.6 4.4 - 11.3 x10*3/uL Final   05/28/2024 12:02 PM 2.8 (L) 4.4 - 11.3 x10*3/uL Final   05/20/2024 11:02 AM 3.5 (L) 4.4 - 11.3 x10*3/uL Final     nRBC   Date Value Ref Range Status   05/28/2024   Final     Comment:     Not Measured   05/20/2024 0.0 0.0 - 0.0 /100 WBCs Final   05/13/2024 0.2 (H) 0.0 - 0.0 /100 WBCs Final     RBC   Date Value Ref Range Status   05/30/2024 2.26 (L) 4.00 - 5.20 x10*6/uL Final   05/28/2024 2.30 (L) 4.00 - 5.20 x10*6/uL Final   05/20/2024 2.77 (L) 4.00 - 5.20 x10*6/uL Final     Hemoglobin   Date Value Ref Range Status   05/30/2024 7.2 (L) 12.0 - 16.0 g/dL Final   05/28/2024 7.3 (L) 12.0 - 16.0 g/dL Final   05/20/2024 8.9 (L) 12.0 - 16.0 g/dL Final     Hematocrit   Date Value Ref Range Status   05/30/2024 22.6 (L) 36.0 - 46.0 % Final   05/28/2024 22.9 (L) 36.0 - 46.0 % Final   05/20/2024 28.0 (L) 36.0 - 46.0 % Final     MCV   Date/Time Value Ref Range Status   05/30/2024 12:02  80 - 100 fL Final   05/28/2024 12:02  80 - 100 fL Final   05/20/2024 11:02  (H) 80 - 100 fL Final     MCH   Date/Time Value Ref Range Status   05/30/2024 12:02 PM 31.9 26.0 - 34.0 pg Final   05/28/2024 12:02 PM 31.7 26.0 - 34.0 pg Final   05/20/2024 11:02 AM 32.1 26.0 - 34.0 pg  Final     MCHC   Date/Time Value Ref Range Status   05/30/2024 12:02 PM 31.9 (L) 32.0 - 36.0 g/dL Final   05/28/2024 12:02 PM 31.9 (L) 32.0 - 36.0 g/dL Final   05/20/2024 11:02 AM 31.8 (L) 32.0 - 36.0 g/dL Final     RDW   Date/Time Value Ref Range Status   05/30/2024 12:02 PM 17.9 (H) 11.5 - 14.5 % Final   05/28/2024 12:02 PM 17.8 (H) 11.5 - 14.5 % Final   05/20/2024 11:02 AM 18.6 (H) 11.5 - 14.5 % Final     Platelets   Date/Time Value Ref Range Status   05/30/2024 12:02 PM 57 (L) 150 - 450 x10*3/uL Final   05/28/2024 12:02 PM 64 (L) 150 - 450 x10*3/uL Final   05/20/2024 11:02  150 - 450 x10*3/uL Final     MPV   Date/Time Value Ref Range Status   10/27/2023 08:21 AM 9.8 7.5 - 11.5 fL Final     Neutrophils %   Date/Time Value Ref Range Status   05/30/2024 12:02 PM 29.8 40.0 - 80.0 % Final   05/28/2024 12:02 PM 23.3 40.0 - 80.0 % Final   05/20/2024 11:02 AM 27.8 40.0 - 80.0 % Final     Immature Granulocytes %, Automated   Date/Time Value Ref Range Status   05/30/2024 12:02 PM 0.2 0.0 - 0.9 % Final     Comment:     Immature Granulocyte Count (IG) includes promyelocytes, myelocytes and metamyelocytes but does not include bands. Percent differential counts (%) should be interpreted in the context of the absolute cell counts (cells/UL).   05/28/2024 12:02 PM 0.4 0.0 - 0.9 % Final     Comment:     Immature Granulocyte Count (IG) includes promyelocytes, myelocytes and metamyelocytes but does not include bands. Percent differential counts (%) should be interpreted in the context of the absolute cell counts (cells/UL).   05/20/2024 11:02 AM 0.3 0.0 - 0.9 % Final     Comment:     Immature Granulocyte Count (IG) includes promyelocytes, myelocytes and metamyelocytes but does not include bands. Percent differential counts (%) should be interpreted in the context of the absolute cell counts (cells/UL).     Lymphocytes %, Manual   Date/Time Value Ref Range Status   05/03/2024 09:52 AM 12.0 13.0 - 44.0 % Final   04/15/2024  01:07 PM 27.0 13.0 - 44.0 % Final   02/19/2024 10:04 AM 18.0 13.0 - 44.0 % Final     Lymphocytes %   Date/Time Value Ref Range Status   05/30/2024 12:02 PM 61.6 13.0 - 44.0 % Final   05/28/2024 12:02 PM 68.2 13.0 - 44.0 % Final   05/20/2024 11:02 AM 62.1 13.0 - 44.0 % Final     Monocytes %, Manual   Date/Time Value Ref Range Status   05/03/2024 09:52 AM 0.0 2.0 - 10.0 % Final   04/15/2024 01:07 PM 3.0 2.0 - 10.0 % Final   02/19/2024 10:04 AM 6.0 2.0 - 10.0 % Final     Monocytes %   Date/Time Value Ref Range Status   05/30/2024 12:02 PM 5.0 2.0 - 10.0 % Final   05/28/2024 12:02 PM 4.6 2.0 - 10.0 % Final   05/20/2024 11:02 AM 6.2 2.0 - 10.0 % Final     Eosinophils %, Manual   Date/Time Value Ref Range Status   05/03/2024 09:52 AM 1.0 0.0 - 6.0 % Final   04/15/2024 01:07 PM 1.0 0.0 - 6.0 % Final   02/19/2024 10:04 AM 0.0 0.0 - 6.0 % Final     Eosinophils %   Date/Time Value Ref Range Status   05/30/2024 12:02 PM 3.0 0.0 - 6.0 % Final   05/28/2024 12:02 PM 2.8 0.0 - 6.0 % Final   05/20/2024 11:02 AM 2.8 0.0 - 6.0 % Final     Basophils %, Manual   Date/Time Value Ref Range Status   05/03/2024 09:52 AM 0.0 0.0 - 2.0 % Final   04/15/2024 01:07 PM 1.0 0.0 - 2.0 % Final   02/19/2024 10:04 AM 1.0 0.0 - 2.0 % Final     Basophils %   Date/Time Value Ref Range Status   05/30/2024 12:02 PM 0.4 0.0 - 2.0 % Final   05/28/2024 12:02 PM 0.7 0.0 - 2.0 % Final   05/20/2024 11:02 AM 0.8 0.0 - 2.0 % Final     Neutrophils Absolute   Date/Time Value Ref Range Status   05/30/2024 12:02 PM 1.37 (L) 1.60 - 5.50 x10*3/uL Final     Comment:     Percent differential counts (%) should be interpreted in the context of the absolute cell counts (cells/uL).   05/28/2024 12:02 PM 0.66 (L) 1.60 - 5.50 x10*3/uL Final     Comment:     Percent differential counts (%) should be interpreted in the context of the absolute cell counts (cells/uL).   05/20/2024 11:02 AM 0.98 (L) 1.60 - 5.50 x10*3/uL Final     Comment:     Percent differential counts (%) should  "be interpreted in the context of the absolute cell counts (cells/uL).     Immature Granulocytes Absolute, Automated   Date/Time Value Ref Range Status   05/30/2024 12:02 PM 0.01 0.00 - 0.50 x10*3/uL Final   05/28/2024 12:02 PM 0.01 0.00 - 0.50 x10*3/uL Final   05/20/2024 11:02 AM 0.01 0.00 - 0.50 x10*3/uL Final     Lymphocytes Absolute   Date/Time Value Ref Range Status   05/30/2024 12:02 PM 2.84 0.80 - 3.00 x10*3/uL Final   05/28/2024 12:02 PM 1.93 0.80 - 3.00 x10*3/uL Final   05/20/2024 11:02 AM 2.20 0.80 - 3.00 x10*3/uL Final     Monocytes Absolute   Date/Time Value Ref Range Status   05/30/2024 12:02 PM 0.23 0.05 - 0.80 x10*3/uL Final   05/28/2024 12:02 PM 0.13 0.05 - 0.80 x10*3/uL Final   05/20/2024 11:02 AM 0.22 0.05 - 0.80 x10*3/uL Final     Eosinophils Absolute   Date/Time Value Ref Range Status   05/30/2024 12:02 PM 0.14 0.00 - 0.40 x10*3/uL Final   05/28/2024 12:02 PM 0.08 0.00 - 0.40 x10*3/uL Final   05/20/2024 11:02 AM 0.10 0.00 - 0.40 x10*3/uL Final     Eosinophils Absolute, Manual   Date/Time Value Ref Range Status   05/03/2024 09:52 AM 0.19 0.00 - 0.40 x10*3/uL Final   04/15/2024 01:07 PM 0.17 0.00 - 0.40 x10*3/uL Final   02/19/2024 10:04 AM 0.00 0.00 - 0.40 x10*3/uL Final     Basophils Absolute   Date/Time Value Ref Range Status   05/30/2024 12:02 PM 0.02 0.00 - 0.10 x10*3/uL Final   05/28/2024 12:02 PM 0.02 0.00 - 0.10 x10*3/uL Final   05/20/2024 11:02 AM 0.03 0.00 - 0.10 x10*3/uL Final     Comment:     Automated WBC differential has been confirmed by manual smear.     Basophils Absolute, Manual   Date/Time Value Ref Range Status   05/03/2024 09:52 AM 0.00 0.00 - 0.10 x10*3/uL Final   04/15/2024 01:07 PM 0.17 (H) 0.00 - 0.10 x10*3/uL Final   02/19/2024 10:04 AM 0.16 (H) 0.00 - 0.10 x10*3/uL Final       No components found for: \"PT\"  aPTT   Date/Time Value Ref Range Status   02/11/2024 01:40 PM 24 (L) 27 - 38 seconds Final       Assessment/Plan           {Assess/PlanSmartLinks:85737}     "     Aleyda Ca MD

## 2024-06-10 ENCOUNTER — OFFICE VISIT (OUTPATIENT)
Dept: ENDOCRINOLOGY | Facility: CLINIC | Age: 74
End: 2024-06-10
Payer: MEDICARE

## 2024-06-10 VITALS
RESPIRATION RATE: 16 BRPM | BODY MASS INDEX: 25.37 KG/M2 | WEIGHT: 148.6 LBS | SYSTOLIC BLOOD PRESSURE: 120 MMHG | DIASTOLIC BLOOD PRESSURE: 64 MMHG | HEART RATE: 97 BPM | HEIGHT: 64 IN

## 2024-06-10 DIAGNOSIS — E10.9 TYPE 1 DIABETES MELLITUS WITHOUT COMPLICATION (MULTI): Primary | ICD-10-CM

## 2024-06-10 DIAGNOSIS — E03.9 ACQUIRED HYPOTHYROIDISM: ICD-10-CM

## 2024-06-10 DIAGNOSIS — I10 BENIGN ESSENTIAL HYPERTENSION: ICD-10-CM

## 2024-06-10 PROCEDURE — 4010F ACE/ARB THERAPY RXD/TAKEN: CPT | Performed by: INTERNAL MEDICINE

## 2024-06-10 PROCEDURE — 3048F LDL-C <100 MG/DL: CPT | Performed by: INTERNAL MEDICINE

## 2024-06-10 PROCEDURE — 3060F POS MICROALBUMINURIA REV: CPT | Performed by: INTERNAL MEDICINE

## 2024-06-10 PROCEDURE — 3008F BODY MASS INDEX DOCD: CPT | Performed by: INTERNAL MEDICINE

## 2024-06-10 PROCEDURE — 3074F SYST BP LT 130 MM HG: CPT | Performed by: INTERNAL MEDICINE

## 2024-06-10 PROCEDURE — 3052F HG A1C>EQUAL 8.0%<EQUAL 9.0%: CPT | Performed by: INTERNAL MEDICINE

## 2024-06-10 PROCEDURE — 99214 OFFICE O/P EST MOD 30 MIN: CPT | Performed by: INTERNAL MEDICINE

## 2024-06-10 PROCEDURE — 1159F MED LIST DOCD IN RCRD: CPT | Performed by: INTERNAL MEDICINE

## 2024-06-10 PROCEDURE — 3078F DIAST BP <80 MM HG: CPT | Performed by: INTERNAL MEDICINE

## 2024-06-10 PROCEDURE — 1160F RVW MEDS BY RX/DR IN RCRD: CPT | Performed by: INTERNAL MEDICINE

## 2024-06-10 PROCEDURE — 1157F ADVNC CARE PLAN IN RCRD: CPT | Performed by: INTERNAL MEDICINE

## 2024-06-10 PROCEDURE — 1036F TOBACCO NON-USER: CPT | Performed by: INTERNAL MEDICINE

## 2024-06-10 ASSESSMENT — ENCOUNTER SYMPTOMS
NAUSEA: 0
DIARRHEA: 0
CHILLS: 0
COUGH: 0
FEVER: 0
FATIGUE: 0
VOMITING: 0
HEADACHES: 0
PALPITATIONS: 0
SHORTNESS OF BREATH: 0

## 2024-06-10 NOTE — PROGRESS NOTES
Endocrinology: Follow up visit  Subjective   Patient ID: Catalina Mendoza is a 73 y.o. female who presents for Diabetes (Type 1 ), Hypothyroidism, Hyperlipidemia, and Hypertension.    PCP: Juju Kenney MD    HPI  Since last visit sugars are decent.  Up during the day on/off but no serious lows.   Currently on break from chemo.   Onc team has advised her she is terminal, she is undecided on restarting chemo or not  Taking t4 as directed    Review of Systems   Constitutional:  Negative for chills, fatigue and fever.   Respiratory:  Negative for cough and shortness of breath.    Cardiovascular:  Negative for chest pain and palpitations.   Gastrointestinal:  Negative for diarrhea, nausea and vomiting.   Neurological:  Negative for headaches.       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)    Overweight with body mass index (BMI) of 28 to 28.9 in adult    Diabetic macular edema (Multi)    Diabetic nephropathy associated with type 1 diabetes mellitus (Multi)    Mixed hyperlipidemia    Acquired hypothyroidism    Diabetes mellitus (Multi)    Actinic keratosis    Scar    Squamous cell carcinoma of skin of lip    Poorly controlled diabetes mellitus (Multi)    Acute posthemorrhagic anemia    Fracture of bone    History of malignant neoplasm of lip    History of malignant neoplasm of skin    Postoperative state    Rib pain    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)    Immunodeficiency, unspecified (Multi)    Drug-induced polyneuropathy (Multi)    Stage 3a chronic  "kidney disease (Multi)    Anemia due to blood loss    Normochromic normocytic anemia    Left upper quadrant abdominal pain    Other specified anemias        Home Meds:  Current Outpatient Medications   Medication Instructions    alcohol swabs (BD Alcohol Swabs) pads, medicated     calcium carbonate-vitamin D3 600 mg-20 mcg (800 unit) tablet 1 tablet, oral, Nightly    cholecalciferol (VITAMIN D-3) 25 mcg, oral, Daily    DULoxetine (CYMBALTA) 60 mg, oral, Nightly    fenofibrate (TRIGLIDE) 160 mg, oral, Daily    glucagon (Gvoke HypoPen 2-Pack) 1 mg/0.2 mL auto-injector Use as directed    insulin lispro (Admelog SoloStar U-100 Insulin) 100 unit/mL injection Inject with meals up to 60 units per day    insulin lispro (HumaLOG) 100 unit/mL injection Inject with meal Up to 60 units a day    Lantus Solostar U-100 Insulin 30 Units, subcutaneous, Nightly    levothyroxine (SYNTHROID, LEVOXYL) 88 mcg, oral, Daily before breakfast    losartan (COZAAR) 50 mg, oral, Daily    metFORMIN XR (GLUCOPHAGE-XR) 500 mg, oral, 2 times daily, Do not crush, chew, or split.     multivit/folic acid/vit K1 (ONE-A-DAY WOMEN'S 50 PLUS ORAL) 1 tablet, oral, Nightly, Chew and swallow 1 tablet daily    pen needle, diabetic 31 gauge x 3/16\" needle 4 per day    rosuvastatin (CRESTOR) 40 mg, oral, Daily        Allergies   Allergen Reactions    Amoxicillin Unknown    Flu Vac 2020 [id number](Pf) Other     Dizziness, Double vision    Grass Pollen Unknown    House Dust Unknown    Mold Unknown    Other Dizziness     Dizziness, Double vision    Penicillins Other     unsure    Tree And Shrub Pollen Unknown        Objective   Vitals:    06/10/24 1100   BP: 120/64   Pulse: 97   Resp: 16      Vitals:    06/10/24 1100   Weight: 67.4 kg (148 lb 9.6 oz)      Body mass index is 25.91 kg/m².   Physical Exam  Constitutional:       Appearance: Normal appearance. She is overweight.   HENT:      Head: Normocephalic and atraumatic.   Neck:      Thyroid: No thyroid mass, " thyromegaly or thyroid tenderness.   Cardiovascular:      Rate and Rhythm: Normal rate and regular rhythm.      Heart sounds: No murmur heard.     No gallop.   Pulmonary:      Effort: Pulmonary effort is normal.      Breath sounds: Normal breath sounds.   Abdominal:      Palpations: Abdomen is soft.      Comments: benign   Neurological:      General: No focal deficit present.      Mental Status: She is alert and oriented to person, place, and time.      Deep Tendon Reflexes: Reflexes are normal and symmetric.   Psychiatric:         Behavior: Behavior is cooperative.         Labs:  Lab Results   Component Value Date    HGBA1C 8.2 (H) 01/22/2024    TSH 3.55 01/22/2024    FREET4 1.09 02/07/2023      Lab Results   Component Value Date    PR1  02/11/2024     Normocytic anemia and thrombocytopenia. Toxic changes in granulocytes. Clinical correlation is recommended.            Assessment/Plan   Problem List Items Addressed This Visit       Benign essential hypertension    Acquired hypothyroidism    Relevant Orders    TSH with reflex to Free T4 if abnormal    Diabetes mellitus (Multi) - Primary    Relevant Orders    Hemoglobin A1C   Dm1:  Discussed sugars and goals.   Primary goal at this point is avoiding lows and sustained severe hyperglycemia  Sugars currently look good  Hypothyroidism:  Recheck tsh  Htn:  Bp excellent  Follow up in 4 months      Electronically signed by:  Augustina Ch MD 06/10/24 11:25 AM

## 2024-06-11 ENCOUNTER — APPOINTMENT (OUTPATIENT)
Dept: HEMATOLOGY/ONCOLOGY | Facility: CLINIC | Age: 74
End: 2024-06-11
Payer: MEDICARE

## 2024-06-11 DIAGNOSIS — C44.02 SQUAMOUS CELL CARCINOMA OF SKIN OF LIP: ICD-10-CM

## 2024-06-11 DIAGNOSIS — C56.9 PRIMARY MALIGNANT NEOPLASM OF OVARY WITH WIDESPREAD METASTATIC DISEASE, UNSPECIFIED LATERALITY (MULTI): Primary | ICD-10-CM

## 2024-06-11 DIAGNOSIS — C80.0 PRIMARY MALIGNANT NEOPLASM OF OVARY WITH WIDESPREAD METASTATIC DISEASE, UNSPECIFIED LATERALITY (MULTI): Primary | ICD-10-CM

## 2024-06-17 ENCOUNTER — LAB (OUTPATIENT)
Dept: LAB | Facility: LAB | Age: 74
End: 2024-06-17
Payer: MEDICARE

## 2024-06-17 DIAGNOSIS — C80.0 PRIMARY MALIGNANT NEOPLASM OF OVARY WITH WIDESPREAD METASTATIC DISEASE, UNSPECIFIED LATERALITY (MULTI): ICD-10-CM

## 2024-06-17 DIAGNOSIS — C44.02 SQUAMOUS CELL CARCINOMA OF SKIN OF LIP: ICD-10-CM

## 2024-06-17 DIAGNOSIS — E03.9 ACQUIRED HYPOTHYROIDISM: ICD-10-CM

## 2024-06-17 DIAGNOSIS — C56.9 PRIMARY MALIGNANT NEOPLASM OF OVARY WITH WIDESPREAD METASTATIC DISEASE, UNSPECIFIED LATERALITY (MULTI): ICD-10-CM

## 2024-06-17 DIAGNOSIS — E10.9 TYPE 1 DIABETES MELLITUS WITHOUT COMPLICATION (MULTI): ICD-10-CM

## 2024-06-17 LAB
ALBUMIN SERPL BCP-MCNC: 4.7 G/DL (ref 3.4–5)
ALP SERPL-CCNC: 83 U/L (ref 33–136)
ALT SERPL W P-5'-P-CCNC: 22 U/L (ref 7–45)
ANION GAP SERPL CALC-SCNC: 11 MMOL/L (ref 10–20)
APPEARANCE UR: CLEAR
AST SERPL W P-5'-P-CCNC: 32 U/L (ref 9–39)
BASOPHILS # BLD AUTO: 0.07 X10*3/UL (ref 0–0.1)
BASOPHILS NFR BLD AUTO: 0.7 %
BILIRUB SERPL-MCNC: 0.4 MG/DL (ref 0–1.2)
BILIRUB UR STRIP.AUTO-MCNC: NEGATIVE MG/DL
BUN SERPL-MCNC: 26 MG/DL (ref 6–23)
CALCIUM SERPL-MCNC: 10.8 MG/DL (ref 8.6–10.3)
CHLORIDE SERPL-SCNC: 103 MMOL/L (ref 98–107)
CO2 SERPL-SCNC: 24 MMOL/L (ref 21–32)
COLOR UR: YELLOW
CREAT SERPL-MCNC: 1.15 MG/DL (ref 0.5–1.05)
EGFRCR SERPLBLD CKD-EPI 2021: 50 ML/MIN/1.73M*2
EOSINOPHIL # BLD AUTO: 0.21 X10*3/UL (ref 0–0.4)
EOSINOPHIL NFR BLD AUTO: 2.1 %
ERYTHROCYTE [DISTWIDTH] IN BLOOD BY AUTOMATED COUNT: 21.4 % (ref 11.5–14.5)
EST. AVERAGE GLUCOSE BLD GHB EST-MCNC: 171 MG/DL
GLUCOSE SERPL-MCNC: 214 MG/DL (ref 74–99)
GLUCOSE UR STRIP.AUTO-MCNC: ABNORMAL MG/DL
HBA1C MFR BLD: 7.6 %
HCT VFR BLD AUTO: 31.3 % (ref 36–46)
HGB BLD-MCNC: 9.7 G/DL (ref 12–16)
HYALINE CASTS #/AREA URNS AUTO: ABNORMAL /LPF
HYPOCHROMIA BLD QL SMEAR: NORMAL
IMM GRANULOCYTES # BLD AUTO: 0.13 X10*3/UL (ref 0–0.5)
IMM GRANULOCYTES NFR BLD AUTO: 1.3 % (ref 0–0.9)
KETONES UR STRIP.AUTO-MCNC: NEGATIVE MG/DL
LEUKOCYTE ESTERASE UR QL STRIP.AUTO: ABNORMAL
LYMPHOCYTES # BLD AUTO: 2.93 X10*3/UL (ref 0.8–3)
LYMPHOCYTES NFR BLD AUTO: 29.2 %
MCH RBC QN AUTO: 32.3 PG (ref 26–34)
MCHC RBC AUTO-ENTMCNC: 31 G/DL (ref 32–36)
MCV RBC AUTO: 104 FL (ref 80–100)
MONOCYTES # BLD AUTO: 1.08 X10*3/UL (ref 0.05–0.8)
MONOCYTES NFR BLD AUTO: 10.8 %
MUCOUS THREADS #/AREA URNS AUTO: ABNORMAL /LPF
NEUTROPHILS # BLD AUTO: 5.62 X10*3/UL (ref 1.6–5.5)
NEUTROPHILS NFR BLD AUTO: 55.9 %
NITRITE UR QL STRIP.AUTO: NEGATIVE
NRBC BLD-RTO: 0 /100 WBCS (ref 0–0)
PH UR STRIP.AUTO: 5 [PH]
PLATELET # BLD AUTO: 493 X10*3/UL (ref 150–450)
PLATELET CLUMP BLD QL SMEAR: PRESENT
POLYCHROMASIA BLD QL SMEAR: NORMAL
POTASSIUM SERPL-SCNC: 4.5 MMOL/L (ref 3.5–5.3)
PROT SERPL-MCNC: 7.7 G/DL (ref 6.4–8.2)
PROT UR STRIP.AUTO-MCNC: ABNORMAL MG/DL
RBC # BLD AUTO: 3 X10*6/UL (ref 4–5.2)
RBC # UR STRIP.AUTO: NEGATIVE /UL
RBC #/AREA URNS AUTO: ABNORMAL /HPF
RBC MORPH BLD: NORMAL
SODIUM SERPL-SCNC: 133 MMOL/L (ref 136–145)
SP GR UR STRIP.AUTO: 1.02
SQUAMOUS #/AREA URNS AUTO: ABNORMAL /HPF
T4 FREE SERPL-MCNC: 0.68 NG/DL (ref 0.61–1.12)
TRANS CELLS #/AREA UR COMP ASSIST: ABNORMAL /HPF
TSH SERPL-ACNC: 33.46 MIU/L (ref 0.44–3.98)
UROBILINOGEN UR STRIP.AUTO-MCNC: NORMAL MG/DL
WBC # BLD AUTO: 10 X10*3/UL (ref 4.4–11.3)
WBC #/AREA URNS AUTO: ABNORMAL /HPF

## 2024-06-17 PROCEDURE — 83036 HEMOGLOBIN GLYCOSYLATED A1C: CPT

## 2024-06-17 PROCEDURE — 80053 COMPREHEN METABOLIC PANEL: CPT

## 2024-06-17 PROCEDURE — 81001 URINALYSIS AUTO W/SCOPE: CPT

## 2024-06-17 PROCEDURE — 86304 IMMUNOASSAY TUMOR CA 125: CPT

## 2024-06-17 PROCEDURE — 85025 COMPLETE CBC W/AUTO DIFF WBC: CPT

## 2024-06-18 ENCOUNTER — INFUSION (OUTPATIENT)
Dept: HEMATOLOGY/ONCOLOGY | Facility: CLINIC | Age: 74
End: 2024-06-18
Payer: MEDICARE

## 2024-06-18 VITALS
RESPIRATION RATE: 17 BRPM | SYSTOLIC BLOOD PRESSURE: 127 MMHG | HEART RATE: 74 BPM | DIASTOLIC BLOOD PRESSURE: 73 MMHG | TEMPERATURE: 96.8 F | BODY MASS INDEX: 25.7 KG/M2 | WEIGHT: 147.38 LBS | OXYGEN SATURATION: 98 %

## 2024-06-18 DIAGNOSIS — C56.9 PRIMARY MALIGNANT NEOPLASM OF OVARY WITH WIDESPREAD METASTATIC DISEASE, UNSPECIFIED LATERALITY (MULTI): ICD-10-CM

## 2024-06-18 DIAGNOSIS — E03.9 ACQUIRED HYPOTHYROIDISM: Primary | ICD-10-CM

## 2024-06-18 DIAGNOSIS — C44.02 SQUAMOUS CELL CARCINOMA OF SKIN OF LIP: ICD-10-CM

## 2024-06-18 DIAGNOSIS — C80.0 PRIMARY MALIGNANT NEOPLASM OF OVARY WITH WIDESPREAD METASTATIC DISEASE, UNSPECIFIED LATERALITY (MULTI): ICD-10-CM

## 2024-06-18 DIAGNOSIS — E86.0 DEHYDRATION: Primary | ICD-10-CM

## 2024-06-18 LAB — CANCER AG125 SERPL-ACNC: 49.3 U/ML (ref 0–30.2)

## 2024-06-18 PROCEDURE — 2500000004 HC RX 250 GENERAL PHARMACY W/ HCPCS (ALT 636 FOR OP/ED): Performed by: INTERNAL MEDICINE

## 2024-06-18 PROCEDURE — 96361 HYDRATE IV INFUSION ADD-ON: CPT | Mod: INF

## 2024-06-18 PROCEDURE — 2500000004 HC RX 250 GENERAL PHARMACY W/ HCPCS (ALT 636 FOR OP/ED): Performed by: PHYSICIAN ASSISTANT

## 2024-06-18 PROCEDURE — 96413 CHEMO IV INFUSION 1 HR: CPT

## 2024-06-18 PROCEDURE — 96417 CHEMO IV INFUS EACH ADDL SEQ: CPT

## 2024-06-18 PROCEDURE — 96375 TX/PRO/DX INJ NEW DRUG ADDON: CPT | Mod: INF

## 2024-06-18 RX ORDER — ALBUTEROL SULFATE 0.83 MG/ML
3 SOLUTION RESPIRATORY (INHALATION) AS NEEDED
OUTPATIENT
Start: 2024-06-18

## 2024-06-18 RX ORDER — FAMOTIDINE 10 MG/ML
20 INJECTION INTRAVENOUS ONCE AS NEEDED
OUTPATIENT
Start: 2024-06-18

## 2024-06-18 RX ORDER — FAMOTIDINE 10 MG/ML
20 INJECTION INTRAVENOUS ONCE AS NEEDED
Status: DISCONTINUED | OUTPATIENT
Start: 2024-06-18 | End: 2024-06-18 | Stop reason: HOSPADM

## 2024-06-18 RX ORDER — PROCHLORPERAZINE EDISYLATE 5 MG/ML
10 INJECTION INTRAMUSCULAR; INTRAVENOUS EVERY 6 HOURS PRN
Status: DISCONTINUED | OUTPATIENT
Start: 2024-06-18 | End: 2024-06-18 | Stop reason: HOSPADM

## 2024-06-18 RX ORDER — ALBUTEROL SULFATE 0.83 MG/ML
3 SOLUTION RESPIRATORY (INHALATION) AS NEEDED
Status: CANCELLED | OUTPATIENT
Start: 2024-06-18

## 2024-06-18 RX ORDER — PROCHLORPERAZINE MALEATE 10 MG
10 TABLET ORAL EVERY 6 HOURS PRN
Status: DISCONTINUED | OUTPATIENT
Start: 2024-06-18 | End: 2024-06-18 | Stop reason: HOSPADM

## 2024-06-18 RX ORDER — EPINEPHRINE 0.3 MG/.3ML
0.3 INJECTION SUBCUTANEOUS EVERY 5 MIN PRN
OUTPATIENT
Start: 2024-06-18

## 2024-06-18 RX ORDER — EPINEPHRINE 0.3 MG/.3ML
0.3 INJECTION SUBCUTANEOUS EVERY 5 MIN PRN
Status: CANCELLED | OUTPATIENT
Start: 2024-06-18

## 2024-06-18 RX ORDER — ONDANSETRON HYDROCHLORIDE 2 MG/ML
8 INJECTION, SOLUTION INTRAVENOUS ONCE
Status: COMPLETED | OUTPATIENT
Start: 2024-06-18 | End: 2024-06-18

## 2024-06-18 RX ORDER — DIPHENHYDRAMINE HYDROCHLORIDE 50 MG/ML
50 INJECTION INTRAMUSCULAR; INTRAVENOUS AS NEEDED
OUTPATIENT
Start: 2024-06-18

## 2024-06-18 RX ORDER — FAMOTIDINE 10 MG/ML
20 INJECTION INTRAVENOUS ONCE AS NEEDED
Status: CANCELLED | OUTPATIENT
Start: 2024-06-18

## 2024-06-18 RX ORDER — DIPHENHYDRAMINE HYDROCHLORIDE 50 MG/ML
50 INJECTION INTRAMUSCULAR; INTRAVENOUS AS NEEDED
Status: DISCONTINUED | OUTPATIENT
Start: 2024-06-18 | End: 2024-06-18 | Stop reason: HOSPADM

## 2024-06-18 RX ORDER — ALBUTEROL SULFATE 0.83 MG/ML
3 SOLUTION RESPIRATORY (INHALATION) AS NEEDED
Status: DISCONTINUED | OUTPATIENT
Start: 2024-06-18 | End: 2024-06-18 | Stop reason: HOSPADM

## 2024-06-18 RX ORDER — HEPARIN SODIUM,PORCINE/PF 10 UNIT/ML
50 SYRINGE (ML) INTRAVENOUS AS NEEDED
OUTPATIENT
Start: 2024-06-18

## 2024-06-18 RX ORDER — EPINEPHRINE 0.3 MG/.3ML
0.3 INJECTION SUBCUTANEOUS EVERY 5 MIN PRN
Status: DISCONTINUED | OUTPATIENT
Start: 2024-06-18 | End: 2024-06-18 | Stop reason: HOSPADM

## 2024-06-18 RX ORDER — HEPARIN 100 UNIT/ML
500 SYRINGE INTRAVENOUS AS NEEDED
Status: DISCONTINUED | OUTPATIENT
Start: 2024-06-18 | End: 2024-06-18 | Stop reason: HOSPADM

## 2024-06-18 RX ORDER — HEPARIN 100 UNIT/ML
500 SYRINGE INTRAVENOUS AS NEEDED
OUTPATIENT
Start: 2024-06-18

## 2024-06-18 RX ORDER — DIPHENHYDRAMINE HYDROCHLORIDE 50 MG/ML
50 INJECTION INTRAMUSCULAR; INTRAVENOUS AS NEEDED
Status: CANCELLED | OUTPATIENT
Start: 2024-06-18

## 2024-06-18 ASSESSMENT — PAIN SCALES - GENERAL: PAINLEVEL: 2

## 2024-06-24 ENCOUNTER — APPOINTMENT (OUTPATIENT)
Dept: PRIMARY CARE | Facility: CLINIC | Age: 74
End: 2024-06-24
Payer: MEDICARE

## 2024-06-24 ENCOUNTER — LAB (OUTPATIENT)
Dept: LAB | Facility: LAB | Age: 74
End: 2024-06-24
Payer: MEDICARE

## 2024-06-24 VITALS
TEMPERATURE: 98.3 F | WEIGHT: 148.4 LBS | BODY MASS INDEX: 25.88 KG/M2 | SYSTOLIC BLOOD PRESSURE: 116 MMHG | HEART RATE: 79 BPM | DIASTOLIC BLOOD PRESSURE: 66 MMHG | OXYGEN SATURATION: 98 %

## 2024-06-24 DIAGNOSIS — C56.9 PRIMARY MALIGNANT NEOPLASM OF OVARY WITH WIDESPREAD METASTATIC DISEASE, UNSPECIFIED LATERALITY (MULTI): ICD-10-CM

## 2024-06-24 DIAGNOSIS — D50.0 ANEMIA DUE TO BLOOD LOSS: Primary | ICD-10-CM

## 2024-06-24 DIAGNOSIS — E03.9 HYPOTHYROIDISM, UNSPECIFIED TYPE: ICD-10-CM

## 2024-06-24 DIAGNOSIS — C44.02 SQUAMOUS CELL CARCINOMA OF SKIN OF LIP: ICD-10-CM

## 2024-06-24 DIAGNOSIS — I10 HYPERTENSION, UNSPECIFIED TYPE: ICD-10-CM

## 2024-06-24 DIAGNOSIS — E11.69 TYPE 2 DIABETES MELLITUS WITH OTHER SPECIFIED COMPLICATION, WITHOUT LONG-TERM CURRENT USE OF INSULIN (MULTI): ICD-10-CM

## 2024-06-24 DIAGNOSIS — E03.9 ACQUIRED HYPOTHYROIDISM: ICD-10-CM

## 2024-06-24 DIAGNOSIS — E78.5 HYPERLIPIDEMIA, UNSPECIFIED HYPERLIPIDEMIA TYPE: ICD-10-CM

## 2024-06-24 DIAGNOSIS — C80.0 PRIMARY MALIGNANT NEOPLASM OF OVARY WITH WIDESPREAD METASTATIC DISEASE, UNSPECIFIED LATERALITY (MULTI): ICD-10-CM

## 2024-06-24 DIAGNOSIS — I25.10 CAD, MULTIPLE VESSEL: ICD-10-CM

## 2024-06-24 LAB
ALBUMIN SERPL BCP-MCNC: 4.2 G/DL (ref 3.4–5)
ALP SERPL-CCNC: 46 U/L (ref 33–136)
ALT SERPL W P-5'-P-CCNC: 24 U/L (ref 7–45)
ANION GAP SERPL CALC-SCNC: 12 MMOL/L (ref 10–20)
AST SERPL W P-5'-P-CCNC: 30 U/L (ref 9–39)
BASOPHILS # BLD AUTO: 0.05 X10*3/UL (ref 0–0.1)
BASOPHILS NFR BLD AUTO: 1.5 %
BILIRUB SERPL-MCNC: 0.8 MG/DL (ref 0–1.2)
BUN SERPL-MCNC: 26 MG/DL (ref 6–23)
CALCIUM SERPL-MCNC: 9.9 MG/DL (ref 8.6–10.3)
CHLORIDE SERPL-SCNC: 103 MMOL/L (ref 98–107)
CO2 SERPL-SCNC: 25 MMOL/L (ref 21–32)
CREAT SERPL-MCNC: 1.02 MG/DL (ref 0.5–1.05)
DACRYOCYTES BLD QL SMEAR: NORMAL
EGFRCR SERPLBLD CKD-EPI 2021: 58 ML/MIN/1.73M*2
EOSINOPHIL # BLD AUTO: 0.13 X10*3/UL (ref 0–0.4)
EOSINOPHIL NFR BLD AUTO: 3.9 %
ERYTHROCYTE [DISTWIDTH] IN BLOOD BY AUTOMATED COUNT: 20.4 % (ref 11.5–14.5)
GLUCOSE SERPL-MCNC: 251 MG/DL (ref 74–99)
HCT VFR BLD AUTO: 28 % (ref 36–46)
HGB BLD-MCNC: 8.8 G/DL (ref 12–16)
HYPOCHROMIA BLD QL SMEAR: NORMAL
IMM GRANULOCYTES # BLD AUTO: 0 X10*3/UL (ref 0–0.5)
IMM GRANULOCYTES NFR BLD AUTO: 0 % (ref 0–0.9)
LYMPHOCYTES # BLD AUTO: 2.3 X10*3/UL (ref 0.8–3)
LYMPHOCYTES NFR BLD AUTO: 68.2 %
MCH RBC QN AUTO: 32.4 PG (ref 26–34)
MCHC RBC AUTO-ENTMCNC: 31.4 G/DL (ref 32–36)
MCV RBC AUTO: 103 FL (ref 80–100)
MONOCYTES # BLD AUTO: 0.26 X10*3/UL (ref 0.05–0.8)
MONOCYTES NFR BLD AUTO: 7.7 %
NEUTROPHILS # BLD AUTO: 0.63 X10*3/UL (ref 1.6–5.5)
NEUTROPHILS NFR BLD AUTO: 18.7 %
NRBC BLD-RTO: 0 /100 WBCS (ref 0–0)
PLATELET # BLD AUTO: 294 X10*3/UL (ref 150–450)
POTASSIUM SERPL-SCNC: 5.2 MMOL/L (ref 3.5–5.3)
PROT SERPL-MCNC: 7.1 G/DL (ref 6.4–8.2)
RBC # BLD AUTO: 2.72 X10*6/UL (ref 4–5.2)
RBC MORPH BLD: NORMAL
SODIUM SERPL-SCNC: 135 MMOL/L (ref 136–145)
T4 FREE SERPL-MCNC: 0.73 NG/DL (ref 0.61–1.12)
TSH SERPL-ACNC: 17.81 MIU/L (ref 0.44–3.98)
WBC # BLD AUTO: 3.4 X10*3/UL (ref 4.4–11.3)

## 2024-06-24 PROCEDURE — 3060F POS MICROALBUMINURIA REV: CPT | Performed by: INTERNAL MEDICINE

## 2024-06-24 PROCEDURE — 4010F ACE/ARB THERAPY RXD/TAKEN: CPT | Performed by: INTERNAL MEDICINE

## 2024-06-24 PROCEDURE — 1159F MED LIST DOCD IN RCRD: CPT | Performed by: INTERNAL MEDICINE

## 2024-06-24 PROCEDURE — 1036F TOBACCO NON-USER: CPT | Performed by: INTERNAL MEDICINE

## 2024-06-24 PROCEDURE — 3074F SYST BP LT 130 MM HG: CPT | Performed by: INTERNAL MEDICINE

## 2024-06-24 PROCEDURE — 1157F ADVNC CARE PLAN IN RCRD: CPT | Performed by: INTERNAL MEDICINE

## 2024-06-24 PROCEDURE — 3008F BODY MASS INDEX DOCD: CPT | Performed by: INTERNAL MEDICINE

## 2024-06-24 PROCEDURE — 1160F RVW MEDS BY RX/DR IN RCRD: CPT | Performed by: INTERNAL MEDICINE

## 2024-06-24 PROCEDURE — 99214 OFFICE O/P EST MOD 30 MIN: CPT | Performed by: INTERNAL MEDICINE

## 2024-06-24 PROCEDURE — 3078F DIAST BP <80 MM HG: CPT | Performed by: INTERNAL MEDICINE

## 2024-06-24 PROCEDURE — 3051F HG A1C>EQUAL 7.0%<8.0%: CPT | Performed by: INTERNAL MEDICINE

## 2024-06-24 PROCEDURE — 85025 COMPLETE CBC W/AUTO DIFF WBC: CPT

## 2024-06-24 PROCEDURE — 80053 COMPREHEN METABOLIC PANEL: CPT

## 2024-06-24 PROCEDURE — 3048F LDL-C <100 MG/DL: CPT | Performed by: INTERNAL MEDICINE

## 2024-06-24 RX ORDER — ROSUVASTATIN CALCIUM 40 MG/1
40 TABLET, COATED ORAL DAILY
Qty: 90 TABLET | Refills: 0 | Status: SHIPPED | OUTPATIENT
Start: 2024-06-24

## 2024-06-24 RX ORDER — ROSUVASTATIN CALCIUM 40 MG/1
40 TABLET, COATED ORAL DAILY
Qty: 90 TABLET | Refills: 0 | Status: SHIPPED | OUTPATIENT
Start: 2024-06-24 | End: 2024-06-24 | Stop reason: SDUPTHER

## 2024-06-24 NOTE — PROGRESS NOTES
Subjective   Patient ID: Catalina Mendoza is a 73 y.o. female who presents for 3 month medical management.  HPI         Routine follow up         anemia due to blood loss       s/p 2 units      EGD negative      Oncology following      LUQ pain x 2-21 unchanged  Now goes around upper abdomen  Before cancer  CT June rev'd responded well to chemo  On cymbalta   Saw cardio work up neg  pain mgmt pending     patient had surgery for stage III ovarian cancer on March 13, 2023.  Currently in chemo.  Oncology following  Now metastatic  Notes rev'd     DM -2  on insulin no side effects. Endocrine following.   HBA1C 8.2  1-24   this am  Endo following     Hypercholesterolemia on therapy no side effects     Hypothyroid on therapy no side effects  Endo following     Hypertension stable  on therapy gets dizzy when stands up  on losartan 50 mg daily   follow     CAD stable on therapy no side effects     Diet and exercise reviewed    Review of Systems   All other systems reviewed and are negative.      Objective   /66   Pulse 79   Temp 36.8 °C (98.3 °F)   Wt 67.3 kg (148 lb 6.4 oz)   LMP  (LMP Unknown)   SpO2 98%   BMI 25.88 kg/m²   Lab Results   Component Value Date    WBC 3.4 (L) 06/24/2024    HGB 8.8 (L) 06/24/2024    HCT 28.0 (L) 06/24/2024     06/24/2024    CHOL 126 01/22/2024    TRIG 236 (H) 01/22/2024    HDL 29.3 01/22/2024    ALT 24 06/24/2024    AST 30 06/24/2024     (L) 06/24/2024    K 5.2 06/24/2024     06/24/2024    CREATININE 1.02 06/24/2024    BUN 26 (H) 06/24/2024    CO2 25 06/24/2024    TSH 17.81 (H) 06/24/2024    INR 1.2 (H) 02/11/2024    HGBA1C 7.6 (H) 06/17/2024           Physical Exam  Vitals reviewed.   Constitutional:       Appearance: Normal appearance. She is normal weight.      Comments: pale   HENT:      Head: Normocephalic and atraumatic.      Mouth/Throat:      Pharynx: No posterior oropharyngeal erythema.   Eyes:      General: No scleral icterus.      Conjunctiva/sclera: Conjunctivae normal.      Pupils: Pupils are equal, round, and reactive to light.   Cardiovascular:      Rate and Rhythm: Normal rate and regular rhythm.      Heart sounds: Normal heart sounds.   Pulmonary:      Effort: No respiratory distress.      Breath sounds: No wheezing.   Abdominal:      General: Abdomen is flat. Bowel sounds are normal. There is no distension.      Palpations: Abdomen is soft. There is no mass.      Tenderness: There is no abdominal tenderness. There is no rebound.   Musculoskeletal:         General: Normal range of motion.      Cervical back: Normal range of motion and neck supple.   Skin:     General: Skin is warm and dry.   Neurological:      General: No focal deficit present.      Mental Status: She is alert and oriented to person, place, and time. Mental status is at baseline.   Psychiatric:         Mood and Affect: Mood normal.         Behavior: Behavior normal.         Thought Content: Thought content normal.         Judgment: Judgment normal.         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  Currently in chemo.  Oncology following  Now metastatic  Notes  rev'd  Prognosis poor     DM -2  on insulin no side effects. Endocrine following.   HBA1C 8.2  1-24   this am  Endo following     Hypercholesterolemia on therapy no side effects     Hypothyroid on therapy no side effects  Endo following     Hypertension stable  on therapy gets dizzy when stands up  on losartan 50 mg daily   follow     CAD stable on therapy no side effects     Diet and exercise reviewed    Mammogram 2-24 / per oncology  Dexa n/a  Colonoscopy 2017  due 2027  CT chest lung cancer screening n/a  GYN n/a  immunizations rev'd RSV, COVID, Pneumo  (visual loss with flu yrs ago)  BMI 25.8    Follow up 3 months

## 2024-06-25 ENCOUNTER — INFUSION (OUTPATIENT)
Dept: HEMATOLOGY/ONCOLOGY | Facility: CLINIC | Age: 74
End: 2024-06-25
Payer: MEDICARE

## 2024-06-25 ENCOUNTER — OFFICE VISIT (OUTPATIENT)
Dept: HEMATOLOGY/ONCOLOGY | Facility: CLINIC | Age: 74
End: 2024-06-25
Payer: MEDICARE

## 2024-06-25 VITALS
WEIGHT: 147.38 LBS | DIASTOLIC BLOOD PRESSURE: 74 MMHG | SYSTOLIC BLOOD PRESSURE: 130 MMHG | TEMPERATURE: 96.8 F | BODY MASS INDEX: 25.7 KG/M2 | OXYGEN SATURATION: 99 % | RESPIRATION RATE: 18 BRPM | HEART RATE: 79 BPM

## 2024-06-25 DIAGNOSIS — C56.9 PRIMARY MALIGNANT NEOPLASM OF OVARY WITH WIDESPREAD METASTATIC DISEASE, UNSPECIFIED LATERALITY (MULTI): ICD-10-CM

## 2024-06-25 DIAGNOSIS — C80.0 PRIMARY MALIGNANT NEOPLASM OF OVARY WITH WIDESPREAD METASTATIC DISEASE, UNSPECIFIED LATERALITY (MULTI): ICD-10-CM

## 2024-06-25 DIAGNOSIS — C44.02 SQUAMOUS CELL CARCINOMA OF SKIN OF LIP: ICD-10-CM

## 2024-06-25 PROCEDURE — 3048F LDL-C <100 MG/DL: CPT | Performed by: INTERNAL MEDICINE

## 2024-06-25 PROCEDURE — 3075F SYST BP GE 130 - 139MM HG: CPT | Performed by: INTERNAL MEDICINE

## 2024-06-25 PROCEDURE — 1125F AMNT PAIN NOTED PAIN PRSNT: CPT | Performed by: INTERNAL MEDICINE

## 2024-06-25 PROCEDURE — 3051F HG A1C>EQUAL 7.0%<8.0%: CPT | Performed by: INTERNAL MEDICINE

## 2024-06-25 PROCEDURE — 3078F DIAST BP <80 MM HG: CPT | Performed by: INTERNAL MEDICINE

## 2024-06-25 PROCEDURE — 4010F ACE/ARB THERAPY RXD/TAKEN: CPT | Performed by: INTERNAL MEDICINE

## 2024-06-25 PROCEDURE — 1157F ADVNC CARE PLAN IN RCRD: CPT | Performed by: INTERNAL MEDICINE

## 2024-06-25 PROCEDURE — 3008F BODY MASS INDEX DOCD: CPT | Performed by: INTERNAL MEDICINE

## 2024-06-25 PROCEDURE — 99214 OFFICE O/P EST MOD 30 MIN: CPT | Performed by: INTERNAL MEDICINE

## 2024-06-25 PROCEDURE — 3060F POS MICROALBUMINURIA REV: CPT | Performed by: INTERNAL MEDICINE

## 2024-06-25 PROCEDURE — 1159F MED LIST DOCD IN RCRD: CPT | Performed by: INTERNAL MEDICINE

## 2024-06-25 RX ORDER — HEPARIN SODIUM,PORCINE/PF 10 UNIT/ML
50 SYRINGE (ML) INTRAVENOUS AS NEEDED
Status: CANCELLED | OUTPATIENT
Start: 2024-06-25

## 2024-06-25 RX ORDER — HEPARIN 100 UNIT/ML
500 SYRINGE INTRAVENOUS AS NEEDED
Status: CANCELLED | OUTPATIENT
Start: 2024-06-25

## 2024-06-25 RX ORDER — HEPARIN 100 UNIT/ML
500 SYRINGE INTRAVENOUS AS NEEDED
Status: DISCONTINUED | OUTPATIENT
Start: 2024-06-25 | End: 2024-06-25 | Stop reason: HOSPADM

## 2024-06-25 ASSESSMENT — PAIN SCALES - GENERAL: PAINLEVEL: 4

## 2024-06-25 NOTE — PATIENT INSTRUCTIONS
Today you met with Dr. Ca.  He reviewed your recent .  The decision was made to order a MRI and CT.  You will see Dr. Ca next week to review those images and discuss a possible change in treatment.  The potential new medication is Sorafenib.  I will see if our trials nurse has information on the CC trial with the protein CD55.    Please call the office for any questions or concerns.  Review your schedule prior to leaving [facility].  We ask that you notify us 5-7 days before your medications need refilled.   [facility] is strongly encouraging all patients to access their [employer] [employer] [facility] for all results and to communicate with their provider for non-urgent messages.  If an urgent/same day/sick concern response is needed, please call the office at [phone]. Thank You!

## 2024-06-25 NOTE — PROGRESS NOTES
Patient ID: Catalina Mendoza is a 73 y.o. female.  Referring Physician: Aleyda Ca MD  [id number] [place] [address]  Primary Care Provider: Juju Kenney MD  Visit Type:  Follow Up     Subjective    HPI  Ovarian cancer with malignant ascites.  CT scan gave 12 19 2022.  compared with 12/23 2022.  Mediastinal and abdominal lymphadenopathy which demonstrated abnormal  FDG [id number].  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 Discussed with patient       woman [presented at 72 years old] who presents today with abdominal distention and CT scan showing omental nodularity and extensive abdominal adenopathy.  Her  is 2320 with  elevation in CA 19-9 as well.  INR guided paracentesis as well as cytology of malignant ascitic fluid is pending at this time.  Clinical diagnosis and impression is that of ovarian adenocarcinoma stage III\4.  Full staging CT scans ordered and pathology  reports pending.      04/04/2023: Below is notes copied from OB/GYN.  # [facility]   - We reviewed the typical pathophysiology and management of ovarian cancer, we discussed that ovarian cancer is usually managed with a combination of chemotherapy and surgery  - We reviewed her imaging    - Chest lymph nodes specifically were significantly improved prior to 3rd chemo   - Schedule visit for genetic counseling to r/o hereditary cancer syndromes and/or targeted therapy markers  - Continue monitoring tumor markers  - She is not a candidate for HIPEC  - We discussed the plan for adjuvant chemo following surgery. Adjuvant chemotherapy cycles after surgery can be managed by gyn onc or med onc   12/27/2022-2/7/2023: 3 Cycles Carbo Taxol: Had surgery and resumed Chemotherapy 4/13/2023-5/23/2023.  was  still in double digits of 85.  Patient not a candidate for olaparib.     06/13/2023: Most recent  is 18 well within normal.  We will discussed with patient with the option to  observe and to intervene if we should see a  rise or morphologic evidence of disease recurrence.  Patient is platinum sensitive and has had  good response at this time.  Maintenance or other intervention will be dependent on either a rising  or morphologic evidence of disease persistence.    On Topotecan and Bevacizumab due to platinum resistance.  Review of Systems   Constitutional: Negative.    HENT:  Negative.     Eyes: Negative.    Respiratory: Negative.          Objective   BSA: 1.73 meters squared  /74 (BP Location: Left arm, Patient Position: Sitting, BP Cuff Size: Adult)   Pulse 79   Temp 36 °C (96.8 °F) (Temporal)   Resp 18   Wt 66.8 kg (147 lb 6 oz)   LMP  (LMP Unknown)   SpO2 99%   BMI 25.70 kg/m²      has a past medical history of Encounter for immunization (10/03/2016), Other injury of unspecified body region, initial encounter, Personal history of malignant neoplasm of unspecified site of lip, oral cavity, and pharynx, Personal history of other complications of pregnancy, childbirth and the puerperium, and Personal history of other malignant neoplasm of skin.   has a past surgical history that includes Hysterectomy (09/28/2015); Appendectomy (09/28/2015); Other surgical history (09/28/2015); Eye surgery (06/20/2016); Cholecystectomy (06/20/2016); and US guided abdominal paracentesis (12/15/2022).  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, started on NACT with carbo/taxol  - 2/7/23: S/p cycle 3 with good interval response  - 3/13/23: Diagnostic laparoscopy, BSO, extensive MIKAELA, bilateral ureterolysis, infracolic omentectomy, abdominal wall and mesenteric biopsies, complete gross resection to R0  - 5/23/23: Chemo completed     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 She has never been exposed to tobacco smoke. She has never used smokeless tobacco.  She  reports no history of alcohol use.  She  reports no history of drug use.    Physical Exam  Constitutional:       Appearance: Normal appearance.   HENT:      Head: Normocephalic and atraumatic.   Eyes:      Extraocular Movements: Extraocular movements intact.      Pupils: Pupils are equal, round, and reactive to light.   Abdominal:      General: Abdomen is flat.      Palpations: Abdomen is soft.   Neurological:      Mental Status: She is alert.         WBC   Date/Time Value Ref Range Status   06/24/2024 10:14 AM 3.4 (L) 4.4 - 11.3 x10*3/uL Final   06/17/2024 11:40 AM 10.0 4.4 - 11.3 x10*3/uL Final   05/30/2024 12:02 PM 4.6 4.4 - 11.3 x10*3/uL Final     nRBC   Date Value Ref Range Status   06/24/2024 0.0 0.0 - 0.0 /100 WBCs Final   06/17/2024 0.0 0.0 - 0.0 /100 WBCs Final   05/28/2024   Final     Comment:     Not Measured     RBC   Date Value Ref Range Status   06/24/2024 2.72 (L) 4.00 - 5.20 x10*6/uL Final   06/17/2024 3.00 (L) 4.00 - 5.20 x10*6/uL Final   05/30/2024 2.26 (L) 4.00 - 5.20 x10*6/uL Final     Hemoglobin   Date Value  Ref Range Status   06/24/2024 8.8 (L) 12.0 - 16.0 g/dL Final   06/17/2024 9.7 (L) 12.0 - 16.0 g/dL Final   05/30/2024 7.2 (L) 12.0 - 16.0 g/dL Final     Hematocrit   Date Value Ref Range Status   06/24/2024 28.0 (L) 36.0 - 46.0 % Final   06/17/2024 31.3 (L) 36.0 - 46.0 % Final   05/30/2024 22.6 (L) 36.0 - 46.0 % Final     MCV   Date/Time Value Ref Range Status   06/24/2024 10:14  (H) 80 - 100 fL Final   06/17/2024 11:40  (H) 80 - 100 fL Final   05/30/2024 12:02  80 - 100 fL Final     MCH   Date/Time Value Ref Range Status   06/24/2024 10:14 AM 32.4 26.0 - 34.0 pg Final   06/17/2024 11:40 AM 32.3 26.0 - 34.0 pg Final   05/30/2024 12:02 PM 31.9 26.0 - 34.0 pg Final     MCHC   Date/Time Value Ref Range Status   06/24/2024 10:14 AM 31.4 (L) 32.0 - 36.0 g/dL Final   06/17/2024 11:40 AM 31.0 (L) 32.0 - 36.0 g/dL Final   05/30/2024 12:02 PM 31.9 (L) 32.0 - 36.0 g/dL Final     RDW   Date/Time Value Ref Range Status   06/24/2024 10:14 AM 20.4 (H) 11.5 - 14.5 % Final   06/17/2024 11:40 AM 21.4 (H) 11.5 - 14.5 % Final   05/30/2024 12:02 PM 17.9 (H) 11.5 - 14.5 % Final     Platelets   Date/Time Value Ref Range Status   06/24/2024 10:14  150 - 450 x10*3/uL Final   06/17/2024 11:40  (H) 150 - 450 x10*3/uL Final   05/30/2024 12:02 PM 57 (L) 150 - 450 x10*3/uL Final     MPV   Date/Time Value Ref Range Status   10/27/2023 08:21 AM 9.8 7.5 - 11.5 fL Final     Neutrophils %   Date/Time Value Ref Range Status   06/24/2024 10:14 AM 18.7 40.0 - 80.0 % Final   06/17/2024 11:40 AM 55.9 40.0 - 80.0 % Final   05/30/2024 12:02 PM 29.8 40.0 - 80.0 % Final     Immature Granulocytes %, Automated   Date/Time Value Ref Range Status   06/24/2024 10:14 AM 0.0 0.0 - 0.9 % Final     Comment:     Immature Granulocyte Count (IG) includes promyelocytes, myelocytes and metamyelocytes but does not include bands. Percent differential counts (%) should be interpreted in the context of the absolute cell counts (cells/UL).    06/17/2024 11:40 AM 1.3 (H) 0.0 - 0.9 % Final     Comment:     Immature Granulocyte Count (IG) includes promyelocytes, myelocytes and metamyelocytes but does not include bands. Percent differential counts (%) should be interpreted in the context of the absolute cell counts (cells/UL).   05/30/2024 12:02 PM 0.2 0.0 - 0.9 % Final     Comment:     Immature Granulocyte Count (IG) includes promyelocytes, myelocytes and metamyelocytes but does not include bands. Percent differential counts (%) should be interpreted in the context of the absolute cell counts (cells/UL).     Lymphocytes %, Manual   Date/Time Value Ref Range Status   05/03/2024 09:52 AM 12.0 13.0 - 44.0 % Final   04/15/2024 01:07 PM 27.0 13.0 - 44.0 % Final   02/19/2024 10:04 AM 18.0 13.0 - 44.0 % Final     Lymphocytes %   Date/Time Value Ref Range Status   06/24/2024 10:14 AM 68.2 13.0 - 44.0 % Final   06/17/2024 11:40 AM 29.2 13.0 - 44.0 % Final   05/30/2024 12:02 PM 61.6 13.0 - 44.0 % Final     Monocytes %, Manual   Date/Time Value Ref Range Status   05/03/2024 09:52 AM 0.0 2.0 - 10.0 % Final   04/15/2024 01:07 PM 3.0 2.0 - 10.0 % Final   02/19/2024 10:04 AM 6.0 2.0 - 10.0 % Final     Monocytes %   Date/Time Value Ref Range Status   06/24/2024 10:14 AM 7.7 2.0 - 10.0 % Final   06/17/2024 11:40 AM 10.8 2.0 - 10.0 % Final   05/30/2024 12:02 PM 5.0 2.0 - 10.0 % Final     Eosinophils %, Manual   Date/Time Value Ref Range Status   05/03/2024 09:52 AM 1.0 0.0 - 6.0 % Final   04/15/2024 01:07 PM 1.0 0.0 - 6.0 % Final   02/19/2024 10:04 AM 0.0 0.0 - 6.0 % Final     Eosinophils %   Date/Time Value Ref Range Status   06/24/2024 10:14 AM 3.9 0.0 - 6.0 % Final   06/17/2024 11:40 AM 2.1 0.0 - 6.0 % Final   05/30/2024 12:02 PM 3.0 0.0 - 6.0 % Final     Basophils %, Manual   Date/Time Value Ref Range Status   05/03/2024 09:52 AM 0.0 0.0 - 2.0 % Final   04/15/2024 01:07 PM 1.0 0.0 - 2.0 % Final   02/19/2024 10:04 AM 1.0 0.0 - 2.0 % Final     Basophils %   Date/Time  Value Ref Range Status   06/24/2024 10:14 AM 1.5 0.0 - 2.0 % Final   06/17/2024 11:40 AM 0.7 0.0 - 2.0 % Final   05/30/2024 12:02 PM 0.4 0.0 - 2.0 % Final     Neutrophils Absolute   Date/Time Value Ref Range Status   06/24/2024 10:14 AM 0.63 (L) 1.60 - 5.50 x10*3/uL Final     Comment:     Percent differential counts (%) should be interpreted in the context of the absolute cell counts (cells/uL).   06/17/2024 11:40 AM 5.62 (H) 1.60 - 5.50 x10*3/uL Final     Comment:     Percent differential counts (%) should be interpreted in the context of the absolute cell counts (cells/uL).   05/30/2024 12:02 PM 1.37 (L) 1.60 - 5.50 x10*3/uL Final     Comment:     Percent differential counts (%) should be interpreted in the context of the absolute cell counts (cells/uL).     Immature Granulocytes Absolute, Automated   Date/Time Value Ref Range Status   06/24/2024 10:14 AM 0.00 0.00 - 0.50 x10*3/uL Final   06/17/2024 11:40 AM 0.13 0.00 - 0.50 x10*3/uL Final   05/30/2024 12:02 PM 0.01 0.00 - 0.50 x10*3/uL Final     Lymphocytes Absolute   Date/Time Value Ref Range Status   06/24/2024 10:14 AM 2.30 0.80 - 3.00 x10*3/uL Final   06/17/2024 11:40 AM 2.93 0.80 - 3.00 x10*3/uL Final   05/30/2024 12:02 PM 2.84 0.80 - 3.00 x10*3/uL Final     Monocytes Absolute   Date/Time Value Ref Range Status   06/24/2024 10:14 AM 0.26 0.05 - 0.80 x10*3/uL Final   06/17/2024 11:40 AM 1.08 (H) 0.05 - 0.80 x10*3/uL Final   05/30/2024 12:02 PM 0.23 0.05 - 0.80 x10*3/uL Final     Eosinophils Absolute   Date/Time Value Ref Range Status   06/24/2024 10:14 AM 0.13 0.00 - 0.40 x10*3/uL Final   06/17/2024 11:40 AM 0.21 0.00 - 0.40 x10*3/uL Final   05/30/2024 12:02 PM 0.14 0.00 - 0.40 x10*3/uL Final     Eosinophils Absolute, Manual   Date/Time Value Ref Range Status   05/03/2024 09:52 AM 0.19 0.00 - 0.40 x10*3/uL Final   04/15/2024 01:07 PM 0.17 0.00 - 0.40 x10*3/uL Final   02/19/2024 10:04 AM 0.00 0.00 - 0.40 x10*3/uL Final     Basophils Absolute   Date/Time Value  "Ref Range Status   06/24/2024 10:14 AM 0.05 0.00 - 0.10 x10*3/uL Final   06/17/2024 11:40 AM 0.07 0.00 - 0.10 x10*3/uL Final   05/30/2024 12:02 PM 0.02 0.00 - 0.10 x10*3/uL Final     Basophils Absolute, Manual   Date/Time Value Ref Range Status   05/03/2024 09:52 AM 0.00 0.00 - 0.10 x10*3/uL Final   04/15/2024 01:07 PM 0.17 (H) 0.00 - 0.10 x10*3/uL Final   02/19/2024 10:04 AM 0.16 (H) 0.00 - 0.10 x10*3/uL Final       No components found for: \"PT\"  aPTT   Date/Time Value Ref Range Status   02/11/2024 01:40 PM 24 (L) 27 - 38 seconds Final       Assessment/Plan      Proceed with C9: Tumor marker indicating response        Patient with high-grade histopathologic hide risk recurrent ovarian cancer.  Initially treated with platinum based therapy with recurrence currently receiving topotecan plus Avastin.   on the downward trend ordered today.  CT chest abdomen and pelvis ordered for evaluation.       IMPRESSION:  CT findings compatible with a favorable response to treatment as evidenced by an interval reduction in the size of the previously described nodule/lymph node anterior to the distal stomach, as well  as the retroperitoneal lymph nodes.. No new or enlarging soft tissue nodules or lymph nodes.      Signed by: Alf Lemus 2/2/2024     Cleared for chemotherapy.  Previous chemotherapy resulted in cytopenias and currently on cytokine support.   is in the 30 unit range.  Patient complaining of bandlike constriction just under the diaphragm.  The interval reduction in  has plateaued.  Patient will continue this regimen.  Presents with his children and all questions have been asked and answered including questions regarding prognosis and long-term survival.              Aleyda Ca MD                         "

## 2024-06-25 NOTE — PROGRESS NOTES
1420. ANC from 6.24.24 is 0.63 and outside of parameter to treat. Dr aC made aware and will hold treatment today. Pt/Family aware. Schedule reviewed then Dc'd via independent/ ambulatory.

## 2024-06-26 ENCOUNTER — TELEPHONE (OUTPATIENT)
Dept: PRIMARY CARE | Facility: CLINIC | Age: 74
End: 2024-06-26

## 2024-06-26 ENCOUNTER — HOSPITAL ENCOUNTER (OUTPATIENT)
Dept: RADIOLOGY | Facility: HOSPITAL | Age: 74
Discharge: HOME | End: 2024-06-26
Payer: MEDICARE

## 2024-06-26 ENCOUNTER — INFUSION (OUTPATIENT)
Dept: HEMATOLOGY/ONCOLOGY | Facility: HOSPITAL | Age: 74
End: 2024-06-26
Payer: MEDICARE

## 2024-06-26 DIAGNOSIS — C80.0 PRIMARY MALIGNANT NEOPLASM OF OVARY WITH WIDESPREAD METASTATIC DISEASE, UNSPECIFIED LATERALITY (MULTI): ICD-10-CM

## 2024-06-26 DIAGNOSIS — C56.9 PRIMARY MALIGNANT NEOPLASM OF OVARY WITH WIDESPREAD METASTATIC DISEASE, UNSPECIFIED LATERALITY (MULTI): ICD-10-CM

## 2024-06-26 DIAGNOSIS — C44.02 SQUAMOUS CELL CARCINOMA OF SKIN OF LIP: ICD-10-CM

## 2024-06-26 PROCEDURE — 2500000004 HC RX 250 GENERAL PHARMACY W/ HCPCS (ALT 636 FOR OP/ED): Performed by: INTERNAL MEDICINE

## 2024-06-26 PROCEDURE — 96523 IRRIG DRUG DELIVERY DEVICE: CPT

## 2024-06-26 PROCEDURE — 2550000001 HC RX 255 CONTRASTS: Performed by: INTERNAL MEDICINE

## 2024-06-26 PROCEDURE — 73221 MRI JOINT UPR EXTREM W/O DYE: CPT | Mod: LT

## 2024-06-26 PROCEDURE — 74177 CT ABD & PELVIS W/CONTRAST: CPT

## 2024-06-26 RX ORDER — HEPARIN SODIUM,PORCINE/PF 10 UNIT/ML
50 SYRINGE (ML) INTRAVENOUS AS NEEDED
OUTPATIENT
Start: 2024-06-26

## 2024-06-26 RX ORDER — HEPARIN 100 UNIT/ML
500 SYRINGE INTRAVENOUS AS NEEDED
OUTPATIENT
Start: 2024-06-26

## 2024-06-26 RX ORDER — HEPARIN 100 UNIT/ML
500 SYRINGE INTRAVENOUS AS NEEDED
Status: DISCONTINUED | OUTPATIENT
Start: 2024-06-26 | End: 2024-06-26 | Stop reason: HOSPADM

## 2024-06-26 ASSESSMENT — PATIENT HEALTH QUESTIONNAIRE - PHQ9
1. LITTLE INTEREST OR PLEASURE IN DOING THINGS: NOT AT ALL
2. FEELING DOWN, DEPRESSED OR HOPELESS: NOT AT ALL
SUM OF ALL RESPONSES TO PHQ9 QUESTIONS 1 & 2: 0

## 2024-06-26 ASSESSMENT — ENCOUNTER SYMPTOMS
EYES NEGATIVE: 1
CONSTITUTIONAL NEGATIVE: 1
RESPIRATORY NEGATIVE: 1

## 2024-06-26 NOTE — TELEPHONE ENCOUNTER
Patient called-  per her Oncologist, she should not be taking anything with Calcium.  She noticed her Rosuvastatin has Calcium.  Patient is asking if this medication should be changed.  Please advise.

## 2024-07-01 ENCOUNTER — LAB (OUTPATIENT)
Dept: LAB | Facility: LAB | Age: 74
End: 2024-07-01
Payer: MEDICARE

## 2024-07-01 DIAGNOSIS — C56.9 PRIMARY MALIGNANT NEOPLASM OF OVARY WITH WIDESPREAD METASTATIC DISEASE, UNSPECIFIED LATERALITY (MULTI): ICD-10-CM

## 2024-07-01 DIAGNOSIS — C80.0 PRIMARY MALIGNANT NEOPLASM OF OVARY WITH WIDESPREAD METASTATIC DISEASE, UNSPECIFIED LATERALITY (MULTI): ICD-10-CM

## 2024-07-01 DIAGNOSIS — E03.9 ACQUIRED HYPOTHYROIDISM: Primary | ICD-10-CM

## 2024-07-01 DIAGNOSIS — D63.0 ANEMIA IN NEOPLASTIC DISEASE: ICD-10-CM

## 2024-07-01 DIAGNOSIS — E03.9 ACQUIRED HYPOTHYROIDISM: ICD-10-CM

## 2024-07-01 DIAGNOSIS — C44.02 SQUAMOUS CELL CARCINOMA OF SKIN OF LIP: ICD-10-CM

## 2024-07-01 LAB
ABO GROUP (TYPE) IN BLOOD: NORMAL
ALBUMIN SERPL BCP-MCNC: 4.3 G/DL (ref 3.4–5)
ALP SERPL-CCNC: 40 U/L (ref 33–136)
ALT SERPL W P-5'-P-CCNC: 16 U/L (ref 7–45)
ANION GAP SERPL CALC-SCNC: 13 MMOL/L (ref 10–20)
ANTIBODY SCREEN: NORMAL
AST SERPL W P-5'-P-CCNC: 20 U/L (ref 9–39)
BASOPHILS # BLD AUTO: 0.04 X10*3/UL (ref 0–0.1)
BASOPHILS NFR BLD AUTO: 0.8 %
BILIRUB SERPL-MCNC: 0.5 MG/DL (ref 0–1.2)
BUN SERPL-MCNC: 29 MG/DL (ref 6–23)
CALCIUM SERPL-MCNC: 9.5 MG/DL (ref 8.6–10.3)
CHLORIDE SERPL-SCNC: 103 MMOL/L (ref 98–107)
CO2 SERPL-SCNC: 23 MMOL/L (ref 21–32)
CREAT SERPL-MCNC: 1 MG/DL (ref 0.5–1.05)
EGFRCR SERPLBLD CKD-EPI 2021: 59 ML/MIN/1.73M*2
EOSINOPHIL # BLD AUTO: 0.38 X10*3/UL (ref 0–0.4)
EOSINOPHIL NFR BLD AUTO: 7.5 %
ERYTHROCYTE [DISTWIDTH] IN BLOOD BY AUTOMATED COUNT: 20.3 % (ref 11.5–14.5)
GLUCOSE SERPL-MCNC: 355 MG/DL (ref 74–99)
HCT VFR BLD AUTO: 29 % (ref 36–46)
HGB BLD-MCNC: 9.3 G/DL (ref 12–16)
IMM GRANULOCYTES # BLD AUTO: 0.01 X10*3/UL (ref 0–0.5)
IMM GRANULOCYTES NFR BLD AUTO: 0.2 % (ref 0–0.9)
LYMPHOCYTES # BLD AUTO: 2.1 X10*3/UL (ref 0.8–3)
LYMPHOCYTES NFR BLD AUTO: 41.6 %
MCH RBC QN AUTO: 33.5 PG (ref 26–34)
MCHC RBC AUTO-ENTMCNC: 32.1 G/DL (ref 32–36)
MCV RBC AUTO: 104 FL (ref 80–100)
MONOCYTES # BLD AUTO: 0.54 X10*3/UL (ref 0.05–0.8)
MONOCYTES NFR BLD AUTO: 10.7 %
NEUTROPHILS # BLD AUTO: 1.98 X10*3/UL (ref 1.6–5.5)
NEUTROPHILS NFR BLD AUTO: 39.2 %
NRBC BLD-RTO: 0 /100 WBCS (ref 0–0)
PLATELET # BLD AUTO: 236 X10*3/UL (ref 150–450)
POLYCHROMASIA BLD QL SMEAR: NORMAL
POTASSIUM SERPL-SCNC: 4.6 MMOL/L (ref 3.5–5.3)
PROT SERPL-MCNC: 7.3 G/DL (ref 6.4–8.2)
RBC # BLD AUTO: 2.78 X10*6/UL (ref 4–5.2)
RBC MORPH BLD: NORMAL
RH FACTOR (ANTIGEN D): NORMAL
SODIUM SERPL-SCNC: 134 MMOL/L (ref 136–145)
T4 FREE SERPL-MCNC: 0.85 NG/DL (ref 0.61–1.12)
TSH SERPL-ACNC: 15.88 MIU/L (ref 0.44–3.98)
WBC # BLD AUTO: 5.1 X10*3/UL (ref 4.4–11.3)

## 2024-07-02 ENCOUNTER — OFFICE VISIT (OUTPATIENT)
Dept: HEMATOLOGY/ONCOLOGY | Facility: CLINIC | Age: 74
End: 2024-07-02
Payer: MEDICARE

## 2024-07-02 ENCOUNTER — INFUSION (OUTPATIENT)
Dept: HEMATOLOGY/ONCOLOGY | Facility: CLINIC | Age: 74
End: 2024-07-02
Payer: MEDICARE

## 2024-07-02 VITALS
SYSTOLIC BLOOD PRESSURE: 122 MMHG | BODY MASS INDEX: 25.91 KG/M2 | HEART RATE: 75 BPM | DIASTOLIC BLOOD PRESSURE: 75 MMHG | OXYGEN SATURATION: 100 % | WEIGHT: 148.59 LBS | RESPIRATION RATE: 18 BRPM | TEMPERATURE: 96.4 F

## 2024-07-02 DIAGNOSIS — C80.0 PRIMARY MALIGNANT NEOPLASM OF OVARY WITH WIDESPREAD METASTATIC DISEASE, UNSPECIFIED LATERALITY (MULTI): ICD-10-CM

## 2024-07-02 DIAGNOSIS — C56.9 PRIMARY MALIGNANT NEOPLASM OF OVARY WITH WIDESPREAD METASTATIC DISEASE, UNSPECIFIED LATERALITY (MULTI): ICD-10-CM

## 2024-07-02 DIAGNOSIS — C44.02 SQUAMOUS CELL CARCINOMA OF SKIN OF LIP: ICD-10-CM

## 2024-07-02 LAB — TEST COMMENT - SURGICAL SENDOUT REQUEST: NORMAL

## 2024-07-02 PROCEDURE — 96377 APPLICATON ON-BODY INJECTOR: CPT

## 2024-07-02 PROCEDURE — 1126F AMNT PAIN NOTED NONE PRSNT: CPT | Performed by: INTERNAL MEDICINE

## 2024-07-02 PROCEDURE — 1159F MED LIST DOCD IN RCRD: CPT | Performed by: INTERNAL MEDICINE

## 2024-07-02 PROCEDURE — 2500000004 HC RX 250 GENERAL PHARMACY W/ HCPCS (ALT 636 FOR OP/ED): Performed by: INTERNAL MEDICINE

## 2024-07-02 PROCEDURE — 3078F DIAST BP <80 MM HG: CPT | Performed by: INTERNAL MEDICINE

## 2024-07-02 PROCEDURE — 3060F POS MICROALBUMINURIA REV: CPT | Performed by: INTERNAL MEDICINE

## 2024-07-02 PROCEDURE — 96413 CHEMO IV INFUSION 1 HR: CPT

## 2024-07-02 PROCEDURE — 4010F ACE/ARB THERAPY RXD/TAKEN: CPT | Performed by: INTERNAL MEDICINE

## 2024-07-02 PROCEDURE — 3074F SYST BP LT 130 MM HG: CPT | Performed by: INTERNAL MEDICINE

## 2024-07-02 PROCEDURE — 1157F ADVNC CARE PLAN IN RCRD: CPT | Performed by: INTERNAL MEDICINE

## 2024-07-02 PROCEDURE — 99215 OFFICE O/P EST HI 40 MIN: CPT | Mod: 25 | Performed by: INTERNAL MEDICINE

## 2024-07-02 PROCEDURE — 3008F BODY MASS INDEX DOCD: CPT | Performed by: INTERNAL MEDICINE

## 2024-07-02 PROCEDURE — 3051F HG A1C>EQUAL 7.0%<8.0%: CPT | Performed by: INTERNAL MEDICINE

## 2024-07-02 PROCEDURE — 99215 OFFICE O/P EST HI 40 MIN: CPT | Performed by: INTERNAL MEDICINE

## 2024-07-02 PROCEDURE — 96417 CHEMO IV INFUS EACH ADDL SEQ: CPT

## 2024-07-02 PROCEDURE — 3048F LDL-C <100 MG/DL: CPT | Performed by: INTERNAL MEDICINE

## 2024-07-02 PROCEDURE — 96372 THER/PROPH/DIAG INJ SC/IM: CPT

## 2024-07-02 RX ORDER — HEPARIN SODIUM,PORCINE/PF 10 UNIT/ML
50 SYRINGE (ML) INTRAVENOUS AS NEEDED
OUTPATIENT
Start: 2024-07-02

## 2024-07-02 RX ORDER — HEPARIN 100 UNIT/ML
500 SYRINGE INTRAVENOUS AS NEEDED
Status: DISCONTINUED | OUTPATIENT
Start: 2024-07-02 | End: 2024-07-02 | Stop reason: HOSPADM

## 2024-07-02 RX ORDER — DIPHENHYDRAMINE HYDROCHLORIDE 50 MG/ML
50 INJECTION INTRAMUSCULAR; INTRAVENOUS AS NEEDED
Status: DISCONTINUED | OUTPATIENT
Start: 2024-07-02 | End: 2024-07-02 | Stop reason: HOSPADM

## 2024-07-02 RX ORDER — HEPARIN 100 UNIT/ML
500 SYRINGE INTRAVENOUS AS NEEDED
OUTPATIENT
Start: 2024-07-02

## 2024-07-02 RX ORDER — EPINEPHRINE 0.3 MG/.3ML
0.3 INJECTION SUBCUTANEOUS EVERY 5 MIN PRN
Status: DISCONTINUED | OUTPATIENT
Start: 2024-07-02 | End: 2024-07-02 | Stop reason: HOSPADM

## 2024-07-02 RX ORDER — PROCHLORPERAZINE MALEATE 10 MG
10 TABLET ORAL EVERY 6 HOURS PRN
Status: DISCONTINUED | OUTPATIENT
Start: 2024-07-02 | End: 2024-07-02 | Stop reason: HOSPADM

## 2024-07-02 RX ORDER — PROCHLORPERAZINE EDISYLATE 5 MG/ML
10 INJECTION INTRAMUSCULAR; INTRAVENOUS EVERY 6 HOURS PRN
Status: DISCONTINUED | OUTPATIENT
Start: 2024-07-02 | End: 2024-07-02 | Stop reason: HOSPADM

## 2024-07-02 RX ORDER — ALBUTEROL SULFATE 0.83 MG/ML
3 SOLUTION RESPIRATORY (INHALATION) AS NEEDED
Status: DISCONTINUED | OUTPATIENT
Start: 2024-07-02 | End: 2024-07-02 | Stop reason: HOSPADM

## 2024-07-02 RX ORDER — ONDANSETRON HYDROCHLORIDE 2 MG/ML
8 INJECTION, SOLUTION INTRAVENOUS ONCE
Status: COMPLETED | OUTPATIENT
Start: 2024-07-02 | End: 2024-07-02

## 2024-07-02 RX ORDER — FAMOTIDINE 10 MG/ML
20 INJECTION INTRAVENOUS ONCE AS NEEDED
Status: DISCONTINUED | OUTPATIENT
Start: 2024-07-02 | End: 2024-07-02 | Stop reason: HOSPADM

## 2024-07-02 ASSESSMENT — PAIN SCALES - GENERAL: PAINLEVEL: 0-NO PAIN

## 2024-07-02 ASSESSMENT — ENCOUNTER SYMPTOMS
RESPIRATORY NEGATIVE: 1
EYES NEGATIVE: 1
CONSTITUTIONAL NEGATIVE: 1

## 2024-07-02 NOTE — PROGRESS NOTES
Patient ID: Catalina Mendoza is a 73 y.o. female.  Referring Physician: Aleyda Ca MD  [id number] [place] [address]  Primary Care Provider: Juju Kenney MD  Visit Type:  Follow Up     Subjective    HPI  Ovarian cancer with malignant ascites.  CT scan gave 12 19 2022.  compared with 12/23 2022.  Mediastinal and abdominal lymphadenopathy which demonstrated abnormal  FDG [id number].  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 Discussed with patient       woman [presented at 72 years old] who presents today with abdominal distention and CT scan showing omental nodularity and extensive abdominal adenopathy.  Her  is 2320 with  elevation in CA 19-9 as well.  INR guided paracentesis as well as cytology of malignant ascitic fluid is pending at this time.  Clinical diagnosis and impression is that of ovarian adenocarcinoma stage III\4.  Full staging CT scans ordered and pathology  reports pending.      04/04/2023: Below is notes copied from OB/GYN.  # [facility]GSOC   - We reviewed the typical pathophysiology and management of ovarian cancer, we discussed that ovarian cancer is usually managed with a combination of chemotherapy and surgery  - We reviewed her imaging    - Chest lymph nodes specifically were significantly improved prior to 3rd chemo   - Schedule visit for genetic counseling to r/o hereditary cancer syndromes and/or targeted therapy markers  - Continue monitoring tumor markers  - She is not a candidate for HIPEC  - We discussed the plan for adjuvant chemo following surgery. Adjuvant chemotherapy cycles after surgery can be managed by gyn onc or med onc   12/27/2022-2/7/2023: 3 Cycles Carbo Taxol: Had surgery and resumed Chemotherapy 4/13/2023-5/23/2023.  was  still in double digits of 85.  Patient not a candidate for olaparib.     06/13/2023: Most recent  is 18 well within normal.  We will discussed with patient with the option to  observe and to intervene if we should see a  rise or morphologic evidence of disease recurrence.  Patient is platinum sensitive and has had  good response at this time.  Maintenance or other intervention will be dependent on either a rising  or morphologic evidence of disease persistence.    On Topotecan and Bevacizumab due to platinum resistance.  Review of Systems   Constitutional: Negative.    HENT:  Negative.     Eyes: Negative.    Respiratory: Negative.          Objective   BSA: 1.74 meters squared  /75 (BP Location: Left arm, Patient Position: Sitting, BP Cuff Size: Adult)   Pulse 75   Temp 35.8 °C (96.4 °F) (Temporal)   Resp 18   Wt 67.4 kg (148 lb 9.4 oz)   LMP  (LMP Unknown)   SpO2 100%   BMI 25.91 kg/m²      has a past medical history of Encounter for immunization (10/03/2016), Other injury of unspecified body region, initial encounter, Personal history of malignant neoplasm of unspecified site of lip, oral cavity, and pharynx, Personal history of other complications of pregnancy, childbirth and the puerperium, and Personal history of other malignant neoplasm of skin.   has a past surgical history that includes Hysterectomy (09/28/2015); Appendectomy (09/28/2015); Other surgical history (09/28/2015); Eye surgery (06/20/2016); Cholecystectomy (06/20/2016); and US guided abdominal paracentesis (12/15/2022).  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, started on NACT with carbo/taxol  - 2/7/23: S/p cycle 3 with good interval response  - 3/13/23: Diagnostic laparoscopy, BSO, extensive MIKAELA, bilateral ureterolysis, infracolic omentectomy, abdominal wall and mesenteric biopsies, complete gross resection to R0  - 5/23/23: Chemo completed     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 She has never been exposed to tobacco smoke. She has never used smokeless tobacco.  She  reports no history of alcohol use.  She  reports no history of drug use.    Physical Exam  Constitutional:       Appearance: Normal appearance.   HENT:      Head: Normocephalic and atraumatic.   Eyes:      Extraocular Movements: Extraocular movements intact.      Pupils: Pupils are equal, round, and reactive to light.   Abdominal:      General: Abdomen is flat.      Palpations: Abdomen is soft.   Neurological:      Mental Status: She is alert.         WBC   Date/Time Value Ref Range Status   07/01/2024 10:57 AM 5.1 4.4 - 11.3 x10*3/uL Final   06/24/2024 10:14 AM 3.4 (L) 4.4 - 11.3 x10*3/uL Final   06/17/2024 11:40 AM 10.0 4.4 - 11.3 x10*3/uL Final     nRBC   Date Value Ref Range Status   07/01/2024 0.0 0.0 - 0.0 /100 WBCs Final   06/24/2024 0.0 0.0 - 0.0 /100 WBCs Final   06/17/2024 0.0 0.0 - 0.0 /100 WBCs Final     RBC   Date Value Ref Range Status   07/01/2024 2.78 (L) 4.00 - 5.20 x10*6/uL Final   06/24/2024 2.72 (L) 4.00 - 5.20 x10*6/uL Final   06/17/2024 3.00 (L) 4.00 - 5.20 x10*6/uL Final     Hemoglobin   Date Value Ref  Range Status   07/01/2024 9.3 (L) 12.0 - 16.0 g/dL Final   06/24/2024 8.8 (L) 12.0 - 16.0 g/dL Final   06/17/2024 9.7 (L) 12.0 - 16.0 g/dL Final     Hematocrit   Date Value Ref Range Status   07/01/2024 29.0 (L) 36.0 - 46.0 % Final   06/24/2024 28.0 (L) 36.0 - 46.0 % Final   06/17/2024 31.3 (L) 36.0 - 46.0 % Final     MCV   Date/Time Value Ref Range Status   07/01/2024 10:57  (H) 80 - 100 fL Final   06/24/2024 10:14  (H) 80 - 100 fL Final   06/17/2024 11:40  (H) 80 - 100 fL Final     MCH   Date/Time Value Ref Range Status   07/01/2024 10:57 AM 33.5 26.0 - 34.0 pg Final   06/24/2024 10:14 AM 32.4 26.0 - 34.0 pg Final   06/17/2024 11:40 AM 32.3 26.0 - 34.0 pg Final     MCHC   Date/Time Value Ref Range Status   07/01/2024 10:57 AM 32.1 32.0 - 36.0 g/dL Final   06/24/2024 10:14 AM 31.4 (L) 32.0 - 36.0 g/dL Final   06/17/2024 11:40 AM 31.0 (L) 32.0 - 36.0 g/dL Final     RDW   Date/Time Value Ref Range Status   07/01/2024 10:57 AM 20.3 (H) 11.5 - 14.5 % Final   06/24/2024 10:14 AM 20.4 (H) 11.5 - 14.5 % Final   06/17/2024 11:40 AM 21.4 (H) 11.5 - 14.5 % Final     Platelets   Date/Time Value Ref Range Status   07/01/2024 10:57  150 - 450 x10*3/uL Final   06/24/2024 10:14  150 - 450 x10*3/uL Final   06/17/2024 11:40  (H) 150 - 450 x10*3/uL Final     MPV   Date/Time Value Ref Range Status   10/27/2023 08:21 AM 9.8 7.5 - 11.5 fL Final     Neutrophils %   Date/Time Value Ref Range Status   07/01/2024 10:57 AM 39.2 40.0 - 80.0 % Final   06/24/2024 10:14 AM 18.7 40.0 - 80.0 % Final   06/17/2024 11:40 AM 55.9 40.0 - 80.0 % Final     Immature Granulocytes %, Automated   Date/Time Value Ref Range Status   07/01/2024 10:57 AM 0.2 0.0 - 0.9 % Final     Comment:     Immature Granulocyte Count (IG) includes promyelocytes, myelocytes and metamyelocytes but does not include bands. Percent differential counts (%) should be interpreted in the context of the absolute cell counts (cells/UL).   06/24/2024  10:14 AM 0.0 0.0 - 0.9 % Final     Comment:     Immature Granulocyte Count (IG) includes promyelocytes, myelocytes and metamyelocytes but does not include bands. Percent differential counts (%) should be interpreted in the context of the absolute cell counts (cells/UL).   06/17/2024 11:40 AM 1.3 (H) 0.0 - 0.9 % Final     Comment:     Immature Granulocyte Count (IG) includes promyelocytes, myelocytes and metamyelocytes but does not include bands. Percent differential counts (%) should be interpreted in the context of the absolute cell counts (cells/UL).     Lymphocytes %, Manual   Date/Time Value Ref Range Status   05/03/2024 09:52 AM 12.0 13.0 - 44.0 % Final   04/15/2024 01:07 PM 27.0 13.0 - 44.0 % Final   02/19/2024 10:04 AM 18.0 13.0 - 44.0 % Final     Lymphocytes %   Date/Time Value Ref Range Status   07/01/2024 10:57 AM 41.6 13.0 - 44.0 % Final   06/24/2024 10:14 AM 68.2 13.0 - 44.0 % Final   06/17/2024 11:40 AM 29.2 13.0 - 44.0 % Final     Monocytes %, Manual   Date/Time Value Ref Range Status   05/03/2024 09:52 AM 0.0 2.0 - 10.0 % Final   04/15/2024 01:07 PM 3.0 2.0 - 10.0 % Final   02/19/2024 10:04 AM 6.0 2.0 - 10.0 % Final     Monocytes %   Date/Time Value Ref Range Status   07/01/2024 10:57 AM 10.7 2.0 - 10.0 % Final   06/24/2024 10:14 AM 7.7 2.0 - 10.0 % Final   06/17/2024 11:40 AM 10.8 2.0 - 10.0 % Final     Eosinophils %, Manual   Date/Time Value Ref Range Status   05/03/2024 09:52 AM 1.0 0.0 - 6.0 % Final   04/15/2024 01:07 PM 1.0 0.0 - 6.0 % Final   02/19/2024 10:04 AM 0.0 0.0 - 6.0 % Final     Eosinophils %   Date/Time Value Ref Range Status   07/01/2024 10:57 AM 7.5 0.0 - 6.0 % Final   06/24/2024 10:14 AM 3.9 0.0 - 6.0 % Final   06/17/2024 11:40 AM 2.1 0.0 - 6.0 % Final     Basophils %, Manual   Date/Time Value Ref Range Status   05/03/2024 09:52 AM 0.0 0.0 - 2.0 % Final   04/15/2024 01:07 PM 1.0 0.0 - 2.0 % Final   02/19/2024 10:04 AM 1.0 0.0 - 2.0 % Final     Basophils %   Date/Time Value Ref Range  Status   07/01/2024 10:57 AM 0.8 0.0 - 2.0 % Final   06/24/2024 10:14 AM 1.5 0.0 - 2.0 % Final   06/17/2024 11:40 AM 0.7 0.0 - 2.0 % Final     Neutrophils Absolute   Date/Time Value Ref Range Status   07/01/2024 10:57 AM 1.98 1.60 - 5.50 x10*3/uL Final     Comment:     Percent differential counts (%) should be interpreted in the context of the absolute cell counts (cells/uL).   06/24/2024 10:14 AM 0.63 (L) 1.60 - 5.50 x10*3/uL Final     Comment:     Percent differential counts (%) should be interpreted in the context of the absolute cell counts (cells/uL).   06/17/2024 11:40 AM 5.62 (H) 1.60 - 5.50 x10*3/uL Final     Comment:     Percent differential counts (%) should be interpreted in the context of the absolute cell counts (cells/uL).     Immature Granulocytes Absolute, Automated   Date/Time Value Ref Range Status   07/01/2024 10:57 AM 0.01 0.00 - 0.50 x10*3/uL Final   06/24/2024 10:14 AM 0.00 0.00 - 0.50 x10*3/uL Final   06/17/2024 11:40 AM 0.13 0.00 - 0.50 x10*3/uL Final     Lymphocytes Absolute   Date/Time Value Ref Range Status   07/01/2024 10:57 AM 2.10 0.80 - 3.00 x10*3/uL Final   06/24/2024 10:14 AM 2.30 0.80 - 3.00 x10*3/uL Final   06/17/2024 11:40 AM 2.93 0.80 - 3.00 x10*3/uL Final     Monocytes Absolute   Date/Time Value Ref Range Status   07/01/2024 10:57 AM 0.54 0.05 - 0.80 x10*3/uL Final   06/24/2024 10:14 AM 0.26 0.05 - 0.80 x10*3/uL Final   06/17/2024 11:40 AM 1.08 (H) 0.05 - 0.80 x10*3/uL Final     Eosinophils Absolute   Date/Time Value Ref Range Status   07/01/2024 10:57 AM 0.38 0.00 - 0.40 x10*3/uL Final   06/24/2024 10:14 AM 0.13 0.00 - 0.40 x10*3/uL Final   06/17/2024 11:40 AM 0.21 0.00 - 0.40 x10*3/uL Final     Eosinophils Absolute, Manual   Date/Time Value Ref Range Status   05/03/2024 09:52 AM 0.19 0.00 - 0.40 x10*3/uL Final   04/15/2024 01:07 PM 0.17 0.00 - 0.40 x10*3/uL Final   02/19/2024 10:04 AM 0.00 0.00 - 0.40 x10*3/uL Final     Basophils Absolute   Date/Time Value Ref Range Status  "  07/01/2024 10:57 AM 0.04 0.00 - 0.10 x10*3/uL Final     Comment:     Automated WBC differential has been confirmed by manual smear.   06/24/2024 10:14 AM 0.05 0.00 - 0.10 x10*3/uL Final   06/17/2024 11:40 AM 0.07 0.00 - 0.10 x10*3/uL Final     Basophils Absolute, Manual   Date/Time Value Ref Range Status   05/03/2024 09:52 AM 0.00 0.00 - 0.10 x10*3/uL Final   04/15/2024 01:07 PM 0.17 (H) 0.00 - 0.10 x10*3/uL Final   02/19/2024 10:04 AM 0.16 (H) 0.00 - 0.10 x10*3/uL Final       No components found for: \"PT\"  aPTT   Date/Time Value Ref Range Status   02/11/2024 01:40 PM 24 (L) 27 - 38 seconds Final       Assessment/Plan      Proceed with C9: Tumor marker indicating response        Patient with high-grade histopathologic hide risk recurrent ovarian cancer.  Initially treated with platinum based therapy with recurrence currently receiving topotecan plus Avastin.   on the downward trend ordered today.  CT chest abdomen and pelvis ordered for evaluation.       IMPRESSION:  CT findings compatible with a favorable response to treatment as evidenced by an interval reduction in the size of the previously described nodule/lymph node anterior to the distal stomach, as well  as the retroperitoneal lymph nodes.. No new or enlarging soft tissue nodules or lymph nodes.      Signed by: Alf Lemus 2/2/2024     Cleared for chemotherapy.  Previous chemotherapy resulted in cytopenias and currently on cytokine support.   is in the 30 unit range.  Patient complaining of bandlike constriction just under the diaphragm.  The interval reduction in  has plateaued.  Patient will continue this regimen.  Presents with his children and all questions have been asked and answered including questions regarding prognosis and long-term survival.       Folate alpha Testing: HER 2 Testing. RTC 4-6 weeks.       Aleyda Ca MD                         "

## 2024-07-02 NOTE — PATIENT INSTRUCTIONS
Today you visited with your Oncologist.  Your recent labs were discussed and questions were answered.  Your current treatment regimen was discussed and the plan going forward was also discussed.  Scheduling orders were placed to reflect this conversation.  Please call our office for any questions that may arise after leaving today.   [phone]    Review your schedule upon leaving every infusion visit.  Remember you will no longer see lab visits noted separately on your schedule.    Dr Ca will see you in 4-6 weeks with treatment to discuss the results of the additional genetic testing done on your original pathology and treatment options.

## 2024-07-03 LAB — TEST COMMENT - SURGICAL SENDOUT REQUEST: NORMAL

## 2024-07-15 DIAGNOSIS — E78.5 HYPERLIPIDEMIA, UNSPECIFIED HYPERLIPIDEMIA TYPE: ICD-10-CM

## 2024-07-15 DIAGNOSIS — I10 HYPERTENSION, UNSPECIFIED TYPE: ICD-10-CM

## 2024-07-15 RX ORDER — LOSARTAN POTASSIUM 50 MG/1
50 TABLET ORAL DAILY
Qty: 90 TABLET | Refills: 0 | Status: SHIPPED | OUTPATIENT
Start: 2024-07-15

## 2024-07-15 RX ORDER — FENOFIBRATE 160 MG/1
160 TABLET ORAL DAILY
Qty: 90 TABLET | Refills: 0 | Status: SHIPPED | OUTPATIENT
Start: 2024-07-15

## 2024-07-16 ENCOUNTER — APPOINTMENT (OUTPATIENT)
Dept: HEMATOLOGY/ONCOLOGY | Facility: CLINIC | Age: 74
End: 2024-07-16
Payer: MEDICARE

## 2024-07-23 ENCOUNTER — APPOINTMENT (OUTPATIENT)
Dept: HEMATOLOGY/ONCOLOGY | Facility: CLINIC | Age: 74
End: 2024-07-23
Payer: MEDICARE

## 2024-07-24 ENCOUNTER — TELEPHONE (OUTPATIENT)
Dept: GYNECOLOGIC ONCOLOGY | Facility: HOSPITAL | Age: 74
End: 2024-07-24
Payer: MEDICARE

## 2024-07-24 DIAGNOSIS — G62.0 DRUG-INDUCED POLYNEUROPATHY (MULTI): ICD-10-CM

## 2024-07-24 NOTE — TELEPHONE ENCOUNTER
Patient's mail order pharmacy called to request a refill on the patient's duloxetine. I sent the request to the nurse practitioner

## 2024-07-25 DIAGNOSIS — C56.9 PRIMARY MALIGNANT NEOPLASM OF OVARY WITH WIDESPREAD METASTATIC DISEASE, UNSPECIFIED LATERALITY (MULTI): ICD-10-CM

## 2024-07-25 DIAGNOSIS — C80.0 PRIMARY MALIGNANT NEOPLASM OF OVARY WITH WIDESPREAD METASTATIC DISEASE, UNSPECIFIED LATERALITY (MULTI): ICD-10-CM

## 2024-07-25 DIAGNOSIS — G62.0 DRUG-INDUCED POLYNEUROPATHY (MULTI): ICD-10-CM

## 2024-07-25 RX ORDER — DULOXETIN HYDROCHLORIDE 60 MG/1
60 CAPSULE, DELAYED RELEASE ORAL NIGHTLY
Qty: 30 CAPSULE | Refills: 6 | Status: SHIPPED | OUTPATIENT
Start: 2024-07-25 | End: 2024-08-24

## 2024-07-30 ENCOUNTER — APPOINTMENT (OUTPATIENT)
Dept: HEMATOLOGY/ONCOLOGY | Facility: CLINIC | Age: 74
End: 2024-07-30
Payer: MEDICARE

## 2024-08-13 ENCOUNTER — APPOINTMENT (OUTPATIENT)
Dept: HEMATOLOGY/ONCOLOGY | Facility: CLINIC | Age: 74
End: 2024-08-13
Payer: MEDICARE

## 2024-08-20 ENCOUNTER — APPOINTMENT (OUTPATIENT)
Dept: HEMATOLOGY/ONCOLOGY | Facility: CLINIC | Age: 74
End: 2024-08-20
Payer: MEDICARE

## 2024-08-27 ENCOUNTER — APPOINTMENT (OUTPATIENT)
Dept: HEMATOLOGY/ONCOLOGY | Facility: CLINIC | Age: 74
End: 2024-08-27
Payer: MEDICARE

## 2024-09-10 ENCOUNTER — APPOINTMENT (OUTPATIENT)
Dept: HEMATOLOGY/ONCOLOGY | Facility: CLINIC | Age: 74
End: 2024-09-10
Payer: MEDICARE

## 2024-09-15 DIAGNOSIS — I10 HYPERTENSION, UNSPECIFIED TYPE: ICD-10-CM

## 2024-09-15 DIAGNOSIS — E78.5 HYPERLIPIDEMIA, UNSPECIFIED HYPERLIPIDEMIA TYPE: ICD-10-CM

## 2024-09-16 RX ORDER — LOSARTAN POTASSIUM 50 MG/1
50 TABLET ORAL DAILY
Qty: 90 TABLET | Refills: 0 | Status: SHIPPED | OUTPATIENT
Start: 2024-09-16

## 2024-09-16 RX ORDER — FENOFIBRATE 160 MG/1
160 TABLET ORAL DAILY
Qty: 90 TABLET | Refills: 0 | Status: SHIPPED | OUTPATIENT
Start: 2024-09-16

## 2024-09-16 RX ORDER — ROSUVASTATIN CALCIUM 40 MG/1
40 TABLET, COATED ORAL DAILY
Qty: 90 TABLET | Refills: 0 | Status: SHIPPED | OUTPATIENT
Start: 2024-09-16

## 2024-09-17 ENCOUNTER — APPOINTMENT (OUTPATIENT)
Dept: HEMATOLOGY/ONCOLOGY | Facility: CLINIC | Age: 74
End: 2024-09-17
Payer: MEDICARE

## 2024-09-23 ENCOUNTER — APPOINTMENT (OUTPATIENT)
Dept: PRIMARY CARE | Facility: CLINIC | Age: 74
End: 2024-09-23
Payer: MEDICARE

## 2024-09-24 ENCOUNTER — APPOINTMENT (OUTPATIENT)
Dept: HEMATOLOGY/ONCOLOGY | Facility: CLINIC | Age: 74
End: 2024-09-24
Payer: MEDICARE

## 2024-09-30 ENCOUNTER — APPOINTMENT (OUTPATIENT)
Dept: ENDOCRINOLOGY | Facility: CLINIC | Age: 74
End: 2024-09-30
Payer: MEDICARE

## 2024-09-30 VITALS — BODY MASS INDEX: 25.91 KG/M2 | HEIGHT: 64 IN

## 2024-09-30 DIAGNOSIS — I10 BENIGN ESSENTIAL HYPERTENSION: ICD-10-CM

## 2024-09-30 DIAGNOSIS — E10.9 TYPE 1 DIABETES MELLITUS WITHOUT COMPLICATION: Primary | ICD-10-CM

## 2024-09-30 DIAGNOSIS — E03.9 ACQUIRED HYPOTHYROIDISM: ICD-10-CM

## 2024-09-30 PROCEDURE — 1160F RVW MEDS BY RX/DR IN RCRD: CPT | Performed by: INTERNAL MEDICINE

## 2024-09-30 PROCEDURE — 3051F HG A1C>EQUAL 7.0%<8.0%: CPT | Performed by: INTERNAL MEDICINE

## 2024-09-30 PROCEDURE — 3048F LDL-C <100 MG/DL: CPT | Performed by: INTERNAL MEDICINE

## 2024-09-30 PROCEDURE — 4010F ACE/ARB THERAPY RXD/TAKEN: CPT | Performed by: INTERNAL MEDICINE

## 2024-09-30 PROCEDURE — 1159F MED LIST DOCD IN RCRD: CPT | Performed by: INTERNAL MEDICINE

## 2024-09-30 PROCEDURE — 1157F ADVNC CARE PLAN IN RCRD: CPT | Performed by: INTERNAL MEDICINE

## 2024-09-30 PROCEDURE — 1036F TOBACCO NON-USER: CPT | Performed by: INTERNAL MEDICINE

## 2024-09-30 PROCEDURE — 3060F POS MICROALBUMINURIA REV: CPT | Performed by: INTERNAL MEDICINE

## 2024-09-30 PROCEDURE — 99214 OFFICE O/P EST MOD 30 MIN: CPT | Performed by: INTERNAL MEDICINE

## 2024-09-30 RX ORDER — DEXAMETHASONE 4 MG/1
TABLET ORAL
COMMUNITY
Start: 2024-09-24

## 2024-09-30 RX ORDER — FOLIC ACID 0.8 MG
1 TABLET ORAL
COMMUNITY
Start: 2024-09-24

## 2024-09-30 ASSESSMENT — ENCOUNTER SYMPTOMS
SHORTNESS OF BREATH: 0
VOMITING: 0
COUGH: 0
FATIGUE: 0
HEADACHES: 0
PALPITATIONS: 0
FEVER: 0
DIARRHEA: 0
CHILLS: 0
NAUSEA: 0

## 2024-09-30 NOTE — PROGRESS NOTES
Endocrinology: Follow up visit  Subjective   Patient ID: Catalina Mendoza is a 73 y.o. female who presents for Diabetes (Type 1 ), Hypothyroidism, Hyperlipidemia, and Hypertension.    PCP: Juju Kenney MD    HPI    Dm1 on mdi + dexcom  Lantus 30   Humalog 1/15 + correction    Since last visit sought second opinion at [facility] and starting new chemo later this week.     Last seen in June A1c 6.7  A1c per clarity around 9    On review of data numbers spiking dramatically after meal.  Not currently on steroids  Feeling ok but notes memory is not great, not missing insulin  Checks sugars 4x a day  Injects insulin 4x a day  Currently utilizing cgm to check sugars      Review of Systems   Constitutional:  Negative for chills, fatigue and fever.   Respiratory:  Negative for cough and shortness of breath.    Cardiovascular:  Negative for chest pain and palpitations.   Gastrointestinal:  Negative for diarrhea, nausea and vomiting.   Neurological:  Negative for headaches.       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)    Overweight with body mass index (BMI) of 28 to 28.9 in adult    Diabetic macular edema (Multi)    Diabetic nephropathy associated with type 1 diabetes mellitus (Multi)    Mixed hyperlipidemia    Acquired hypothyroidism    Diabetes mellitus (Multi)    Actinic keratosis    Scar    Squamous cell carcinoma of skin of lip    Poorly controlled diabetes mellitus (Multi)    Acute posthemorrhagic anemia    Fracture of bone    History of malignant neoplasm of lip    History of malignant  "neoplasm of skin    Postoperative state    Rib pain    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)    Immunodeficiency, unspecified (Multi)    Drug-induced polyneuropathy (Multi)    Stage 3a chronic kidney disease (Multi)    Anemia due to blood loss    Normochromic normocytic anemia    Left upper quadrant abdominal pain    Other specified anemias    Dehydration        Home Meds:  Current Outpatient Medications   Medication Instructions    alcohol swabs (BD Alcohol Swabs) pads, medicated     calcium carbonate-vitamin D3 600 mg-20 mcg (800 unit) tablet 1 tablet, oral, Nightly    cholecalciferol (VITAMIN D-3) 25 mcg, oral, Daily    dexAMETHasone (Decadron) 4 mg tablet PLEASE SEE ATTACHED FOR DETAILED DIRECTIONS    DULoxetine (CYMBALTA) 60 mg, oral, Nightly    fenofibrate (TRIGLIDE) 160 mg, oral, Daily    folic acid (Folvite) 800 mcg tablet 1 tablet, oral, Daily (0630)    glucagon (Gvoke HypoPen 2-Pack) 1 mg/0.2 mL auto-injector Use as directed    insulin lispro (Admelog SoloStar U-100 Insulin) 100 unit/mL injection Inject with meals up to 60 units per day    Lantus Solostar U-100 Insulin 30 Units, subcutaneous, Nightly    levothyroxine (SYNTHROID, LEVOXYL) 88 mcg, oral, Daily before breakfast    losartan (COZAAR) 50 mg, oral, Daily    metFORMIN XR (GLUCOPHAGE-XR) 500 mg, oral, 2 times daily, Do not crush, chew, or split.     multivit/folic acid/vit K1 (ONE-A-DAY WOMEN'S 50 PLUS ORAL) 1 tablet, oral, Nightly, Chew and swallow 1 tablet daily    pen needle, diabetic 31 gauge x 3/16\" needle 4 per day    rosuvastatin (CRESTOR) 40 mg, oral, Daily        Allergies   Allergen Reactions    Amoxicillin Unknown    Flu Vac 2020 [id number](Pf) Other     Dizziness, Double vision    Grass Pollen Unknown    House Dust Unknown    Mold Unknown    Other Dizziness     Dizziness, Double vision    Penicillins Other     unsure    Tree And Shrub Pollen Unknown        Objective   There were no vitals filed for " this visit.   There were no vitals filed for this visit.   Body mass index is 25.91 kg/m².   Physical Exam  Constitutional:       Appearance: Normal appearance. She is normal weight.   HENT:      Head: Normocephalic and atraumatic.   Neck:      Thyroid: No thyroid mass, thyromegaly or thyroid tenderness.   Cardiovascular:      Rate and Rhythm: Normal rate and regular rhythm.      Heart sounds: No murmur heard.     No gallop.   Pulmonary:      Effort: Pulmonary effort is normal.      Breath sounds: Normal breath sounds.   Abdominal:      Palpations: Abdomen is soft.      Comments: benign   Neurological:      General: No focal deficit present.      Mental Status: She is alert and oriented to person, place, and time.      Deep Tendon Reflexes: Reflexes are normal and symmetric.   Psychiatric:         Behavior: Behavior is cooperative.         Labs:  Lab Results   Component Value Date    HGBA1C 7.6 (H) 06/17/2024    TSH 15.88 (H) 07/01/2024    FREET4 0.85 07/01/2024      Lab Results   Component Value Date    PR1  02/11/2024     Normocytic anemia and thrombocytopenia. Toxic changes in granulocytes. Clinical correlation is recommended.            Assessment/Plan   Assessment & Plan  Type 1 diabetes mellitus without complication  Discussed numbers  Adjust carb ratio from 1/15 to 1/12 and message in a week to review  Discussed effect of steroids if new regimen requires them  Orders:    TSH with reflex to Free T4 if abnormal; Future    Comprehensive Metabolic Panel; Future    Hemoglobin A1C; Future    Acquired hypothyroidism  Due for recheck  Orders:    TSH with reflex to Free T4 if abnormal; Future    Benign essential hypertension    Bp excellent today      Electronically signed by:  Augustina Ch MD 09/30/24 10:19 AM

## 2024-09-30 NOTE — ASSESSMENT & PLAN NOTE
Discussed numbers  Adjust carb ratio from 1/15 to 1/12 and message in a week to review  Discussed effect of steroids if new regimen requires them  Orders:    TSH with reflex to Free T4 if abnormal; Future    Comprehensive Metabolic Panel; Future    Hemoglobin A1C; Future

## 2024-10-30 ENCOUNTER — APPOINTMENT (OUTPATIENT)
Dept: PRIMARY CARE | Facility: CLINIC | Age: 74
End: 2024-10-30
Payer: MEDICARE

## 2024-11-07 NOTE — ADDENDUM NOTE
Addended by: GUERLINE BOUCHER on: 4/30/2024 03:30 PM     Modules accepted: Orders    
Findings discussed with Patients Family

## 2024-11-12 ENCOUNTER — APPOINTMENT (OUTPATIENT)
Dept: GYNECOLOGIC ONCOLOGY | Facility: CLINIC | Age: 74
End: 2024-11-12
Payer: MEDICARE

## 2024-12-04 DIAGNOSIS — E78.5 HYPERLIPIDEMIA, UNSPECIFIED HYPERLIPIDEMIA TYPE: ICD-10-CM

## 2024-12-05 RX ORDER — FENOFIBRATE 160 MG/1
160 TABLET ORAL DAILY
Qty: 90 TABLET | Refills: 0 | Status: SHIPPED | OUTPATIENT
Start: 2024-12-05

## 2024-12-13 DIAGNOSIS — I10 HYPERTENSION, UNSPECIFIED TYPE: ICD-10-CM

## 2024-12-16 DIAGNOSIS — E78.5 HYPERLIPIDEMIA, UNSPECIFIED HYPERLIPIDEMIA TYPE: ICD-10-CM

## 2024-12-16 RX ORDER — ROSUVASTATIN CALCIUM 40 MG/1
40 TABLET, COATED ORAL DAILY
Qty: 90 TABLET | Refills: 0 | Status: SHIPPED | OUTPATIENT
Start: 2024-12-16

## 2024-12-16 RX ORDER — LOSARTAN POTASSIUM 50 MG/1
50 TABLET ORAL DAILY
Qty: 90 TABLET | Refills: 0 | Status: SHIPPED | OUTPATIENT
Start: 2024-12-16

## 2025-02-04 ENCOUNTER — APPOINTMENT (OUTPATIENT)
Dept: ENDOCRINOLOGY | Facility: CLINIC | Age: 75
End: 2025-02-04
Payer: MEDICARE

## 2025-02-04 VITALS
RESPIRATION RATE: 16 BRPM | SYSTOLIC BLOOD PRESSURE: 112 MMHG | BODY MASS INDEX: 27.39 KG/M2 | HEIGHT: 64 IN | HEART RATE: 91 BPM | WEIGHT: 160.4 LBS | DIASTOLIC BLOOD PRESSURE: 70 MMHG

## 2025-02-04 DIAGNOSIS — E10.9 TYPE 1 DIABETES MELLITUS WITHOUT COMPLICATION: Primary | ICD-10-CM

## 2025-02-04 DIAGNOSIS — E78.5 HYPERLIPIDEMIA, UNSPECIFIED HYPERLIPIDEMIA TYPE: ICD-10-CM

## 2025-02-04 DIAGNOSIS — E03.9 ACQUIRED HYPOTHYROIDISM: ICD-10-CM

## 2025-02-04 DIAGNOSIS — I10 BENIGN ESSENTIAL HYPERTENSION: ICD-10-CM

## 2025-02-04 PROCEDURE — 4010F ACE/ARB THERAPY RXD/TAKEN: CPT | Performed by: INTERNAL MEDICINE

## 2025-02-04 PROCEDURE — 3078F DIAST BP <80 MM HG: CPT | Performed by: INTERNAL MEDICINE

## 2025-02-04 PROCEDURE — 1159F MED LIST DOCD IN RCRD: CPT | Performed by: INTERNAL MEDICINE

## 2025-02-04 PROCEDURE — 99214 OFFICE O/P EST MOD 30 MIN: CPT | Performed by: INTERNAL MEDICINE

## 2025-02-04 PROCEDURE — 1160F RVW MEDS BY RX/DR IN RCRD: CPT | Performed by: INTERNAL MEDICINE

## 2025-02-04 PROCEDURE — 1036F TOBACCO NON-USER: CPT | Performed by: INTERNAL MEDICINE

## 2025-02-04 PROCEDURE — 1157F ADVNC CARE PLAN IN RCRD: CPT | Performed by: INTERNAL MEDICINE

## 2025-02-04 PROCEDURE — G2211 COMPLEX E/M VISIT ADD ON: HCPCS | Performed by: INTERNAL MEDICINE

## 2025-02-04 PROCEDURE — 3074F SYST BP LT 130 MM HG: CPT | Performed by: INTERNAL MEDICINE

## 2025-02-04 RX ORDER — INSULIN LISPRO 100 [IU]/ML
INJECTION, SOLUTION INTRAVENOUS; SUBCUTANEOUS
Qty: 120 ML | Refills: 3 | Status: SHIPPED | OUTPATIENT
Start: 2025-02-04

## 2025-02-04 RX ORDER — LEVOTHYROXINE SODIUM 112 UG/1
112 TABLET ORAL
Qty: 90 TABLET | Refills: 3 | Status: SHIPPED | OUTPATIENT
Start: 2025-02-04 | End: 2026-02-04

## 2025-02-04 RX ORDER — INSULIN DEGLUDEC 100 U/ML
36 INJECTION, SOLUTION SUBCUTANEOUS NIGHTLY
Qty: 32.4 ML | Refills: 3 | Status: SHIPPED | OUTPATIENT
Start: 2025-02-04 | End: 2026-02-04

## 2025-02-04 ASSESSMENT — ENCOUNTER SYMPTOMS
COUGH: 0
FEVER: 0
PALPITATIONS: 0
NAUSEA: 0
FATIGUE: 0
SHORTNESS OF BREATH: 0
VOMITING: 0
DIARRHEA: 0
CHILLS: 0
HEADACHES: 0

## 2025-02-04 NOTE — ASSESSMENT & PLAN NOTE
Discussed sugars: must touch base weekly to adjust insulin  Unable to get sugars down without communication  Switch to tresiba and increase to 36 units  Increase meal insulin to 40 units  Message weekly for adjust

## 2025-02-04 NOTE — PROGRESS NOTES
Endocrinology: Follow up visit  Subjective   Patient ID: Catalina Mendoza is a 74 y.o. female who presents for Diabetes (Type 1 ) and Hypothyroidism.    PCP: Juju Kenney MD    HPI  Dm1 on mdi + dexcom  Lantus 30-40  Humalog 30-40 with meals  Since last visit still struggling with ov cancer rx.   Sugars are high.  I have not heard from her since sept.  Using dexcom and numbers reviewed  On pembro: recent tsh up    Checks sugars 4x a day  Injects insulin 4x a day  Currently utilizing cgm to check sugars  Review of Systems   Constitutional:  Negative for chills, fatigue and fever.   Respiratory:  Negative for cough and shortness of breath.    Cardiovascular:  Negative for chest pain and palpitations.   Gastrointestinal:  Negative for diarrhea, nausea and vomiting.   Neurological:  Negative for headaches.       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)    Overweight with body mass index (BMI) of 28 to 28.9 in adult    Diabetic macular edema    Diabetic nephropathy associated with type 1 diabetes mellitus (Multi)    Mixed hyperlipidemia    Acquired hypothyroidism    Diabetes mellitus (Multi)    Actinic keratosis    Scar    Squamous cell carcinoma of skin of lip    Poorly controlled diabetes mellitus (Multi)    Acute posthemorrhagic anemia    Fracture of bone    History of malignant neoplasm of lip    History of malignant neoplasm of skin    Postoperative state    Rib pain    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)    Immunodeficiency, unspecified     "Drug-induced polyneuropathy (Multi)    Stage 3a chronic kidney disease (Multi)    Anemia due to blood loss    Normochromic normocytic anemia    Left upper quadrant abdominal pain    Other specified anemias    Dehydration        Home Meds:  Current Outpatient Medications   Medication Instructions    alcohol swabs (BD Alcohol Swabs) pads, medicated     calcium carbonate-vitamin D3 600 mg-20 mcg (800 unit) tablet 1 tablet, oral, Nightly    cholecalciferol (VITAMIN D-3) 25 mcg, oral, Daily    dexAMETHasone (Decadron) 4 mg tablet PLEASE SEE ATTACHED FOR DETAILED DIRECTIONS    DULoxetine (CYMBALTA) 60 mg, oral, Nightly    fenofibrate (TRIGLIDE) 160 mg, oral, Daily    folic acid (Folvite) 800 mcg tablet 1 tablet, oral, Daily (0630)    glucagon (Gvoke HypoPen 2-Pack) 1 mg/0.2 mL auto-injector Use as directed    insulin lispro (Admelog SoloStar U-100 Insulin) 100 unit/mL injection Inject with meals up to 60 units per day    Lantus Solostar U-100 Insulin 30 Units, subcutaneous, Nightly    levothyroxine (SYNTHROID, LEVOXYL) 88 mcg, oral, Daily before breakfast    losartan (COZAAR) 50 mg, oral, Daily    metFORMIN XR (GLUCOPHAGE-XR) 500 mg, oral, 2 times daily, Do not crush, chew, or split.     multivit/folic acid/vit K1 (ONE-A-DAY WOMEN'S 50 PLUS ORAL) 1 tablet, oral, Nightly, Chew and swallow 1 tablet daily    pen needle, diabetic 31 gauge x 3/16\" needle 4 per day    rosuvastatin (CRESTOR) 40 mg, oral, Daily        Allergies   Allergen Reactions    Amoxicillin Unknown    Flu Vac 2020 65up-Iupsn13x(Pf) Other     Dizziness, Double vision    Grass Pollen Unknown    House Dust Unknown    Mold Unknown    Other Dizziness     Dizziness, Double vision    Penicillins Other     unsure    Tree And Shrub Pollen Unknown        Objective   Vitals:    02/04/25 1100   BP: 112/70   Pulse: 91   Resp: 16      Vitals:    02/04/25 1100   Weight: 72.8 kg (160 lb 6.4 oz)      Body mass index is 27.97 kg/m².   Physical Exam  Constitutional:       " Appearance: Normal appearance. She is overweight.   HENT:      Head: Normocephalic and atraumatic.   Neck:      Thyroid: No thyroid mass, thyromegaly or thyroid tenderness.   Cardiovascular:      Rate and Rhythm: Normal rate and regular rhythm.      Heart sounds: No murmur heard.     No gallop.   Pulmonary:      Effort: Pulmonary effort is normal.      Breath sounds: Normal breath sounds.   Abdominal:      Palpations: Abdomen is soft.      Comments: benign   Neurological:      General: No focal deficit present.      Mental Status: She is alert and oriented to person, place, and time.      Deep Tendon Reflexes: Reflexes are normal and symmetric.   Psychiatric:         Behavior: Behavior is cooperative.         Labs:  Lab Results   Component Value Date    HGBA1C 10.1 (H) 11/14/2024    TSH 15.88 (H) 07/01/2024    FREET4 0.85 07/01/2024      Lab Results   Component Value Date    PR1  02/11/2024     Normocytic anemia and thrombocytopenia. Toxic changes in granulocytes. Clinical correlation is recommended.            Assessment/Plan   Assessment & Plan  Type 1 diabetes mellitus without complication    Discussed sugars: must touch base weekly to adjust insulin  Unable to get sugars down without communication  Switch to tresiba and increase to 36 units  Increase meal insulin to 40 units  Message weekly for adjust  Acquired hypothyroidism    Discussed pembro and affect on thyroid: increase to 112 mcg    Benign essential hypertension    Bp excellent  Hyperlipidemia, unspecified hyperlipidemia type  Stable on statin      Electronically signed by:  Augustina Ch MD 02/04/25 11:02 AM

## 2025-02-04 NOTE — PATIENT INSTRUCTIONS
Adjust insulin as written  Message weekly for further adjustment  Increase thyroid med to 112 mcg  Follow up in 3 months

## 2025-02-15 DIAGNOSIS — E78.5 HYPERLIPIDEMIA, UNSPECIFIED HYPERLIPIDEMIA TYPE: ICD-10-CM

## 2025-02-17 RX ORDER — FENOFIBRATE 160 MG/1
160 TABLET ORAL DAILY
Qty: 90 TABLET | Refills: 0 | Status: SHIPPED | OUTPATIENT
Start: 2025-02-17

## 2025-02-22 DIAGNOSIS — I10 HYPERTENSION, UNSPECIFIED TYPE: ICD-10-CM

## 2025-02-26 ENCOUNTER — APPOINTMENT (OUTPATIENT)
Dept: PRIMARY CARE | Facility: CLINIC | Age: 75
End: 2025-02-26
Payer: MEDICARE

## 2025-02-26 VITALS
WEIGHT: 160.8 LBS | OXYGEN SATURATION: 97 % | HEART RATE: 84 BPM | TEMPERATURE: 97.5 F | HEIGHT: 64 IN | SYSTOLIC BLOOD PRESSURE: 122 MMHG | BODY MASS INDEX: 27.45 KG/M2 | DIASTOLIC BLOOD PRESSURE: 70 MMHG

## 2025-02-26 DIAGNOSIS — Z12.31 ENCOUNTER FOR SCREENING MAMMOGRAM FOR MALIGNANT NEOPLASM OF BREAST: ICD-10-CM

## 2025-02-26 DIAGNOSIS — E11.69 TYPE 2 DIABETES MELLITUS WITH OTHER SPECIFIED COMPLICATION, WITH LONG-TERM CURRENT USE OF INSULIN: ICD-10-CM

## 2025-02-26 DIAGNOSIS — J84.10 PULMONARY FIBROSIS, UNSPECIFIED (MULTI): ICD-10-CM

## 2025-02-26 DIAGNOSIS — E55.9 VITAMIN D DEFICIENCY: ICD-10-CM

## 2025-02-26 DIAGNOSIS — E11.311 DIABETIC MACULAR EDEMA: ICD-10-CM

## 2025-02-26 DIAGNOSIS — E78.00 HYPERCHOLESTEREMIA: ICD-10-CM

## 2025-02-26 DIAGNOSIS — Z00.00 ENCOUNTER FOR SUBSEQUENT ANNUAL WELLNESS VISIT (AWV) IN MEDICARE PATIENT: ICD-10-CM

## 2025-02-26 DIAGNOSIS — N18.31 STAGE 3A CHRONIC KIDNEY DISEASE (MULTI): ICD-10-CM

## 2025-02-26 DIAGNOSIS — Z79.4 TYPE 2 DIABETES MELLITUS WITH OTHER SPECIFIED COMPLICATION, WITH LONG-TERM CURRENT USE OF INSULIN: ICD-10-CM

## 2025-02-26 DIAGNOSIS — I10 HYPERTENSION, UNSPECIFIED TYPE: ICD-10-CM

## 2025-02-26 DIAGNOSIS — D61.818 OTHER PANCYTOPENIA (MULTI): ICD-10-CM

## 2025-02-26 DIAGNOSIS — Z00.00 ROUTINE GENERAL MEDICAL EXAMINATION AT HEALTH CARE FACILITY: Primary | ICD-10-CM

## 2025-02-26 DIAGNOSIS — I50.32 CHRONIC DIASTOLIC CONGESTIVE HEART FAILURE: ICD-10-CM

## 2025-02-26 RX ORDER — LOSARTAN POTASSIUM 50 MG/1
50 TABLET ORAL DAILY
Qty: 30 TABLET | Refills: 0 | Status: SHIPPED | OUTPATIENT
Start: 2025-02-26

## 2025-02-26 ASSESSMENT — ENCOUNTER SYMPTOMS
DEPRESSION: 0
OCCASIONAL FEELINGS OF UNSTEADINESS: 0
LOSS OF SENSATION IN FEET: 1
HYPERTENSION: 1

## 2025-02-26 ASSESSMENT — PATIENT HEALTH QUESTIONNAIRE - PHQ9
2. FEELING DOWN, DEPRESSED OR HOPELESS: NOT AT ALL
1. LITTLE INTEREST OR PLEASURE IN DOING THINGS: SEVERAL DAYS
SUM OF ALL RESPONSES TO PHQ9 QUESTIONS 1 AND 2: 1

## 2025-02-26 ASSESSMENT — ACTIVITIES OF DAILY LIVING (ADL)
GROCERY_SHOPPING: INDEPENDENT
DRESSING: INDEPENDENT
TAKING_MEDICATION: INDEPENDENT
BATHING: INDEPENDENT
DOING_HOUSEWORK: INDEPENDENT
MANAGING_FINANCES: INDEPENDENT

## 2025-02-26 NOTE — PROGRESS NOTES
"Subjective   Reason for Visit: Catalina Mendoza is an 74 y.o. female here for a Medicare Wellness visit, yearly physical and follow up    Past Medical, Surgical, and Family History reviewed and updated in chart.    Reviewed all medications by prescribing practitioner or clinical pharmacist (such as prescriptions, OTCs, herbal therapies and supplements) and documented in the medical record.    Hypertension      Medicare wellness    Yearly physical    Mammogram 2-24 / per oncology  Dexa n/a  Colonoscopy 2017  due 2027  CT chest lung cancer screening n/a  GYN n/a  immunizations rev'd RSV, COVID, Pneumo  (visual loss with flu yrs ago)  BMI 28    Follow up    Has good days and bad    anemia due to blood loss       s/p 2 units      EGD negative      Oncology following      LUQ pain x 2-21 unchanged  Now goes around upper abdomen  Before cancer  CT June rev'd responded well to chemo  On cymbalta   Saw cardio work up neg  pain mgmt pending     patient had surgery for stage III ovarian cancer on March 13, 2023.  Currently in chemo.  Oncology following  Now metastatic  Notes rev'd  Prognosis poor     DM -2  on insulin no side effects. Endocrine following.   HBA1C 8.2  1-24  Endo following     Hypercholesterolemia on therapy no side effects     Hypothyroid on therapy no side effects  Endo following     Hypertension stable  on therapy gets dizzy when stands up  on losartan 50 mg daily   follow     CAD stable on therapy no side effects     Diet and exercise reviewed    Patient Care Team:  Juju Kenney MD as PCP - General (Internal Medicine)  Juju Kenney MD as PCP - [employer] PCP  Dorys Crystal MD as Consulting Physician (Obstetrics and Gynecology)     Review of Systems   All other systems reviewed and are negative.      Objective   Vitals:  /70   Pulse 84   Temp 36.4 °C (97.5 °F)   Ht 1.613 m (5' 3.5\")   Wt 72.9 kg (160 lb 12.8 oz)   LMP  (LMP Unknown)   SpO2 97%   BMI 28.04 " kg/m²       Physical Exam  Vitals reviewed.   Constitutional:       Appearance: Normal appearance. She is normal weight.   HENT:      Head: Normocephalic and atraumatic.      Mouth/Throat:      Pharynx: No posterior oropharyngeal erythema.   Eyes:      General: No scleral icterus.     Conjunctiva/sclera: Conjunctivae normal.      Pupils: Pupils are equal, round, and reactive to light.   Cardiovascular:      Rate and Rhythm: Normal rate and regular rhythm.      Heart sounds: Normal heart sounds.   Pulmonary:      Effort: No respiratory distress.      Breath sounds: No wheezing.   Abdominal:      General: Abdomen is flat. Bowel sounds are normal. There is no distension.      Palpations: Abdomen is soft. There is no mass.      Tenderness: There is no abdominal tenderness. There is no rebound.   Musculoskeletal:         General: Normal range of motion.      Cervical back: Normal range of motion and neck supple.   Skin:     General: Skin is warm and dry.   Neurological:      General: No focal deficit present.      Mental Status: She is alert and oriented to person, place, and time. Mental status is at baseline.   Psychiatric:         Mood and Affect: Mood normal.         Behavior: Behavior normal.         Thought Content: Thought content normal.         Judgment: Judgment normal.         Assessment & Plan  Routine general medical examination at health care facility         Encounter for subsequent annual wellness visit (AWV) in Medicare patient         Type 2 diabetes mellitus with other specified complication, with long-term current use of insulin    Orders:    Hemoglobin A1C; Future    Albumin-Creatinine Ratio, Urine Random; Future    Diabetic macular edema         Stage 3a chronic kidney disease (Multi)         Hypercholesteremia    Orders:    Comprehensive Metabolic Panel; Future    Lipid Panel; Future    TSH with reflex to Free T4 if abnormal; Future    Hypertension, unspecified type         Chronic diastolic  congestive heart failure         Pulmonary fibrosis, unspecified (Multi)         Other pancytopenia (Multi)    Orders:    CBC and Auto Differential; Future    Encounter for screening mammogram for malignant neoplasm of breast    Orders:    BI mammo bilateral screening tomosynthesis; Future    Vitamin D deficiency    Orders:    Vitamin D 25-Hydroxy,Total (for eval of Vitamin D levels); Future    BMI 28.0-28.9,adult                     Medicare wellness    Yearly physical    Mammogram 2-24 / per oncology  Dexa n/a  Colonoscopy 2017  due 2027  CT chest lung cancer screening n/a  GYN n/a  immunizations rev'd RSV, COVID, Pneumo  (visual loss with flu yrs ago)  BMI 28    Follow up    Has good days and bad    anemia due to blood loss       s/p 2 units      EGD negative      Oncology following      LUQ pain x 2-21 unchanged  Now goes around upper abdomen  Before cancer  CT June rev'd responded well to chemo  On cymbalta   Saw cardio work up neg  pain mgmt pending     patient had surgery for stage III ovarian cancer on March 13, 2023.  Currently in chemo.  Oncology following  Now metastatic  Notes rev'd  Prognosis poor     DM -2  on insulin no side effects. Endocrine following.   HBA1C 8.2  1-24  Endo following     Hypercholesterolemia on therapy no side effects     Hypothyroid on therapy no side effects  Endo following     Hypertension stable  on therapy gets dizzy when stands up  on losartan 50 mg daily   follow     CAD stable on therapy no side effects     Diet and exercise reviewed    Check mammogram and labs    Follow up pending

## 2025-03-14 DIAGNOSIS — E78.5 HYPERLIPIDEMIA, UNSPECIFIED HYPERLIPIDEMIA TYPE: ICD-10-CM

## 2025-03-16 RX ORDER — ROSUVASTATIN CALCIUM 40 MG/1
40 TABLET, COATED ORAL DAILY
Qty: 90 TABLET | Refills: 0 | Status: SHIPPED | OUTPATIENT
Start: 2025-03-16

## 2025-03-22 DIAGNOSIS — I10 HYPERTENSION, UNSPECIFIED TYPE: ICD-10-CM

## 2025-03-26 RX ORDER — LOSARTAN POTASSIUM 50 MG/1
50 TABLET ORAL DAILY
Qty: 90 TABLET | Refills: 0 | Status: SHIPPED | OUTPATIENT
Start: 2025-03-26

## 2025-06-06 DIAGNOSIS — I10 HYPERTENSION, UNSPECIFIED TYPE: ICD-10-CM

## 2025-06-09 RX ORDER — LOSARTAN POTASSIUM 50 MG/1
50 TABLET ORAL DAILY
Qty: 90 TABLET | Refills: 0 | Status: SHIPPED | OUTPATIENT
Start: 2025-06-09

## 2025-06-11 DIAGNOSIS — E78.5 HYPERLIPIDEMIA, UNSPECIFIED HYPERLIPIDEMIA TYPE: ICD-10-CM

## 2025-06-11 RX ORDER — ROSUVASTATIN CALCIUM 40 MG/1
40 TABLET, COATED ORAL DAILY
Qty: 90 TABLET | Refills: 0 | Status: SHIPPED | OUTPATIENT
Start: 2025-06-11

## 2025-06-17 ENCOUNTER — APPOINTMENT (OUTPATIENT)
Dept: ENDOCRINOLOGY | Facility: CLINIC | Age: 75
End: 2025-06-17
Payer: MEDICARE

## 2025-06-17 VITALS
HEIGHT: 64 IN | RESPIRATION RATE: 16 BRPM | SYSTOLIC BLOOD PRESSURE: 124 MMHG | BODY MASS INDEX: 27.86 KG/M2 | DIASTOLIC BLOOD PRESSURE: 72 MMHG | WEIGHT: 163.2 LBS | HEART RATE: 76 BPM

## 2025-06-17 DIAGNOSIS — E03.9 ACQUIRED HYPOTHYROIDISM: ICD-10-CM

## 2025-06-17 DIAGNOSIS — E78.5 HYPERLIPIDEMIA, UNSPECIFIED HYPERLIPIDEMIA TYPE: ICD-10-CM

## 2025-06-17 DIAGNOSIS — I10 BENIGN ESSENTIAL HYPERTENSION: ICD-10-CM

## 2025-06-17 DIAGNOSIS — E10.9 TYPE 1 DIABETES MELLITUS WITHOUT COMPLICATION: Primary | ICD-10-CM

## 2025-06-17 PROCEDURE — 1159F MED LIST DOCD IN RCRD: CPT | Performed by: INTERNAL MEDICINE

## 2025-06-17 PROCEDURE — G2211 COMPLEX E/M VISIT ADD ON: HCPCS | Performed by: INTERNAL MEDICINE

## 2025-06-17 PROCEDURE — 4010F ACE/ARB THERAPY RXD/TAKEN: CPT | Performed by: INTERNAL MEDICINE

## 2025-06-17 PROCEDURE — 99214 OFFICE O/P EST MOD 30 MIN: CPT | Performed by: INTERNAL MEDICINE

## 2025-06-17 PROCEDURE — 3074F SYST BP LT 130 MM HG: CPT | Performed by: INTERNAL MEDICINE

## 2025-06-17 PROCEDURE — 1036F TOBACCO NON-USER: CPT | Performed by: INTERNAL MEDICINE

## 2025-06-17 PROCEDURE — 3078F DIAST BP <80 MM HG: CPT | Performed by: INTERNAL MEDICINE

## 2025-06-17 PROCEDURE — 1160F RVW MEDS BY RX/DR IN RCRD: CPT | Performed by: INTERNAL MEDICINE

## 2025-06-17 RX ORDER — PREDNISONE 5 MG/1
TABLET ORAL
COMMUNITY
Start: 2025-04-10

## 2025-06-17 RX ORDER — GABAPENTIN 300 MG/1
300 CAPSULE ORAL 2 TIMES DAILY
COMMUNITY
Start: 2025-04-17 | End: 2025-10-14

## 2025-06-17 ASSESSMENT — ENCOUNTER SYMPTOMS
PALPITATIONS: 0
COUGH: 0
DIARRHEA: 0
NAUSEA: 0
VOMITING: 0
HEADACHES: 0
FATIGUE: 0
CHILLS: 0
FEVER: 0
SHORTNESS OF BREATH: 0

## 2025-06-17 NOTE — ASSESSMENT & PLAN NOTE
Discussed sugars: must touch base weekly to adjust insulin  Unable to get sugars down without communication  Reduce long acting to 30  Increase meal to 44 units  Message/call weekly for adjust

## 2025-06-17 NOTE — PROGRESS NOTES
Endocrinology: Follow up visit  Subjective   Patient ID: Catalina Mendoza is a 74 y.o. female who presents for Diabetes (Type 1 ) and Hypothyroidism.    PCP: Juju Kenney MD    HPI  Dm1 on mdi + dexcom  Lantus 30-40  Humalog 30-40 with meals  Since last visit still struggling with ov cancer rx.   Sugars are high as has been on steroids for 6 wks, just stopped a few days ago.  I have not heard from her since sept.  Using dexcom and numbers reviewed  On pembro: recent tsh/t3/t4 normal 5/15     Checks sugars 4x a day  Injects insulin 4x a day  Currently utilizing cgm to check sugars  Review of Systems   Constitutional:  Negative for chills, fatigue and fever.   Respiratory:  Negative for cough and shortness of breath.    Cardiovascular:  Negative for chest pain and palpitations.   Gastrointestinal:  Negative for diarrhea, nausea and vomiting.   Neurological:  Negative for headaches.       Problem List[1]     Home Meds:  Current Outpatient Medications   Medication Instructions    alcohol swabs (BD Alcohol Swabs) pads, medicated     bevacizumab (Avastin) 25 mg/mL chemo injection Once    calcium carbonate-vitamin D3 600 mg-20 mcg (800 unit) tablet 1 tablet, Nightly    cholecalciferol (VITAMIN D-3) 25 mcg, Daily    CYCLOPHOSPHAMIDE ORAL Take by mouth.    dexAMETHasone (Decadron) 4 mg tablet     DULoxetine (CYMBALTA) 60 mg, oral, Nightly    fenofibrate (TRIGLIDE) 160 mg, oral, Daily    folic acid (Folvite) 800 mcg tablet 1 tablet, Daily (0630)    gabapentin (NEURONTIN) 300 mg, 2 times daily    glucagon (Gvoke HypoPen 2-Pack) 1 mg/0.2 mL auto-injector Use as directed    insulin lispro (Admelog SoloStar U-100 Insulin) 100 unit/mL injection Inject with meals up to 60 units per day    insulin lispro (HumaLOG KwikPen Insulin) 100 unit/mL injection Inject with meals up to 120 units a day    Lantus Solostar U-100 Insulin 30 Units, subcutaneous, Nightly    levothyroxine (SYNTHROID, LEVOXYL) 112 mcg, oral, Daily before  "breakfast    losartan (COZAAR) 50 mg, oral, Daily    metFORMIN XR (GLUCOPHAGE-XR) 500 mg, oral, 2 times daily, Do not crush, chew, or split.     multivit/folic acid/vit K1 (ONE-A-DAY WOMEN'S 50 PLUS ORAL) 1 tablet, Nightly    pembrolizumab (Keytruda) 25 mg/mL chemo injection Once    pen needle, diabetic 31 gauge x 3/16\" needle 4 per day    predniSONE (Deltasone) 5 mg tablet PLEASE SEE ATTACHED FOR DETAILED DIRECTIONS    rosuvastatin (CRESTOR) 40 mg, oral, Daily    Tresiba FlexTouch U-100 36 Units, subcutaneous, Nightly, Take as directed per insulin instructions.        RX Allergies[2]     Objective   Vitals:    06/17/25 1212   BP: 124/72   Pulse: 76   Resp: 16      Vitals:    06/17/25 1212   Weight: 74 kg (163 lb 3.2 oz)      Body mass index is 28.46 kg/m².   Physical Exam  Constitutional:       Appearance: Normal appearance. She is overweight.   HENT:      Head: Normocephalic and atraumatic.   Neck:      Thyroid: No thyroid mass, thyromegaly or thyroid tenderness.   Cardiovascular:      Rate and Rhythm: Normal rate and regular rhythm.      Heart sounds: No murmur heard.     No gallop.   Pulmonary:      Effort: Pulmonary effort is normal.      Breath sounds: Normal breath sounds.   Abdominal:      Palpations: Abdomen is soft.      Comments: benign   Neurological:      General: No focal deficit present.      Mental Status: She is alert and oriented to person, place, and time.      Deep Tendon Reflexes: Reflexes are normal and symmetric.   Psychiatric:         Behavior: Behavior is cooperative.         Labs:  Lab Results   Component Value Date    HGBA1C 10.1 (H) 11/14/2024    TSH 15.88 (H) 07/01/2024    FREET4 0.85 07/01/2024      Lab Results   Component Value Date    PR1  02/11/2024     Normocytic anemia and thrombocytopenia. Toxic changes in granulocytes. Clinical correlation is recommended.            Assessment/Plan   Assessment & Plan  Type 1 diabetes mellitus without complication    Discussed sugars: must touch " base weekly to adjust insulin  Unable to get sugars down without communication  Reduce long acting to 30  Increase meal to 44 units  Message/call weekly for adjust        Acquired hypothyroidism    Discussed pembro and affect on thyroid: current levels normal          Benign essential hypertension    Bp excellent        Hyperlipidemia, unspecified hyperlipidemia type  Stable on statin            Electronically signed by:  Augustina Ch MD 06/17/25 12:57 PM                   [1]   Patient Active Problem List  Diagnosis    Abnormal computed tomography scan    Benign essential hypertension    CAD, multiple vessel    Fissure in skin of foot    Tinea pedis of both feet    Hyperlipidemia    Hypothyroidism    Motion sickness    Osteoarthritis    Pain due to onychomycosis of toenails of both feet    Pain, wrist joint    Urinary symptom or sign    Vitamin D deficiency    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)    Overweight with body mass index (BMI) of 28 to 28.9 in adult    Diabetic macular edema    Diabetic nephropathy associated with type 1 diabetes mellitus    Mixed hyperlipidemia    Acquired hypothyroidism    Diabetes mellitus (Multi)    Actinic keratosis    Scar    Squamous cell carcinoma of skin of lip    Poorly controlled diabetes mellitus (Multi)    Acute posthemorrhagic anemia    Fracture of bone    History of malignant neoplasm of lip    History of malignant neoplasm of skin    Postoperative state    Rib pain    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)    Immunodeficiency, unspecified    Drug-induced polyneuropathy (Multi)    Stage 3a chronic kidney disease (Multi)    Anemia due to blood loss    Normochromic normocytic anemia    Left upper quadrant abdominal pain    Other pancytopenia (Multi)    Dehydration    Chronic diastolic congestive heart failure    Pulmonary fibrosis, unspecified (Multi)   [2]   Allergies  Allergen Reactions     Amoxicillin Unknown    Flu Vac 2020 65up-[id number](Pf) Other     Dizziness, Double vision    Grass Pollen Unknown    House Dust Unknown    Mold Unknown    Other Dizziness     Dizziness, Double vision    Penicillins Other     unsure    Tree And Shrub Pollen Unknown

## 2025-06-27 ENCOUNTER — TELEPHONE (OUTPATIENT)
Facility: CLINIC | Age: 75
End: 2025-06-27
Payer: MEDICARE

## 2025-07-11 ENCOUNTER — TELEPHONE (OUTPATIENT)
Facility: CLINIC | Age: 75
End: 2025-07-11
Payer: MEDICARE

## 2025-07-14 ENCOUNTER — TELEPHONE (OUTPATIENT)
Dept: PRIMARY CARE | Facility: CLINIC | Age: 75
End: 2025-07-14
Payer: MEDICARE

## 2025-07-14 NOTE — TELEPHONE ENCOUNTER
Patient's  is asking for an order for palliative care with hospice of the [facility].

## 2025-07-16 ENCOUNTER — TELEMEDICINE (OUTPATIENT)
Dept: PRIMARY CARE | Facility: CLINIC | Age: 75
End: 2025-07-16
Payer: MEDICARE

## 2025-07-16 DIAGNOSIS — I10 HYPERTENSION, UNSPECIFIED TYPE: ICD-10-CM

## 2025-07-16 DIAGNOSIS — E78.5 HYPERLIPIDEMIA, UNSPECIFIED HYPERLIPIDEMIA TYPE: ICD-10-CM

## 2025-07-16 DIAGNOSIS — I25.10 CAD, MULTIPLE VESSEL: ICD-10-CM

## 2025-07-16 DIAGNOSIS — D50.0 ANEMIA DUE TO BLOOD LOSS: ICD-10-CM

## 2025-07-16 DIAGNOSIS — C56.9 PRIMARY MALIGNANT NEOPLASM OF OVARY WITH WIDESPREAD METASTATIC DISEASE, UNSPECIFIED LATERALITY (MULTI): Primary | ICD-10-CM

## 2025-07-16 DIAGNOSIS — C80.0 PRIMARY MALIGNANT NEOPLASM OF OVARY WITH WIDESPREAD METASTATIC DISEASE, UNSPECIFIED LATERALITY (MULTI): Primary | ICD-10-CM

## 2025-07-16 DIAGNOSIS — E03.9 HYPOTHYROIDISM, UNSPECIFIED TYPE: ICD-10-CM

## 2025-07-16 DIAGNOSIS — E11.69 TYPE 2 DIABETES MELLITUS WITH OTHER SPECIFIED COMPLICATION, WITHOUT LONG-TERM CURRENT USE OF INSULIN: ICD-10-CM

## 2025-07-16 PROCEDURE — 1159F MED LIST DOCD IN RCRD: CPT | Performed by: INTERNAL MEDICINE

## 2025-07-16 PROCEDURE — 99214 OFFICE O/P EST MOD 30 MIN: CPT | Performed by: INTERNAL MEDICINE

## 2025-07-16 PROCEDURE — 4010F ACE/ARB THERAPY RXD/TAKEN: CPT | Performed by: INTERNAL MEDICINE

## 2025-07-16 PROCEDURE — 1036F TOBACCO NON-USER: CPT | Performed by: INTERNAL MEDICINE

## 2025-07-16 PROCEDURE — 1160F RVW MEDS BY RX/DR IN RCRD: CPT | Performed by: INTERNAL MEDICINE

## 2025-07-16 PROCEDURE — G2211 COMPLEX E/M VISIT ADD ON: HCPCS | Performed by: INTERNAL MEDICINE

## 2025-07-16 NOTE — PROGRESS NOTES
Subjective   Patient ID: Catalina Mendoza is a 74 y.o. female who presents for Request For Order(s) (Wants order palliative care hospice ).  Virtual or Telephone Consent    An interactive audio and video telecommunication system which permits real time communications between the patient (at the originating site) and provider (at the distant site) was utilized to provide this telehealth service.   Verbal consent was requested and obtained from Catalina Mendoza on this date, 07/16/25 for a telehealth visit and the patient's location was confirmed at the time of the visit.   [facility]     Tele health    Wants to discuss hospice care  Pt in palliative care now  discussed     Has good days and bad     anemia due to blood loss       s/p 2 units      EGD negative      Oncology following      LUQ pain x 2-21 unchanged  Now goes around upper abdomen  Before cancer  CT June rev'd responded well to chemo  On cymbalta   Saw cardio work up neg  pain mgmt pending     patient had surgery for stage III ovarian cancer on March 13, 2023.  Currently in chemo.  Oncology following  Now metastatic  Notes rev'd  Prognosis poor     DM -2  on insulin no side effects. Endocrine following.   HBA1C 8.2  1-24  Endo following     Hypercholesterolemia on therapy no side effects     Hypothyroid on therapy no side effects  Endo following     Hypertension stable  on therapy gets dizzy when stands up  on losartan 50 mg daily   follow     CAD stable on therapy no side effects     Diet and exercise reviewed     Check mammogram and labs not done    Review of Systems   All other systems reviewed and are negative.      Objective   LMP  (LMP Unknown)     Physical Exam  Constitutional:       Appearance: Normal appearance. She is obese.   Pulmonary:      Effort: Pulmonary effort is normal.     Neurological:      Mental Status: She is alert.     Psychiatric:         Mood and Affect: Mood normal.         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) - Primary C56.9, C80.0    Diabetes mellitus (Multi) E11.9    Anemia due to blood loss D50.0     Other Visit Diagnoses         Codes      Hypertension, unspecified type     I10                 Tele health    Wants to discuss hospice care  Pt in palliative care now  discussed     Has good days and bad     anemia due to blood loss       s/p 2 units      EGD negative      Oncology following      LUQ pain x 2-21 unchanged  Now goes around upper abdomen  Before cancer  CT June rev'd responded well to chemo  On cymbalta   Saw cardio work up neg  pain mgmt pending     patient had surgery for stage III ovarian cancer on March 13, 2023.  Currently in chemo.  Oncology following  Now metastatic  Notes rev'd  Prognosis poor     DM -2  on insulin no side effects. Endocrine following.   HBA1C 8.2  1-24  Endo following     Hypercholesterolemia on therapy no side effects     Hypothyroid on therapy no side effects  Endo following     Hypertension stable  on therapy gets dizzy when stands up  on losartan 50 mg daily   follow     CAD stable on therapy no side effects     Diet and exercise reviewed     Check mammogram and labs not done    Mammogram 2-24 / per oncology  Dexa n/a  Colonoscopy 2017  due 2027  CT chest lung cancer screening n/a  GYN n/a  immunizations rev'd RSV, COVID, Pneumo  (visual loss with flu yrs ago)  BMI 25.8    Follow up as needed

## 2025-07-17 ENCOUNTER — TELEPHONE (OUTPATIENT)
Facility: CLINIC | Age: 75
End: 2025-07-17
Payer: MEDICARE

## 2025-07-17 NOTE — TELEPHONE ENCOUNTER
Pt called in to have you review her blood sugars in dexcom clarity. Pt is on     Lantus 22 units  Humalog b-54 l-44 d-44    Pt was placed back on prednisone 10mg tapering dose starting with  70mg a day for 1 week taper down 10mg per week .

## 2025-07-18 ENCOUNTER — TELEPHONE (OUTPATIENT)
Facility: CLINIC | Age: 75
End: 2025-07-18
Payer: MEDICARE

## 2025-08-14 DIAGNOSIS — E78.5 HYPERLIPIDEMIA, UNSPECIFIED HYPERLIPIDEMIA TYPE: ICD-10-CM

## 2025-08-14 RX ORDER — FENOFIBRATE 160 MG/1
160 TABLET ORAL DAILY
Qty: 90 TABLET | Refills: 0 | Status: SHIPPED | OUTPATIENT
Start: 2025-08-14

## 2025-08-20 DIAGNOSIS — I10 HYPERTENSION, UNSPECIFIED TYPE: ICD-10-CM

## 2025-08-24 RX ORDER — LOSARTAN POTASSIUM 50 MG/1
50 TABLET ORAL DAILY
Qty: 90 TABLET | Refills: 0 | Status: SHIPPED | OUTPATIENT
Start: 2025-08-24

## 2025-10-14 ENCOUNTER — APPOINTMENT (OUTPATIENT)
Dept: ENDOCRINOLOGY | Facility: CLINIC | Age: 75
End: 2025-10-14
Payer: MEDICARE